# Patient Record
Sex: FEMALE | Race: WHITE | NOT HISPANIC OR LATINO | Employment: OTHER | URBAN - METROPOLITAN AREA
[De-identification: names, ages, dates, MRNs, and addresses within clinical notes are randomized per-mention and may not be internally consistent; named-entity substitution may affect disease eponyms.]

---

## 2017-01-16 ENCOUNTER — GENERIC CONVERSION - ENCOUNTER (OUTPATIENT)
Dept: OTHER | Facility: OTHER | Age: 77
End: 2017-01-16

## 2017-01-18 ENCOUNTER — GENERIC CONVERSION - ENCOUNTER (OUTPATIENT)
Dept: OTHER | Facility: OTHER | Age: 77
End: 2017-01-18

## 2017-02-27 ENCOUNTER — APPOINTMENT (OUTPATIENT)
Dept: LAB | Facility: CLINIC | Age: 77
End: 2017-02-27
Payer: MEDICARE

## 2017-02-27 ENCOUNTER — TRANSCRIBE ORDERS (OUTPATIENT)
Dept: LAB | Facility: CLINIC | Age: 77
End: 2017-02-27

## 2017-02-27 DIAGNOSIS — Z79.899 ENCOUNTER FOR LONG-TERM (CURRENT) USE OF OTHER MEDICATIONS: Primary | ICD-10-CM

## 2017-02-27 DIAGNOSIS — Z79.899 ENCOUNTER FOR LONG-TERM (CURRENT) USE OF OTHER MEDICATIONS: ICD-10-CM

## 2017-02-27 LAB
25(OH)D3 SERPL-MCNC: 22.3 NG/ML (ref 30–100)
ALT SERPL W P-5'-P-CCNC: 21 U/L (ref 12–78)
ANION GAP SERPL CALCULATED.3IONS-SCNC: 6 MMOL/L (ref 4–13)
BUN SERPL-MCNC: 17 MG/DL (ref 5–25)
CALCIUM SERPL-MCNC: 9.1 MG/DL (ref 8.3–10.1)
CHLORIDE SERPL-SCNC: 106 MMOL/L (ref 100–108)
CHOLEST SERPL-MCNC: 199 MG/DL (ref 50–200)
CO2 SERPL-SCNC: 30 MMOL/L (ref 21–32)
CREAT SERPL-MCNC: 0.78 MG/DL (ref 0.6–1.3)
GFR SERPL CREATININE-BSD FRML MDRD: >60 ML/MIN/1.73SQ M
GLUCOSE SERPL-MCNC: 95 MG/DL (ref 65–140)
HCT VFR BLD AUTO: 39.6 % (ref 34.8–46.1)
HDLC SERPL-MCNC: 83 MG/DL (ref 40–60)
HGB BLD-MCNC: 13.2 G/DL (ref 11.5–15.4)
LDLC SERPL CALC-MCNC: 99 MG/DL (ref 0–100)
POTASSIUM SERPL-SCNC: 3.8 MMOL/L (ref 3.5–5.3)
SODIUM SERPL-SCNC: 142 MMOL/L (ref 136–145)
TRIGL SERPL-MCNC: 83 MG/DL

## 2017-02-27 PROCEDURE — 85014 HEMATOCRIT: CPT

## 2017-02-27 PROCEDURE — 80061 LIPID PANEL: CPT

## 2017-02-27 PROCEDURE — 36415 COLL VENOUS BLD VENIPUNCTURE: CPT

## 2017-02-27 PROCEDURE — 84460 ALANINE AMINO (ALT) (SGPT): CPT

## 2017-02-27 PROCEDURE — 82306 VITAMIN D 25 HYDROXY: CPT

## 2017-02-27 PROCEDURE — 80048 BASIC METABOLIC PNL TOTAL CA: CPT

## 2017-02-27 PROCEDURE — 85018 HEMOGLOBIN: CPT

## 2017-04-25 ENCOUNTER — ALLSCRIPTS OFFICE VISIT (OUTPATIENT)
Dept: OTHER | Facility: OTHER | Age: 77
End: 2017-04-25

## 2017-05-03 ENCOUNTER — ANESTHESIA EVENT (OUTPATIENT)
Dept: PERIOP | Facility: AMBULARY SURGERY CENTER | Age: 77
End: 2017-05-03
Payer: MEDICARE

## 2017-05-04 ENCOUNTER — HOSPITAL ENCOUNTER (OUTPATIENT)
Facility: AMBULARY SURGERY CENTER | Age: 77
Setting detail: OUTPATIENT SURGERY
Discharge: HOME | End: 2017-05-04
Attending: ORTHOPAEDIC SURGERY | Admitting: ORTHOPAEDIC SURGERY
Payer: MEDICARE

## 2017-05-04 ENCOUNTER — ANESTHESIA (OUTPATIENT)
Dept: PERIOP | Facility: AMBULARY SURGERY CENTER | Age: 77
End: 2017-05-04
Payer: MEDICARE

## 2017-05-04 VITALS
SYSTOLIC BLOOD PRESSURE: 130 MMHG | OXYGEN SATURATION: 98 % | RESPIRATION RATE: 20 BRPM | HEART RATE: 76 BPM | TEMPERATURE: 97 F | DIASTOLIC BLOOD PRESSURE: 68 MMHG

## 2017-05-04 PROCEDURE — C1713 ANCHOR/SCREW BN/BN,TIS/BN: HCPCS | Performed by: ORTHOPAEDIC SURGERY

## 2017-05-04 PROCEDURE — 93005 ELECTROCARDIOGRAM TRACING: CPT | Performed by: ANESTHESIOLOGY

## 2017-05-04 DEVICE — SYSTEM IMPLANT SPEEDBRIDGE W/4.75 BCMPS SWIVELOCK C: Type: IMPLANTABLE DEVICE | Site: SHOULDER | Status: FUNCTIONAL

## 2017-05-04 RX ORDER — ROPIVACAINE HYDROCHLORIDE 5 MG/ML
INJECTION, SOLUTION EPIDURAL; INFILTRATION; PERINEURAL AS NEEDED
Status: DISCONTINUED | OUTPATIENT
Start: 2017-05-04 | End: 2017-05-04 | Stop reason: SURG

## 2017-05-04 RX ORDER — CLINDAMYCIN PHOSPHATE 900 MG/50ML
900 INJECTION INTRAVENOUS ONCE
Status: COMPLETED | OUTPATIENT
Start: 2017-05-04 | End: 2017-05-04

## 2017-05-04 RX ORDER — PROPOFOL 10 MG/ML
INJECTION, EMULSION INTRAVENOUS AS NEEDED
Status: DISCONTINUED | OUTPATIENT
Start: 2017-05-04 | End: 2017-05-04 | Stop reason: SURG

## 2017-05-04 RX ORDER — OXYCODONE HYDROCHLORIDE AND ACETAMINOPHEN 5; 325 MG/1; MG/1
2 TABLET ORAL EVERY 4 HOURS PRN
Status: DISCONTINUED | OUTPATIENT
Start: 2017-05-04 | End: 2017-05-04 | Stop reason: HOSPADM

## 2017-05-04 RX ORDER — MIDAZOLAM HYDROCHLORIDE 1 MG/ML
INJECTION INTRAMUSCULAR; INTRAVENOUS AS NEEDED
Status: DISCONTINUED | OUTPATIENT
Start: 2017-05-04 | End: 2017-05-04 | Stop reason: SURG

## 2017-05-04 RX ORDER — PROPOFOL 10 MG/ML
INJECTION, EMULSION INTRAVENOUS CONTINUOUS PRN
Status: DISCONTINUED | OUTPATIENT
Start: 2017-05-04 | End: 2017-05-04 | Stop reason: SURG

## 2017-05-04 RX ORDER — SODIUM CHLORIDE, SODIUM LACTATE, POTASSIUM CHLORIDE, CALCIUM CHLORIDE 600; 310; 30; 20 MG/100ML; MG/100ML; MG/100ML; MG/100ML
75 INJECTION, SOLUTION INTRAVENOUS CONTINUOUS
Status: DISCONTINUED | OUTPATIENT
Start: 2017-05-04 | End: 2017-05-04 | Stop reason: HOSPADM

## 2017-05-04 RX ORDER — FENTANYL CITRATE 50 UG/ML
INJECTION, SOLUTION INTRAMUSCULAR; INTRAVENOUS AS NEEDED
Status: DISCONTINUED | OUTPATIENT
Start: 2017-05-04 | End: 2017-05-04 | Stop reason: SURG

## 2017-05-04 RX ADMIN — FENTANYL CITRATE 25 MCG: 50 INJECTION, SOLUTION INTRAMUSCULAR; INTRAVENOUS at 08:18

## 2017-05-04 RX ADMIN — MIDAZOLAM HYDROCHLORIDE 0.5 MG: 1 INJECTION, SOLUTION INTRAMUSCULAR; INTRAVENOUS at 09:05

## 2017-05-04 RX ADMIN — MIDAZOLAM HYDROCHLORIDE 0.5 MG: 1 INJECTION, SOLUTION INTRAMUSCULAR; INTRAVENOUS at 08:18

## 2017-05-04 RX ADMIN — FENTANYL CITRATE 25 MCG: 50 INJECTION, SOLUTION INTRAMUSCULAR; INTRAVENOUS at 09:05

## 2017-05-04 RX ADMIN — OXYCODONE HYDROCHLORIDE AND ACETAMINOPHEN 2 TABLET: 5; 325 TABLET ORAL at 10:51

## 2017-05-04 RX ADMIN — PROPOFOL 50 MG: 10 INJECTION, EMULSION INTRAVENOUS at 09:05

## 2017-05-04 RX ADMIN — ROPIVACAINE HYDROCHLORIDE 30 ML: 5 INJECTION, SOLUTION EPIDURAL; INFILTRATION; PERINEURAL at 08:24

## 2017-05-04 RX ADMIN — PROPOFOL 80 MCG/KG/MIN: 10 INJECTION, EMULSION INTRAVENOUS at 09:05

## 2017-05-04 RX ADMIN — SODIUM CHLORIDE, SODIUM LACTATE, POTASSIUM CHLORIDE, AND CALCIUM CHLORIDE: .6; .31; .03; .02 INJECTION, SOLUTION INTRAVENOUS at 08:00

## 2017-05-04 RX ADMIN — MIDAZOLAM HYDROCHLORIDE 1 MG: 1 INJECTION, SOLUTION INTRAMUSCULAR; INTRAVENOUS at 09:15

## 2017-05-04 RX ADMIN — CLINDAMYCIN PHOSPHATE 600 MG: 18 INJECTION, SOLUTION INTRAMUSCULAR; INTRAVENOUS at 09:02

## 2017-05-04 RX ADMIN — FENTANYL CITRATE 25 MCG: 50 INJECTION, SOLUTION INTRAMUSCULAR; INTRAVENOUS at 09:25

## 2017-05-04 RX ADMIN — FENTANYL CITRATE 25 MCG: 50 INJECTION, SOLUTION INTRAMUSCULAR; INTRAVENOUS at 10:10

## 2017-05-04 RX ADMIN — ROPIVACAINE HYDROCHLORIDE: 2 INJECTION, SOLUTION EPIDURAL; INFILTRATION at 10:37

## 2017-05-05 LAB
ATRIAL RATE: 61 BPM
P AXIS: 71 DEGREES
PR INTERVAL: 150 MS
QRS AXIS: 57 DEGREES
QRSD INTERVAL: 78 MS
QT INTERVAL: 440 MS
QTC INTERVAL: 442 MS
T WAVE AXIS: 43 DEGREES
VENTRICULAR RATE: 61 BPM

## 2017-05-12 ENCOUNTER — ALLSCRIPTS OFFICE VISIT (OUTPATIENT)
Dept: OTHER | Facility: OTHER | Age: 77
End: 2017-05-12

## 2017-06-16 ENCOUNTER — ALLSCRIPTS OFFICE VISIT (OUTPATIENT)
Dept: OTHER | Facility: OTHER | Age: 77
End: 2017-06-16

## 2017-06-16 DIAGNOSIS — M75.121 COMPLETE TEAR OF RIGHT ROTATOR CUFF: ICD-10-CM

## 2017-06-21 ENCOUNTER — GENERIC CONVERSION - ENCOUNTER (OUTPATIENT)
Dept: OTHER | Facility: OTHER | Age: 77
End: 2017-06-21

## 2017-07-28 ENCOUNTER — ALLSCRIPTS OFFICE VISIT (OUTPATIENT)
Dept: OTHER | Facility: OTHER | Age: 77
End: 2017-07-28

## 2017-07-31 ENCOUNTER — GENERIC CONVERSION - ENCOUNTER (OUTPATIENT)
Dept: OTHER | Facility: OTHER | Age: 77
End: 2017-07-31

## 2017-08-04 ENCOUNTER — GENERIC CONVERSION - ENCOUNTER (OUTPATIENT)
Dept: OTHER | Facility: OTHER | Age: 77
End: 2017-08-04

## 2017-09-11 ENCOUNTER — GENERIC CONVERSION - ENCOUNTER (OUTPATIENT)
Dept: OTHER | Facility: OTHER | Age: 77
End: 2017-09-11

## 2017-09-20 ENCOUNTER — TRANSCRIBE ORDERS (OUTPATIENT)
Dept: LAB | Facility: CLINIC | Age: 77
End: 2017-09-20

## 2017-09-20 ENCOUNTER — APPOINTMENT (OUTPATIENT)
Dept: LAB | Facility: CLINIC | Age: 77
End: 2017-09-20
Payer: MEDICARE

## 2017-09-20 DIAGNOSIS — I10 ESSENTIAL HYPERTENSION, BENIGN: Primary | ICD-10-CM

## 2017-09-20 DIAGNOSIS — D64.81 ANEMIA DUE TO CHEMOTHERAPY: ICD-10-CM

## 2017-09-20 DIAGNOSIS — D72.820 PERSISTENT LYMPHOCYTOSIS: ICD-10-CM

## 2017-09-20 DIAGNOSIS — E78.00 PURE HYPERCHOLESTEROLEMIA: ICD-10-CM

## 2017-09-20 DIAGNOSIS — Z79.899 ENCOUNTER FOR LONG-TERM (CURRENT) USE OF OTHER MEDICATIONS: ICD-10-CM

## 2017-09-20 DIAGNOSIS — T45.1X5A ANEMIA DUE TO CHEMOTHERAPY: ICD-10-CM

## 2017-09-20 LAB
ALBUMIN SERPL BCP-MCNC: 3.6 G/DL (ref 3.5–5)
ALP SERPL-CCNC: 81 U/L (ref 46–116)
ALT SERPL W P-5'-P-CCNC: 18 U/L (ref 12–78)
ANION GAP SERPL CALCULATED.3IONS-SCNC: 7 MMOL/L (ref 4–13)
AST SERPL W P-5'-P-CCNC: 13 U/L (ref 5–45)
BASOPHILS # BLD AUTO: 0.04 THOUSANDS/ΜL (ref 0–0.1)
BASOPHILS NFR BLD AUTO: 1 % (ref 0–1)
BILIRUB SERPL-MCNC: 1.17 MG/DL (ref 0.2–1)
BUN SERPL-MCNC: 16 MG/DL (ref 5–25)
CALCIUM SERPL-MCNC: 9.5 MG/DL (ref 8.3–10.1)
CHLORIDE SERPL-SCNC: 106 MMOL/L (ref 100–108)
CHOLEST SERPL-MCNC: 228 MG/DL (ref 50–200)
CO2 SERPL-SCNC: 28 MMOL/L (ref 21–32)
CREAT SERPL-MCNC: 0.82 MG/DL (ref 0.6–1.3)
EOSINOPHIL # BLD AUTO: 0.09 THOUSAND/ΜL (ref 0–0.61)
EOSINOPHIL NFR BLD AUTO: 2 % (ref 0–6)
ERYTHROCYTE [DISTWIDTH] IN BLOOD BY AUTOMATED COUNT: 13.7 % (ref 11.6–15.1)
GFR SERPL CREATININE-BSD FRML MDRD: 68 ML/MIN/1.73SQ M
GLUCOSE P FAST SERPL-MCNC: 94 MG/DL (ref 65–99)
HCT VFR BLD AUTO: 42.4 % (ref 34.8–46.1)
HDLC SERPL-MCNC: 81 MG/DL (ref 40–60)
HGB BLD-MCNC: 14.4 G/DL (ref 11.5–15.4)
LDLC SERPL CALC-MCNC: 133 MG/DL (ref 0–100)
LYMPHOCYTES # BLD AUTO: 2.26 THOUSANDS/ΜL (ref 0.6–4.47)
LYMPHOCYTES NFR BLD AUTO: 46 % (ref 14–44)
MCH RBC QN AUTO: 31.7 PG (ref 26.8–34.3)
MCHC RBC AUTO-ENTMCNC: 34 G/DL (ref 31.4–37.4)
MCV RBC AUTO: 93 FL (ref 82–98)
MONOCYTES # BLD AUTO: 0.46 THOUSAND/ΜL (ref 0.17–1.22)
MONOCYTES NFR BLD AUTO: 9 % (ref 4–12)
NEUTROPHILS # BLD AUTO: 2.08 THOUSANDS/ΜL (ref 1.85–7.62)
NEUTS SEG NFR BLD AUTO: 42 % (ref 43–75)
NRBC BLD AUTO-RTO: 0 /100 WBCS
PLATELET # BLD AUTO: 205 THOUSANDS/UL (ref 149–390)
PMV BLD AUTO: 11.8 FL (ref 8.9–12.7)
POTASSIUM SERPL-SCNC: 3.9 MMOL/L (ref 3.5–5.3)
PROT SERPL-MCNC: 7.2 G/DL (ref 6.4–8.2)
RBC # BLD AUTO: 4.54 MILLION/UL (ref 3.81–5.12)
SODIUM SERPL-SCNC: 141 MMOL/L (ref 136–145)
TRIGL SERPL-MCNC: 71 MG/DL
WBC # BLD AUTO: 4.93 THOUSAND/UL (ref 4.31–10.16)

## 2017-09-20 PROCEDURE — 80061 LIPID PANEL: CPT

## 2017-09-20 PROCEDURE — 80053 COMPREHEN METABOLIC PANEL: CPT

## 2017-09-20 PROCEDURE — 36415 COLL VENOUS BLD VENIPUNCTURE: CPT

## 2017-09-20 PROCEDURE — 85025 COMPLETE CBC W/AUTO DIFF WBC: CPT

## 2017-12-21 ENCOUNTER — TRANSCRIBE ORDERS (OUTPATIENT)
Dept: LAB | Facility: CLINIC | Age: 77
End: 2017-12-21

## 2017-12-22 ENCOUNTER — TRANSCRIBE ORDERS (OUTPATIENT)
Dept: LAB | Facility: CLINIC | Age: 77
End: 2017-12-22

## 2017-12-22 ENCOUNTER — APPOINTMENT (OUTPATIENT)
Dept: LAB | Facility: CLINIC | Age: 77
End: 2017-12-22
Payer: MEDICARE

## 2017-12-22 DIAGNOSIS — R94.5 NONSPECIFIC ABNORMAL RESULTS OF LIVER FUNCTION STUDY: ICD-10-CM

## 2017-12-22 DIAGNOSIS — I10 ESSENTIAL HYPERTENSION, MALIGNANT: Primary | ICD-10-CM

## 2017-12-22 DIAGNOSIS — E78.00 PURE HYPERCHOLESTEROLEMIA: ICD-10-CM

## 2017-12-22 LAB
ALBUMIN SERPL BCP-MCNC: 3.7 G/DL (ref 3.5–5)
ALP SERPL-CCNC: 79 U/L (ref 46–116)
ALT SERPL W P-5'-P-CCNC: 17 U/L (ref 12–78)
ANION GAP SERPL CALCULATED.3IONS-SCNC: 5 MMOL/L (ref 4–13)
AST SERPL W P-5'-P-CCNC: 13 U/L (ref 5–45)
BILIRUB DIRECT SERPL-MCNC: 0.27 MG/DL (ref 0–0.2)
BILIRUB SERPL-MCNC: 1.21 MG/DL (ref 0.2–1)
BUN SERPL-MCNC: 18 MG/DL (ref 5–25)
CALCIUM SERPL-MCNC: 9.4 MG/DL (ref 8.3–10.1)
CHLORIDE SERPL-SCNC: 107 MMOL/L (ref 100–108)
CHOLEST SERPL-MCNC: 196 MG/DL (ref 50–200)
CO2 SERPL-SCNC: 29 MMOL/L (ref 21–32)
CREAT SERPL-MCNC: 0.71 MG/DL (ref 0.6–1.3)
GFR SERPL CREATININE-BSD FRML MDRD: 81 ML/MIN/1.73SQ M
GLUCOSE P FAST SERPL-MCNC: 97 MG/DL (ref 65–99)
HDLC SERPL-MCNC: 87 MG/DL (ref 40–60)
LDLC SERPL CALC-MCNC: 97 MG/DL (ref 0–100)
POTASSIUM SERPL-SCNC: 3.9 MMOL/L (ref 3.5–5.3)
PROT SERPL-MCNC: 7.6 G/DL (ref 6.4–8.2)
SODIUM SERPL-SCNC: 141 MMOL/L (ref 136–145)
TRIGL SERPL-MCNC: 60 MG/DL

## 2017-12-22 PROCEDURE — 36415 COLL VENOUS BLD VENIPUNCTURE: CPT

## 2017-12-22 PROCEDURE — 80053 COMPREHEN METABOLIC PANEL: CPT

## 2017-12-22 PROCEDURE — 80061 LIPID PANEL: CPT

## 2017-12-22 PROCEDURE — 82248 BILIRUBIN DIRECT: CPT

## 2018-01-12 VITALS
HEART RATE: 62 BPM | HEIGHT: 60 IN | WEIGHT: 110.5 LBS | BODY MASS INDEX: 21.69 KG/M2 | DIASTOLIC BLOOD PRESSURE: 74 MMHG | SYSTOLIC BLOOD PRESSURE: 166 MMHG

## 2018-01-13 VITALS
HEART RATE: 66 BPM | HEIGHT: 60 IN | SYSTOLIC BLOOD PRESSURE: 150 MMHG | DIASTOLIC BLOOD PRESSURE: 77 MMHG | BODY MASS INDEX: 21.06 KG/M2 | WEIGHT: 107.25 LBS

## 2018-01-13 VITALS
HEART RATE: 70 BPM | BODY MASS INDEX: 21.6 KG/M2 | DIASTOLIC BLOOD PRESSURE: 82 MMHG | HEIGHT: 60 IN | WEIGHT: 110 LBS | SYSTOLIC BLOOD PRESSURE: 168 MMHG

## 2018-04-17 ENCOUNTER — TRANSCRIBE ORDERS (OUTPATIENT)
Dept: LAB | Facility: CLINIC | Age: 78
End: 2018-04-17

## 2018-04-17 ENCOUNTER — APPOINTMENT (OUTPATIENT)
Dept: LAB | Facility: CLINIC | Age: 78
End: 2018-04-17
Payer: MEDICARE

## 2018-04-17 DIAGNOSIS — I10 ESSENTIAL HYPERTENSION, MALIGNANT: Primary | ICD-10-CM

## 2018-04-17 DIAGNOSIS — D64.89 OTHER SPECIFIED ANEMIAS: ICD-10-CM

## 2018-04-17 DIAGNOSIS — E78.00 PURE HYPERCHOLESTEROLEMIA: ICD-10-CM

## 2018-04-17 LAB
ALT SERPL W P-5'-P-CCNC: 13 U/L (ref 12–78)
ANION GAP SERPL CALCULATED.3IONS-SCNC: 6 MMOL/L (ref 4–13)
BUN SERPL-MCNC: 16 MG/DL (ref 5–25)
CALCIUM SERPL-MCNC: 9.3 MG/DL (ref 8.3–10.1)
CHLORIDE SERPL-SCNC: 109 MMOL/L (ref 100–108)
CO2 SERPL-SCNC: 27 MMOL/L (ref 21–32)
CREAT SERPL-MCNC: 0.94 MG/DL (ref 0.6–1.3)
GFR SERPL CREATININE-BSD FRML MDRD: 58 ML/MIN/1.73SQ M
GLUCOSE SERPL-MCNC: 99 MG/DL (ref 65–140)
HCT VFR BLD AUTO: 42.3 % (ref 34.8–46.1)
HGB BLD-MCNC: 13.7 G/DL (ref 11.5–15.4)
POTASSIUM SERPL-SCNC: 4.2 MMOL/L (ref 3.5–5.3)
SODIUM SERPL-SCNC: 142 MMOL/L (ref 136–145)

## 2018-04-17 PROCEDURE — 85018 HEMOGLOBIN: CPT

## 2018-04-17 PROCEDURE — 80048 BASIC METABOLIC PNL TOTAL CA: CPT

## 2018-04-17 PROCEDURE — 85014 HEMATOCRIT: CPT

## 2018-04-17 PROCEDURE — 84460 ALANINE AMINO (ALT) (SGPT): CPT

## 2018-04-17 PROCEDURE — 36415 COLL VENOUS BLD VENIPUNCTURE: CPT

## 2018-04-25 ENCOUNTER — TRANSCRIBE ORDERS (OUTPATIENT)
Dept: ADMINISTRATIVE | Facility: HOSPITAL | Age: 78
End: 2018-04-25

## 2018-04-25 DIAGNOSIS — R09.81 NASAL CONGESTION: ICD-10-CM

## 2018-04-25 DIAGNOSIS — J30.89 SEASONAL ALLERGIC RHINITIS DUE TO OTHER ALLERGIC TRIGGER: Primary | ICD-10-CM

## 2018-04-30 ENCOUNTER — HOSPITAL ENCOUNTER (OUTPATIENT)
Dept: RADIOLOGY | Facility: HOSPITAL | Age: 78
Discharge: HOME | End: 2018-04-30
Attending: OTOLARYNGOLOGY
Payer: MEDICARE

## 2018-04-30 DIAGNOSIS — R09.81 NASAL CONGESTION: ICD-10-CM

## 2018-04-30 DIAGNOSIS — J30.89 SEASONAL ALLERGIC RHINITIS DUE TO OTHER ALLERGIC TRIGGER: ICD-10-CM

## 2018-04-30 PROCEDURE — 70486 CT MAXILLOFACIAL W/O DYE: CPT

## 2018-09-21 ENCOUNTER — TRANSCRIBE ORDERS (OUTPATIENT)
Dept: LAB | Facility: CLINIC | Age: 78
End: 2018-09-21

## 2018-09-21 ENCOUNTER — APPOINTMENT (OUTPATIENT)
Dept: LAB | Facility: CLINIC | Age: 78
End: 2018-09-21
Payer: MEDICARE

## 2018-09-21 DIAGNOSIS — I10 ESSENTIAL HYPERTENSION, MALIGNANT: Primary | ICD-10-CM

## 2018-09-21 DIAGNOSIS — E55.9 VITAMIN D DEFICIENCY: ICD-10-CM

## 2018-09-21 DIAGNOSIS — D63.8 CHRONIC DISEASE ANEMIA: ICD-10-CM

## 2018-09-21 DIAGNOSIS — E78.00 PURE HYPERCHOLESTEROLEMIA: ICD-10-CM

## 2018-09-21 LAB
25(OH)D3 SERPL-MCNC: 37.2 NG/ML (ref 30–100)
HCT VFR BLD AUTO: 40.4 % (ref 34.8–46.1)
HGB BLD-MCNC: 13.3 G/DL (ref 11.5–15.4)

## 2018-09-21 PROCEDURE — 80053 COMPREHEN METABOLIC PANEL: CPT

## 2018-09-21 PROCEDURE — 85018 HEMOGLOBIN: CPT

## 2018-09-21 PROCEDURE — 36415 COLL VENOUS BLD VENIPUNCTURE: CPT

## 2018-09-21 PROCEDURE — 80061 LIPID PANEL: CPT

## 2018-09-21 PROCEDURE — 82306 VITAMIN D 25 HYDROXY: CPT

## 2018-09-21 PROCEDURE — 85014 HEMATOCRIT: CPT

## 2018-09-22 LAB
ALBUMIN SERPL BCP-MCNC: 3.8 G/DL (ref 3.5–5)
ALP SERPL-CCNC: 67 U/L (ref 46–116)
ALT SERPL W P-5'-P-CCNC: 18 U/L (ref 12–78)
ANION GAP SERPL CALCULATED.3IONS-SCNC: 7 MMOL/L (ref 4–13)
AST SERPL W P-5'-P-CCNC: 13 U/L (ref 5–45)
BILIRUB SERPL-MCNC: 1.18 MG/DL (ref 0.2–1)
BUN SERPL-MCNC: 19 MG/DL (ref 5–25)
CALCIUM SERPL-MCNC: 9.7 MG/DL (ref 8.3–10.1)
CHLORIDE SERPL-SCNC: 109 MMOL/L (ref 100–108)
CHOLEST SERPL-MCNC: 201 MG/DL (ref 50–200)
CO2 SERPL-SCNC: 25 MMOL/L (ref 21–32)
CREAT SERPL-MCNC: 0.87 MG/DL (ref 0.6–1.3)
GFR SERPL CREATININE-BSD FRML MDRD: 63 ML/MIN/1.73SQ M
GLUCOSE P FAST SERPL-MCNC: 92 MG/DL (ref 65–99)
HDLC SERPL-MCNC: 75 MG/DL (ref 40–60)
LDLC SERPL CALC-MCNC: 113 MG/DL (ref 0–100)
NONHDLC SERPL-MCNC: 126 MG/DL
POTASSIUM SERPL-SCNC: 4.5 MMOL/L (ref 3.5–5.3)
PROT SERPL-MCNC: 7.2 G/DL (ref 6.4–8.2)
SODIUM SERPL-SCNC: 141 MMOL/L (ref 136–145)
TRIGL SERPL-MCNC: 63 MG/DL

## 2018-12-18 ENCOUNTER — APPOINTMENT (OUTPATIENT)
Dept: LAB | Facility: CLINIC | Age: 78
End: 2018-12-18
Payer: MEDICARE

## 2018-12-18 ENCOUNTER — TRANSCRIBE ORDERS (OUTPATIENT)
Dept: LAB | Facility: CLINIC | Age: 78
End: 2018-12-18

## 2018-12-18 DIAGNOSIS — I10 ESSENTIAL HYPERTENSION, MALIGNANT: Primary | ICD-10-CM

## 2018-12-18 LAB
ALBUMIN SERPL BCP-MCNC: 3.5 G/DL (ref 3.5–5)
ALP SERPL-CCNC: 68 U/L (ref 46–116)
ALT SERPL W P-5'-P-CCNC: 18 U/L (ref 12–78)
ANION GAP SERPL CALCULATED.3IONS-SCNC: 6 MMOL/L (ref 4–13)
AST SERPL W P-5'-P-CCNC: 12 U/L (ref 5–45)
BILIRUB SERPL-MCNC: 0.58 MG/DL (ref 0.2–1)
BUN SERPL-MCNC: 21 MG/DL (ref 5–25)
CALCIUM SERPL-MCNC: 9.6 MG/DL (ref 8.3–10.1)
CHLORIDE SERPL-SCNC: 107 MMOL/L (ref 100–108)
CO2 SERPL-SCNC: 25 MMOL/L (ref 21–32)
CREAT SERPL-MCNC: 0.87 MG/DL (ref 0.6–1.3)
GFR SERPL CREATININE-BSD FRML MDRD: 63 ML/MIN/1.73SQ M
GLUCOSE SERPL-MCNC: 102 MG/DL (ref 65–140)
POTASSIUM SERPL-SCNC: 4 MMOL/L (ref 3.5–5.3)
PROT SERPL-MCNC: 7 G/DL (ref 6.4–8.2)
SODIUM SERPL-SCNC: 138 MMOL/L (ref 136–145)

## 2018-12-18 PROCEDURE — 36415 COLL VENOUS BLD VENIPUNCTURE: CPT

## 2018-12-18 PROCEDURE — 80053 COMPREHEN METABOLIC PANEL: CPT

## 2019-03-12 ENCOUNTER — TRANSCRIBE ORDERS (OUTPATIENT)
Dept: ADMINISTRATIVE | Facility: HOSPITAL | Age: 79
End: 2019-03-12

## 2019-03-12 DIAGNOSIS — R06.02 SOB (SHORTNESS OF BREATH): Primary | ICD-10-CM

## 2019-03-12 DIAGNOSIS — I34.0 MITRAL VALVE INSUFFICIENCY, UNSPECIFIED ETIOLOGY: ICD-10-CM

## 2019-03-25 ENCOUNTER — HOSPITAL ENCOUNTER (OUTPATIENT)
Dept: NON INVASIVE DIAGNOSTICS | Facility: HOSPITAL | Age: 79
Discharge: HOME | End: 2019-03-25
Attending: INTERNAL MEDICINE
Payer: MEDICARE

## 2019-03-25 DIAGNOSIS — I34.0 MITRAL VALVE INSUFFICIENCY, UNSPECIFIED ETIOLOGY: ICD-10-CM

## 2019-03-25 DIAGNOSIS — R06.02 SOB (SHORTNESS OF BREATH): ICD-10-CM

## 2019-03-25 PROCEDURE — 93306 TTE W/DOPPLER COMPLETE: CPT

## 2019-03-26 PROCEDURE — 93306 TTE W/DOPPLER COMPLETE: CPT | Performed by: INTERNAL MEDICINE

## 2019-05-09 ENCOUNTER — TRANSCRIBE ORDERS (OUTPATIENT)
Dept: LAB | Facility: CLINIC | Age: 79
End: 2019-05-09

## 2019-05-09 ENCOUNTER — APPOINTMENT (OUTPATIENT)
Dept: LAB | Facility: CLINIC | Age: 79
End: 2019-05-09
Payer: MEDICARE

## 2019-05-09 DIAGNOSIS — E55.9 AVITAMINOSIS D: ICD-10-CM

## 2019-05-09 DIAGNOSIS — I10 ESSENTIAL HYPERTENSION, MALIGNANT: ICD-10-CM

## 2019-05-09 DIAGNOSIS — E78.00 PURE HYPERCHOLESTEROLEMIA: ICD-10-CM

## 2019-05-09 DIAGNOSIS — I10 ESSENTIAL HYPERTENSION, MALIGNANT: Primary | ICD-10-CM

## 2019-05-09 LAB
25(OH)D3 SERPL-MCNC: 39.2 NG/ML (ref 30–100)
ALBUMIN SERPL BCP-MCNC: 3.6 G/DL (ref 3.5–5)
ALP SERPL-CCNC: 63 U/L (ref 46–116)
ALT SERPL W P-5'-P-CCNC: 29 U/L (ref 12–78)
ANION GAP SERPL CALCULATED.3IONS-SCNC: 5 MMOL/L (ref 4–13)
AST SERPL W P-5'-P-CCNC: 15 U/L (ref 5–45)
BILIRUB SERPL-MCNC: 0.83 MG/DL (ref 0.2–1)
BUN SERPL-MCNC: 31 MG/DL (ref 5–25)
CALCIUM SERPL-MCNC: 9 MG/DL (ref 8.3–10.1)
CHLORIDE SERPL-SCNC: 105 MMOL/L (ref 100–108)
CHOLEST SERPL-MCNC: 219 MG/DL (ref 50–200)
CO2 SERPL-SCNC: 29 MMOL/L (ref 21–32)
CREAT SERPL-MCNC: 1.17 MG/DL (ref 0.6–1.3)
GFR SERPL CREATININE-BSD FRML MDRD: 44 ML/MIN/1.73SQ M
GLUCOSE P FAST SERPL-MCNC: 95 MG/DL (ref 65–99)
HCT VFR BLD AUTO: 40.7 % (ref 34.8–46.1)
HDLC SERPL-MCNC: 70 MG/DL (ref 40–60)
HGB BLD-MCNC: 13.2 G/DL (ref 11.5–15.4)
LDLC SERPL CALC-MCNC: 134 MG/DL (ref 0–100)
NONHDLC SERPL-MCNC: 149 MG/DL
POTASSIUM SERPL-SCNC: 4.3 MMOL/L (ref 3.5–5.3)
PROT SERPL-MCNC: 7.3 G/DL (ref 6.4–8.2)
SODIUM SERPL-SCNC: 139 MMOL/L (ref 136–145)
TRIGL SERPL-MCNC: 77 MG/DL

## 2019-05-09 PROCEDURE — 85018 HEMOGLOBIN: CPT

## 2019-05-09 PROCEDURE — 80053 COMPREHEN METABOLIC PANEL: CPT

## 2019-05-09 PROCEDURE — 82306 VITAMIN D 25 HYDROXY: CPT

## 2019-05-09 PROCEDURE — 85014 HEMATOCRIT: CPT

## 2019-05-09 PROCEDURE — 80061 LIPID PANEL: CPT

## 2019-05-09 PROCEDURE — 36415 COLL VENOUS BLD VENIPUNCTURE: CPT

## 2019-08-22 PROBLEM — Z87.898 HISTORY OF SYNCOPE: Status: ACTIVE | Noted: 2019-08-22

## 2019-08-22 PROBLEM — G43.909 MIGRAINE: Status: ACTIVE | Noted: 2019-08-22

## 2019-08-22 PROBLEM — R60.0 EDEMA OF EXTREMITIES: Status: ACTIVE | Noted: 2019-08-22

## 2019-08-22 PROBLEM — J30.9 RHINITIS, ALLERGIC: Status: ACTIVE | Noted: 2019-08-22

## 2019-08-22 PROBLEM — K21.9 GASTROESOPHAGEAL REFLUX DISEASE: Status: ACTIVE | Noted: 2019-08-22

## 2019-08-22 PROBLEM — I34.0 MITRAL REGURGITATION: Status: ACTIVE | Noted: 2019-08-22

## 2019-08-22 PROBLEM — G25.81 RESTLESS LEGS: Status: ACTIVE | Noted: 2019-08-22

## 2019-08-22 PROBLEM — Z78.9 ASPIRIN INTOLERANCE: Status: ACTIVE | Noted: 2019-08-22

## 2019-08-22 PROBLEM — B02.9 HERPES ZOSTER: Status: ACTIVE | Noted: 2019-08-22

## 2019-08-22 PROBLEM — D72.820 PERSISTENT LYMPHOCYTOSIS: Status: ACTIVE | Noted: 2019-08-22

## 2019-08-22 PROBLEM — R93.1 ABNORMAL ECHOCARDIOGRAM: Status: ACTIVE | Noted: 2019-08-22

## 2019-08-22 PROBLEM — E55.9 VITAMIN D DEFICIENCY: Status: ACTIVE | Noted: 2019-08-22

## 2019-08-22 PROBLEM — R73.02 GLUCOSE INTOLERANCE (IMPAIRED GLUCOSE TOLERANCE): Status: ACTIVE | Noted: 2019-08-22

## 2019-08-22 PROBLEM — E87.6 HYPOKALEMIA: Status: ACTIVE | Noted: 2019-08-22

## 2019-08-22 PROBLEM — E78.00 HYPERCHOLESTEREMIA: Status: ACTIVE | Noted: 2019-08-22

## 2019-08-22 PROBLEM — K58.9 IRRITABLE BOWEL SYNDROME: Status: ACTIVE | Noted: 2019-08-22

## 2019-08-22 PROBLEM — R17 ELEVATED BILIRUBIN: Status: ACTIVE | Noted: 2019-08-22

## 2019-08-22 PROBLEM — K57.90 DIVERTICULOSIS: Status: ACTIVE | Noted: 2019-08-22

## 2019-08-22 PROBLEM — I10 ESSENTIAL HYPERTENSION: Status: ACTIVE | Noted: 2019-08-22

## 2019-08-22 PROBLEM — I83.90 VARICOSE VEIN OF LEG: Status: ACTIVE | Noted: 2019-08-22

## 2019-11-20 PROBLEM — J20.8 ACUTE BRONCHITIS DUE TO OTHER SPECIFIED ORGANISMS: Status: ACTIVE | Noted: 2019-11-20

## 2020-01-13 ENCOUNTER — TELEPHONE (OUTPATIENT)
Dept: SURGERY | Facility: CLINIC | Age: 80
End: 2020-01-13

## 2020-01-16 ENCOUNTER — CONSULT (OUTPATIENT)
Dept: SURGERY | Facility: CLINIC | Age: 80
End: 2020-01-16
Payer: MEDICARE

## 2020-01-16 VITALS
DIASTOLIC BLOOD PRESSURE: 82 MMHG | TEMPERATURE: 98.3 F | WEIGHT: 122.5 LBS | HEIGHT: 61 IN | SYSTOLIC BLOOD PRESSURE: 134 MMHG | BODY MASS INDEX: 23.13 KG/M2 | HEART RATE: 69 BPM

## 2020-01-16 DIAGNOSIS — M20.42 HAMMER TOE OF LEFT FOOT: Primary | ICD-10-CM

## 2020-01-16 PROCEDURE — 99203 OFFICE O/P NEW LOW 30 MIN: CPT | Performed by: SPECIALIST

## 2020-01-16 NOTE — PROGRESS NOTES
History and Physical Examination - General Surgery   [facility]  Jessie Pal 78 y o  female MRN: [id number]  Unit/Bed#:  Encounter: [id number] PCP: Delon Barrios MD    History of Present Illness   Chief Complaint:    I have a hammertoe and I want to have the surgery done for it    HPI:  Jessie Simmons is a 78 y o  female who presents to office with hammertoe left 2nd  With the little ulcer which patient has developed since she has started wearing new shoes which are slightly tight    Patient is here for hammertoe surgery    Historical Information   The following portions of the patient's history were reviewed and updated as appropriate  Past Medical History:   Diagnosis Date    Allergic rhinitis     Arthritis     Baker's cyst of knee 10/21/2016    right- burst on its own    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    hx of-fell when ice skating  Noted brain bleed w/swelling    Contact lens/glasses fitting     contact only in left eye    GERD (gastroesophageal reflux disease)     History of shingles     bilateral eyes-x3 episodes    HL (hearing loss)     Hypertension     recent elevation put on norvasc-now controlled    Loss of smell     since brain injury 1992    Migraine     PONV (postoperative nausea and vomiting)     Raynaud's disease     both hands    Seasonal allergies     Shoulder problem 10/2016    rotator cuff issue-right    Tendonitis of shoulder     right     Past Surgical History:   Procedure Laterality Date    AXILLARY SURGERY Left     fatty cyst removed, benign    CATARACT EXTRACTION Bilateral     CATARACT EXTRACTION W/ INTRAOCULAR LENS IMPLANT Right 11/10/2016    Procedure: EXTRACTION EXTRACAPSULAR CATARACT PHACO INTRAOCULAR LENS (IOL);   Surgeon: Nestor Galvan MD;  Location: [facility] MAIN OR;  Service:    [address] UTERUS      MYRINGOTOMY W/ TUBES  2011    both ears-one tube out on the left    MYRINGOTOMY W/ TUBES  UT SHLDR ARTHROSCOP,SURG,W/ROTAT CUFF REPR Right 2017    Procedure: ARTHROSCOPY SHOULDER WITH ROTATOR CUFF REPAIR, BICEPS TENODESIS, AND SUBACROMIAL DECOMPRESSION;  Surgeon: Ike Whitehead MD;  Location: [place] [address] MAIN OR;  Service: Orthopedics    UT XCAPSL CTRC RMVL INSJ IO LENS PROSTH W/O ECP Left 10/20/2016    Procedure: EXTRACTION EXTRACAPSULAR CATARACT PHACO INTRAOCULAR LENS (IOL);   Surgeon: Alejo Ahn MD;  Location: [facility] MAIN OR;  Service: Ophthalmology    SKIN BIOPSY      mole on chest    SKIN BIOPSY      under breast, benign     Social History   Social History     Substance and Sexual Activity   Alcohol Use Yes    Comment: socially     Social History     Substance and Sexual Activity   Drug Use No     Social History     Tobacco Use   Smoking Status Former Smoker    Packs/day: 1 50    Years: 30 00    Pack years: 45 00    Last attempt to quit: Trerebel Byers Years since quittin 0   Smokeless Tobacco Never Used     Family History:   Family History   Problem Relation Age of Onset    Heart disease Mother         exp age 59 MI    Stroke Father     Macular degeneration Sister     Macular degeneration Brother     Diabetes Brother     Cancer Brother         kidney-nephrectomy    Kidney disease Brother         cancer     Meds/Allergies   Allergies   Allergen Reactions    Lactose GI Intolerance    Latex Itching     Elastic,adhesive tape    Dilantin [Phenytoin Sodium Extended] Rash    Other Rash     Adhesive tape, environmental    Penicillins Rash       Current Outpatient Medications:     acetaminophen (TYLENOL) 500 mg tablet, Take 1,000 mg by mouth every 6 (six) hours as needed for mild pain, Disp: , Rfl:     ALPHAGAN P 0 1 %, , Disp: , Rfl: 0    ascorbic acid (VITAMIN C) 500 mg tablet, Take 500 mg by mouth every morning, Disp: , Rfl:     Azelastine-Fluticasone (DYMISTA NA), into each nostril 2 (two) times a day  , Disp: , Rfl:     Biotin (BIOTIN 5000) 5 MG CAPS, Take by mouth every morning, Disp: , Rfl:     Calcium Carbonate-Vitamin D (CALTRATE 600+D PO), Take by mouth every morning, Disp: , Rfl:     Cyanocobalamin (VITAMIN B 12 PO), Take by mouth every morning, Disp: , Rfl:     furosemide (LASIX) 40 mg tablet, Take 1 tablet (40 mg total) by mouth every morning, Disp: 90 tablet, Rfl: 1    ipratropium (ATROVENT) 0 06 % nasal spray, instill 2 sprays into each nostril four times a day if needed, Disp: 15 mL, Rfl: 6    levocetirizine (XYZAL) 5 MG tablet, Take 1 tablet (5 mg total) by mouth every evening, Disp: 90 tablet, Rfl: 3    Lutein 40 MG CAPS, Take by mouth every morning, Disp: , Rfl:     Magnesium 250 MG TABS, Take by mouth every morning, Disp: , Rfl:     Misc Natural Products (TUMERSAID PO), Take 1 tablet by mouth daily, Disp: , Rfl:     olmesartan (BENICAR) 40 mg tablet, Take 1 tablet (40 mg total) by mouth daily, Disp: 90 tablet, Rfl: 1    omeprazole (PriLOSEC) 20 mg delayed release capsule, take 1 capsule by mouth once daily, Disp: 30 capsule, Rfl: 0    Potassium Chloride ER 20 MEQ TBCR, take 1 tablet by mouth once daily, Disp: 90 tablet, Rfl: 0    SUMAtriptan (IMITREX) 50 mg tablet, Take 50 mg by mouth once as needed for migraine, Disp: , Rfl:     vitamin A 7500 UNIT capsule, Take 7,500 Units by mouth every morning  , Disp: , Rfl:     vitamin E, tocopherol, 400 units capsule, Take 400 Units by mouth every morning Last dose 4/26/17, Disp: , Rfl:     zinc gluconate 50 mg tablet, Take 50 mg by mouth every morning, Disp: , Rfl:      REVIEW OF SYSTEMS  Constitutional:  Denies fever or chills   Eyes:  Denies change in visual acuity   HENT:  Denies nasal congestion or sore throat   Respiratory:  Denies cough or shortness of breath   Cardiovascular:  Denies chest pain or edema   GI:  Denies abdominal pain, nausea, vomiting, bloody stools or diarrhea   :  Denies dysuria, frequency, difficulty in micturition and nocturia  Musculoskeletal:  Denies back pain or joint pain Neurologic:  Denies headache, focal weakness or sensory changes   Endocrine:  Denies polyuria or polydipsia   Lymphatic:  Denies swollen glands   Psychiatric:  Denies depression or anxiety     Objective   Current Vitals:   /82 (BP Location: Right arm, Patient Position: Sitting, Cuff Size: Standard)   Pulse 69   Temp 98 3 °F (36 8 °C)   Ht 5' 1" (1 549 m)   Wt 55 6 kg (122 lb 8 oz)   BMI 23 15 kg/m²   Body mass index is 23 15 kg/m²  PHYSICAL EXAMS  General:  Patient is not in acute distress, laying in the bed comfortably, awake, alert responding to commands,   HEENT:  Both pupils normal-size atraumatic, normocephalic, nonicteric  Neck:  JVP not raised  Trachea central  Respiratory:  normal Breath sounds clear to auscultation,  Cardiovascular:  S1-S2 normal without any murmur   GI:  Abdomen soft nontender  Musculoskeletal:  No back pain  Integument:  No skin rashes or ulceration  Neurologic:  Patient is awake alert, responding to command, well-oriented to time and place and person moving all extremities ambulating well    2nd toe hammertoe  With a small ulcer over the dorsum of the toe 0 1 x 0 1cm    Visit Diagnosis:   Diagnoses and all orders for this visit:    Hammer toe of left foot       Plan of care was discussed with patient in detail    Pertinent labs reviewed  Pertinent images and available reads personally reviewed  No results found  Pertinent notes reviewed    Assessment/Plan   Assessment:  Hammertoe with the early pressure ulcer    Plan:  Local wound care with bacitracin ointment  Referred to podiatrist Dr Rich Pepe / Coordination of Care  Expained patient  in detailed about coordination of care  A description of the counseling / coordination of care:  I performed an interim history, pertinent images and labs, performed a physical examination to arrive at the plan delineated above with associated thought processes         Dr Marika Sellers MD [id number] [facility]    Office   Tel  [phone]  Fax   [phone]

## 2020-02-11 ENCOUNTER — OFFICE VISIT (OUTPATIENT)
Dept: PODIATRY | Facility: CLINIC | Age: 80
End: 2020-02-11
Payer: MEDICARE

## 2020-02-11 VITALS
RESPIRATION RATE: 16 BRPM | DIASTOLIC BLOOD PRESSURE: 83 MMHG | WEIGHT: 122 LBS | HEIGHT: 61 IN | HEART RATE: 85 BPM | BODY MASS INDEX: 23.03 KG/M2 | SYSTOLIC BLOOD PRESSURE: 131 MMHG

## 2020-02-11 DIAGNOSIS — M21.961 ACQUIRED DEFORMITY OF RIGHT FOOT: ICD-10-CM

## 2020-02-11 DIAGNOSIS — M20.41 HAMMER TOE OF RIGHT FOOT: ICD-10-CM

## 2020-02-11 DIAGNOSIS — M20.11 HALLUX VALGUS OF RIGHT FOOT: Primary | ICD-10-CM

## 2020-02-11 DIAGNOSIS — M79.671 RIGHT FOOT PAIN: ICD-10-CM

## 2020-02-11 PROCEDURE — 99203 OFFICE O/P NEW LOW 30 MIN: CPT | Performed by: PODIATRIST

## 2020-02-11 PROCEDURE — 73630 X-RAY EXAM OF FOOT: CPT | Performed by: PODIATRIST

## 2020-02-11 NOTE — PROGRESS NOTES
Assessment/Plan:  Pain upon ambulation  Pes planus  Hallux valgus deformity with secondary overlapping 2nd right hammertoe  Plan  Foot exam performed  X-rays taken  Patient educated on condition  Patient may need surgical intervention  This would include Naren bunionectomy with Perez osteotomy  In addition she would need 2nd toe pinning after hammertoe surgery  Today her corn on the dorsum of the 2nd right toe debrided  Gentian violet applied  She will bandage daily  Diagnoses and all orders for this visit:    Hallux valgus of right foot    Hammer toe of right foot    Right foot pain    Acquired deformity of right foot          Subjective:  Patient is seen on referral   Patient has pain in her foot  She has pain with ambulation and shoe wear  This has been ongoing for years  No history of trauma  She gets sores on the top of her 2nd right toe  She has never had an occult infection    Allergies   Allergen Reactions    Lactose GI Intolerance    Latex Itching     Elastic,adhesive tape    Dilantin [Phenytoin Sodium Extended] Rash    Other Rash     Adhesive tape, environmental    Penicillins Rash         Current Outpatient Medications:     acetaminophen (TYLENOL) 500 mg tablet, Take 1,000 mg by mouth every 6 (six) hours as needed for mild pain, Disp: , Rfl:     ALPHAGAN P 0 1 %, , Disp: , Rfl: 0    ascorbic acid (VITAMIN C) 500 mg tablet, Take 500 mg by mouth every morning, Disp: , Rfl:     Azelastine-Fluticasone (DYMISTA NA), into each nostril 2 (two) times a day  , Disp: , Rfl:     Biotin (BIOTIN 5000) 5 MG CAPS, Take by mouth every morning, Disp: , Rfl:     Calcium Carbonate-Vitamin D (CALTRATE 600+D PO), Take by mouth every morning, Disp: , Rfl:     Cyanocobalamin (VITAMIN B 12 PO), Take by mouth every morning, Disp: , Rfl:     furosemide (LASIX) 40 mg tablet, Take 1 tablet (40 mg total) by mouth every morning, Disp: 90 tablet, Rfl: 1    ipratropium (ATROVENT) 0 06 % nasal spray, instill 2 sprays into each nostril four times a day if needed, Disp: 15 mL, Rfl: 6    levocetirizine (XYZAL) 5 MG tablet, Take 1 tablet (5 mg total) by mouth every evening, Disp: 90 tablet, Rfl: 3    Lutein 40 MG CAPS, Take by mouth every morning, Disp: , Rfl:     Magnesium 250 MG TABS, Take by mouth every morning, Disp: , Rfl:     Misc Natural Products (TUMERSAID PO), Take 1 tablet by mouth daily, Disp: , Rfl:     olmesartan (BENICAR) 40 mg tablet, Take 1 tablet (40 mg total) by mouth daily, Disp: 90 tablet, Rfl: 1    omeprazole (PriLOSEC) 20 mg delayed release capsule, take 1 capsule by mouth once daily, Disp: 30 capsule, Rfl: 0    Potassium Chloride ER 20 MEQ TBCR, take 1 tablet by mouth once daily, Disp: 90 tablet, Rfl: 0    SUMAtriptan (IMITREX) 50 mg tablet, Take 50 mg by mouth once as needed for migraine, Disp: , Rfl:     vitamin A 7500 UNIT capsule, Take 7,500 Units by mouth every morning  , Disp: , Rfl:     vitamin E, tocopherol, 400 units capsule, Take 400 Units by mouth every morning Last dose 4/26/17, Disp: , Rfl:     zinc gluconate 50 mg tablet, Take 50 mg by mouth every morning, Disp: , Rfl:     Patient Active Problem List   Diagnosis    Baker cyst, right    Complete tear of right rotator cuff    Right rotator cuff tendinitis    Gastroesophageal reflux disease without esophagitis    Glucose intolerance (impaired glucose tolerance)    Restless legs    Hypercholesteremia    Mitral regurgitation    Migraine    Essential hypertension    Varicose vein of leg    Edema of extremities    Rhinitis, allergic    Aspirin intolerance    Persistent lymphocytosis    Hypokalemia    Irritable bowel syndrome    Abnormal echocardiogram    Vitamin D deficiency    Diverticulosis    Herpes zoster    History of syncope    Acute bronchitis due to other specified organisms    Hammer toe of left foot          Patient ID: Georgiana Snellen is a 78 y o  female      HPI    The following portions of the patient's history were reviewed and updated as appropriate:     family history includes Cancer in her brother; Diabetes in her brother; Heart disease in her mother; Kidney disease in her brother; Macular degeneration in her brother and sister; Stroke in her father  reports that she quit smoking about 25 years ago  She has a 45 00 pack-year smoking history  She has never used smokeless tobacco  She reports that she drinks alcohol  She reports that she does not use drugs  Vitals:    02/11/20 1519   BP: 131/83   Pulse: 85   Resp: 16       Review of Systems      Objective:  Patient's shoes and socks removed  Foot Exam    General  General Appearance: appears stated age and healthy   Orientation: alert and oriented to person, place, and time   Affect: appropriate   Gait: antalgic       Right Foot/Ankle     Inspection and Palpation  Ecchymosis: none  Tenderness: bony tenderness, great toe metatarsophalangeal joint and lesser metatarsophalangeal joints   Swelling: great toe metatarsophalangeal joint   Arch: pes planus  Hammertoes: second toe and fifth toe  Hallux valgus: yes  Hallux limitus: yes  Skin Exam: callus and dry skin;     Neurovascular  Dorsalis pedis: 3+  Posterior tibial: 3+  Saphenous nerve sensation: normal  Tibial nerve sensation: normal  Superficial peroneal nerve sensation: normal  Deep peroneal nerve sensation: normal  Sural nerve sensation: normal      Left Foot/Ankle      Inspection and Palpation  Ecchymosis: none  Tenderness: none   Arch: pes planus  Hammertoes: fifth toe  Hallux valgus: no  Hallux limitus: yes  Skin Exam: callus and dry skin;     Neurovascular  Dorsalis pedis: 3+  Posterior tibial: 3+  Saphenous nerve sensation: normal  Tibial nerve sensation: normal  Superficial peroneal nerve sensation: normal  Deep peroneal nerve sensation: normal  Sural nerve sensation: normal        Physical Exam   Constitutional: She is oriented to person, place, and time   She appears well-developed and well-nourished  Cardiovascular: Normal rate and regular rhythm  Pulses:       Dorsalis pedis pulses are 3+ on the right side, and 3+ on the left side  Posterior tibial pulses are 3+ on the right side, and 3+ on the left side  Musculoskeletal:        Right foot: There is bony tenderness  Patient is pronated stance and gait  She has large hallux valgus deformity of the right foot  It is track bound  There is overlapping hammertoe of the 2nd right toe  It is rigid  X-ray demonstrates hammertoe and bunion formation  Mild osteopenia noted  Osteoarthritis noted as well   Feet:   Right Foot:   Skin Integrity: Positive for callus and dry skin  Left Foot:   Skin Integrity: Positive for callus and dry skin  Neurological: She is alert and oriented to person, place, and time  Skin: Skin is warm and dry  Capillary refill takes less than 2 seconds  Pre ulcer/corn over 2nd right PIPJ  Psychiatric: She has a normal mood and affect  Her behavior is normal  Judgment and thought content normal    Vitals reviewed

## 2020-05-19 PROBLEM — J20.8 ACUTE BRONCHITIS DUE TO OTHER SPECIFIED ORGANISMS: Status: RESOLVED | Noted: 2019-11-20 | Resolved: 2020-05-19

## 2020-05-19 PROBLEM — I34.0 MITRAL REGURGITATION: Status: RESOLVED | Noted: 2019-08-22 | Resolved: 2020-05-19

## 2020-05-19 PROBLEM — I73.00 RAYNAUD'S DISEASE: Status: ACTIVE | Noted: 2020-05-19

## 2020-08-19 ENCOUNTER — OFFICE VISIT (OUTPATIENT)
Dept: URGENT CARE | Facility: CLINIC | Age: 80
End: 2020-08-19
Payer: MEDICARE

## 2020-08-19 VITALS — OXYGEN SATURATION: 96 % | RESPIRATION RATE: 14 BRPM | HEART RATE: 88 BPM | TEMPERATURE: 98.6 F

## 2020-08-19 DIAGNOSIS — Z20.822 SUSPECTED COVID-19 VIRUS INFECTION: Primary | ICD-10-CM

## 2020-08-19 DIAGNOSIS — R52 BODY ACHES: ICD-10-CM

## 2020-08-19 PROCEDURE — 1160F RVW MEDS BY RX/DR IN RCRD: CPT | Performed by: FAMILY MEDICINE

## 2020-08-19 PROCEDURE — 99212 OFFICE O/P EST SF 10 MIN: CPT | Performed by: FAMILY MEDICINE

## 2020-08-19 PROCEDURE — 3079F DIAST BP 80-89 MM HG: CPT | Performed by: FAMILY MEDICINE

## 2020-08-19 PROCEDURE — 1036F TOBACCO NON-USER: CPT | Performed by: FAMILY MEDICINE

## 2020-08-19 PROCEDURE — U0003 INFECTIOUS AGENT DETECTION BY NUCLEIC ACID (DNA OR RNA); SEVERE ACUTE RESPIRATORY SYNDROME CORONAVIRUS 2 (SARS-COV-2) (CORONAVIRUS DISEASE [COVID-19]), AMPLIFIED PROBE TECHNIQUE, MAKING USE OF HIGH THROUGHPUT TECHNOLOGIES AS DESCRIBED BY CMS-2020-01-R: HCPCS | Performed by: FAMILY MEDICINE

## 2020-08-19 PROCEDURE — 3075F SYST BP GE 130 - 139MM HG: CPT | Performed by: FAMILY MEDICINE

## 2020-08-19 NOTE — PATIENT INSTRUCTIONS
Patient tested for COVID-19 in office today  She was informed that we will call her with test results and further instructions at that time  Until then she is to self isolate at home, rest, drink plenty of fluids, take Tylenol as needed  It was explained to the patient that because she traveled to [place] which is a [facility] state, she must remain at home self quarantine for a minimum of 14 days even if her symptoms resolve and COVID-19 test result is negative  Work excuse note provided  I explained that if her symptoms persist despite treatment, worsen, or any new symptoms present, such as but not limited to, persistent high fever, chest pain, or shortness of breath, she is to be seen in the ER  For any follow up care or clearance to return to work, patient is to follow up with her PCP  Patient verbalizes understanding and agrees w/ treatment plan

## 2020-08-19 NOTE — PROGRESS NOTES
[facility]        NAME: Shweta Livingston is a 78 y o  female  : 1940    MRN: [id number]  DATE: 2020  TIME: 3:52 PM    Assessment and Plan   Suspected Covid-19 Virus Infection [Z20 828]  1  Suspected Covid-19 Virus Infection  Novel Coronavirus (COVID-19), PCR [facility] - Collected at [facility]         Patient Instructions     Patient Instructions   Patient tested for COVID-19 in office today  She was informed that we will call her with test results and further instructions at that time  Until then she is to self isolate at home, rest, drink plenty of fluids, take Tylenol as needed  It was explained to the patient that because she traveled to [place] which is a restricted state, she must remain at home self quarantine for a minimum of 14 days even if her symptoms resolve and COVID-19 test result is negative  Work excuse note provided  I explained that if her symptoms persist despite treatment, worsen, or any new symptoms present, such as but not limited to, persistent high fever, chest pain, or shortness of breath, she is to be seen in the ER  For any follow up care or clearance to return to work, patient is to follow up with her PCP  Patient verbalizes understanding and agrees w/ treatment plan  Follow up with PCP in 3-5 days  Proceed to  ER if symptoms worsen  Chief Complaint     Chief Complaint   Patient presents with    COVID-19         History of Present Illness       79 yo female presents for COVID-19 testing  She recently traveled to [place] and returned 4 days ago  She is currently experiencing subjective feeling of fever, chills, headache, body aches, nasal congestion, rhinorrhea, and post-nasal drip x 2 days  No cough or sore throat  No chest pain, SOB, or wheezing  Non-smoker  No GI sx  No skin rashes  She has taken Tylenol for the symptoms  She denies any known exposure to anyone with confirmed COVID-19  She has never been tested for COVID-19 prior to today  Review of Systems   Review of Systems   Constitutional:        As noted in HPI   HENT:        As noted in HPI   Eyes: Negative  Respiratory: Negative  Cardiovascular: Negative  Gastrointestinal: Negative  Genitourinary: Negative  Musculoskeletal: Negative  Skin: Negative  Hematological: Negative            Current Medications       Current Outpatient Medications:     acetaminophen (TYLENOL) 500 mg tablet, Take 1,000 mg by mouth every 6 (six) hours as needed for mild pain, Disp: , Rfl:     ALPHAGAN P 0 1 %, , Disp: , Rfl: 0    ascorbic acid (VITAMIN C) 500 mg tablet, Take 500 mg by mouth every morning, Disp: , Rfl:     Azelastine-Fluticasone (DYMISTA NA), into each nostril 2 (two) times a day  , Disp: , Rfl:     Biotin (BIOTIN 5000) 5 MG CAPS, Take by mouth every morning, Disp: , Rfl:     Calcium Carbonate-Vitamin D (CALTRATE 600+D PO), Take by mouth every morning, Disp: , Rfl:     Cyanocobalamin (VITAMIN B 12 PO), Take by mouth every morning, Disp: , Rfl:     furosemide (LASIX) 40 mg tablet, Take 1 tablet (40 mg total) by mouth every morning, Disp: 90 tablet, Rfl: 1    ipratropium (ATROVENT) 0 06 % nasal spray, instill 2 sprays into each nostril four times a day if needed, Disp: 15 mL, Rfl: 6    levocetirizine (XYZAL) 5 MG tablet, Take 1 tablet (5 mg total) by mouth every evening, Disp: 90 tablet, Rfl: 3    Lutein 40 MG CAPS, Take by mouth every morning, Disp: , Rfl:     Magnesium 250 MG TABS, Take by mouth every morning, Disp: , Rfl:     Misc Natural Products (TUMERSAID PO), Take 1 tablet by mouth daily, Disp: , Rfl:     olmesartan (BENICAR) 40 mg tablet, Take 1 tablet (40 mg total) by mouth daily, Disp: 90 tablet, Rfl: 1    omeprazole (PriLOSEC) 20 mg delayed release capsule, take 1 capsule by mouth once daily, Disp: 30 capsule, Rfl: 3    omeprazole (PriLOSEC) 20 mg delayed release capsule, Take 1 capsule (20 mg total) by mouth daily as needed (HEARTBURN), Disp: 90 capsule, Rfl: 1    Potassium Chloride ER 20 MEQ TBCR, Take 1 tablet (20 mEq total) by mouth daily, Disp: 90 tablet, Rfl: 1    rOPINIRole (REQUIP) 0 5 mg tablet, Take 1 tablet (0 5 mg total) by mouth daily at bedtime, Disp: 90 tablet, Rfl: 1    SUMAtriptan (IMITREX) 50 mg tablet, Take 50 mg by mouth once as needed for migraine, Disp: , Rfl:     vitamin A 7500 UNIT capsule, Take 7,500 Units by mouth every morning  , Disp: , Rfl:     vitamin E, tocopherol, 400 units capsule, Take 400 Units by mouth every morning Last dose 4/26/17, Disp: , Rfl:     zinc gluconate 50 mg tablet, Take 50 mg by mouth every morning, Disp: , Rfl:     Current Allergies     Allergies as of 08/19/2020 - Reviewed 08/19/2020   Allergen Reaction Noted    Lactose GI Intolerance 04/27/2017    Latex Itching 04/27/2017    Dilantin [phenytoin sodium extended] Rash 10/17/2016    Other Rash 10/17/2016    Penicillins Rash 10/17/2016            The following portions of the patient's history were reviewed and updated as appropriate: allergies, current medications, past family history, past medical history, past social history, past surgical history and problem list      Past Medical History:   Diagnosis Date    Allergic rhinitis     Arthritis     Baker's cyst of knee 10/21/2016    right- burst on its own    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    hx of-fell when ice skating   Noted brain bleed w/swelling    Contact lens/glasses fitting     contact only in left eye    GERD (gastroesophageal reflux disease)     History of shingles     bilateral eyes-x3 episodes    HL (hearing loss)     Hypertension     recent elevation put on norvasc-now controlled    Loss of smell     since brain injury 1992    Migraine     PONV (postoperative nausea and vomiting)     Raynaud's disease     both hands    Seasonal allergies     Shoulder problem 10/2016    rotator cuff issue-right    Tendonitis of shoulder     right       Past Surgical History:   Procedure Laterality Date    AXILLARY SURGERY Left     fatty cyst removed, benign    CATARACT EXTRACTION Bilateral     CATARACT EXTRACTION W/ INTRAOCULAR LENS IMPLANT Right 11/10/2016    Procedure: EXTRACTION EXTRACAPSULAR CATARACT PHACO INTRAOCULAR LENS (IOL); Surgeon: Anaya Quezada MD;  Location: [facility] MAIN OR;  Service:    [place] [address] OF UTERUS      MYRINGOTOMY W/ TUBES  2011    both ears-one tube out on the left    MYRINGOTOMY W/ TUBES      FL SHLDR ARTHROSCOP,SURG,W/ROTAT CUFF REPR Right 5/4/2017    Procedure: ARTHROSCOPY SHOULDER WITH ROTATOR CUFF REPAIR, BICEPS TENODESIS, AND SUBACROMIAL DECOMPRESSION;  Surgeon: Cristiane Avilez MD;  Location: [place] [id number] MAIN OR;  Service: Orthopedics    FL XCAPSL CTRC RMVL INSJ IO LENS PROSTH W/O ECP Left 10/20/2016    Procedure: EXTRACTION EXTRACAPSULAR CATARACT PHACO INTRAOCULAR LENS (IOL); Surgeon: Anaya Quezada MD;  Location: [facility] MAIN OR;  Service: Ophthalmology    SKIN BIOPSY      mole on chest    SKIN BIOPSY      under breast, benign       Family History   Problem Relation Age of Onset    Heart disease Mother         exp age 59 MI    Stroke Father     Macular degeneration Sister     Macular degeneration Brother     Diabetes Brother     Cancer Brother         kidney-nephrectomy    Kidney disease Brother         cancer         Medications have been verified  Objective   Pulse 88   Temp 98 6 °F (37 °C) (Tympanic)   Resp 14   SpO2 96%        Physical Exam     Physical Exam  Vitals signs and nursing note reviewed  Constitutional:       General: She is awake  She is not in acute distress  Appearance: Normal appearance  She is well-developed, well-groomed and normal weight  She is not ill-appearing, toxic-appearing or diaphoretic  HENT:      Head: Normocephalic and atraumatic  Nose: Nose normal       Mouth/Throat:      Lips: Pink        Mouth: Mucous membranes are moist    Cardiovascular: Rate and Rhythm: Normal rate  Pulses: Normal pulses  Pulmonary:      Effort: Pulmonary effort is normal  No tachypnea, accessory muscle usage or respiratory distress  Breath sounds: Normal breath sounds and air entry  Skin:     General: Skin is warm and dry  Coloration: Skin is not pale  Neurological:      Mental Status: She is alert and oriented to person, place, and time  Mental status is at baseline  Psychiatric:         Attention and Perception: Attention and perception normal          Mood and Affect: Mood and affect normal          Speech: Speech normal          Behavior: Behavior normal  Behavior is cooperative  Thought Content:  Thought content normal          Cognition and Memory: Cognition and memory normal          Judgment: Judgment normal

## 2020-08-20 LAB — SARS-COV-2 RNA SPEC QL NAA+PROBE: NOT DETECTED

## 2020-08-28 PROBLEM — Z20.822 SUSPECTED COVID-19 VIRUS INFECTION: Status: RESOLVED | Noted: 2020-08-19 | Resolved: 2020-08-28

## 2020-09-11 ENCOUNTER — APPOINTMENT (OUTPATIENT)
Dept: LAB | Facility: CLINIC | Age: 80
End: 2020-09-11
Payer: MEDICARE

## 2020-09-11 DIAGNOSIS — I10 ESSENTIAL HYPERTENSION: ICD-10-CM

## 2020-09-11 DIAGNOSIS — D72.820 LYMPHOCYTOSIS: ICD-10-CM

## 2020-09-11 DIAGNOSIS — J30.89 NON-SEASONAL ALLERGIC RHINITIS DUE TO OTHER ALLERGIC TRIGGER: ICD-10-CM

## 2020-09-11 DIAGNOSIS — K21.9 GASTROESOPHAGEAL REFLUX DISEASE WITHOUT ESOPHAGITIS: ICD-10-CM

## 2020-09-11 DIAGNOSIS — R73.02 GLUCOSE INTOLERANCE (IMPAIRED GLUCOSE TOLERANCE): ICD-10-CM

## 2020-09-11 DIAGNOSIS — E78.00 HYPERCHOLESTEREMIA: ICD-10-CM

## 2020-09-11 DIAGNOSIS — E55.9 VITAMIN D DEFICIENCY: ICD-10-CM

## 2020-09-11 LAB
25(OH)D3 SERPL-MCNC: 39.7 NG/ML (ref 30–100)
ALBUMIN SERPL BCP-MCNC: 3.8 G/DL (ref 3.5–5)
ALP SERPL-CCNC: 57 U/L (ref 46–116)
ALT SERPL W P-5'-P-CCNC: 19 U/L (ref 12–78)
ANION GAP SERPL CALCULATED.3IONS-SCNC: 6 MMOL/L (ref 4–13)
AST SERPL W P-5'-P-CCNC: 10 U/L (ref 5–45)
BASOPHILS # BLD AUTO: 0.04 THOUSANDS/ΜL (ref 0–0.1)
BASOPHILS NFR BLD AUTO: 1 % (ref 0–1)
BILIRUB SERPL-MCNC: 0.76 MG/DL (ref 0.2–1)
BUN SERPL-MCNC: 33 MG/DL (ref 5–25)
CALCIUM SERPL-MCNC: 9.6 MG/DL (ref 8.3–10.1)
CHLORIDE SERPL-SCNC: 107 MMOL/L (ref 100–108)
CHOLEST SERPL-MCNC: 224 MG/DL (ref 50–200)
CO2 SERPL-SCNC: 25 MMOL/L (ref 21–32)
CREAT SERPL-MCNC: 1.07 MG/DL (ref 0.6–1.3)
EOSINOPHIL # BLD AUTO: 0.15 THOUSAND/ΜL (ref 0–0.61)
EOSINOPHIL NFR BLD AUTO: 2 % (ref 0–6)
ERYTHROCYTE [DISTWIDTH] IN BLOOD BY AUTOMATED COUNT: 13.4 % (ref 11.6–15.1)
GFR SERPL CREATININE-BSD FRML MDRD: 48 ML/MIN/1.73SQ M
GLUCOSE P FAST SERPL-MCNC: 90 MG/DL (ref 65–99)
HCT VFR BLD AUTO: 36.4 % (ref 34.8–46.1)
HDLC SERPL-MCNC: 65 MG/DL
HGB BLD-MCNC: 12.2 G/DL (ref 11.5–15.4)
IMM GRANULOCYTES # BLD AUTO: 0.02 THOUSAND/UL (ref 0–0.2)
IMM GRANULOCYTES NFR BLD AUTO: 0 % (ref 0–2)
LDLC SERPL CALC-MCNC: 138 MG/DL (ref 0–100)
LYMPHOCYTES # BLD AUTO: 2.77 THOUSANDS/ΜL (ref 0.6–4.47)
LYMPHOCYTES NFR BLD AUTO: 45 % (ref 14–44)
MCH RBC QN AUTO: 33.2 PG (ref 26.8–34.3)
MCHC RBC AUTO-ENTMCNC: 33.5 G/DL (ref 31.4–37.4)
MCV RBC AUTO: 99 FL (ref 82–98)
MONOCYTES # BLD AUTO: 0.61 THOUSAND/ΜL (ref 0.17–1.22)
MONOCYTES NFR BLD AUTO: 10 % (ref 4–12)
NEUTROPHILS # BLD AUTO: 2.63 THOUSANDS/ΜL (ref 1.85–7.62)
NEUTS SEG NFR BLD AUTO: 42 % (ref 43–75)
NONHDLC SERPL-MCNC: 159 MG/DL
NRBC BLD AUTO-RTO: 0 /100 WBCS
PLATELET # BLD AUTO: 159 THOUSANDS/UL (ref 149–390)
PMV BLD AUTO: 11.8 FL (ref 8.9–12.7)
POTASSIUM SERPL-SCNC: 3.9 MMOL/L (ref 3.5–5.3)
PROT SERPL-MCNC: 7.1 G/DL (ref 6.4–8.2)
RBC # BLD AUTO: 3.68 MILLION/UL (ref 3.81–5.12)
SODIUM SERPL-SCNC: 138 MMOL/L (ref 136–145)
TRIGL SERPL-MCNC: 103 MG/DL
WBC # BLD AUTO: 6.22 THOUSAND/UL (ref 4.31–10.16)

## 2020-09-11 PROCEDURE — 36415 COLL VENOUS BLD VENIPUNCTURE: CPT

## 2020-09-11 PROCEDURE — 82306 VITAMIN D 25 HYDROXY: CPT

## 2020-09-11 PROCEDURE — 85025 COMPLETE CBC W/AUTO DIFF WBC: CPT

## 2020-09-11 PROCEDURE — 80061 LIPID PANEL: CPT

## 2020-09-11 PROCEDURE — 80053 COMPREHEN METABOLIC PANEL: CPT

## 2021-01-21 ENCOUNTER — IMMUNIZATIONS (OUTPATIENT)
Dept: FAMILY MEDICINE CLINIC | Facility: HOSPITAL | Age: 81
End: 2021-01-21

## 2021-01-21 DIAGNOSIS — Z23 ENCOUNTER FOR IMMUNIZATION: Primary | ICD-10-CM

## 2021-01-21 PROCEDURE — 91300 SARS-COV-2 / COVID-19 MRNA VACCINE (PFIZER-BIONTECH) 30 MCG: CPT

## 2021-01-21 PROCEDURE — 0001A SARS-COV-2 / COVID-19 MRNA VACCINE (PFIZER-BIONTECH) 30 MCG: CPT

## 2021-02-11 ENCOUNTER — IMMUNIZATIONS (OUTPATIENT)
Dept: FAMILY MEDICINE CLINIC | Facility: HOSPITAL | Age: 81
End: 2021-02-11

## 2021-02-11 DIAGNOSIS — Z23 ENCOUNTER FOR IMMUNIZATION: Primary | ICD-10-CM

## 2021-02-11 PROCEDURE — 91300 SARS-COV-2 / COVID-19 MRNA VACCINE (PFIZER-BIONTECH) 30 MCG: CPT

## 2021-02-11 PROCEDURE — 0002A SARS-COV-2 / COVID-19 MRNA VACCINE (PFIZER-BIONTECH) 30 MCG: CPT

## 2021-02-26 ENCOUNTER — LAB (OUTPATIENT)
Dept: LAB | Facility: CLINIC | Age: 81
End: 2021-02-26
Payer: MEDICARE

## 2021-02-26 DIAGNOSIS — R60.0 EDEMA OF EXTREMITIES: ICD-10-CM

## 2021-02-26 DIAGNOSIS — I10 ESSENTIAL HYPERTENSION: ICD-10-CM

## 2021-02-26 DIAGNOSIS — E55.9 VITAMIN D DEFICIENCY: ICD-10-CM

## 2021-02-26 DIAGNOSIS — E78.00 HYPERCHOLESTEREMIA: ICD-10-CM

## 2021-02-26 DIAGNOSIS — K21.9 GASTROESOPHAGEAL REFLUX DISEASE WITHOUT ESOPHAGITIS: ICD-10-CM

## 2021-02-26 LAB
25(OH)D3 SERPL-MCNC: 36.3 NG/ML (ref 30–100)
ALBUMIN SERPL BCP-MCNC: 3.8 G/DL (ref 3.5–5)
ALP SERPL-CCNC: 62 U/L (ref 46–116)
ALT SERPL W P-5'-P-CCNC: 19 U/L (ref 12–78)
ANION GAP SERPL CALCULATED.3IONS-SCNC: 3 MMOL/L (ref 4–13)
AST SERPL W P-5'-P-CCNC: 15 U/L (ref 5–45)
BASOPHILS # BLD AUTO: 0.04 THOUSANDS/ΜL (ref 0–0.1)
BASOPHILS NFR BLD AUTO: 1 % (ref 0–1)
BILIRUB SERPL-MCNC: 0.78 MG/DL (ref 0.2–1)
BUN SERPL-MCNC: 35 MG/DL (ref 5–25)
CALCIUM SERPL-MCNC: 10 MG/DL (ref 8.3–10.1)
CHLORIDE SERPL-SCNC: 108 MMOL/L (ref 100–108)
CHOLEST SERPL-MCNC: 235 MG/DL (ref 50–200)
CO2 SERPL-SCNC: 28 MMOL/L (ref 21–32)
CREAT SERPL-MCNC: 1.15 MG/DL (ref 0.6–1.3)
EOSINOPHIL # BLD AUTO: 0.12 THOUSAND/ΜL (ref 0–0.61)
EOSINOPHIL NFR BLD AUTO: 2 % (ref 0–6)
ERYTHROCYTE [DISTWIDTH] IN BLOOD BY AUTOMATED COUNT: 12.7 % (ref 11.6–15.1)
GFR SERPL CREATININE-BSD FRML MDRD: 44 ML/MIN/1.73SQ M
GLUCOSE P FAST SERPL-MCNC: 109 MG/DL (ref 65–99)
HCT VFR BLD AUTO: 36.5 % (ref 34.8–46.1)
HDLC SERPL-MCNC: 71 MG/DL
HGB BLD-MCNC: 11.8 G/DL (ref 11.5–15.4)
IMM GRANULOCYTES # BLD AUTO: 0.01 THOUSAND/UL (ref 0–0.2)
IMM GRANULOCYTES NFR BLD AUTO: 0 % (ref 0–2)
LDLC SERPL CALC-MCNC: 141 MG/DL (ref 0–100)
LYMPHOCYTES # BLD AUTO: 2.82 THOUSANDS/ΜL (ref 0.6–4.47)
LYMPHOCYTES NFR BLD AUTO: 41 % (ref 14–44)
MCH RBC QN AUTO: 32.2 PG (ref 26.8–34.3)
MCHC RBC AUTO-ENTMCNC: 32.3 G/DL (ref 31.4–37.4)
MCV RBC AUTO: 100 FL (ref 82–98)
MONOCYTES # BLD AUTO: 0.7 THOUSAND/ΜL (ref 0.17–1.22)
MONOCYTES NFR BLD AUTO: 10 % (ref 4–12)
NEUTROPHILS # BLD AUTO: 3.15 THOUSANDS/ΜL (ref 1.85–7.62)
NEUTS SEG NFR BLD AUTO: 46 % (ref 43–75)
NONHDLC SERPL-MCNC: 164 MG/DL
NRBC BLD AUTO-RTO: 0 /100 WBCS
PLATELET # BLD AUTO: 178 THOUSANDS/UL (ref 149–390)
PMV BLD AUTO: 11.3 FL (ref 8.9–12.7)
POTASSIUM SERPL-SCNC: 4.2 MMOL/L (ref 3.5–5.3)
PROT SERPL-MCNC: 7.3 G/DL (ref 6.4–8.2)
RBC # BLD AUTO: 3.66 MILLION/UL (ref 3.81–5.12)
SODIUM SERPL-SCNC: 139 MMOL/L (ref 136–145)
TRIGL SERPL-MCNC: 117 MG/DL
WBC # BLD AUTO: 6.84 THOUSAND/UL (ref 4.31–10.16)

## 2021-02-26 PROCEDURE — 80053 COMPREHEN METABOLIC PANEL: CPT

## 2021-02-26 PROCEDURE — 36415 COLL VENOUS BLD VENIPUNCTURE: CPT

## 2021-02-26 PROCEDURE — 85025 COMPLETE CBC W/AUTO DIFF WBC: CPT

## 2021-02-26 PROCEDURE — 80061 LIPID PANEL: CPT

## 2021-02-26 PROCEDURE — 82306 VITAMIN D 25 HYDROXY: CPT

## 2021-03-02 ENCOUNTER — HOSPITAL ENCOUNTER (OUTPATIENT)
Dept: RADIOLOGY | Facility: HOSPITAL | Age: 81
Discharge: HOME | End: 2021-03-02
Attending: INTERNAL MEDICINE
Payer: MEDICARE

## 2021-03-02 ENCOUNTER — TELEPHONE (OUTPATIENT)
Dept: GASTROENTEROLOGY | Facility: CLINIC | Age: 81
End: 2021-03-02

## 2021-03-02 ENCOUNTER — TRANSCRIBE ORDERS (OUTPATIENT)
Dept: ADMINISTRATIVE | Facility: HOSPITAL | Age: 81
End: 2021-03-02

## 2021-03-02 ENCOUNTER — OFFICE VISIT (OUTPATIENT)
Dept: INTERNAL MEDICINE CLINIC | Facility: CLINIC | Age: 81
End: 2021-03-02
Payer: MEDICARE

## 2021-03-02 VITALS
BODY MASS INDEX: 22.84 KG/M2 | HEIGHT: 61 IN | WEIGHT: 121 LBS | DIASTOLIC BLOOD PRESSURE: 76 MMHG | SYSTOLIC BLOOD PRESSURE: 140 MMHG | TEMPERATURE: 97.1 F

## 2021-03-02 DIAGNOSIS — K21.9 GASTROESOPHAGEAL REFLUX DISEASE WITHOUT ESOPHAGITIS: ICD-10-CM

## 2021-03-02 DIAGNOSIS — R73.02 GLUCOSE INTOLERANCE (IMPAIRED GLUCOSE TOLERANCE): ICD-10-CM

## 2021-03-02 DIAGNOSIS — N18.32 STAGE 3B CHRONIC KIDNEY DISEASE (HCC): ICD-10-CM

## 2021-03-02 DIAGNOSIS — I73.00 RAYNAUD'S DISEASE WITHOUT GANGRENE: ICD-10-CM

## 2021-03-02 DIAGNOSIS — M54.2 NECK PAIN: Primary | ICD-10-CM

## 2021-03-02 DIAGNOSIS — J30.89 NON-SEASONAL ALLERGIC RHINITIS DUE TO OTHER ALLERGIC TRIGGER: ICD-10-CM

## 2021-03-02 DIAGNOSIS — I10 ESSENTIAL HYPERTENSION: ICD-10-CM

## 2021-03-02 DIAGNOSIS — K58.0 IRRITABLE BOWEL SYNDROME WITH DIARRHEA: ICD-10-CM

## 2021-03-02 DIAGNOSIS — I34.0 NONRHEUMATIC MITRAL VALVE REGURGITATION: ICD-10-CM

## 2021-03-02 DIAGNOSIS — E87.6 HYPOKALEMIA: ICD-10-CM

## 2021-03-02 DIAGNOSIS — R60.0 EDEMA OF EXTREMITIES: ICD-10-CM

## 2021-03-02 DIAGNOSIS — E78.00 HYPERCHOLESTEREMIA: ICD-10-CM

## 2021-03-02 DIAGNOSIS — G25.81 RESTLESS LEGS: ICD-10-CM

## 2021-03-02 DIAGNOSIS — E55.9 VITAMIN D DEFICIENCY: ICD-10-CM

## 2021-03-02 DIAGNOSIS — M54.2 NECK PAIN: ICD-10-CM

## 2021-03-02 PROCEDURE — 72050 X-RAY EXAM NECK SPINE 4/5VWS: CPT

## 2021-03-02 PROCEDURE — 99215 OFFICE O/P EST HI 40 MIN: CPT | Performed by: INTERNAL MEDICINE

## 2021-03-02 RX ORDER — PREDNISONE 20 MG/1
TABLET ORAL
Qty: 10 TABLET | Refills: 0 | Status: SHIPPED | OUTPATIENT
Start: 2021-03-02 | End: 2021-03-16 | Stop reason: ALTCHOICE

## 2021-03-02 RX ORDER — METHOCARBAMOL 500 MG/1
500 TABLET, FILM COATED ORAL 2 TIMES DAILY
Qty: 30 TABLET | Refills: 0 | Status: SHIPPED | OUTPATIENT
Start: 2021-03-02 | End: 2021-03-16 | Stop reason: DRUGHIGH

## 2021-03-02 NOTE — ASSESSMENT & PLAN NOTE
A modest elevation of LDL cholesterol  Diet is continued    I recommend to think about statin present will think about it

## 2021-03-02 NOTE — PROGRESS NOTES
[facility] Office Visit Note  21     Eula Oneil 78 y o  female MRN: [id number]  : 1940    Assessment:     Irritable bowel syndrome  IBS flare  Continue same regimen  Gastroesophageal reflux disease without esophagitis  More symptomatic acid reflux symptoms  Restarted on her own MR isabel 20 mg daily will continue  Will defer to the gastroenterologist   His hemoglobin was 11 8  Diet is reviewed and advised    Suggestion for Gastroesophageal reflux Disease/Peptic Ulcer/hyperacidity/Boyce's esophagus  1  Avoid Orange Juice/citrus Juice/Grapefruit juice      2  Avoid Caffeine  including cola, coffee, tea       3  No Tomato products of any kind including tomato sauce, ketch up , pizza sauce, marinara saucet etc       4  No mint, herbs, garlic, onions      5  No spicy food      6  No Alcohol of any type     7  No Smoking    8  Minimize/avoid stress      Glucose intolerance (impaired glucose tolerance)  Monitor blood sugar  Next prednisone short course    Rhinitis, allergic  Remains on home oral size as well as local nitro spray by ENT physicians chronic symptoms    Raynaud's disease  Renal remains symptomatic  This winter was better  Again given name of the dermatologist and telephone number moved for additional input continue on a heart June blocker and a wedding this    Mitral regurgitation  Mom will ask cardiologist to see in the future  Compensated    Essential hypertension  Hypertension( High Blood Pressure ):    Continue Home BP check daily and bring log, if you are not checking consider checking daily    Take your Blood Pressure medicine as advised    Do not take your Blood pressure medicine if systolic Blood Pressure (top number) is less than 100 or heart rate less than 60  Notify you physician  Vitamin D deficiency  Vitamin-D level is normal   Continue adamantly over-the-counter supplement  Hypokalemia  Potassium stable    Will continue Lasix and potassium and monitor the glucose and BMP noted  GFR 44  Hypercholesteremia  A modest elevation of LDL cholesterol  Diet is continued  I recommend to think about statin present will think about it     Edema of extremities  Edema of leg is now fairly stable  Continue Lasix as well as potassium pill    Stage 3b chronic kidney disease  Lab Results   Component Value Date    EGFR 44 02/26/2021    EGFR 48 09/11/2020    EGFR 44 05/09/2019    CREATININE 1 15 02/26/2021    CREATININE 1 07 09/11/2020    CREATININE 1 17 05/09/2019   GFR is baseline  Creat is baseline  Recommend periodic blood test for monitoring of BUN and Creat  Avoid Non Steroidal Antiinflammatory such as ibuprofen, aleve etc   Potassium, HCO3 and calcium are wnl and will require periodic blood test   Bone and Mineral disease monitoring as appropriate  All patient with GFR less than 30( CKD 4 or 5 or 6) advised to follow with nephrologist     Baseline CKD    Restless legs  Increased fluid intake  That helps better than Requip    Neck pain  New problem neck pain with radiculopathy for last 1 month  Will treat with prednisone off for 1 week as prescribed  With Robaxin 500 mg twice a day for 10-15 days  He gets are) accepted will follow back in 2 weeks  Medical and physical therapy in the future  Diagnoses and all orders for this visit:    Neck pain  -     predniSONE 20 mg tablet; Two daily for three days then one daily for next four days  -     methocarbamol (ROBAXIN) 500 mg tablet; Take 1 tablet (500 mg total) by mouth 2 (two) times a day  -     XR spine cervical complete 4 or 5 vw non injury; Future    Irritable bowel syndrome with diarrhea    Raynaud's disease without gangrene  -     Ambulatory referral to Rheumatology;  Future    Non-seasonal allergic rhinitis due to other allergic trigger    Nonrheumatic mitral valve regurgitation    Vitamin D deficiency    Hypokalemia    Hypercholesteremia    Edema of extremities    Stage 3b chronic kidney disease    Restless legs    Gastroesophageal reflux disease without esophagitis  -     Ambulatory referral to Gastroenterology; Future    Glucose intolerance (impaired glucose tolerance)    Essential hypertension          Discussion Summary and Plan: Today's care plan and medications were reviewed with patient in detail and all their questions answered to their satisfaction  In summary present presents with a new problem of acute left neck pain for 1 month with radiculopathy to the left mid arm with some tingling numbness while it  Will treat with prednisone for a week  Robaxin and intubated paracervical spine to be done  Medical and physical therapy  Multiple Aks other medical problems of breath and fatigue  Allergic rhinitis chronic  Raynaud's apparently asymptomatic the patient went to see rheumatologist name again given  Worsening of the GERD symptoms back on omeprazole I have some difficulty swallowing and it reverted in symptoms  Referred to the gastroenterologist   I have mitral regurgitant compensated  Hyperlipidemia explained the need of ongoing on statins even think about  Edema fat  Blood sugar will monitor  Blood number of chronic issues were addressed  The 100s and mammogram or colonoscopy due to her age  New problem of neck pain when addressed  Time spent is greater than 50 minutes  Chief Complaint   Patient presents with    Neck Pain     asssociated with arm pain for 1 month  Worse at night sitting while reading newspaper    Hypertension    Hyperlipidemia    Abnormal Lab    Allergic Rhinitis    Depression    Follow-up     Multiple other problems as outlined in HPI    GERD      Subjective:       chronic disease MANAGEMENT as well as a new complaint of fall left paracervical neck pain with radiculopathy to the left upper extremity  Patient complains of 1 month history of left lower paracervical neck area pain radicular down to the mid forearm    Does get some tingling numbness in the fingers at times  No loss of strength PPD had no effect of the shoulder movement  Denies any fall weight lifting injury  Patient did note a previous back surgery and neck surgery  Present the note of previous neck x-ray  Present has not seen back next visualized orthopedic before foot  However patient is a known case of Raynaud's phenomenon as well as advanced degenerative joint disease multiple joints  her restless legs are symptomatic at at times  Plain water helps more than liquid  She prefers to take that as needed  Raynaud's this time of the year is fairly good in summer , now symptomatic, rec to follow with rheuamtologsit    Allergic rhinitis :   She remains on Xyzal and has some inhaled most likely steroid by ENT physician  See is not taking damaged anymore  She is no longer on allergy desensitization    No further mammogram or colonoscopy given her age and choice    Today notes reflect control  LVH, moderate TR, 2+ MR, PA systolic pressure of 67-86 for surveillance  CKD level 3 who will continue to monitor see blood test and mid    Varicose veins flat  Lymphocytosis will monitor  Remote history of shingles not a problem  Diet diverticulosis fair  S the hyperlipidemia:  Her LDL is 141  CP has not been taking statin  I did advise  She will think about it and let me know next visit  Annual eye exam done in 2016, DEXA scan 2012 osteopenia, mammogram 2015 reviewed, 08/11/2014 upper GI endoscopy and colonoscopy revealed distal esophageal erosion small polyp from the stomach and colon revealed sigmoid diverticulosis  Hypertension:  Symptom-free  Home blood pressure well controlled  Tolerating current antihypertensive without side effect  Peripheral edema is better the remains on Lasix    Mitral regurgitation:  Shortness of breath is better  Patient had echocardiogram previously    IBS:  Symptomatic at times  For symptomatic treatment      DJD of knees reasonable, some knee pain as well as the finger pain back secondary to arthritis though takes Tylenol p r n     Will also await the rheumatologists input    Gastroesophageal reflux symptoms for further paragraphs migraine not a major issue    Total bilirubin will continue to monitor  Anxiety and depression disorder  Borderline blood sugar low will continue to monitor  Hyperlipidemia:  Continue diet      GERD:  Her acid reflux is Darwin Morelos is taking antacid medicines tobacco regularly  Dietary would advise, CC started back on omeprazole 20 mg daily back again  Her heartburn is better  Patient is getting food as he did not regurgitate a son but not as well as CS has difficulty swallowing at times  No black bowel movement no nausea vomiting hematemesis melena  See endoscopic done many years ago  Sodium                    Value: 139(mmol/L)        Dt: 02/26/2021  Potassium                 Value: 4 2(mmol/L)        Dt: 02/26/2021  Chloride                  Value: 108(mmol/L)        Dt: 02/26/2021  CO2                       Value: 28(mmol/L)         Dt: 02/26/2021  ANION GAP                 Value: 3(mmol/L)*         Dt: 02/26/2021  BUN                       Value: 35(mg/dL)*         Dt: 02/26/2021  Creatinine                Value: 1 15(mg/dL)        Dt: 02/26/2021  Glucose, Fasting          Value: 109(mg/dL)*        Dt: 02/26/2021  Calcium                   Value: 10  0(mg/dL)        Dt: 02/26/2021  AST                       Value: 15(U/L)            Dt: 02/26/2021  ALT                       Value: 19(U/L)            Dt: 02/26/2021  Alkaline Phosphatase      Value: 62(U/L)            Dt: 02/26/2021  Total Protein             Value: 7 3(g/dL)          Dt: 02/26/2021  Albumin                   Value: 3 8(g/dL)          Dt: 02/26/2021  Total Bilirubin           Value: 0 78(mg/dL)        Dt: 02/26/2021  eGFR                      Value: 44(ml/min/1 73sq m) Dt: 02/26/2021  WBC                       Value: 6 84(Thousand/uL)  Dt: 02/26/2021  RBC                       Value: 3 66(Million/uL)*  Dt: 02/26/2021  Hemoglobin                Value: 11 8(g/dL)         Dt: 02/26/2021  Hematocrit                Value: 36 5(%)            Dt: 02/26/2021  MCV                       Value: 100(fL)*           Dt: 02/26/2021  MCH                       Value: 32 2(pg)           Dt: 02/26/2021  MCHC                      Value: 32 3(g/dL)         Dt: 02/26/2021  RDW                       Value: 12 7(%)            Dt: 02/26/2021  MPV                       Value: 11 3(fL)           Dt: 02/26/2021  Platelets                 Value: 178(Thousands/uL)  Dt: 02/26/2021  nRBC                      Value: 0(/100 WBCs)       Dt: 02/26/2021  Neutrophils Relative      Value: 46(%)              Dt: 02/26/2021  Immat GRANS %             Value: 0(%)               Dt: 02/26/2021  Lymphocytes Relative      Value: 41(%)              Dt: 02/26/2021  Monocytes Relative        Value: 10(%)              Dt: 02/26/2021  Eosinophils Relative      Value: 2(%)               Dt: 02/26/2021  Basophils Relative        Value: 1(%)               Dt: 02/26/2021  Neutrophils Absolute      Value: 3 15(Thousands/µL) Dt: 02/26/2021  Immature Grans Absolute   Value: 0 01(Thousand/uL)  Dt: 02/26/2021  Lymphocytes Absolute      Value: 2 82(Thousands/µL) Dt: 02/26/2021  Monocytes Absolute        Value: 0 70(Thousand/µL)  Dt: 02/26/2021  Eosinophils Absolute      Value: 0 12(Thousand/µL)  Dt: 02/26/2021  Basophils Absolute        Value: 0 04(Thousands/µL) Dt: 02/26/2021  Cholesterol               Value: 235(mg/dL)*        Dt: 02/26/2021  Triglycerides             Value: 117(mg/dL)         Dt: 02/26/2021  HDL, Direct               Value: 71(mg/dL)          Dt: 02/26/2021  LDL Calculated            Value: 141(mg/dL)*        Dt: 02/26/2021  Non-HDL-Chol (CHOL-HDL)   Value: 164(mg/dl)         Dt: 02/26/2021  Vit D, 25-Hydroxy         Value: 36  3(ng/mL)        Dt: 02/26/2021  ------------ - 2 weeks          The following portions of the patient's history were reviewed and updated as appropriate: allergies, current medications, past family history, past medical history, past social history, past surgical history and problem list     Review of Systems   All other systems reviewed and are negative  Historical Information   Patient Active Problem List   Diagnosis    Baker cyst, right    Gastroesophageal reflux disease without esophagitis    Glucose intolerance (impaired glucose tolerance)    Restless legs    Hypercholesteremia    Mitral regurgitation    Migraine    Essential hypertension    Varicose vein of leg    Edema of extremities    Rhinitis, allergic    Aspirin intolerance    Persistent lymphocytosis    Hypokalemia    Irritable bowel syndrome    Abnormal echocardiogram    Vitamin D deficiency    Diverticulosis    Herpes zoster    History of syncope    Hammer toe of left foot    Raynaud's disease    Stage 3b chronic kidney disease    Neck pain     Past Medical History:   Diagnosis Date    Allergic rhinitis     Arthritis     Baker's cyst of knee 10/21/2016    right- burst on its own    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    hx of-fell when ice skating   Noted brain bleed w/swelling    Contact lens/glasses fitting     contact only in left eye    GERD (gastroesophageal reflux disease)     History of shingles     bilateral eyes-x3 episodes    HL (hearing loss)     Hypertension     recent elevation put on norvasc-now controlled    Loss of smell     since brain injury 1992    Migraine     PONV (postoperative nausea and vomiting)     Raynaud's disease     both hands    Seasonal allergies     Shoulder problem 10/2016    rotator cuff issue-right    Tendonitis of shoulder     right     Past Surgical History:   Procedure Laterality Date    AXILLARY SURGERY Left     fatty cyst removed, benign    CATARACT EXTRACTION Bilateral     CATARACT EXTRACTION W/ INTRAOCULAR LENS IMPLANT Right 11/10/2016    Procedure: EXTRACTION EXTRACAPSULAR CATARACT PHACO INTRAOCULAR LENS (IOL); Surgeon: Hiram Peabody, MD;  Location: [facility] MAIN OR;  Service:    [facility] 32 OF UTERUS      MYRINGOTOMY W/ TUBES  2011    both ears-one tube out on the left    MYRINGOTOMY W/ TUBES      LA SHLDR ARTHROSCOP,SURG,W/ROTAT CUFF REPR Right 2017    Procedure: ARTHROSCOPY SHOULDER WITH ROTATOR CUFF REPAIR, BICEPS TENODESIS, AND SUBACROMIAL DECOMPRESSION;  Surgeon: Rachelle Henry MD;  Location: [facility] MAIN OR;  Service: Orthopedics    LA XCAPSL CTRC RMVL INSJ IO LENS PROSTH W/O ECP Left 10/20/2016    Procedure: EXTRACTION EXTRACAPSULAR CATARACT PHACO INTRAOCULAR LENS (IOL);   Surgeon: Hiram Peabody, MD;  Location: [facility] MAIN OR;  Service: Ophthalmology    SKIN BIOPSY      mole on chest    SKIN BIOPSY      under breast, benign     Social History     Substance and Sexual Activity   Alcohol Use Yes    Comment: socially     Social History     Substance and Sexual Activity   Drug Use No     Social History     Tobacco Use   Smoking Status Former Smoker    Packs/day: 1 50    Years: 30 00    Pack years: 45 00    Quit date: 18    Years since quittin 1   Smokeless Tobacco Never Used     Family History   Problem Relation Age of Onset    Heart disease Mother         exp age 59 MI    Stroke Father     Macular degeneration Sister     Macular degeneration Brother     Diabetes Brother     Cancer Brother         kidney-nephrectomy    Kidney disease Brother         cancer     Health Maintenance Due   Topic    DTaP,Tdap,and Td Vaccines (1 - Tdap)    Pneumococcal Vaccine: 65+ Years (1 of 1 - PPSV23)    Medicare Annual Wellness Visit (AWV)     Depression Screening PHQ       Meds/Allergies       Current Outpatient Medications:     acetaminophen (TYLENOL) 500 mg tablet, Take 1,000 mg by mouth every 6 (six) hours as needed for mild pain, Disp: , Rfl:     ALPHAGAN P 0 1 %, , Disp: , Rfl: 0    ascorbic acid (VITAMIN C) 500 mg tablet, Take 500 mg by mouth every morning, Disp: , Rfl:     azelastine (ASTELIN) 0 1 % nasal spray, 1 spray into each nostril 2 (two) times a day, Disp: 1 Bottle, Rfl: 11    Biotin (BIOTIN 5000) 5 MG CAPS, Take by mouth every morning, Disp: , Rfl:     Calcium Carbonate-Vitamin D (CALTRATE 600+D PO), Take by mouth every morning, Disp: , Rfl:     Cyanocobalamin (VITAMIN B 12 PO), Take by mouth every morning, Disp: , Rfl:     furosemide (LASIX) 40 mg tablet, Take 1 tablet (40 mg total) by mouth every morning, Disp: 90 tablet, Rfl: 1    ipratropium (ATROVENT) 0 06 % nasal spray, 2 sprays into each nostril 4 (four) times a day, Disp: 15 mL, Rfl: 11    levocetirizine (XYZAL) 5 MG tablet, take 1 tablet by mouth every evening, Disp: 90 tablet, Rfl: 3    Lutein 40 MG CAPS, Take by mouth every morning, Disp: , Rfl:     Magnesium 250 MG TABS, Take by mouth every morning, Disp: , Rfl:     Misc Natural Products (TUMERSAID PO), Take 1 tablet by mouth daily, Disp: , Rfl:     olmesartan (BENICAR) 40 mg tablet, Take 1 tablet (40 mg total) by mouth daily, Disp: 90 tablet, Rfl: 1    omeprazole (PriLOSEC) 20 mg delayed release capsule, Take 1 capsule (20 mg total) by mouth daily as needed (HEARTBURN), Disp: 90 capsule, Rfl: 1    Potassium Chloride ER 20 MEQ TBCR, Take 1 tablet (20 mEq total) by mouth daily, Disp: 90 tablet, Rfl: 1    rOPINIRole (REQUIP) 0 5 mg tablet, Take 1 tablet (0 5 mg total) by mouth daily at bedtime, Disp: 90 tablet, Rfl: 1    SUMAtriptan (IMITREX) 50 mg tablet, Take 1 tablet (50 mg total) by mouth once as needed for migraine, Disp: 9 tablet, Rfl: 1    vitamin A 7500 UNIT capsule, Take 7,500 Units by mouth every morning  , Disp: , Rfl:     vitamin E, tocopherol, 400 units capsule, Take 400 Units by mouth every morning Last dose 4/26/17, Disp: , Rfl:     zinc gluconate 50 mg tablet, Take 50 mg by mouth every morning, Disp: , Rfl:     methocarbamol (ROBAXIN) 500 mg tablet, Take 1 tablet (500 mg total) by mouth 2 (two) times a day, Disp: 30 tablet, Rfl: 0    predniSONE 20 mg tablet, Two daily for three days then one daily for next four days, Disp: 10 tablet, Rfl: 0      Objective:    Vitals:   /76   Temp (!) 97 1 °F (36 2 °C)   Ht 5' 1" (1 549 m)   Wt 54 9 kg (121 lb)   BMI 22 86 kg/m²   Body mass index is 22 86 kg/m²  Vitals:    03/02/21 0937   Weight: 54 9 kg (121 lb)       Physical Exam  Vitals signs and nursing note reviewed  Constitutional:       Appearance: She is well-developed  She is not ill-appearing or diaphoretic  HENT:      Head: Normocephalic and atraumatic  Nose: Rhinorrhea present  Mouth/Throat:      Pharynx: Uvula midline  No oropharyngeal exudate or posterior oropharyngeal erythema  Eyes:      General:         Right eye: No discharge  Left eye: No discharge  Conjunctiva/sclera: Conjunctivae normal    Neck:      Thyroid: No thyromegaly  Vascular: No JVD  Cardiovascular:      Rate and Rhythm: Regular rhythm  Heart sounds: Murmur present  Pulmonary:      Effort: No respiratory distress  Breath sounds: Normal breath sounds  No wheezing or rales  Abdominal:      General: Bowel sounds are normal  There is no distension  Palpations: There is no mass  Tenderness: There is no abdominal tenderness  There is no rebound  Musculoskeletal:      Left shoulder: Normal       Right elbow: Normal      Left elbow: Normal       Cervical back: She exhibits decreased range of motion, tenderness, pain and spasm  She exhibits no edema  Right upper arm: Normal       Left upper arm: Normal       Right lower leg: No edema  Left lower leg: No edema  Comments: Left upper extremity proximal and distal strength normal reflexes are normal biceps triceps    Sensitive to touch is normal     Multiple joint exam isn't consistent with mild-to-moderate DJD without active synovitis of both hands PPI PIP and MCP as well as both knees   Lymphadenopathy:      Cervical: No cervical adenopathy  Skin:     General: Skin is warm  Findings: No rash  Psychiatric:         Mood and Affect: Mood normal          Behavior: Behavior normal          Thought Content: Thought content normal          Judgment: Judgment normal          Lab Review   Lab on 02/26/2021   Component Date Value Ref Range Status    Sodium 02/26/2021 139  136 - 145 mmol/L Final    Potassium 02/26/2021 4 2  3 5 - 5 3 mmol/L Final    Chloride 02/26/2021 108  100 - 108 mmol/L Final    CO2 02/26/2021 28  21 - 32 mmol/L Final    ANION GAP 02/26/2021 3* 4 - 13 mmol/L Final    BUN 02/26/2021 35* 5 - 25 mg/dL Final    Creatinine 02/26/2021 1 15  0 60 - 1 30 mg/dL Final    Standardized to IDMS reference method    Glucose, Fasting 02/26/2021 109* 65 - 99 mg/dL Final    Specimen collection should occur prior to Sulfasalazine administration due to the potential for falsely depressed results  Specimen collection should occur prior to Sulfapyridine administration due to the potential for falsely elevated results   Calcium 02/26/2021 10 0  8 3 - 10 1 mg/dL Final    AST 02/26/2021 15  5 - 45 U/L Final    Specimen collection should occur prior to Sulfasalazine administration due to the potential for falsely depressed results   ALT 02/26/2021 19  12 - 78 U/L Final    Specimen collection should occur prior to Sulfasalazine and/or Sulfapyridine administration due to the potential for falsely depressed results   Alkaline Phosphatase 02/26/2021 62  46 - 116 U/L Final    Total Protein 02/26/2021 7 3  6 4 - 8 2 g/dL Final    Albumin 02/26/2021 3 8  3 5 - 5 0 g/dL Final    Total Bilirubin 02/26/2021 0 78  0 20 - 1 00 mg/dL Final    Use of this assay is not recommended for patients undergoing treatment with eltrombopag due to the potential for falsely elevated results      eGFR 02/26/2021 44  ml/min/1 73sq m Final    WBC 02/26/2021 6 84  4 31 - 10 16 Thousand/uL Final    RBC 02/26/2021 3 66* 3 81 - 5 12 Million/uL Final    Hemoglobin 02/26/2021 11 8  11 5 - 15 4 g/dL Final    Hematocrit 02/26/2021 36 5  34 8 - 46 1 % Final    MCV 02/26/2021 100* 82 - 98 fL Final    MCH 02/26/2021 32 2  26 8 - 34 3 pg Final    MCHC 02/26/2021 32 3  31 4 - 37 4 g/dL Final    RDW 02/26/2021 12 7  11 6 - 15 1 % Final    MPV 02/26/2021 11 3  8 9 - 12 7 fL Final    Platelets 40/06/8132 178  149 - 390 Thousands/uL Final    nRBC 02/26/2021 0  /100 WBCs Final    Neutrophils Relative 02/26/2021 46  43 - 75 % Final    Immat GRANS % 02/26/2021 0  0 - 2 % Final    Lymphocytes Relative 02/26/2021 41  14 - 44 % Final    Monocytes Relative 02/26/2021 10  4 - 12 % Final    Eosinophils Relative 02/26/2021 2  0 - 6 % Final    Basophils Relative 02/26/2021 1  0 - 1 % Final    Neutrophils Absolute 02/26/2021 3 15  1 85 - 7 62 Thousands/µL Final    Immature Grans Absolute 02/26/2021 0 01  0 00 - 0 20 Thousand/uL Final    Lymphocytes Absolute 02/26/2021 2 82  0 60 - 4 47 Thousands/µL Final    Monocytes Absolute 02/26/2021 0 70  0 17 - 1 22 Thousand/µL Final    Eosinophils Absolute 02/26/2021 0 12  0 00 - 0 61 Thousand/µL Final    Basophils Absolute 02/26/2021 0 04  0 00 - 0 10 Thousands/µL Final    Cholesterol 02/26/2021 235* 50 - 200 mg/dL Final    Cholesterol:       Desirable         <200 mg/dl       Borderline         200-239 mg/dl       High              >239           Triglycerides 02/26/2021 117  <=150 mg/dL Final    Triglyceride:     Normal          <150 mg/dl     Borderline High 150-199 mg/dl     High            200-499 mg/dl        Very High       >499 mg/dl    Specimen collection should occur prior to N-Acetylcysteine or Metamizole administration due to the potential for falsely depressed results      HDL, Direct 02/26/2021 71  >=40 mg/dL Final    HDL Cholesterol:       Low     <41 mg/dL  Specimen collection should occur prior to Metamizole administration due to the potential for falsley depressed results   LDL Calculated 02/26/2021 141* 0 - 100 mg/dL Final    LDL Cholesterol:     Optimal           <100 mg/dl     Near Optimal      100-129 mg/dl     Above Optimal       Borderline High 130-159 mg/dl       High            160-189 mg/dl       Very High       >189 mg/dl         This screening LDL is a calculated result  It does not have the accuracy of the Direct Measured LDL in the monitoring of patients with hyperlipidemia and/or statin therapy  Direct Measure LDL (ETM398) must be ordered separately in these patients   Non-HDL-Chol (CHOL-HDL) 02/26/2021 164  mg/dl Final    Vit D, 25-Hydroxy 02/26/2021 36 3  30 0 - 100 0 ng/mL Final         Patient Instructions   Follow with Consultants as per their and our suggestion    Follow up in 2  week(s) or as needed earlier    Follow all instructions as advised and discussed  Take your medications as prescribed  Call the office immediately if you experience any side effects  Ask questions if you do not understand  Keep your scheduled appointment as advised or come sooner if necessary or in doubt  Best time to call for non-urgent matter or questions on weekdays is between 9am and 12 noon  See physician for any new symptoms or worsening of current symptoms  Urgent or emergent situations call 911 and report to nearest emergency room  I spent  45 minutes taking care of this patient including clinical care, conseling, collaboration, chart, lab and consultaion review  A prednisone as directed 2 tablets daily for 3 days then 1 a day for next 4 days  Muscle relaxant Robaxin 500 mg twice a day for 10-15 days      No weightlifting plan put    Over the neck x-ray to convene is prepared    Call gastroenterologist since it appeared appointment for your reflux symptoms and possible evaluation for possible endoscopy continue omeprazole 20 mg daily  Please see rheumatologist Dr Thanh Crespo  name and telephone number given :  40 on appointment         Dr Tony Gandhi MD  [facility]       "This note has been constructed using a voice recognition system  Therefore there may be syntax, spelling, and/or grammatical errors   Please call if you have any questions  "

## 2021-03-02 NOTE — ASSESSMENT & PLAN NOTE
Lab Results   Component Value Date    EGFR 44 02/26/2021    EGFR 48 09/11/2020    EGFR 44 05/09/2019    CREATININE 1 15 02/26/2021    CREATININE 1 07 09/11/2020    CREATININE 1 17 05/09/2019   GFR is baseline  Creat is baseline  Recommend periodic blood test for monitoring of BUN and Creat  Avoid Non Steroidal Antiinflammatory such as ibuprofen, aleve etc   Potassium, HCO3 and calcium are wnl and will require periodic blood test   Bone and Mineral disease monitoring as appropriate    All patient with GFR less than 30( CKD 4 or 5 or 6) advised to follow with nephrologist     Baseline CKD

## 2021-03-02 NOTE — TELEPHONE ENCOUNTER
Received order from Dr Shobha Whitaker for pt to be scheduled for an appt for GERD  Pt is actually overdue for colon for hx of polyps since 2014  I lmom for pt to please call back to schedule an appt with Dr Florina Lepe   Will call pt again in one week if do not hear back from her

## 2021-03-02 NOTE — ASSESSMENT & PLAN NOTE
New problem neck pain with radiculopathy for last 1 month  Will treat with prednisone off for 1 week as prescribed  With Robaxin 500 mg twice a day for 10-15 days  He gets are) accepted will follow back in 2 weeks  Medical and physical therapy in the future

## 2021-03-02 NOTE — PATIENT INSTRUCTIONS
Follow with Consultants as per their and our suggestion    Follow up in 2  week(s) or as needed earlier    Follow all instructions as advised and discussed  Take your medications as prescribed  Call the office immediately if you experience any side effects  Ask questions if you do not understand  Keep your scheduled appointment as advised or come sooner if necessary or in doubt  Best time to call for non-urgent matter or questions on weekdays is between 9am and 12 noon  See physician for any new symptoms or worsening of current symptoms  Urgent or emergent situations call 911 and report to nearest emergency room  I spent  50 minutes taking care of this patient including clinical care, conseling, collaboration, chart, lab and consultaion review  A prednisone as directed 2 tablets daily for 3 days then 1 a day for next 4 days  Muscle relaxant Robaxin 500 mg twice a day for 10-15 days  No weightlifting plan put    Over the neck x-ray to convene is prepared    Call gastroenterologist since it appeared appointment for your reflux symptoms and possible evaluation for possible endoscopy continue omeprazole 20 mg daily      Please see rheumatologist Dr Lennard Romberg  name and telephone number given :  40 on appointment

## 2021-03-02 NOTE — ASSESSMENT & PLAN NOTE
Renal remains symptomatic  This winter was better    Again given name of the dermatologist and telephone number moved for additional input continue on a heart June blocker and a wedding this

## 2021-03-02 NOTE — ASSESSMENT & PLAN NOTE
More symptomatic acid reflux symptoms  Restarted on her own MR present 20 mg daily will continue  Will defer to the gastroenterologist   His hemoglobin was 11 8  Diet is reviewed and advised    Suggestion for Gastroesophageal reflux Disease/Peptic Ulcer/hyperacidity/Boyce's esophagus  1  Avoid Orange Juice/citrus Juice/Grapefruit juice      2  Avoid Caffeine  including cola, coffee, tea       3  No Tomato products of any kind including tomato sauce, ketch up , pizza sauce, marinara saucet etc       4  No mint, herbs, garlic, onions      5  No spicy food      6  No Alcohol of any type     7  No Smoking    8   Minimize/avoid stress

## 2021-03-09 NOTE — TELEPHONE ENCOUNTER
I lmom for pt to please call back to schedule an appt with Dr Letty De Jesus per Dr Glenis Torres  Will call pt again in two weeks if do not hear back from her

## 2021-03-11 ENCOUNTER — OFFICE VISIT (OUTPATIENT)
Dept: INTERNAL MEDICINE CLINIC | Facility: CLINIC | Age: 81
End: 2021-03-11
Payer: MEDICARE

## 2021-03-11 VITALS — WEIGHT: 120 LBS | HEIGHT: 61 IN | BODY MASS INDEX: 22.66 KG/M2

## 2021-03-11 DIAGNOSIS — Z00.00 MEDICARE ANNUAL WELLNESS VISIT, SUBSEQUENT: Primary | ICD-10-CM

## 2021-03-11 DIAGNOSIS — I10 ESSENTIAL HYPERTENSION: ICD-10-CM

## 2021-03-11 PROBLEM — I48.92 ATRIAL FLUTTER (HCC): Status: ACTIVE | Noted: 2021-03-11

## 2021-03-11 PROCEDURE — 1123F ACP DISCUSS/DSCN MKR DOCD: CPT | Performed by: INTERNAL MEDICINE

## 2021-03-11 PROCEDURE — G0439 PPPS, SUBSEQ VISIT: HCPCS | Performed by: INTERNAL MEDICINE

## 2021-03-11 NOTE — PATIENT INSTRUCTIONS
Medicare Preventive Visit Patient Instructions  Thank you for completing your Welcome to Medicare Visit or Medicare Annual Wellness Visit today  Your next wellness visit will be due in one year (3/12/2022)  The screening/preventive services that you may require over the next 5-10 years are detailed below  Some tests may not apply to you based off risk factors and/or age  Screening tests ordered at today's visit but not completed yet may show as past due  Also, please note that scanned in results may not display below  Preventive Screenings:  Service Recommendations Previous Testing/Comments   Colorectal Cancer Screening  * Colonoscopy    * Fecal Occult Blood Test (FOBT)/Fecal Immunochemical Test (FIT)  * Fecal DNA/Cologuard Test  * Flexible Sigmoidoscopy Age: 54-65 years old   Colonoscopy: every 10 years (may be performed more frequently if at higher risk)  OR  FOBT/FIT: every 1 year  OR  Cologuard: every 3 years  OR  Sigmoidoscopy: every 5 years  Screening may be recommended earlier than age 48 if at higher risk for colorectal cancer  Also, an individualized decision between you and your healthcare provider will decide whether screening between the ages of 74-80 would be appropriate  Colonoscopy: Not on file  FOBT/FIT: Not on file  Cologuard: Not on file  Sigmoidoscopy: Not on file          Breast Cancer Screening Age: 36 years old  Frequency: every 1-2 years  Not required if history of left and right mastectomy Mammogram: Not on file        Cervical Cancer Screening Between the ages of 21-29, pap smear recommended once every 3 years  Between the ages of 33-67, can perform pap smear with HPV co-testing every 5 years     Recommendations may differ for women with a history of total hysterectomy, cervical cancer, or abnormal pap smears in past  Pap Smear: Not on file        Hepatitis C Screening Once for adults born between [facility]  More frequently in patients at high risk for Hepatitis C Hep C Antibody: Not on file        Diabetes Screening 1-2 times per year if you're at risk for diabetes or have pre-diabetes Fasting glucose: 109 mg/dL   A1C: No results in last 5 years        Cholesterol Screening Once every 5 years if you don't have a lipid disorder  May order more often based on risk factors  Lipid panel: 02/26/2021          Other Preventive Screenings Covered by Medicare:  1  Abdominal Aortic Aneurysm (AAA) Screening: covered once if your at risk  You're considered to be at risk if you have a family history of AAA  2  Lung Cancer Screening: covers low dose CT scan once per year if you meet all of the following conditions: (1) Age 50-69; (2) No signs or symptoms of lung cancer; (3) Current smoker or have quit smoking within the last 15 years; (4) You have a tobacco smoking history of at least 30 pack years (packs per day multiplied by number of years you smoked); (5) You get a written order from a healthcare provider  3  Glaucoma Screening: covered annually if you're considered high risk: (1) You have diabetes OR (2) Family history of glaucoma OR (3)  aged 48 and older OR (3)  American aged 72 and older  3  Osteoporosis Screening: covered every 2 years if you meet one of the following conditions: (1) You're estrogen deficient and at risk for osteoporosis based off medical history and other findings; (2) Have a vertebral abnormality; (3) On glucocorticoid therapy for more than 3 months; (4) Have primary hyperparathyroidism; (5) On osteoporosis medications and need to assess response to drug therapy  · Last bone density test (DXA Scan): Not on file  5  HIV Screening: covered annually if you're between the age of 12-76  Also covered annually if you are younger than 13 and older than 72 with risk factors for HIV infection  For pregnant patients, it is covered up to 3 times per pregnancy      Immunizations:  Immunization Recommendations   Influenza Vaccine Annual influenza vaccination during flu season is recommended for all persons aged >= 6 months who do not have contraindications   Pneumococcal Vaccine (Prevnar and Pneumovax)  * Prevnar = PCV13  * Pneumovax = PPSV23   Adults 25-60 years old: 1-3 doses may be recommended based on certain risk factors  Adults 72 years old: Prevnar (PCV13) vaccine recommended followed by Pneumovax (PPSV23) vaccine  If already received PPSV23 since turning 65, then PCV13 recommended at least one year after PPSV23 dose  Hepatitis B Vaccine 3 dose series if at intermediate or high risk (ex: diabetes, end stage renal disease, liver disease)   Tetanus (Td) Vaccine - COST NOT COVERED BY MEDICARE PART B Following completion of primary series, a booster dose should be given every 10 years to maintain immunity against tetanus  Td may also be given as tetanus wound prophylaxis  Tdap Vaccine - COST NOT COVERED BY MEDICARE PART B Recommended at least once for all adults  For pregnant patients, recommended with each pregnancy  Shingles Vaccine (Shingrix) - COST NOT COVERED BY MEDICARE PART B  2 shot series recommended in those aged 48 and above     Health Maintenance Due:  There are no preventive care reminders to display for this patient  Immunizations Due:      Topic Date Due    DTaP,Tdap,and Td Vaccines (1 - Tdap) 07/24/1961    Pneumococcal Vaccine: 65+ Years (1 of 1 - PPSV23) 07/24/2003     Advance Directives   What are advance directives? Advance directives are legal documents that state your wishes and plans for medical care  These plans are made ahead of time in case you lose your ability to make decisions for yourself  Advance directives can apply to any medical decision, such as the treatments you want, and if you want to donate organs  What are the types of advance directives? There are many types of advance directives, and each state has rules about how to use them  You may choose a combination of any of the following:  · Living will:   This is a written record of the treatment you want  You can also choose which treatments you do not want, which to limit, and which to stop at a certain time  This includes surgery, medicine, IV fluid, and tube feedings  · Durable power of  for healthcare [facility]): This is a written record that states who you want to make healthcare choices for you when you are unable to make them for yourself  This person, called a proxy, is usually a family member or a friend  You may choose more than 1 proxy  · Do not resuscitate (DNR) order:  A DNR order is used in case your heart stops beating or you stop breathing  It is a request not to have certain forms of treatment, such as CPR  A DNR order may be included in other types of advance directives  · Medical directive: This covers the care that you want if you are in a coma, near death, or unable to make decisions for yourself  You can list the treatments you want for each condition  Treatment may include pain medicine, surgery, blood transfusions, dialysis, IV or tube feedings, and a ventilator (breathing machine)  · Values history: This document has questions about your views, beliefs, and how you feel and think about life  This information can help others choose the care that you would choose  Why are advance directives important? An advance directive helps you control your care  Although spoken wishes may be used, it is better to have your wishes written down  Spoken wishes can be misunderstood, or not followed  Treatments may be given even if you do not want them  An advance directive may make it easier for your family to make difficult choices about your care  © Copyright [employer] 2018 Information is for End User's use only and may not be sold, redistributed or otherwise used for commercial purposes   All illustrations and images included in CareNotes® are the copyrighted property of [employer]  or Michael Lorenzo      Follow up as scheduled

## 2021-03-11 NOTE — PROGRESS NOTES
Virtual AWV Consent    Reason for visit is AWE    Encounter provider Adnia Birmingham MD    Provider located at [address] [address]      Recent Visits  No visits were found meeting these conditions  Showing recent visits within past 7 days and meeting all other requirements     Today's Visits  Date Type Provider Dept   03/11/21 Office Visit Adina Birmingham MD [facility] Internal Med   Showing today's visits and meeting all other requirements     Future Appointments  No visits were found meeting these conditions  Showing future appointments within next 150 days and meeting all other requirements        After connecting through [employer], the patient was identified by name and date of birth  Germain Nose was informed that this is a telemedicine visit and that the visit is being conducted through telephone  My office door was closed  No one else was in the room  She acknowledged consent and understanding of privacy and security of the video platform  The patient has agreed to participate and understands they can discontinue the visit at any time    Patient is aware this is a billable service  Assessment and Plan:     Problem List Items Addressed This Visit        Cardiovascular and Mediastinum    Essential hypertension       Other    Medicare annual wellness visit, subsequent - Primary     See order                  Preventive health issues were discussed with patient, and age appropriate screening tests were ordered as noted in patient's After Visit Summary  Personalized health advice and appropriate referrals for health education or preventive services given if needed, as noted in patient's After Visit Summary       History of Present Illness:     Patient presents for Medicare Annual Wellness visit    Patient Care Team:  Adina Birmingham MD as PCP - General (Internal Medicine)     Problem List: Patient Active Problem List   Diagnosis    Baker cyst, right    Gastroesophageal reflux disease without esophagitis    Glucose intolerance (impaired glucose tolerance)    Restless legs    Hypercholesteremia    Mitral regurgitation    Migraine    Essential hypertension    Varicose vein of leg    Edema of extremities    Rhinitis, allergic    Aspirin intolerance    Persistent lymphocytosis    Hypokalemia    Irritable bowel syndrome    Abnormal echocardiogram    Vitamin D deficiency    Diverticulosis    Herpes zoster    History of syncope    Hammer toe of left foot    Raynaud's disease    Stage 3b chronic kidney disease    Neck pain    Medicare annual wellness visit, subsequent      Past Medical and Surgical History:     Past Medical History:   Diagnosis Date    Allergic rhinitis     Arthritis     Baker's cyst of knee 10/21/2016    right- burst on its own    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    hx of-fell when ice skating  Noted brain bleed w/swelling    Contact lens/glasses fitting     contact only in left eye    GERD (gastroesophageal reflux disease)     History of shingles     bilateral eyes-x3 episodes    HL (hearing loss)     Hypertension     recent elevation put on norvasc-now controlled    Loss of smell     since brain injury 1992    Migraine     PONV (postoperative nausea and vomiting)     Raynaud's disease     both hands    Seasonal allergies     Shoulder problem 10/2016    rotator cuff issue-right    Tendonitis of shoulder     right     Past Surgical History:   Procedure Laterality Date    AXILLARY SURGERY Left     fatty cyst removed, benign    CATARACT EXTRACTION Bilateral     CATARACT EXTRACTION W/ INTRAOCULAR LENS IMPLANT Right 11/10/2016    Procedure: EXTRACTION EXTRACAPSULAR CATARACT PHACO INTRAOCULAR LENS (IOL);   Surgeon: Odilon Gibson MD;  Location: [facility] OR;  Service:    Naveen Lacey  UTERUS      MYRINGOTOMY W/ TUBES  2011    both ears-one tube out on the left    MYRINGOTOMY W/ TUBES      NV SHLDR ARTHROSCOP,SURG,W/ROTAT CUFF REPR Right 2017    Procedure: ARTHROSCOPY SHOULDER WITH ROTATOR CUFF REPAIR, BICEPS TENODESIS, AND SUBACROMIAL DECOMPRESSION;  Surgeon: Nico Bradford MD;  Location: [place] [address] MAIN OR;  Service: Orthopedics    NV XCAPSL CTRC RMVL INSJ IO LENS PROSTH W/O ECP Left 10/20/2016    Procedure: EXTRACTION EXTRACAPSULAR CATARACT PHACO INTRAOCULAR LENS (IOL);   Surgeon: Johan Nicole MD;  Location: [facility] MAIN OR;  Service: Ophthalmology    SKIN BIOPSY      mole on chest    SKIN BIOPSY      under breast, benign      Family History:     Family History   Problem Relation Age of Onset    Heart disease Mother         exp age 59 MI    Stroke Father     Macular degeneration Sister     Macular degeneration Brother     Diabetes Brother     Cancer Brother         kidney-nephrectomy    Kidney disease Brother         cancer      Social History:        Social History     Socioeconomic History    Marital status: /Civil Union     Spouse name: None    Number of children: None    Years of education: None    Highest education level: None   Occupational History    None   Social Needs    Financial resource strain: None    Food insecurity     Worry: None     Inability: None    Transportation needs     Medical: None     Non-medical: None   Tobacco Use    Smoking status: Former Smoker     Packs/day: 1 50     Years: 30 00     Pack years: 45 00     Quit date:      Years since quittin 2    Smokeless tobacco: Never Used   Substance and Sexual Activity    Alcohol use: Yes     Comment: socially    Drug use: No    Sexual activity: None   Lifestyle    Physical activity     Days per week: None     Minutes per session: None    Stress: None   Relationships    Social connections     Talks on phone: None     Gets together: None     Attends Latter day service: None     Active member of club or organization: None     Attends meetings of clubs or organizations: None     Relationship status: None    Intimate partner violence     Fear of current or ex partner: None     Emotionally abused: None     Physically abused: None     Forced sexual activity: None   Other Topics Concern    None   Social History Narrative    None      Medications and Allergies:     Current Outpatient Medications   Medication Sig Dispense Refill    acetaminophen (TYLENOL) 500 mg tablet Take 1,000 mg by mouth every 6 (six) hours as needed for mild pain      ALPHAGAN P 0 1 %   0    ascorbic acid (VITAMIN C) 500 mg tablet Take 500 mg by mouth every morning      azelastine (ASTELIN) 0 1 % nasal spray 1 spray into each nostril 2 (two) times a day 1 Bottle 11    Biotin (BIOTIN 5000) 5 MG CAPS Take by mouth every morning      Calcium Carbonate-Vitamin D (CALTRATE 600+D PO) Take by mouth every morning      Cyanocobalamin (VITAMIN B 12 PO) Take by mouth every morning      furosemide (LASIX) 40 mg tablet Take 1 tablet (40 mg total) by mouth every morning 90 tablet 1    ipratropium (ATROVENT) 0 06 % nasal spray 2 sprays into each nostril 4 (four) times a day 15 mL 11    levocetirizine (XYZAL) 5 MG tablet take 1 tablet by mouth every evening 90 tablet 3    Lutein 40 MG CAPS Take by mouth every morning      Magnesium 250 MG TABS Take by mouth every morning      methocarbamol (ROBAXIN) 500 mg tablet Take 1 tablet (500 mg total) by mouth 2 (two) times a day 30 tablet 0    Misc Natural Products (TUMERSAID PO) Take 1 tablet by mouth daily      olmesartan (BENICAR) 40 mg tablet Take 1 tablet (40 mg total) by mouth daily 90 tablet 1    omeprazole (PriLOSEC) 20 mg delayed release capsule Take 1 capsule (20 mg total) by mouth daily as needed (HEARTBURN) 90 capsule 1    Potassium Chloride ER 20 MEQ TBCR Take 1 tablet (20 mEq total) by mouth daily 90 tablet 1    predniSONE 20 mg tablet Two daily for three days then one daily for next four days 10 tablet 0    rOPINIRole (REQUIP) 0 5 mg tablet Take 1 tablet (0 5 mg total) by mouth daily at bedtime 90 tablet 1    SUMAtriptan (IMITREX) 50 mg tablet Take 1 tablet (50 mg total) by mouth once as needed for migraine 9 tablet 1    vitamin A 7500 UNIT capsule Take 7,500 Units by mouth every morning        vitamin E, tocopherol, 400 units capsule Take 400 Units by mouth every morning Last dose 4/26/17      zinc gluconate 50 mg tablet Take 50 mg by mouth every morning       No current facility-administered medications for this visit  Allergies   Allergen Reactions    Lactose GI Intolerance    Latex Itching     Elastic,adhesive tape    Dilantin [Phenytoin Sodium Extended] Rash    Other Rash     Adhesive tape, environmental    Penicillins Rash      Immunizations:     Immunization History   Administered Date(s) Administered    Influenza, high dose seasonal 0 7 mL 12/01/2020    Influenza, injectable, quadrivalent, preservative free 0 5 mL 10/31/2019    SARS-CoV-2 / COVID-19 mRNA IM (Pfizer-BioNTech) 01/21/2021, 02/11/2021      Health Maintenance: There are no preventive care reminders to display for this patient  Topic Date Due    DTaP,Tdap,and Td Vaccines (1 - Tdap) 07/24/1961    Pneumococcal Vaccine: 65+ Years (1 of 1 - PPSV23) 07/24/2003      Medicare Health Risk Assessment:     Ht 5' 1" (1 549 m)   Wt 54 4 kg (120 lb)   BMI 22 67 kg/m²      Sharona Love is here for her Subsequent Wellness visit  Last Medicare Wellness visit information reviewed, patient interviewed and updates made to the record today  Health Risk Assessment:   Patient rates overall health as very good  Patient feels that their physical health rating is same  Patient is satisfied with their life  Eyesight was rated as slightly worse  Hearing was rated as same  Patient feels that their emotional and mental health rating is same  Patients states they are never, rarely angry   Patient states they are sometimes unusually tired/fatigued  Pain experienced in the last 7 days has been none  Patient states that she has experienced no weight loss or gain in last 6 months  Gets tired tired at the end of the day  Hearing is ok  Anger not a problem  Weight stays stable  Depression Screening:   PHQ-2 Score: 0      Fall Risk Screening: In the past year, patient has experienced: no history of falling in past year      Urinary Incontinence Screening:   Patient has not leaked urine accidently in the last six months  Home Safety:  Patient does not have trouble with stairs inside or outside of their home  Patient has working smoke alarms and has working carbon monoxide detector  Home safety hazards include: none  No home hazrads  No issues with stairs  Runs stairs for cardio  Pt feels safe in her home  Nutrition:   Current diet is Regular  Balanced  Eats fruits and veggies and proteins  Medications:   Patient is currently taking over-the-counter supplements  OTC medications include: see medication list  Patient is able to manage medications  Has no issues with meds  Does not take opiods  Activities of Daily Living (ADLs)/Instrumental Activities of Daily Living (IADLs):   Walk and transfer into and out of bed and chair?: Yes  Dress and groom yourself?: Yes    Bathe or shower yourself?: Yes    Do your laundry/housekeeping?: Yes  Manage your money, pay your bills and track your expenses?: Yes  Make your own meals?: Yes    Do your own shopping?: Yes    ADL comments: She still works full time  Drives and is independent  Previous Hospitalizations:   Any hospitalizations or ED visits within the last 12 months?: No      Hospitalization Comments: Chronic Conditions have been stable      Advance Care Planning:   Living will: No    Durable POA for healthcare: No    Advanced directive: No    Advanced directive counseling given: Yes    Five wishes given: No    Patient declined ACP directive: Yes    End of Life Decisions reviewed with patient: Yes    Provider agrees with end of life decisions: Yes      Comments: Will send pt a POLST  She will review and bring to next visit  Cognitive Screening:   Provider or family/friend/caregiver concerned regarding cognition?: No    PREVENTIVE SCREENINGS      Cardiovascular Screening:    General: Screening Not Indicated and History Lipid Disorder      Diabetes Screening:     General: Screening Current      Colorectal Cancer Screening:     General: Patient Declines and Risks and Benefits Discussed      Breast Cancer Screening:     General: Risks and Benefits Discussed and Patient Declines      Cervical Cancer Screening:    General: Screening Not Indicated      Osteoporosis Screening:    General: Risks and Benefits Discussed and Patient Declines      Abdominal Aortic Aneurysm (AAA) Screening:        General: Screening Not Indicated and Risks and Benefits Discussed      Lung Cancer Screening:     General: Screening Not Indicated and Risks and Benefits Discussed      Hepatitis C Screening:    General: Risks and Benefits Discussed and Patient Declines    Hep C Screening Accepted: No       Preventive Screening Comments: UTD with flu and Covid  Rec Prevnar  Sees eye doctor regularly  No longer wants CRC or mammogram     Screening, Brief Intervention, and Referral to Treatment (SBIRT)    Screening  Typical number of drinks in a day: 1  Typical number of drinks in a week: 1  Interpretation: Low risk drinking behavior  Single Item Drug Screening:  How often have you used an illegal drug (including marijuana) or a prescription medication for non-medical reasons in the past year? never    Single Item Drug Screen Score: 0  Interpretation: Negative screen for possible drug use disorder    Brief Intervention  Alcohol & drug use screenings were reviewed  No concerns regarding substance use disorder identified       Other Counseling Topics:   Skin self-exam        Zaire Cota MD

## 2021-03-16 ENCOUNTER — OFFICE VISIT (OUTPATIENT)
Dept: INTERNAL MEDICINE CLINIC | Facility: CLINIC | Age: 81
End: 2021-03-16
Payer: MEDICARE

## 2021-03-16 VITALS
OXYGEN SATURATION: 100 % | DIASTOLIC BLOOD PRESSURE: 80 MMHG | WEIGHT: 120 LBS | TEMPERATURE: 97.4 F | HEART RATE: 74 BPM | HEIGHT: 61 IN | BODY MASS INDEX: 22.66 KG/M2 | SYSTOLIC BLOOD PRESSURE: 134 MMHG

## 2021-03-16 DIAGNOSIS — I73.00 RAYNAUD'S DISEASE WITHOUT GANGRENE: ICD-10-CM

## 2021-03-16 DIAGNOSIS — N18.32 STAGE 3B CHRONIC KIDNEY DISEASE (HCC): ICD-10-CM

## 2021-03-16 DIAGNOSIS — M54.2 NECK PAIN: ICD-10-CM

## 2021-03-16 DIAGNOSIS — K21.9 GASTROESOPHAGEAL REFLUX DISEASE WITHOUT ESOPHAGITIS: ICD-10-CM

## 2021-03-16 DIAGNOSIS — E55.9 VITAMIN D DEFICIENCY: ICD-10-CM

## 2021-03-16 DIAGNOSIS — M54.12 CERVICAL RADICULOPATHY: Primary | ICD-10-CM

## 2021-03-16 PROCEDURE — 99214 OFFICE O/P EST MOD 30 MIN: CPT | Performed by: INTERNAL MEDICINE

## 2021-03-16 RX ORDER — METHOCARBAMOL 500 MG/1
500 TABLET, FILM COATED ORAL 3 TIMES DAILY
Qty: 45 TABLET | Refills: 0 | Status: SHIPPED | OUTPATIENT
Start: 2021-03-16 | End: 2021-07-19

## 2021-03-16 RX ORDER — OXYCODONE HYDROCHLORIDE AND ACETAMINOPHEN 5; 325 MG/1; MG/1
TABLET ORAL
Qty: 12 TABLET | Refills: 0 | Status: SHIPPED | OUTPATIENT
Start: 2021-03-16 | End: 2021-07-19

## 2021-03-16 NOTE — PATIENT INSTRUCTIONS
1  MRI of the cervical spine  2  Go for physical therapy  3  See orthopedic doctor of     4  Continue Tylenol 500 mg every 6 hours as needed for mild pain, half Percocet for moderate pain or 1 full tablet every 6 hour for severe pain  5  Continue Robaxin 500 mg twice a day or 3 times a day  No weightlifting pulling pushing  Follow with your rheumatologist as per plan    Follow with Consultants as per their and our suggestion    Follow up in 2  week(s) or as needed earlier    Follow all instructions as advised and discussed  Take your medications as prescribed  Call the office immediately if you experience any side effects  Ask questions if you do not understand  Keep your scheduled appointment as advised or come sooner if necessary or in doubt  Best time to call for non-urgent matter or questions on weekdays is between 9am and 12 noon  See physician for any new symptoms or worsening of current symptoms  Urgent or emergent situations call 911 and report to nearest emergency room  I spent  30 minutes taking care of this patient including clinical care, conseling, collaboration, chart, lab and consultaion review      Patient is to get labs 1 week(s) prior to next visit if advised

## 2021-03-16 NOTE — ASSESSMENT & PLAN NOTE
Patient will be seeing rheumatologist in near future for evaluation of significant Raynaud's phenomenon with suboptimal symptom control

## 2021-03-16 NOTE — ASSESSMENT & PLAN NOTE
Continue omeprazole daily  Recommend to follow-up with gastroenterologist regarding acid reflux re-evaluation

## 2021-03-16 NOTE — PROGRESS NOTES
Shane Acosta Office Visit Note  21     Kenya Gordon 78 y o  female MRN: [id number]  : 1940    Assessment:     No problem-specific Assessment & Plan notes found for this encounter  There are no diagnoses linked to this encounter  Discussion Summary and Plan: Today's care plan and medications were reviewed with patient in detail and all their questions answered to their satisfaction  In summary patient with ongoing neck pain on the left side with left upper extremity radiculopathy with associated tingling numbness more moderate to severe pain ongoing symptoms not better with prednisone and Robaxin  Known case of multiple joint degenerative joint disease rim Raynaud's symptoms  Home    Will get MRI of cervical spine, refer for physical therapy, refer to the orthopedic doctor, a increase Robaxin 500 mg 3 times a day due to spasm identified on the cervical spine x-ray, probably has a combination of degenerative joint disease per pinching of the nerve home of   Given tingling and numbness MRI is indicated also patient did not get better with the current treatment plan  Patient's pain is severe home will add Percocet for the severe pain contract were signed  Side effect of the Percocet reviewed recommend stool softener when she takes Percocet  Tylenol for mild pain half Percocet for moderate and 1 for severe  Increase Robaxin 500 3 times a day refer for physical therapy  Also to be seen by rheumatologist   Evaluate back in 2-3 weeks  Chief Complaint   Patient presents with    Neck Pain     Was better  No pain coming back with movement      Subjective:          Patient complains of 1 month history of left lower paracervical neck area pain radicular down to the mid forearm  Does get some tingling numbness in the fingers at times  No loss of strength  had no effect of the shoulder movement  Denies any fall weight lifting injury    Patient did note report  a previous back surgery and neck surgery  Patient was treated with prednisone for a week as well as Robaxin which she is finishing up  X-ray report shows his stroke is straightening of cervical lordosis related to spasm as well as 1 mm anterolisthesis of C3 on C4 and 2 mm on the retrolisthesis on C6 on C7  There are endplate degenerative changes throughout the cervical spine most prominent at C4-C5 and to c6-7  Patient continues to have radiculopathy  Pain is not better  Patient can not now  1st from the floor however has difficulty raising it at shoulder level  Patient continues to have tingling and numbness in the left upper extremity  Pain is 8/10 at times  At times it is worse at times it is better  Patient is taking Tylenol  Due to acid reflux we cannot give anti-inflammatory  Tolerated prednisone without side effect  Remains on Robaxin  See sees a known case of advanced osteoarthritis of multiple joint       However patient is a known case of Raynaud's phenomenon as well as advanced degenerative joint disease multiple joints  Patient has appointment with rheumatologist in near future  Patient had seen new rheumatologist in 2013 at 1 time  her restless legs are symptomatic at at times  Plain water helps more than liquid  She prefers to take that as needed  Raynaud's this time of the year is fairly good in summer , now symptomatic,     Allergic rhinitis :   She remains on Xyzal and has some inhaled most likely steroid by ENT physician  See is not taking damaged anymore  She is no longer on allergy desensitization    No further mammogram or colonoscopy given her age and choice      LVH, moderate TR, 2+ MR, PA systolic pressure of 78-58 for surveillance  CKD level 3 who will continue to monitor see blood test and mid    Varicose veins flat  Lymphocytosis will monitor  Remote history of shingles not a problem  Diet diverticulosis fair  S the hyperlipidemia:  Her LDL is 141   CP has not been taking statin  I did advise  She will think about it    Annual eye exam done in 2016, DEXA scan 2012 osteopenia, mammogram 2015 reviewed, 08/11/2014 upper GI endoscopy and colonoscopy revealed distal esophageal erosion small polyp from the stomach and colon revealed sigmoid diverticulosis  Hypertension:  Symptom-free  Home blood pressure well controlled  Tolerating current antihypertensive without side effect  Peripheral edema is better the remains on Lasix    Mitral regurgitation:  Shortness of breath is better  Patient had echocardiogram previously    IBS:  Symptomatic at times  For symptomatic treatment  DJD of knees reasonable, some knee pain as well as the finger pain back secondary to arthritis though takes Tylenol p r n     Will also await the rheumatologists input    Gastroesophageal reflux symptoms free as long as patient is taking omeprazole  Patient has a appointment with gastroenterologist and will be going for upper GI endoscopy when patient gets time see does not have time at this time  migraine not a major issue    Total bilirubin will continue to monitor  Anxiety and depression disorder  Borderline blood sugar low will continue to monitor  Hyperlipidemia:  Continue diet      GERD:  Her acid reflux is [address] is taking antacid medicines tobacco regularly  Dietary would advise, CC started back on omeprazole 20 mg daily back again  Her heartburn is better  Patient is getting food as he did not regurgitate a son but not as well as CS has difficulty swallowing at times  No black bowel movement no nausea vomiting hematemesis melena  See endoscopic done many years ago        Sodium                    Value: 139(mmol/L)        Dt: 02/26/2021  Potassium                 Value: 4 2(mmol/L)        Dt: 02/26/2021  Chloride                  Value: 108(mmol/L)        Dt: 02/26/2021  CO2                       Value: 28(mmol/L)         Dt: 02/26/2021  ANION GAP Value: 3(mmol/L)*         Dt: 02/26/2021  BUN                       Value: 35(mg/dL)*         Dt: 02/26/2021  Creatinine                Value: 1 15(mg/dL)        Dt: 02/26/2021  Glucose, Fasting          Value: 109(mg/dL)*        Dt: 02/26/2021  Calcium                   Value: 10  0(mg/dL)        Dt: 02/26/2021  AST                       Value: 15(U/L)            Dt: 02/26/2021  ALT                       Value: 19(U/L)            Dt: 02/26/2021  Alkaline Phosphatase      Value: 62(U/L)            Dt: 02/26/2021  Total Protein             Value: 7 3(g/dL)          Dt: 02/26/2021  Albumin                   Value: 3 8(g/dL)          Dt: 02/26/2021  Total Bilirubin           Value: 0 78(mg/dL)        Dt: 02/26/2021  eGFR                      Value: 44(ml/min/1 73sq m) Dt: 02/26/2021  WBC                       Value: 6 84(Thousand/uL)  Dt: 02/26/2021  RBC                       Value: 3 66(Million/uL)*  Dt: 02/26/2021  Hemoglobin                Value: 11 8(g/dL)         Dt: 02/26/2021  Hematocrit                Value: 36 5(%)            Dt: 02/26/2021  MCV                       Value: 100(fL)*           Dt: 02/26/2021  MCH                       Value: 32 2(pg)           Dt: 02/26/2021  MCHC                      Value: 32 3(g/dL)         Dt: 02/26/2021  RDW                       Value: 12 7(%)            Dt: 02/26/2021  MPV                       Value: 11 3(fL)           Dt: 02/26/2021  Platelets                 Value: 178(Thousands/uL)  Dt: 02/26/2021  nRBC                      Value: 0(/100 WBCs)       Dt: 02/26/2021  Neutrophils Relative      Value: 46(%)              Dt: 02/26/2021  Immat GRANS %             Value: 0(%)               Dt: 02/26/2021  Lymphocytes Relative      Value: 41(%)              Dt: 02/26/2021  Monocytes Relative        Value: 10(%)              Dt: 02/26/2021  Eosinophils Relative      Value: 2(%)               Dt: 02/26/2021  Basophils Relative        Value: 1(%)               Dt: 02/26/2021  Neutrophils Absolute      Value: 3 15(Thousands/µL) Dt: 02/26/2021  Immature Grans Absolute   Value: 0 01(Thousand/uL)  Dt: 02/26/2021  Lymphocytes Absolute      Value: 2 82(Thousands/µL) Dt: 02/26/2021  Monocytes Absolute        Value: 0 70(Thousand/µL)  Dt: 02/26/2021  Eosinophils Absolute      Value: 0 12(Thousand/µL)  Dt: 02/26/2021  Basophils Absolute        Value: 0 04(Thousands/µL) Dt: 02/26/2021  Cholesterol               Value: 235(mg/dL)*        Dt: 02/26/2021  Triglycerides             Value: 117(mg/dL)         Dt: 02/26/2021  HDL, Direct               Value: 71(mg/dL)          Dt: 02/26/2021  LDL Calculated            Value: 141(mg/dL)*        Dt: 02/26/2021  Non-HDL-Chol (CHOL-HDL)   Value: 164(mg/dl)         Dt: 02/26/2021  Vit D, 25-Hydroxy         Value: 36  3(ng/mL)        Dt: 02/26/2021  ------------ - 2 weeks          The following portions of the patient's history were reviewed and updated as appropriate: allergies, current medications, past family history, past medical history, past social history, past surgical history and problem list     Review of Systems      Historical Information   Patient Active Problem List   Diagnosis    Baker cyst, right    Gastroesophageal reflux disease without esophagitis    Glucose intolerance (impaired glucose tolerance)    Restless legs    Hypercholesteremia    Mitral regurgitation    Migraine    Essential hypertension    Varicose vein of leg    Edema of extremities    Rhinitis, allergic    Aspirin intolerance    Persistent lymphocytosis    Hypokalemia    Irritable bowel syndrome    Abnormal echocardiogram    Vitamin D deficiency    Diverticulosis    Herpes zoster    History of syncope    Hammer toe of left foot    Raynaud's disease    Stage 3b chronic kidney disease    Neck pain    Medicare annual wellness visit, subsequent     Past Medical History:   Diagnosis Date    Allergic rhinitis     Arthritis     Baker's cyst of knee 10/21/2016    right- burst on its own    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    hx of-fell when ice skating  Noted brain bleed w/swelling    Contact lens/glasses fitting     contact only in left eye    GERD (gastroesophageal reflux disease)     History of shingles     bilateral eyes-x3 episodes    HL (hearing loss)     Hypertension     recent elevation put on norvasc-now controlled    Loss of smell     since brain injury 1992    Migraine     PONV (postoperative nausea and vomiting)     Raynaud's disease     both hands    Seasonal allergies     Shoulder problem 10/2016    rotator cuff issue-right    Tendonitis of shoulder     right     Past Surgical History:   Procedure Laterality Date    AXILLARY SURGERY Left     fatty cyst removed, benign    CATARACT EXTRACTION Bilateral     CATARACT EXTRACTION W/ INTRAOCULAR LENS IMPLANT Right 11/10/2016    Procedure: EXTRACTION EXTRACAPSULAR CATARACT PHACO INTRAOCULAR LENS (IOL); Surgeon: Rashaad Milton MD;  Location: [facility] MAIN OR;  [facility]:    [facility]      MYRINGOTOMY W/ TUBES  2011    both ears-one tube out on the left    MYRINGOTOMY W/ TUBES      [place] SHLDR ARTHROSCOP,SURG,W/ROTAT CUFF REPR Right 5/4/2017    Procedure: ARTHROSCOPY SHOULDER WITH ROTATOR CUFF REPAIR, BICEPS TENODESIS, AND SUBACROMIAL DECOMPRESSION;  Surgeon: Sonya Brock MD;  Location: [facility] MAIN OR;  Service: Orthopedics    [place] XCAPSL CTRC RMVL INSJ IO LENS PROSTH W/O ECP Left 10/20/2016    Procedure: EXTRACTION EXTRACAPSULAR CATARACT PHACO INTRAOCULAR LENS (IOL);   Surgeon: Rashaad Milton MD;  Location: [facility] MAIN OR;  Service: Ophthalmology    SKIN BIOPSY      mole on chest    SKIN BIOPSY      under breast, benign     Social History     Substance and Sexual Activity   Alcohol Use Yes    Comment: socially     Social History     Substance and Sexual Activity   Drug Use No     Social History     Tobacco Use   Smoking Status Former Smoker    Packs/day: 1 50    Years: 30 00    Pack years: 37 1    Quit date: [place] Years since quittin 2   Smokeless Tobacco Never Used     Family History   Problem Relation Age of Onset    Heart disease Mother         exp age 59 MI    Stroke Father     Macular degeneration Sister     Macular degeneration Brother     Diabetes Brother     Cancer Brother         kidney-nephrectomy    Kidney disease Brother         cancer     Health Maintenance Due   Topic    DTaP,Tdap,and Td Vaccines (1 - Tdap)    Pneumococcal Vaccine: 65+ Years (1 of 1 - PPSV23)      Meds/Allergies       Current Outpatient Medications:     acetaminophen (TYLENOL) 500 mg tablet, Take 1,000 mg by mouth every 6 (six) hours as needed for mild pain, Disp: , Rfl:     ALPHAGAN P 0 1 %, , Disp: , Rfl: 0    ascorbic acid (VITAMIN C) 500 mg tablet, Take 500 mg by mouth every morning, Disp: , Rfl:     azelastine (ASTELIN) 0 1 % nasal spray, 1 spray into each nostril 2 (two) times a day, Disp: 1 Bottle, Rfl: 11    Biotin (BIOTIN 5000) 5 MG CAPS, Take by mouth every morning, Disp: , Rfl:     Calcium Carbonate-Vitamin D (CALTRATE 600+D PO), Take by mouth every morning, Disp: , Rfl:     Cyanocobalamin (VITAMIN B 12 PO), Take by mouth every morning, Disp: , Rfl:     furosemide (LASIX) 40 mg tablet, Take 1 tablet (40 mg total) by mouth every morning, Disp: 90 tablet, Rfl: 1    ipratropium (ATROVENT) 0 06 % nasal spray, 2 sprays into each nostril 4 (four) times a day, Disp: 15 mL, Rfl: 11    levocetirizine (XYZAL) 5 MG tablet, take 1 tablet by mouth every evening, Disp: 90 tablet, Rfl: 3    Lutein 40 MG CAPS, Take by mouth every morning, Disp: , Rfl:     Magnesium 250 MG TABS, Take by mouth every morning, Disp: , Rfl:     methocarbamol (ROBAXIN) 500 mg tablet, Take 1 tablet (500 mg total) by mouth 2 (two) times a day, Disp: 30 tablet, Rfl: 0    Misc Natural Products (TUMERSAID PO), Take 1 tablet by mouth daily, Disp: , Rfl:    olmesartan (BENICAR) 40 mg tablet, Take 1 tablet (40 mg total) by mouth daily, Disp: 90 tablet, Rfl: 1    omeprazole (PriLOSEC) 20 mg delayed release capsule, Take 1 capsule (20 mg total) by mouth daily as needed (HEARTBURN), Disp: 90 capsule, Rfl: 1    Potassium Chloride ER 20 MEQ TBCR, Take 1 tablet (20 mEq total) by mouth daily, Disp: 90 tablet, Rfl: 1    predniSONE 20 mg tablet, Two daily for three days then one daily for next four days, Disp: 10 tablet, Rfl: 0    rOPINIRole (REQUIP) 0 5 mg tablet, Take 1 tablet (0 5 mg total) by mouth daily at bedtime, Disp: 90 tablet, Rfl: 1    SUMAtriptan (IMITREX) 50 mg tablet, Take 1 tablet (50 mg total) by mouth once as needed for migraine, Disp: 9 tablet, Rfl: 1    vitamin A 7500 UNIT capsule, Take 7,500 Units by mouth every morning  , Disp: , Rfl:     vitamin E, tocopherol, 400 units capsule, Take 400 Units by mouth every morning Last dose 4/26/17, Disp: , Rfl:     zinc gluconate 50 mg tablet, Take 50 mg by mouth every morning, Disp: , Rfl:       Objective:    Vitals:   Pulse 74   Temp (!) 97 4 °F (36 3 °C)   Ht 5' 1" (1 549 m)   Wt 54 4 kg (120 lb)   SpO2 100%   BMI 22 67 kg/m²   Body mass index is 22 67 kg/m²  Vitals:    03/16/21 0925   Weight: 54 4 kg (120 lb)       Physical Exam    Lab Review   Lab on 02/26/2021   Component Date Value Ref Range Status    Sodium 02/26/2021 139  136 - 145 mmol/L Final    Potassium 02/26/2021 4 2  3 5 - 5 3 mmol/L Final    Chloride 02/26/2021 108  100 - 108 mmol/L Final    CO2 02/26/2021 28  21 - 32 mmol/L Final    ANION GAP 02/26/2021 3* 4 - 13 mmol/L Final    BUN 02/26/2021 35* 5 - 25 mg/dL Final    Creatinine 02/26/2021 1 15  0 60 - 1 30 mg/dL Final    Standardized to IDMS reference method    Glucose, Fasting 02/26/2021 109* 65 - 99 mg/dL Final    Specimen collection should occur prior to Sulfasalazine administration due to the potential for falsely depressed results   Specimen collection should occur prior to Sulfapyridine administration due to the potential for falsely elevated results   Calcium 02/26/2021 10 0  8 3 - 10 1 mg/dL Final    AST 02/26/2021 15  5 - 45 U/L Final    Specimen collection should occur prior to Sulfasalazine administration due to the potential for falsely depressed results   ALT 02/26/2021 19  12 - 78 U/L Final    Specimen collection should occur prior to Sulfasalazine and/or Sulfapyridine administration due to the potential for falsely depressed results   Alkaline Phosphatase 02/26/2021 62  46 - 116 U/L Final    Total Protein 02/26/2021 7 3  6 4 - 8 2 g/dL Final    Albumin 02/26/2021 3 8  3 5 - 5 0 g/dL Final    Total Bilirubin 02/26/2021 0 78  0 20 - 1 00 mg/dL Final    Use of this assay is not recommended for patients undergoing treatment with eltrombopag due to the potential for falsely elevated results      eGFR 02/26/2021 44  ml/min/1 73sq m Final    WBC 02/26/2021 6 84  4 31 - 10 16 Thousand/uL Final    RBC 02/26/2021 3 66* 3 81 - 5 12 Million/uL Final    Hemoglobin 02/26/2021 11 8  11 5 - 15 4 g/dL Final    Hematocrit 02/26/2021 36 5  34 8 - 46 1 % Final    MCV 02/26/2021 100* 82 - 98 fL Final    MCH 02/26/2021 32 2  26 8 - 34 3 pg Final    MCHC 02/26/2021 32 3  31 4 - 37 4 g/dL Final    RDW 02/26/2021 12 7  11 6 - 15 1 % Final    MPV 02/26/2021 11 3  8 9 - 12 7 fL Final    Platelets 64/97/2416 178  149 - 390 Thousands/uL Final    nRBC 02/26/2021 0  /100 WBCs Final    Neutrophils Relative 02/26/2021 46  43 - 75 % Final    Immat GRANS % 02/26/2021 0  0 - 2 % Final    Lymphocytes Relative 02/26/2021 41  14 - 44 % Final    Monocytes Relative 02/26/2021 10  4 - 12 % Final    Eosinophils Relative 02/26/2021 2  0 - 6 % Final    Basophils Relative 02/26/2021 1  0 - 1 % Final    Neutrophils Absolute 02/26/2021 3 15  1 85 - 7 62 Thousands/µL Final    Immature Grans Absolute 02/26/2021 0 01  0 00 - 0 20 Thousand/uL Final    Lymphocytes Absolute 02/26/2021 2 82 0 60 - 4 47 Thousands/µL Final    Monocytes Absolute 02/26/2021 0 70  0 17 - 1 22 Thousand/µL Final    Eosinophils Absolute 02/26/2021 0 12  0 00 - 0 61 Thousand/µL Final    Basophils Absolute 02/26/2021 0 04  0 00 - 0 10 Thousands/µL Final    Cholesterol 02/26/2021 235* 50 - 200 mg/dL Final    Cholesterol:       Desirable         <200 mg/dl       Borderline         200-239 mg/dl       High              >239           Triglycerides 02/26/2021 117  <=150 mg/dL Final    Triglyceride:     Normal          <150 mg/dl     Borderline High 150-199 mg/dl     High            200-499 mg/dl        Very High       >499 mg/dl    Specimen collection should occur prior to N-Acetylcysteine or Metamizole administration due to the potential for falsely depressed results   HDL, Direct 02/26/2021 71  >=40 mg/dL Final    HDL Cholesterol:       Low     <41 mg/dL  Specimen collection should occur prior to Metamizole administration due to the potential for falsley depressed results   LDL Calculated 02/26/2021 141* 0 - 100 mg/dL Final    LDL Cholesterol:     Optimal           <100 mg/dl     Near Optimal      100-129 mg/dl     Above Optimal       Borderline High 130-159 mg/dl       High            160-189 mg/dl       Very High       >189 mg/dl         This screening LDL is a calculated result  It does not have the accuracy of the Direct Measured LDL in the monitoring of patients with hyperlipidemia and/or statin therapy  Direct Measure LDL (CHN753) must be ordered separately in these patients   Non-HDL-Chol (CHOL-HDL) 02/26/2021 164  mg/dl Final    Vit D, 25-Hydroxy 02/26/2021 36 3  30 0 - 100 0 ng/mL Final         There are no Patient Instructions on file for this visit  Dr Latesha Sevilla MD  [facility]       "This note has been constructed using a voice recognition system  Therefore there may be syntax, spelling, and/or grammatical errors   Please call if you have any questions  "

## 2021-03-16 NOTE — ASSESSMENT & PLAN NOTE
Recommend to avoid reviewed  Plan is to proceed with MRI of cervical spine  Refer to the orthopedic doctor  Of how continue Robaxin 500 mg but increase to 3 times a day  Continue Tylenol of 500 mg 3 times a day home  The refer for physical therapy    Recommend Percocet 5 mg half tablet every 6 hour if severe pain

## 2021-03-23 NOTE — TELEPHONE ENCOUNTER
Called and spoke to pt whom informed has other issues at this time and will call us back to schedule once taken care of

## 2021-03-24 ENCOUNTER — EVALUATION (OUTPATIENT)
Dept: PHYSICAL THERAPY | Facility: CLINIC | Age: 81
End: 2021-03-24
Payer: MEDICARE

## 2021-03-24 DIAGNOSIS — M54.12 CERVICAL RADICULOPATHY: Primary | ICD-10-CM

## 2021-03-24 PROCEDURE — 97162 PT EVAL MOD COMPLEX 30 MIN: CPT | Performed by: PHYSICAL THERAPIST

## 2021-03-24 NOTE — PROGRESS NOTES
PT Evaluation     Today's date: 3/24/2021  Patient name: Rena Fischer  : 1940  MRN: [id number]  Referring provider: Klever Miles MD  Dx:   Encounter Diagnosis     ICD-10-CM    1  Cervical radiculopathy  M54 12 Ambulatory referral to Physical Therapy                  Assessment  Assessment details: Rena Fischer is a 78 y o  female who presents to physical therapy with pain, decreased LE strength, decreased cervical range of motion , impaired function, decreased activity tolerance, fair posture and poor body mechanics  Patient's clinical presentation is consistent with their referring diagnosis of Cervical radiculopathy  (primary encounter diagnosis)  The pt presents with functional limitations of ADLs, recreational activities, work-related activities, performing household chores and reaching  Pt would benefit from physical therapy services to address these limitations and maximize function  Pt was instructed and educated on home exercise program today and demonstrates understanding  Impairments: abnormal muscle firing, abnormal muscle tone, abnormal or restricted ROM, activity intolerance, impaired physical strength, lacks appropriate home exercise program, pain with function, scapular dyskinesis, poor posture  and poor body mechanics  Understanding of Dx/Px/POC: good   Prognosis: good    Goals  Short term goals:  (3 weeks)  1  Patient will have pain level 2/10 or less in cervical spine and left arm with ADL's   2  Patient will report a 50% improvement in symptoms with ADL's  3  Patient will have normal Cervical ROM  4  Patient will demonstrate correct sitting posture      Long term goals:  (6 weeks)  1  Patient will demonstrate a reduction in tenderness and tension in hypertonic musculature  2  Patient will report 85% improvement improvement in symptoms with ADL's  3  patient will demonstrate functional reaching without compensatory patterns   4   Patient will be independent in a comprehensive home exercise program       Plan  Patient would benefit from: PT eval and skilled physical therapy  Planned modality interventions: thermotherapy: hydrocollator packs  Planned therapy interventions: joint mobilization, manual therapy, massage, neuromuscular re-education, patient education, postural training, strengthening, stretching, therapeutic activities, ADL training, functional ROM exercises, home exercise program and abdominal trunk stabilization  Frequency: 2x week  Duration in visits: 12  Duration in weeks: 6  Treatment plan discussed with: patient        Subjective Evaluation    History of Present Illness  Mechanism of injury: She reports over a month of having left C-S and left arm pain  She followed up with Dr Trini Kessler  She was prescribed muscle relaxers and she had an x-ray  She was then referred to PT  Pain  Current pain ratin  At best pain ratin  At worst pain rating: 10  Location: Left C-S, Left scapula, axilla and posterior arm to elbow  Quality: dull ache, throbbing and sharp  Relieving factors: medications    Social Support    Employment status: working (Book keeper)  Hand dominance: right  Exercise history: Yard work    Patient Goals  Patient goals for therapy: return to sport/leisure activities and decreased pain          Objective     Postural Observations  Seated posture: fair    Additional Postural Observation Details  Increase T-S hyphosis    Palpation   Left   Hypertonic in the cervical paraspinals, levator scapulae, pectoralis minor, rhomboids, suboccipitals and upper trapezius  Tenderness of the cervical paraspinals, rhomboids, suboccipitals and upper trapezius       Neurological Testing     Sensation   Cervical/Thoracic   Left   Intact: light touch    Right   Intact: light touch    Active Range of Motion   Cervical/Thoracic Spine       Cervical    Flexion:  Restriction level: minimal  Extension:  Restriction level: moderate  Left lateral flexion:  Restriction level: minimal  Right lateral flexion:  with pain Restriction level moderate  Left rotation:  with pain Restriction level: moderate  Right rotation:  Restriction level: minimal    Joint Play     Hypomobile: C4, C5, C6, T3, T4, T5 and T6     Pain: C4, C5, C6, T3, T4, T5 and T6     Strength/Myotome Testing   Cervical Spine     Left   Interossei strength (t1): 5    Right   Interossei strength (t1): 5    Left Shoulder     Planes of Motion   Flexion: 4+     Right Shoulder     Planes of Motion   Flexion: 5     Left Elbow   Flexion: 4+  Extension: 4    Right Elbow   Flexion: 5  Extension: 5    Left Wrist/Hand   Wrist extension: 5  Wrist flexion: 5    Right Wrist/Hand   Wrist extension: 5  Wrist flexion: 5             Precautions:  Hypertension, LATEX ALLERGY    Specialty Daily Treatment Diary     Manuals 3/24/21       Visit # 1       STM UT, paraspinals, levator, biceps        C-S PROM        C-S distraction        T-S P to A        Warm-up        NuStep        Neuro Re-Ed        Posture correct        UT stretch        Chin tucks        C-S  AROM                Ther Ex        TB row        TB ext        Shrugs        Cat/Camel        Quad T-S rotate                                Ther Activity                                Modalities        MH

## 2021-03-29 ENCOUNTER — OFFICE VISIT (OUTPATIENT)
Dept: PHYSICAL THERAPY | Facility: CLINIC | Age: 81
End: 2021-03-29
Payer: MEDICARE

## 2021-03-29 DIAGNOSIS — M54.12 CERVICAL RADICULOPATHY: Primary | ICD-10-CM

## 2021-03-29 PROCEDURE — 97110 THERAPEUTIC EXERCISES: CPT | Performed by: PHYSICAL THERAPIST

## 2021-03-29 PROCEDURE — 97140 MANUAL THERAPY 1/> REGIONS: CPT | Performed by: PHYSICAL THERAPIST

## 2021-03-29 NOTE — PROGRESS NOTES
Daily Note     Today's date: 3/29/2021  Patient name: Elza Valadez  : 1940  MRN: [id number]  Referring provider: Mick Montes MD  Dx:   Encounter Diagnosis     ICD-10-CM    1  Cervical radiculopathy  M54 12                   Subjective: Pain is 4/10 right now      Objective: See treatment diary below      Assessment: Tolerated treatment well  Patient would benefit from continued PT  Patient felt distraction relieved her symptoms but thera band extension may have caused them to worsen  Plan: Continue per plan of care        Precautions:  Hypertension, LATEX ALLERGY    Specialty Daily Treatment Diary     Manuals 3/24/21 3/29/21      Visit # 1 2      STM UT, paraspinals, levator, biceps  7'      C-S PROM  5'      C-S distraction  2'      T-S P to A  2'      Warm-up        NuStep  5'      Neuro Re-Ed        Posture correct        UT stretch  20      Chin tucks  20   3"      C-S  AROM  10              Ther Ex        TB row  20    RTB      TB ext  20   YTB      Shrugs        Cat/Camel  10      Quad T-S rotate  10                              Ther Activity                                Modalities          5'

## 2021-03-30 ENCOUNTER — APPOINTMENT (OUTPATIENT)
Dept: LAB | Facility: CLINIC | Age: 81
End: 2021-03-30
Payer: MEDICARE

## 2021-03-30 ENCOUNTER — OFFICE VISIT (OUTPATIENT)
Dept: INTERNAL MEDICINE CLINIC | Facility: CLINIC | Age: 81
End: 2021-03-30
Payer: MEDICARE

## 2021-03-30 ENCOUNTER — TRANSCRIBE ORDERS (OUTPATIENT)
Dept: LAB | Facility: CLINIC | Age: 81
End: 2021-03-30
Payer: MEDICARE

## 2021-03-30 VITALS
BODY MASS INDEX: 22.84 KG/M2 | DIASTOLIC BLOOD PRESSURE: 76 MMHG | WEIGHT: 121 LBS | SYSTOLIC BLOOD PRESSURE: 126 MMHG | HEART RATE: 90 BPM | OXYGEN SATURATION: 90 % | TEMPERATURE: 96.3 F | HEIGHT: 61 IN

## 2021-03-30 DIAGNOSIS — I73.00 RAYNAUD'S DISEASE WITHOUT GANGRENE: ICD-10-CM

## 2021-03-30 DIAGNOSIS — M79.602 LEFT ARM PAIN: ICD-10-CM

## 2021-03-30 DIAGNOSIS — I73.00 SECONDARY RAYNAUD'S PHENOMENON: Primary | ICD-10-CM

## 2021-03-30 DIAGNOSIS — H11.143 CONJUNCTIVAL XEROSIS OF BOTH EYES: ICD-10-CM

## 2021-03-30 DIAGNOSIS — I10 ESSENTIAL HYPERTENSION: Primary | ICD-10-CM

## 2021-03-30 DIAGNOSIS — M54.12 CERVICAL RADICULOPATHY: ICD-10-CM

## 2021-03-30 LAB
CRP SERPL QL: <3 MG/L
ERYTHROCYTE [SEDIMENTATION RATE] IN BLOOD: 6 MM/HOUR (ref 0–29)

## 2021-03-30 PROCEDURE — 36415 COLL VENOUS BLD VENIPUNCTURE: CPT

## 2021-03-30 PROCEDURE — 86038 ANTINUCLEAR ANTIBODIES: CPT

## 2021-03-30 PROCEDURE — 86140 C-REACTIVE PROTEIN: CPT

## 2021-03-30 PROCEDURE — 84165 PROTEIN E-PHORESIS SERUM: CPT

## 2021-03-30 PROCEDURE — 85652 RBC SED RATE AUTOMATED: CPT

## 2021-03-30 PROCEDURE — 84165 PROTEIN E-PHORESIS SERUM: CPT | Performed by: PATHOLOGY

## 2021-03-30 PROCEDURE — 99214 OFFICE O/P EST MOD 30 MIN: CPT | Performed by: INTERNAL MEDICINE

## 2021-03-30 PROCEDURE — 86235 NUCLEAR ANTIGEN ANTIBODY: CPT

## 2021-03-30 PROCEDURE — 86225 DNA ANTIBODY NATIVE: CPT

## 2021-03-30 NOTE — PATIENT INSTRUCTIONS
Follow with Consultants as per their and our suggestion    Follow up in one month or as needed earlier    Follow all instructions as advised and discussed  Take your medications as prescribed  Call the office immediately if you experience any side effects  Ask questions if you do not understand  Keep your scheduled appointment as advised or come sooner if necessary or in doubt  Best time to call for non-urgent matter or questions on weekdays is between 9am and 12 noon  See physician for any new symptoms or worsening of current symptoms  Urgent or emergent situations call 911 and report to nearest emergency room  I spent  30 -40 minutes taking care of this patient including clinical care, conseling, collaboration, chart, lab and consultaion review as appropriate    Patient is to get labs 1 week(s) prior to next visit if advised    Go for MRI on April 5th as planned  Continue physical therapy for left neck pain and radiculopathy  You have a new problem of possible partial left deltoid tear  See orthopedic doctor Dr Alfonza Lesch  Also keep her appointment with Dr Kristin Ceja regarding your neck pain and radiculopathy  Of consider taking SSRI/antidepressant for your Raynaud's as recommended by rheumatologist     We will hold of SSRI per your request in your blood test for Sjogren syndrome are back  Continue physical therapy for you neck  Continue Robaxin as needed  We will continue Benicar for your blood pressure  You could not tolerate amlodipine in the past we will not be able to use beta blocker or other calcium channel blocker  Urology on the diuretic  Blood pressure is well controlled

## 2021-03-30 NOTE — ASSESSMENT & PLAN NOTE
Of cervical radiculopathy is improving  Will continue physical therapy  Will continue Robaxin a as needed  Patient has appointment with Dr Leslie Acosta after MRI going for MRI of cervical spine on April 5th

## 2021-03-30 NOTE — PROGRESS NOTES
[facility] Office Visit Note  21     Rena Fischer 78 y o  female MRN: [id number]  : 1940    Assessment:     Essential hypertension  Will continue Benicar at this time  Patient could not tolerate amlodipine in the past   Now beta-blocker will exacerbate her Raynaud's will avoid it  She is already on diuretic  The    Cervical radiculopathy  Of cervical radiculopathy is improving  Will continue physical therapy  Will continue Robaxin a as needed  Patient has appointment with Dr Yesenia Steven after MRI going for MRI of cervical spine on   Raynaud's disease  Seen by rheumatologist     Lelia Ly for additional blood test for the Sjogren syndrome  The options of SSRI were given to the patient  Patient wants to wait till blood test is back  We did discuss about the pros and cons  We reassured that it is reasonable to try given her significant Raynaud's  Probably will not be able to change blood pressure medications given her inability to tolerate beta blocker in the past, she is already on diuretic, as well as would beta-blocker will exacerbate her Raynaud of  Home Ace will not have any added benefit at this time  Left arm pain  New problem of the left arm on the mid arm area probably may have a partial deltoid tear  Home sees functioning reasonably well  Recommend to see orthopedic doctor for their opinion       Diagnoses and all orders for this visit:    Essential hypertension    Raynaud's disease without gangrene    Cervical radiculopathy    Left arm pain  -     Ambulatory referral to Orthopedic Surgery; Future          Discussion Summary and Plan: Today's care plan and medications were reviewed with patient in detail and all their questions answered to their satisfaction  Chief Complaint   Patient presents with    Follow-up     raynaud's    Shoulder Pain    Hypertension      Subjective:  Is here for follow up following issues      1  Raynaud's:  Patient was seen by rheumatologist going for for the blood test including so current  Patient is recommended to consider SSRI however patient wanted to talk to me we talked about antidepressant patient does not want to pursue that at this time  All see would prefer to await blood tests and make further decision  We talked about pros and cons of using it  Please not that she could not tolerate the amlodipine due to edema of the legs in the past   Also the patient mentioned about possibly Benicar causing was a constrictions  She could not tolerate calcium channel blocker I would be really reluctant to try other calcium channel blocker such as verapamil or Cardizem  Sees LAD on diuretic  We will continue Benicar at this time  2  Patient continued to have a left paracervical pain radiculopathy to the left upper extremity to left arm  Patient had x-ray which showed  X-ray report shows his stroke is straightening of cervical lordosis related to spasm as well as 1 mm anterolisthesis of C3 on C4 and 2 mm on the retrolisthesis on C6 on C7  There are endplate degenerative changes throughout the cervical spine most prominent at C4-C5 and to c6-7  Patient does not have much tingling and numbness  Pain is improved recently pain is 2 to 3/10 today  Patient tolerated prednisone and patient did not need to take opioid  Patient is taking Robaxin twice a day with modest relief  Patient is going for physical therapy for her iron neck pain and radiculopathy and went twice so far    Patient does have appointment with the back specialist Dr Jero Estes after MRI for MRI of cervical spine on April 5th  3  New problem:  Patient drove the computer on the floor and tried to grab it from preventing from falling down about 10 days ago and developed lump in deltoid area with prominent prominent muscle on the left arm  This is more prominent when she does the flexion of the elbow    Patient does not have any pain in that area at this time         Allergic rhinitis :   She remains on Xyzal and has some inhaled most likely steroid by ENT physician  See is not taking damaged anymore  She is no longer on allergy desensitization    No further mammogram or colonoscopy given her age and choice      LVH, moderate TR, 2+ MR, PA systolic pressure of 48-28 for surveillance  CKD level 3 who will continue to monitor see blood test and mid    Varicose veins flat  Lymphocytosis will monitor  Remote history of shingles not a problem  Diet diverticulosis fair  S the hyperlipidemia:  Her LDL is 141  CP has not been taking statin  I did advise  She will think about it    Annual eye exam done in 2016, DEXA scan 2012 osteopenia, mammogram 2015 reviewed, 08/11/2014 upper GI endoscopy and colonoscopy revealed distal esophageal erosion small polyp from the stomach and colon revealed sigmoid diverticulosis  Hypertension:  Symptom-free  Home blood pressure well controlled  Tolerating current antihypertensive without side effect  Peripheral edema is better the remains on Lasix    Mitral regurgitation:  Shortness of breath is better  Patient had echocardiogram previously    IBS:  Symptomatic at times  For symptomatic treatment  DJD of knees reasonable, some knee pain as well as the finger pain back secondary to arthritis though takes Tylenol p r n     Will also await the rheumatologists input    Gastroesophageal reflux symptoms free as long as patient is taking omeprazole  Patient has a appointment with gastroenterologist and will be going for upper GI endoscopy when patient gets time see does not have time at this time  migraine not a major issue    Total bilirubin will continue to monitor  Anxiety and depression disorder  Borderline blood sugar low will continue to monitor  Hyperlipidemia:  Continue diet      GERD:  Her acid reflux is [place] Willy is taking antacid medicines tobacco regularly    Dietary would advise, CC started back on omeprazole 20 mg daily back again  Her heartburn is better  Patient is getting food as he did not regurgitate a son but not as well as CS has difficulty swallowing at times  No black bowel movement no nausea vomiting hematemesis melena  See endoscopic done many years ago  Sodium                    Value: 139(mmol/L)        Dt: 02/26/2021  Potassium                 Value: 4 2(mmol/L)        Dt: 02/26/2021  Chloride                  Value: 108(mmol/L)        Dt: 02/26/2021  CO2                       Value: 28(mmol/L)         Dt: 02/26/2021  ANION GAP                 Value: 3(mmol/L)*         Dt: 02/26/2021  BUN                       Value: 35(mg/dL)*         Dt: 02/26/2021  Creatinine                Value: 1 15(mg/dL)        Dt: 02/26/2021  Glucose, Fasting          Value: 109(mg/dL)*        Dt: 02/26/2021  Calcium                   Value: 10  0(mg/dL)        Dt: 02/26/2021  AST                       Value: 15(U/L)            Dt: 02/26/2021  ALT                       Value: 19(U/L)            Dt: 02/26/2021  Alkaline Phosphatase      Value: 62(U/L)            Dt: 02/26/2021  Total Protein             Value: 7 3(g/dL)          Dt: 02/26/2021  Albumin                   Value: 3 8(g/dL)          Dt: 02/26/2021  Total Bilirubin           Value: 0 78(mg/dL)        Dt: 02/26/2021  eGFR                      Value: 44(ml/min/1 73sq m) Dt: 02/26/2021  WBC                       Value: 6 84(Thousand/uL)  Dt: 02/26/2021  RBC                       Value: 3 66(Million/uL)*  Dt: 02/26/2021  Hemoglobin                Value: 11 8(g/dL)         Dt: 02/26/2021  Hematocrit                Value: 36 5(%)            Dt: 02/26/2021  MCV                       Value: 100(fL)*           Dt: 02/26/2021  MCH                       Value: 32 2(pg)           Dt: 02/26/2021  MCHC                      Value: 32 3(g/dL)         Dt: 02/26/2021  RDW                       Value: 12 7(%)            Dt: 02/26/2021  MPV Value: 11 3(fL)           Dt: 02/26/2021  Platelets                 Value: 178(Thousands/uL)  Dt: 02/26/2021  nRBC                      Value: 0(/100 WBCs)       Dt: 02/26/2021  Neutrophils Relative      Value: 46(%)              Dt: 02/26/2021  Immat GRANS %             Value: 0(%)               Dt: 02/26/2021  Lymphocytes Relative      Value: 41(%)              Dt: 02/26/2021  Monocytes Relative        Value: 10(%)              Dt: 02/26/2021  Eosinophils Relative      Value: 2(%)               Dt: 02/26/2021  Basophils Relative        Value: 1(%)               Dt: 02/26/2021  Neutrophils Absolute      Value: 3 15(Thousands/µL) Dt: 02/26/2021  Immature Grans Absolute   Value: 0 01(Thousand/uL)  Dt: 02/26/2021  Lymphocytes Absolute      Value: 2 82(Thousands/µL) Dt: 02/26/2021  Monocytes Absolute        Value: 0 70(Thousand/µL)  Dt: 02/26/2021  Eosinophils Absolute      Value: 0 12(Thousand/µL)  Dt: 02/26/2021  Basophils Absolute        Value: 0 04(Thousands/µL) Dt: 02/26/2021  Cholesterol               Value: 235(mg/dL)*        Dt: 02/26/2021  Triglycerides             Value: 117(mg/dL)         Dt: 02/26/2021  HDL, Direct               Value: 71(mg/dL)          Dt: 02/26/2021  LDL Calculated            Value: 141(mg/dL)*        Dt: 02/26/2021  Non-HDL-Chol (CHOL-HDL)   Value: 164(mg/dl)         Dt: 02/26/2021  Vit D, 25-Hydroxy         Value: 36  3(ng/mL)        Dt: 02/26/2021  ------------ - 2 weeks          The following portions of the patient's history were reviewed and updated as appropriate: allergies, current medications, past family history, past medical history, past social history, past surgical history and problem list     Review of Systems   All other systems reviewed and are negative          Historical Information   Patient Active Problem List   Diagnosis    Baker cyst, right    Gastroesophageal reflux disease without esophagitis    Glucose intolerance (impaired glucose tolerance)    Restless legs  Hypercholesteremia    Mitral regurgitation    Migraine    Essential hypertension    Varicose vein of leg    Edema of extremities    Rhinitis, allergic    Aspirin intolerance    Persistent lymphocytosis    Hypokalemia    Irritable bowel syndrome    Abnormal echocardiogram    Vitamin D deficiency    Diverticulosis    Herpes zoster    History of syncope    Hammer toe of left foot    Raynaud's disease    Stage 3b chronic kidney disease    Neck pain    Medicare annual wellness visit, subsequent    Cervical radiculopathy    Left arm pain     Past Medical History:   Diagnosis Date    Allergic rhinitis     Arthritis     Baker's cyst of knee 10/21/2016    right- burst on its own    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    hx of-fell when ice skating  Noted brain bleed w/swelling    Contact lens/glasses fitting     contact only in left eye    GERD (gastroesophageal reflux disease)     History of shingles     bilateral eyes-x3 episodes    HL (hearing loss)     Hypertension     recent elevation put on norvasc-now controlled    Loss of smell     since brain injury 1992    Migraine     PONV (postoperative nausea and vomiting)     Raynaud's disease     both hands    Seasonal allergies     Shoulder problem 10/2016    rotator cuff issue-right    Tendonitis of shoulder     right     Past Surgical History:   Procedure Laterality Date    AXILLARY SURGERY Left     fatty cyst removed, benign    CATARACT EXTRACTION Bilateral     CATARACT EXTRACTION W/ INTRAOCULAR LENS IMPLANT Right 11/10/2016    Procedure: EXTRACTION EXTRACAPSULAR CATARACT PHACO INTRAOCULAR LENS (IOL);   Surgeon: Lynn Evangelista MD;  Location: [facility] MAIN OR;  Service:    [place] [address] OF UTERUS      MYRINGOTOMY W/ TUBES  2011    both ears-one tube out on the left    MYRINGOTOMY W/ TUBES      IL SHLDR ARTHROSCOP,SURG,W/ROTAT CUFF REPR Right 5/4/2017    Procedure: ARTHROSCOPY SHOULDER WITH ROTATOR CUFF REPAIR, BICEPS TENODESIS, AND SUBACROMIAL DECOMPRESSION;  Surgeon: Yen Castro MD;  Location: [facility] MAIN OR;  Service: Orthopedics    WY XCAPSL CTRC RMVL INSJ IO LENS PROSTH W/O ECP Left 10/20/2016    Procedure: EXTRACTION EXTRACAPSULAR CATARACT PHACO INTRAOCULAR LENS (IOL);   Surgeon: Julissa Subramanian MD;  Location: [facility] MAIN OR;  Service: Ophthalmology    SKIN BIOPSY      mole on chest    SKIN BIOPSY      under breast, benign     Social History     Substance and Sexual Activity   Alcohol Use Yes    Comment: socially     Social History     Substance and Sexual Activity   Drug Use No     Social History     Tobacco Use   Smoking Status Former Smoker    Packs/day: 1 50    Years: 30 00    Pack years: 45 00    Quit date: 18    Years since quittin 2   Smokeless Tobacco Never Used     Family History   Problem Relation Age of Onset    Heart disease Mother         exp age 59 MI    Stroke Father     Macular degeneration Sister     Macular degeneration Brother     Diabetes Brother     Cancer Brother         kidney-nephrectomy    Kidney disease Brother         cancer     Health Maintenance Due   Topic    DTaP,Tdap,and Td Vaccines (1 - Tdap)    Pneumococcal Vaccine: 65+ Years (1 of 1 - PPSV23)      Meds/Allergies       Current Outpatient Medications:     acetaminophen (TYLENOL) 500 mg tablet, Take 1,000 mg by mouth every 6 (six) hours as needed for mild pain, Disp: , Rfl:     ALPHAGAN P 0 1 %, , Disp: , Rfl: 0    ascorbic acid (VITAMIN C) 500 mg tablet, Take 500 mg by mouth every morning, Disp: , Rfl:     azelastine (ASTELIN) 0 1 % nasal spray, 1 spray into each nostril 2 (two) times a day, Disp: 1 Bottle, Rfl: 11    Biotin (BIOTIN 5000) 5 MG CAPS, Take by mouth every morning, Disp: , Rfl:     Calcium Carbonate-Vitamin D (CALTRATE 600+D PO), Take by mouth every morning, Disp: , Rfl:     Cyanocobalamin (VITAMIN B 12 PO), Take by mouth every morning, Disp: , Rfl:     furosemide (LASIX) 40 mg tablet, Take 1 tablet (40 mg total) by mouth every morning, Disp: 90 tablet, Rfl: 1    ipratropium (ATROVENT) 0 06 % nasal spray, 2 sprays into each nostril 4 (four) times a day, Disp: 15 mL, Rfl: 11    levocetirizine (XYZAL) 5 MG tablet, take 1 tablet by mouth every evening, Disp: 90 tablet, Rfl: 3    Lutein 40 MG CAPS, Take by mouth every morning, Disp: , Rfl:     Magnesium 250 MG TABS, Take by mouth every morning, Disp: , Rfl:     methocarbamol (ROBAXIN) 500 mg tablet, Take 1 tablet (500 mg total) by mouth 3 (three) times a day, Disp: 45 tablet, Rfl: 0    Misc Natural Products (TUMERSAID PO), Take 1 tablet by mouth daily, Disp: , Rfl:     olmesartan (BENICAR) 40 mg tablet, Take 1 tablet (40 mg total) by mouth daily, Disp: 90 tablet, Rfl: 1    omeprazole (PriLOSEC) 20 mg delayed release capsule, Take 1 capsule (20 mg total) by mouth daily as needed (HEARTBURN), Disp: 90 capsule, Rfl: 1    oxyCODONE-acetaminophen (PERCOCET) 5-325 mg per tablet, Half tablet every 6 hours as needed for moderate pain and 1 tablet every 6 hour for severe pain as needed, Disp: 12 tablet, Rfl: 0    Potassium Chloride ER 20 MEQ TBCR, Take 1 tablet (20 mEq total) by mouth daily, Disp: 90 tablet, Rfl: 1    rOPINIRole (REQUIP) 0 5 mg tablet, Take 1 tablet (0 5 mg total) by mouth daily at bedtime, Disp: 90 tablet, Rfl: 1    SUMAtriptan (IMITREX) 50 mg tablet, Take 1 tablet (50 mg total) by mouth once as needed for migraine, Disp: 9 tablet, Rfl: 1    vitamin A 7500 UNIT capsule, Take 7,500 Units by mouth every morning  , Disp: , Rfl:     vitamin E, tocopherol, 400 units capsule, Take 400 Units by mouth every morning Last dose 4/26/17, Disp: , Rfl:     zinc gluconate 50 mg tablet, Take 50 mg by mouth every morning, Disp: , Rfl:       Objective:    Vitals:   /76   Pulse 90   Temp (!) 96 3 °F (35 7 °C)   Ht 5' 1" (1 549 m)   Wt 54 9 kg (121 lb)   SpO2 90% Comment: Emily  BMI 22 86 kg/m²   Body mass index is 22 86 kg/m²  Vitals:    03/30/21 0917   Weight: 54 9 kg (121 lb)       Physical Exam  Constitutional:       Appearance: She is well-developed  HENT:      Head: Normocephalic and atraumatic  Eyes:      Conjunctiva/sclera: Conjunctivae normal    Neck:      Thyroid: No thyromegaly  Vascular: No carotid bruit or JVD  Cardiovascular:      Rate and Rhythm: Regular rhythm  Heart sounds: Normal heart sounds  No murmur  Pulmonary:      Effort: No respiratory distress  Breath sounds: Normal breath sounds  No wheezing or rales  Musculoskeletal:      Right shoulder: She exhibits decreased range of motion  Left shoulder: She exhibits normal range of motion  Cervical back: She exhibits decreased range of motion  She exhibits no tenderness, no bony tenderness, no swelling, no edema, no deformity and no laceration  Right lower leg: No edema  Left lower leg: No edema  Comments: Cervical spine range of motion is better  No neurovascular deficit of left upper extremity  Left elbow and left shoulder range of motion is normal     Patient does have a prominent deltoid muscle on the left arm partial particularly when she flexes  No other neurovascular deficit of upper extremity       Lymphadenopathy:      Cervical: No cervical adenopathy  Lab Review   Lab on 02/26/2021   Component Date Value Ref Range Status    Sodium 02/26/2021 139  136 - 145 mmol/L Final    Potassium 02/26/2021 4 2  3 5 - 5 3 mmol/L Final    Chloride 02/26/2021 108  100 - 108 mmol/L Final    CO2 02/26/2021 28  21 - 32 mmol/L Final    ANION GAP 02/26/2021 3* 4 - 13 mmol/L Final    BUN 02/26/2021 35* 5 - 25 mg/dL Final    Creatinine 02/26/2021 1 15  0 60 - 1 30 mg/dL Final    Standardized to IDMS reference method    Glucose, Fasting 02/26/2021 109* 65 - 99 mg/dL Final    Specimen collection should occur prior to Sulfasalazine administration due to the potential for falsely depressed results  Specimen collection should occur prior to Sulfapyridine administration due to the potential for falsely elevated results   Calcium 02/26/2021 10 0  8 3 - 10 1 mg/dL Final    AST 02/26/2021 15  5 - 45 U/L Final    Specimen collection should occur prior to Sulfasalazine administration due to the potential for falsely depressed results   ALT 02/26/2021 19  12 - 78 U/L Final    Specimen collection should occur prior to Sulfasalazine and/or Sulfapyridine administration due to the potential for falsely depressed results   Alkaline Phosphatase 02/26/2021 62  46 - 116 U/L Final    Total Protein 02/26/2021 7 3  6 4 - 8 2 g/dL Final    Albumin 02/26/2021 3 8  3 5 - 5 0 g/dL Final    Total Bilirubin 02/26/2021 0 78  0 20 - 1 00 mg/dL Final    Use of this assay is not recommended for patients undergoing treatment with eltrombopag due to the potential for falsely elevated results      eGFR 02/26/2021 44  ml/min/1 73sq m Final    WBC 02/26/2021 6 84  4 31 - 10 16 Thousand/uL Final    RBC 02/26/2021 3 66* 3 81 - 5 12 Million/uL Final    Hemoglobin 02/26/2021 11 8  11 5 - 15 4 g/dL Final    Hematocrit 02/26/2021 36 5  34 8 - 46 1 % Final    MCV 02/26/2021 100* 82 - 98 fL Final    MCH 02/26/2021 32 2  26 8 - 34 3 pg Final    MCHC 02/26/2021 32 3  31 4 - 37 4 g/dL Final    RDW 02/26/2021 12 7  11 6 - 15 1 % Final    MPV 02/26/2021 11 3  8 9 - 12 7 fL Final    Platelets 07/80/5906 178  149 - 390 Thousands/uL Final    nRBC 02/26/2021 0  /100 WBCs Final    Neutrophils Relative 02/26/2021 46  43 - 75 % Final    Immat GRANS % 02/26/2021 0  0 - 2 % Final    Lymphocytes Relative 02/26/2021 41  14 - 44 % Final    Monocytes Relative 02/26/2021 10  4 - 12 % Final    Eosinophils Relative 02/26/2021 2  0 - 6 % Final    Basophils Relative 02/26/2021 1  0 - 1 % Final    Neutrophils Absolute 02/26/2021 3 15  1 85 - 7 62 Thousands/µL Final    Immature Grans Absolute 02/26/2021 0 01  0 00 - 0 20 Thousand/uL Final    Lymphocytes Absolute 02/26/2021 2 82  0 60 - 4 47 Thousands/µL Final    Monocytes Absolute 02/26/2021 0 70  0 17 - 1 22 Thousand/µL Final    Eosinophils Absolute 02/26/2021 0 12  0 00 - 0 61 Thousand/µL Final    Basophils Absolute 02/26/2021 0 04  0 00 - 0 10 Thousands/µL Final    Cholesterol 02/26/2021 235* 50 - 200 mg/dL Final    Cholesterol:       Desirable         <200 mg/dl       Borderline         200-239 mg/dl       High              >239           Triglycerides 02/26/2021 117  <=150 mg/dL Final    Triglyceride:     Normal          <150 mg/dl     Borderline High 150-199 mg/dl     High            200-499 mg/dl        Very High       >499 mg/dl    Specimen collection should occur prior to N-Acetylcysteine or Metamizole administration due to the potential for falsely depressed results   HDL, Direct 02/26/2021 71  >=40 mg/dL Final    HDL Cholesterol:       Low     <41 mg/dL  Specimen collection should occur prior to Metamizole administration due to the potential for falsley depressed results   LDL Calculated 02/26/2021 141* 0 - 100 mg/dL Final    LDL Cholesterol:     Optimal           <100 mg/dl     Near Optimal      100-129 mg/dl     Above Optimal       Borderline High 130-159 mg/dl       High            160-189 mg/dl       Very High       >189 mg/dl         This screening LDL is a calculated result  It does not have the accuracy of the Direct Measured LDL in the monitoring of patients with hyperlipidemia and/or statin therapy  Direct Measure LDL (JQZ888) must be ordered separately in these patients   Non-HDL-Chol (CHOL-HDL) 02/26/2021 164  mg/dl Final    Vit D, 25-Hydroxy 02/26/2021 36 3  30 0 - 100 0 ng/mL Final         Patient Instructions   Follow with Consultants as per their and our suggestion    Follow up in one month or as needed earlier    Follow all instructions as advised and discussed  Take your medications as prescribed      Call the office immediately if you experience any side effects  Ask questions if you do not understand  Keep your scheduled appointment as advised or come sooner if necessary or in doubt  Best time to call for non-urgent matter or questions on weekdays is between 9am and 12 noon  See physician for any new symptoms or worsening of current symptoms  Urgent or emergent situations call 911 and report to nearest emergency room  I spent  30 -40 minutes taking care of this patient including clinical care, conseling, collaboration, chart, lab and consultaion review as appropriate    Patient is to get labs 1 week(s) prior to next visit if advised    Go for MRI on April 5th as planned  Continue physical therapy for left neck pain and radiculopathy  You have a new problem of possible partial left deltoid tear  See orthopedic doctor Dr Laura Javed  Also keep her appointment with Dr Shukri Parisi regarding your neck pain and radiculopathy  Of consider taking SSRI/antidepressant for your Raynaud's as recommended by rheumatologist     We will hold of SSRI per your request in your blood test for Sjogren syndrome are back  Continue physical therapy for you neck  Continue Robaxin as needed  We will continue Benicar for your blood pressure  You could not tolerate amlodipine in the past we will not be able to use beta blocker or other calcium channel blocker  Urology on the diuretic  Blood pressure is well controlled  Dr Elly Claros MD  [facility] [place]       "This note has been constructed using a voice recognition system  Therefore there may be syntax, spelling, and/or grammatical errors   Please call if you have any questions  "

## 2021-03-30 NOTE — ASSESSMENT & PLAN NOTE
New problem of the left arm on the mid arm area probably may have a partial deltoid tear  Home sees functioning reasonably well    Recommend to see orthopedic doctor for their opinion

## 2021-03-30 NOTE — ASSESSMENT & PLAN NOTE
Seen by rheumatologist     Carey Davis for additional blood test for the Sjogren syndrome  The options of SSRI were given to the patient  Patient wants to wait till blood test is back  We did discuss about the pros and cons  We reassured that it is reasonable to try given her significant Raynaud's  Probably will not be able to change blood pressure medications given her inability to tolerate beta blocker in the past, she is already on diuretic, as well as would beta-blocker will exacerbate her Raynaud of  Home Ace will not have any added benefit at this time

## 2021-03-30 NOTE — ASSESSMENT & PLAN NOTE
Will continue Benicar at this time  Patient could not tolerate amlodipine in the past   Now beta-blocker will exacerbate her Raynaud's will avoid it  She is already on diuretic    The

## 2021-03-31 LAB
ALBUMIN SERPL ELPH-MCNC: 3.97 G/DL (ref 3.5–5)
ALBUMIN SERPL ELPH-MCNC: 60.1 % (ref 52–65)
ALPHA1 GLOB SERPL ELPH-MCNC: 0.26 G/DL (ref 0.1–0.4)
ALPHA1 GLOB SERPL ELPH-MCNC: 3.9 % (ref 2.5–5)
ALPHA2 GLOB SERPL ELPH-MCNC: 0.73 G/DL (ref 0.4–1.2)
ALPHA2 GLOB SERPL ELPH-MCNC: 11 % (ref 7–13)
BETA GLOB ABNORMAL SERPL ELPH-MCNC: 0.42 G/DL (ref 0.4–0.8)
BETA1 GLOB SERPL ELPH-MCNC: 6.3 % (ref 5–13)
BETA2 GLOB SERPL ELPH-MCNC: 5.9 % (ref 2–8)
BETA2+GAMMA GLOB SERPL ELPH-MCNC: 0.39 G/DL (ref 0.2–0.5)
CENTROMERE B AB SER-ACNC: <0.2 AI (ref 0–0.9)
DSDNA AB SER-ACNC: 1 IU/ML (ref 0–9)
ENA RNP AB SER-ACNC: <0.2 AI (ref 0–0.9)
ENA SCL70 AB SER-ACNC: <0.2 AI (ref 0–0.9)
ENA SM AB SER-ACNC: <0.2 AI (ref 0–0.9)
ENA SS-A AB SER-ACNC: <0.2 AI (ref 0–0.9)
ENA SS-B AB SER-ACNC: <0.2 AI (ref 0–0.9)
GAMMA GLOB ABNORMAL SERPL ELPH-MCNC: 0.84 G/DL (ref 0.5–1.6)
GAMMA GLOB SERPL ELPH-MCNC: 12.8 % (ref 12–22)
IGG/ALB SER: 1.51 {RATIO} (ref 1.1–1.8)
PROT PATTERN SERPL ELPH-IMP: NORMAL
PROT SERPL-MCNC: 6.6 G/DL (ref 6.4–8.2)
RYE IGE QN: NEGATIVE

## 2021-04-05 ENCOUNTER — OFFICE VISIT (OUTPATIENT)
Dept: PHYSICAL THERAPY | Facility: CLINIC | Age: 81
End: 2021-04-05
Payer: MEDICARE

## 2021-04-05 ENCOUNTER — HOSPITAL ENCOUNTER (OUTPATIENT)
Dept: RADIOLOGY | Facility: HOSPITAL | Age: 81
Discharge: HOME | End: 2021-04-05
Attending: INTERNAL MEDICINE
Payer: MEDICARE

## 2021-04-05 DIAGNOSIS — M54.12 CERVICAL RADICULOPATHY: ICD-10-CM

## 2021-04-05 DIAGNOSIS — M54.12 CERVICAL RADICULOPATHY: Primary | ICD-10-CM

## 2021-04-05 PROCEDURE — 97110 THERAPEUTIC EXERCISES: CPT | Performed by: PHYSICAL THERAPIST

## 2021-04-05 PROCEDURE — 72141 MRI NECK SPINE W/O DYE: CPT

## 2021-04-05 PROCEDURE — G1004 CDSM NDSC: HCPCS

## 2021-04-05 PROCEDURE — 97140 MANUAL THERAPY 1/> REGIONS: CPT | Performed by: PHYSICAL THERAPIST

## 2021-04-05 NOTE — PROGRESS NOTES
Daily Note     Today's date: 2021  Patient name: Abelardo Black  : 1940  MRN: [id number]  Referring provider: Vic Perez MD  Dx:   Encounter Diagnosis     ICD-10-CM    1  Cervical radiculopathy  M54 12                   Subjective:  She was a little sore following last session but overall feels better  Objective: See treatment diary below      Assessment: Tolerated treatment well  Patient would benefit from continued PT  Left upper trap and levator restrictions remain  She also has poor mobility through upper T-S segments  Plan: Continue per plan of care        Precautions:  Hypertension, LATEX ALLERGY    Specialty Daily Treatment Diary     Manuals 3/24/21 3/29/21 4/5/21     Visit # 1 2 3     STM UT, paraspinals, levator, biceps  7' 5''     C-S PROM  5' 5'     C-S distraction  2' 2'     T-S P to A  2' 2'     Warm-up        NuStep  5' 5'     Neuro Re-Ed        Posture correct        UT stretch  20 20     Chin tucks  20   3" 20   3"     C-S  AROM  10 10             Ther Ex        TB row  20    RTB 20   GTB     TB ext  20   YTB 20   RTB     Shrugs   20     Cat/Camel  10 15     Quad T-S rotate  10 12                             Ther Activity                                Modalities          5' 5'

## 2021-04-07 ENCOUNTER — OFFICE VISIT (OUTPATIENT)
Dept: PHYSICAL THERAPY | Facility: CLINIC | Age: 81
End: 2021-04-07
Payer: MEDICARE

## 2021-04-07 DIAGNOSIS — M54.12 CERVICAL RADICULOPATHY: Primary | ICD-10-CM

## 2021-04-07 PROCEDURE — 97140 MANUAL THERAPY 1/> REGIONS: CPT | Performed by: PHYSICAL THERAPIST

## 2021-04-07 PROCEDURE — 97110 THERAPEUTIC EXERCISES: CPT | Performed by: PHYSICAL THERAPIST

## 2021-04-07 NOTE — PROGRESS NOTES
Daily Note     Today's date: 2021  Patient name: Eliseo Love  : 1940  MRN: [id number]  Referring provider: Tanisha Coffman MD  Dx:   Encounter Diagnosis     ICD-10-CM    1  Cervical radiculopathy  M54 12                   Subjective:  She has 3/10 pain today midline C-S      Objective: See treatment diary below      Assessment: Tolerated treatment well  Patient would benefit from continued PT  She has improved C-S ROM for side bend and rotation  She needs postural cues during PT session  Plan: Continue per plan of care        Precautions:  Hypertension, LATEX ALLERGY    Specialty Daily Treatment Diary     Manuals 3/24/21 3/29/21 4/5/21 4/7/21    Visit # 1 2 3 4    STM UT, paraspinals, levator, biceps  7' 5'' 5'    C-S PROM  5' 5' 4'    C-S distraction  2' 2' 2'    T-S P to A  2' 2' 1'    Warm-up        NuStep  5' 5' 5'    Neuro Re-Ed        Posture correct        UT stretch  20 20 20    Chin tucks  20   3" 20   3" 20   3"    C-S  AROM  10 10 10            Ther Ex        TB row  20    RTB 20   GTB 20   GTB    TB ext  20   YTB 20   RTB 20    RTB    Shrugs   20 20    Cat/Camel  10 15 15    Quad T-S rotate  10 12 12    Pulleys    20                    Ther Activity                                Modalities        MH  5' 5' 5'

## 2021-04-08 ENCOUNTER — OFFICE VISIT (OUTPATIENT)
Dept: OBGYN CLINIC | Facility: CLINIC | Age: 81
End: 2021-04-08
Payer: MEDICARE

## 2021-04-08 VITALS
WEIGHT: 116 LBS | SYSTOLIC BLOOD PRESSURE: 123 MMHG | BODY MASS INDEX: 21.9 KG/M2 | HEART RATE: 81 BPM | HEIGHT: 61 IN | DIASTOLIC BLOOD PRESSURE: 73 MMHG

## 2021-04-08 DIAGNOSIS — S46.212A RUPTURE OF LEFT PROXIMAL BICEPS TENDON, INITIAL ENCOUNTER: ICD-10-CM

## 2021-04-08 DIAGNOSIS — M47.812 OSTEOARTHRITIS OF FACET JOINT OF CERVICAL SPINE: ICD-10-CM

## 2021-04-08 PROCEDURE — 99203 OFFICE O/P NEW LOW 30 MIN: CPT | Performed by: ORTHOPAEDIC SURGERY

## 2021-04-08 NOTE — LETTER
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  Ambulatory referral to Orthopedic Surgery    Ambulatory referral to Pain Management   2  Rupture of left proximal biceps tendon, initial encounter  Ambulatory referral to [facility] Lantigua 51 has significant osteoarthritis in her cervical spine and I do think most of her pain is coming from her facet joints  She does not have obvious radiculopathy but she may be experiencing some foraminal stenosis discomfort  I would like for her to see Dr Judie Cowden at pain management  In consider a facet joint or epidural injection based on his interpretation of the MRI  She also seems to have suffered a proximal biceps tendon rupture which can ultimately relieve a lot of her anterior shoulder pain  She will be left with a pop by deformity however this should not affect her strength of her shoulder going forward  She will follow up with me as needed but she can continue treatment  of her neck with Dr Judie Cowden at next available appointment  Subjective:   Andres Fernandez is a 78 y o  female who presents  To the office for evaluation for left sided neck and shoulder pain  She was referred by her primary care physician Dr Trish Rodríguez  She has been having ongoing neck pain and left arm pain for several months  She denies any injury or trauma    She initially had x-rays of her neck showing severe arthritis and then was sent for an MRI of her neck  Results have not been completed as the MRI was just a few days ago  She has also started physical therapy and has  Attended 3 sessions with some improvement  She also was feeling left arm discomfort in the anterior aspect and she suffered significant pain lifting a computer last week which caused a pop in bruising in the anterior aspect of the left shoulder  This pain and swelling has resolved and she has noticed a deformity I her biceps consistent with a pop by deformity  She states that this has relieved a lot of the pain in her shoulder she no longer has pain radiating down her arm  She denies any numbness and tingling in her arm  Review of Systems   Constitutional: Negative for chills, fever and unexpected weight change  HENT: Negative for hearing loss, nosebleeds and sore throat  Eyes: Negative for pain, redness and visual disturbance  Respiratory: Negative for cough, shortness of breath and wheezing  Cardiovascular: Negative for chest pain, palpitations and leg swelling  Gastrointestinal: Negative for abdominal pain, nausea and vomiting  Endocrine: Negative for polydipsia and polyuria  Genitourinary: Negative for dysuria and hematuria  Musculoskeletal:        See HPI   Skin: Negative for rash and wound  Neurological: Negative for dizziness, numbness and headaches  Psychiatric/Behavioral: Negative for decreased concentration and suicidal ideas  The patient is not nervous/anxious  Past Medical History:   Diagnosis Date    Allergic rhinitis     Arthritis     Baker's cyst of knee 10/21/2016    right- burst on its own    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[facility] [address] ) 1992    hx of-fell when ice skating   Noted brain bleed w/swelling    Contact lens/glasses fitting     contact only in left eye    GERD (gastroesophageal reflux disease)     History of shingles     bilateral eyes-x3 episodes    HL (hearing loss)     Hypertension     recent elevation put on norvasc-now controlled    Loss of smell     since brain injury     Migraine     PONV (postoperative nausea and vomiting)     Raynaud's disease     both hands    Seasonal allergies     Shoulder problem 10/2016    rotator cuff issue-right    Tendonitis of shoulder     right       Past Surgical History:   Procedure Laterality Date    AXILLARY SURGERY Left     fatty cyst removed, benign    CATARACT EXTRACTION Bilateral     CATARACT EXTRACTION W/ INTRAOCULAR LENS IMPLANT Right 11/10/2016    Procedure: EXTRACTION EXTRACAPSULAR CATARACT PHACO INTRAOCULAR LENS (IOL); Surgeon: Tiffany Felix MD;  Location: [facility] MAIN OR;  Service:    [place]  OF UTERUS      MYRINGOTOMY W/ TUBES      both ears-one tube out on the left    MYRINGOTOMY W/ TUBES      ND SHLDR ARTHROSCOP,SURG,W/ROTAT CUFF REPR Right 2017    Procedure: ARTHROSCOPY SHOULDER WITH ROTATOR CUFF REPAIR, BICEPS TENODESIS, AND SUBACROMIAL DECOMPRESSION;  Surgeon: Charissa Del Valle MD;  Location: [facility] MAIN OR;  Service: Orthopedics    ND XCAPSL CTRC RMVL INSJ IO LENS PROSTH W/O ECP Left 10/20/2016    Procedure: EXTRACTION EXTRACAPSULAR CATARACT PHACO INTRAOCULAR LENS (IOL);   Surgeon: Tiffany Felix MD;  Location: [facility] MAIN OR;  Service: Ophthalmology    SKIN BIOPSY      mole on chest    SKIN BIOPSY      under breast, benign       Family History   Problem Relation Age of Onset    Heart disease Mother         exp age 59 MI    Stroke Father     Macular degeneration Sister     Macular degeneration Brother     Diabetes Brother     Cancer Brother         kidney-nephrectomy    Kidney disease Brother         cancer       Social History     Occupational History    Not on file   Tobacco Use    Smoking status: Former Smoker     Packs/day: 1 50     Years: 30 00     Pack years: 45 00     Quit date:      Years since quittin 2    Smokeless tobacco: Never Used   Substance and Sexual Activity    Alcohol use: Yes     Comment: socially    Drug use: No    Sexual activity: Not on file         Current Outpatient Medications:     acetaminophen (TYLENOL) 500 mg tablet, Take 1,000 mg by mouth every 6 (six) hours as needed for mild pain, Disp: , Rfl:     ALPHAGAN P 0 1 %, , Disp: , Rfl: 0    ascorbic acid (VITAMIN C) 500 mg tablet, Take 500 mg by mouth every morning, Disp: , Rfl:     azelastine (ASTELIN) 0 1 % nasal spray, 1 spray into each nostril 2 (two) times a day, Disp: 1 Bottle, Rfl: 11    Biotin (BIOTIN 5000) 5 MG CAPS, Take by mouth every morning, Disp: , Rfl:     Calcium Carbonate-Vitamin D (CALTRATE 600+D PO), Take by mouth every morning, Disp: , Rfl:     Cyanocobalamin (VITAMIN B 12 PO), Take by mouth every morning, Disp: , Rfl:     furosemide (LASIX) 40 mg tablet, Take 1 tablet (40 mg total) by mouth every morning, Disp: 90 tablet, Rfl: 1    ipratropium (ATROVENT) 0 06 % nasal spray, 2 sprays into each nostril 4 (four) times a day, Disp: 15 mL, Rfl: 11    levocetirizine (XYZAL) 5 MG tablet, take 1 tablet by mouth every evening, Disp: 90 tablet, Rfl: 3    Lutein 40 MG CAPS, Take by mouth every morning, Disp: , Rfl:     Magnesium 250 MG TABS, Take by mouth every morning, Disp: , Rfl:     methocarbamol (ROBAXIN) 500 mg tablet, Take 1 tablet (500 mg total) by mouth 3 (three) times a day, Disp: 45 tablet, Rfl: 0    Misc Natural Products (TUMERSAID PO), Take 1 tablet by mouth daily, Disp: , Rfl:     olmesartan (BENICAR) 40 mg tablet, Take 1 tablet (40 mg total) by mouth daily, Disp: 90 tablet, Rfl: 1    omeprazole (PriLOSEC) 20 mg delayed release capsule, Take 1 capsule (20 mg total) by mouth daily as needed (HEARTBURN), Disp: 90 capsule, Rfl: 1    Potassium Chloride ER 20 MEQ TBCR, Take 1 tablet (20 mEq total) by mouth daily, Disp: 90 tablet, Rfl: 1    rOPINIRole (REQUIP) 0 5 mg tablet, Take 1 tablet (0 5 mg total) by mouth daily at bedtime, Disp: 90 tablet, Rfl: 1   SUMAtriptan (IMITREX) 50 mg tablet, Take 1 tablet (50 mg total) by mouth once as needed for migraine, Disp: 9 tablet, Rfl: 1    vitamin A 7500 UNIT capsule, Take 7,500 Units by mouth every morning  , Disp: , Rfl:     vitamin E, tocopherol, 400 units capsule, Take 400 Units by mouth every morning Last dose 4/26/17, Disp: , Rfl:     zinc gluconate 50 mg tablet, Take 50 mg by mouth every morning, Disp: , Rfl:     oxyCODONE-acetaminophen (PERCOCET) 5-325 mg per tablet, Half tablet every 6 hours as needed for moderate pain and 1 tablet every 6 hour for severe pain as needed (Patient not taking: Reported on 4/8/2021), Disp: 12 tablet, Rfl: 0    Allergies   Allergen Reactions    Lactose - Food Allergy GI Intolerance    Latex - Food Allergy Itching     Elastic,adhesive tape    Dilantin [Phenytoin Sodium Extended] Rash    Other Rash     Adhesive tape, environmental    Penicillins Rash       Objective:  Vitals:    04/08/21 0908   BP: 123/73   Pulse: 81       Left Shoulder Exam     Tenderness   The patient is experiencing tenderness in the biceps tendon  Range of Motion   Active abduction: normal   Passive abduction: normal   Extension: normal   External rotation: normal   Forward flexion: normal     Muscle Strength   Abduction: 4/5   Internal rotation: 5/5   External rotation: 5/5   Supraspinatus: 5/5   Subscapularis: 5/5   Biceps: 4/5     Tests   Goldberg test: negative  Impingement: negative  Drop arm: negative    Other   Erythema: absent  Sensation: normal  Pulse: present             Physical Exam  Vitals signs reviewed  Constitutional:       Appearance: She is well-developed  HENT:      Head: Normocephalic and atraumatic  Eyes:      Conjunctiva/sclera: Conjunctivae normal       Pupils: Pupils are equal, round, and reactive to light  Neck:      Musculoskeletal: Neck supple  Decreased range of motion  No spinous process tenderness or muscular tenderness  Comments:   Positive facet joint loading  Negative Spurling's maneuver on the left side  Cardiovascular:      Rate and Rhythm: Normal rate  Pulmonary:      Effort: Pulmonary effort is normal  No respiratory distress  Skin:     General: Skin is warm and dry  Neurological:      Mental Status: She is alert and oriented to person, place, and time  Psychiatric:         Behavior: Behavior normal          I have personally reviewed pertinent films in PACS and my interpretation is as follows:   three-view x-rays of the cervical spine dated 4/7/2021 demonstrate significant degenerative changes throughout the entire cervical spine  MRI of the cervical spine demonstrates multiple levels of degenerative changes and disc bulging without clear spinal canal stenosis  Foraminal stenosis at many levels  Facet joint osteoarthritis at multiple levels

## 2021-04-08 NOTE — PROGRESS NOTES
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  Ambulatory referral to Orthopedic Surgery    Ambulatory referral to Pain Management   2  Rupture of left proximal biceps tendon, initial encounter  Ambulatory referral to [facility] Lantigua 51 has significant osteoarthritis in her cervical spine and I do think most of her pain is coming from her facet joints  She does not have obvious radiculopathy but she may be experiencing some foraminal stenosis discomfort  I would like for her to see Dr Juju Acosta at pain management  In consider a facet joint or epidural injection based on his interpretation of the MRI  She also seems to have suffered a proximal biceps tendon rupture which can ultimately relieve a lot of her anterior shoulder pain  She will be left with a pop by deformity however this should not affect her strength of her shoulder going forward  She will follow up with me as needed but she can continue treatment  of her neck with Dr Juju Acosta at next available appointment  Subjective:   Kvng Marcus is a 78 y o  female who presents  To the office for evaluation for left sided neck and shoulder pain  She was referred by her primary care physician Dr Emilia Galeas  She has been having ongoing neck pain and left arm pain for several months  She denies any injury or trauma  She initially had x-rays of her neck showing severe arthritis and then was sent for an MRI of her neck  Results have not been completed as the MRI was just a few days ago  She has also started physical therapy and has  Attended 3 sessions with some improvement  She also was feeling left arm discomfort in the anterior aspect and she suffered significant pain lifting a computer last week which caused a pop in bruising in the anterior aspect of the left shoulder  This pain and swelling has resolved and she has noticed a deformity I her biceps consistent with a pop by deformity    She states that this has relieved a lot of the pain in her shoulder she no longer has pain radiating down her arm  She denies any numbness and tingling in her arm  Review of Systems   Constitutional: Negative for chills, fever and unexpected weight change  HENT: Negative for hearing loss, nosebleeds and sore throat  Eyes: Negative for pain, redness and visual disturbance  Respiratory: Negative for cough, shortness of breath and wheezing  Cardiovascular: Negative for chest pain, palpitations and leg swelling  Gastrointestinal: Negative for abdominal pain, nausea and vomiting  Endocrine: Negative for polydipsia and polyuria  Genitourinary: Negative for dysuria and hematuria  Musculoskeletal:        See HPI   Skin: Negative for rash and wound  Neurological: Negative for dizziness, numbness and headaches  Psychiatric/Behavioral: Negative for decreased concentration and suicidal ideas  The patient is not nervous/anxious  Past Medical History:   Diagnosis Date    Allergic rhinitis     Arthritis     Baker's cyst of knee 10/21/2016    right- burst on its own    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[facility] [address] ) 1992    hx of-fell when ice skating   Noted brain bleed w/swelling    Contact lens/glasses fitting     contact only in left eye    GERD (gastroesophageal reflux disease)     History of shingles     bilateral eyes-x3 episodes    HL (hearing loss)     Hypertension     recent elevation put on norvasc-now controlled    Loss of smell     since brain injury 1992    Migraine     PONV (postoperative nausea and vomiting)     Raynaud's disease     both hands    Seasonal allergies     Shoulder problem 10/2016    rotator cuff issue-right    Tendonitis of shoulder     right       Past Surgical History:   Procedure Laterality Date    AXILLARY SURGERY Left     fatty cyst removed, benign    CATARACT EXTRACTION Bilateral     CATARACT EXTRACTION W/ INTRAOCULAR LENS IMPLANT Right 11/10/2016    Procedure: EXTRACTION EXTRACAPSULAR CATARACT PHACO INTRAOCULAR LENS (IOL); Surgeon: Mateusz Sierra MD;  Location: [facility] MAIN OR;  Service:    [address] [id number] OF UTERUS      MYRINGOTOMY W/ TUBES      both ears-one tube out on the left    MYRINGOTOMY W/ TUBES      MO SHLDR ARTHROSCOP,SURG,W/ROTAT CUFF REPR Right 2017    Procedure: ARTHROSCOPY SHOULDER WITH ROTATOR CUFF REPAIR, BICEPS TENODESIS, AND SUBACROMIAL DECOMPRESSION;  Surgeon: Maria Del Carmen Fall MD;  Location: [address] [address] MAIN OR;  Service: Orthopedics    MO XCAPSL CTRC RMVL INSJ IO LENS PROSTH W/O ECP Left 10/20/2016    Procedure: EXTRACTION EXTRACAPSULAR CATARACT PHACO INTRAOCULAR LENS (IOL);   Surgeon: Mateusz Sierra MD;  Location: [facility] MAIN OR;  Service: Ophthalmology    SKIN BIOPSY      mole on chest    SKIN BIOPSY      under breast, benign       Family History   Problem Relation Age of Onset    Heart disease Mother         exp age 59 MI    Stroke Father     Macular degeneration Sister     Macular degeneration Brother     Diabetes Brother     Cancer Brother         kidney-nephrectomy    Kidney disease Brother         cancer       Social History     Occupational History    Not on file   Tobacco Use    Smoking status: Former Smoker     Packs/day: 1 50     Years: 30 00     Pack years: 45 00     Quit date:      Years since quittin 2    Smokeless tobacco: Never Used   Substance and Sexual Activity    Alcohol use: Yes     Comment: socially    Drug use: No    Sexual activity: Not on file         Current Outpatient Medications:     acetaminophen (TYLENOL) 500 mg tablet, Take 1,000 mg by mouth every 6 (six) hours as needed for mild pain, Disp: , Rfl:     ALPHAGAN P 0 1 %, , Disp: , Rfl: 0    ascorbic acid (VITAMIN C) 500 mg tablet, Take 500 mg by mouth every morning, Disp: , Rfl:     azelastine (ASTELIN) 0 1 % nasal spray, 1 spray into each nostril 2 (two) times a day, Disp: 1 Bottle, Rfl: 11    Biotin (BIOTIN 5000) 5 MG CAPS, Take by mouth every morning, Disp: , Rfl:     Calcium Carbonate-Vitamin D (CALTRATE 600+D PO), Take by mouth every morning, Disp: , Rfl:     Cyanocobalamin (VITAMIN B 12 PO), Take by mouth every morning, Disp: , Rfl:     furosemide (LASIX) 40 mg tablet, Take 1 tablet (40 mg total) by mouth every morning, Disp: 90 tablet, Rfl: 1    ipratropium (ATROVENT) 0 06 % nasal spray, 2 sprays into each nostril 4 (four) times a day, Disp: 15 mL, Rfl: 11    levocetirizine (XYZAL) 5 MG tablet, take 1 tablet by mouth every evening, Disp: 90 tablet, Rfl: 3    Lutein 40 MG CAPS, Take by mouth every morning, Disp: , Rfl:     Magnesium 250 MG TABS, Take by mouth every morning, Disp: , Rfl:     methocarbamol (ROBAXIN) 500 mg tablet, Take 1 tablet (500 mg total) by mouth 3 (three) times a day, Disp: 45 tablet, Rfl: 0    Misc Natural Products (TUMERSAID PO), Take 1 tablet by mouth daily, Disp: , Rfl:     olmesartan (BENICAR) 40 mg tablet, Take 1 tablet (40 mg total) by mouth daily, Disp: 90 tablet, Rfl: 1    omeprazole (PriLOSEC) 20 mg delayed release capsule, Take 1 capsule (20 mg total) by mouth daily as needed (HEARTBURN), Disp: 90 capsule, Rfl: 1    Potassium Chloride ER 20 MEQ TBCR, Take 1 tablet (20 mEq total) by mouth daily, Disp: 90 tablet, Rfl: 1    rOPINIRole (REQUIP) 0 5 mg tablet, Take 1 tablet (0 5 mg total) by mouth daily at bedtime, Disp: 90 tablet, Rfl: 1    SUMAtriptan (IMITREX) 50 mg tablet, Take 1 tablet (50 mg total) by mouth once as needed for migraine, Disp: 9 tablet, Rfl: 1    vitamin A 7500 UNIT capsule, Take 7,500 Units by mouth every morning  , Disp: , Rfl:     vitamin E, tocopherol, 400 units capsule, Take 400 Units by mouth every morning Last dose 4/26/17, Disp: , Rfl:     zinc gluconate 50 mg tablet, Take 50 mg by mouth every morning, Disp: , Rfl:     oxyCODONE-acetaminophen (PERCOCET) 5-325 mg per tablet, Half tablet every 6 hours as needed for moderate pain and 1 tablet every 6 hour for severe pain as needed (Patient not taking: Reported on 4/8/2021), Disp: 12 tablet, Rfl: 0    Allergies   Allergen Reactions    Lactose - Food Allergy GI Intolerance    Latex - Food Allergy Itching     Elastic,adhesive tape    Dilantin [Phenytoin Sodium Extended] Rash    Other Rash     Adhesive tape, environmental    Penicillins Rash       Objective:  Vitals:    04/08/21 0908   BP: 123/73   Pulse: 81       Left Shoulder Exam     Tenderness   The patient is experiencing tenderness in the biceps tendon  Range of Motion   Active abduction: normal   Passive abduction: normal   Extension: normal   External rotation: normal   Forward flexion: normal     Muscle Strength   Abduction: 4/5   Internal rotation: 5/5   External rotation: 5/5   Supraspinatus: 5/5   Subscapularis: 5/5   Biceps: 4/5     Tests   Goldberg test: negative  Impingement: negative  Drop arm: negative    Other   Erythema: absent  Sensation: normal  Pulse: present             Physical Exam  Vitals signs reviewed  Constitutional:       Appearance: She is well-developed  HENT:      Head: Normocephalic and atraumatic  Eyes:      Conjunctiva/sclera: Conjunctivae normal       Pupils: Pupils are equal, round, and reactive to light  Neck:      Musculoskeletal: Neck supple  Decreased range of motion  No spinous process tenderness or muscular tenderness  Comments:   Positive facet joint loading  Negative Spurling's maneuver on the left side  Cardiovascular:      Rate and Rhythm: Normal rate  Pulmonary:      Effort: Pulmonary effort is normal  No respiratory distress  Skin:     General: Skin is warm and dry  Neurological:      Mental Status: She is alert and oriented to person, place, and time     Psychiatric:         Behavior: Behavior normal          I have personally reviewed pertinent films in PACS and my interpretation is as follows:   three-view x-rays of the cervical spine dated 4/7/2021 demonstrate significant degenerative changes throughout the entire cervical spine  MRI of the cervical spine demonstrates multiple levels of degenerative changes and disc bulging without clear spinal canal stenosis  Foraminal stenosis at many levels  Facet joint osteoarthritis at multiple levels

## 2021-04-12 ENCOUNTER — OFFICE VISIT (OUTPATIENT)
Dept: PHYSICAL THERAPY | Facility: CLINIC | Age: 81
End: 2021-04-12
Payer: MEDICARE

## 2021-04-12 DIAGNOSIS — M54.12 CERVICAL RADICULOPATHY: Primary | ICD-10-CM

## 2021-04-12 PROCEDURE — 97140 MANUAL THERAPY 1/> REGIONS: CPT

## 2021-04-12 PROCEDURE — 97112 NEUROMUSCULAR REEDUCATION: CPT

## 2021-04-12 PROCEDURE — 97110 THERAPEUTIC EXERCISES: CPT

## 2021-04-12 NOTE — PROGRESS NOTES
Daily Note     Today's date: 2021  Patient name: Marixa Cline  : 1940  MRN: [id number]  Referring provider: Gretel Ibarra MD  Dx:   Encounter Diagnosis     ICD-10-CM    1  Cervical radiculopathy  M54 12        Start Time: 845  Stop Time: 930  Total time in clinic (min): 45 minutes    Subjective:  She has 1/10 pain prior to session  She reports she was doing yardwork this weekend as well  Objective: See treatment diary below      Assessment: Tolerated treatment well  Patient would benefit from continued PT  Demo improvement with ROM overall, added DNF exercise today, demo good understanding  Plan: Continue per plan of care        Precautions:  Hypertension, LATEX ALLERGY    Specialty Daily Treatment Diary     Manuals 3/24/21 3/29/21 4/5/21 4/7/21 4/12/21   Visit # 1 2 3 4 5   STM UT, paraspinals, levator, biceps  7' 5'' 5' 4'   C-S PROM  5' 5' 4' 4'   C-S distraction  2' 2' 2' 2'   T-S P to A  2' 2' 1'    Warm-up        NuStep  5' 5' 5'    Neuro Re-Ed        Posture correct        UT stretch  20 20 20 20   Chin tucks  20   3" 20   3" 20   3" 20 3" seated/supine   C-S  AROM  10 10 10 10   DNF lift off     W/ chin tuck 15x   Ther Ex        TB row  20    RTB 20   GTB 20   GTB 20 RTB   TB ext  20   YTB 20   RTB 20    RTB 20 YTB   Shrugs   20 20 20   Cat/Camel  10 15 15 20   Quad T-S rotate  10 12 12 12   Pulleys    20 20                   Ther Activity                                Modalities          5' 5' 5' 5' post

## 2021-04-15 ENCOUNTER — OFFICE VISIT (OUTPATIENT)
Dept: PHYSICAL THERAPY | Facility: CLINIC | Age: 81
End: 2021-04-15
Payer: MEDICARE

## 2021-04-15 DIAGNOSIS — M54.12 CERVICAL RADICULOPATHY: Primary | ICD-10-CM

## 2021-04-15 PROCEDURE — 97110 THERAPEUTIC EXERCISES: CPT | Performed by: PHYSICAL THERAPIST

## 2021-04-15 PROCEDURE — 97140 MANUAL THERAPY 1/> REGIONS: CPT | Performed by: PHYSICAL THERAPIST

## 2021-04-15 NOTE — PROGRESS NOTES
Daily Note     Today's date: 4/15/2021  Patient name: Niharika Stewart  : 1940  MRN: [id number]  Referring provider: Byrd Lanes, MD  Dx:   Encounter Diagnosis     ICD-10-CM    1  Cervical radiculopathy  M54 12                   Subjective: She complains of stiffness in C-S and upper shoulders  Pain has improved  Objective: See treatment diary below      Assessment: Tolerated treatment well  Patient would benefit from continued PT  Restrictions persist left lower C-S paraspinals and side glide of lower segments      Plan: Continue per plan of care  Decrease to once per week and progress patient over next few sessions to independent HEP       Precautions:  Hypertension, LATEX ALLERGY    Specialty Daily Treatment Diary     Manuals 4/15/21       Visit # 6       STM UT, paraspinals, levator, biceps 4'       C-S PROM 4'       C-S distraction 2'       Warm-up        NuStep 5'       Neuro Re-Ed        UT stretch 20       Chin tucks 20       C-S  AROM        DNF lift off 20       Ther Ex        TB row 20   GTB       TB ext 20   GTB       Shrugs 20   3#       Cat/Camel        Quad T-S rotate 10       Pulleys 20                       Ther Activity                                Modalities         5'

## 2021-04-19 ENCOUNTER — OFFICE VISIT (OUTPATIENT)
Dept: INTERNAL MEDICINE CLINIC | Facility: CLINIC | Age: 81
End: 2021-04-19
Payer: MEDICARE

## 2021-04-19 VITALS
TEMPERATURE: 97.1 F | HEIGHT: 61 IN | HEART RATE: 70 BPM | OXYGEN SATURATION: 99 % | WEIGHT: 121 LBS | BODY MASS INDEX: 22.84 KG/M2

## 2021-04-19 DIAGNOSIS — K21.9 GASTROESOPHAGEAL REFLUX DISEASE WITHOUT ESOPHAGITIS: Primary | ICD-10-CM

## 2021-04-19 DIAGNOSIS — E55.9 VITAMIN D DEFICIENCY: ICD-10-CM

## 2021-04-19 DIAGNOSIS — I83.93 VARICOSE VEINS OF BOTH LOWER EXTREMITIES, UNSPECIFIED WHETHER COMPLICATED: ICD-10-CM

## 2021-04-19 DIAGNOSIS — S46.212S BICEPS RUPTURE, DISTAL, LEFT, SEQUELA: ICD-10-CM

## 2021-04-19 DIAGNOSIS — G25.81 RESTLESS LEGS: ICD-10-CM

## 2021-04-19 DIAGNOSIS — D72.820 PERSISTENT LYMPHOCYTOSIS: ICD-10-CM

## 2021-04-19 DIAGNOSIS — N18.32 STAGE 3B CHRONIC KIDNEY DISEASE (HCC): ICD-10-CM

## 2021-04-19 DIAGNOSIS — E78.00 HYPERCHOLESTEREMIA: ICD-10-CM

## 2021-04-19 DIAGNOSIS — I73.00 RAYNAUD'S DISEASE WITHOUT GANGRENE: ICD-10-CM

## 2021-04-19 DIAGNOSIS — M54.12 CERVICAL RADICULOPATHY: ICD-10-CM

## 2021-04-19 DIAGNOSIS — I34.0 NONRHEUMATIC MITRAL VALVE REGURGITATION: ICD-10-CM

## 2021-04-19 PROCEDURE — 99214 OFFICE O/P EST MOD 30 MIN: CPT | Performed by: INTERNAL MEDICINE

## 2021-04-19 NOTE — PROGRESS NOTES
[facility] Office Visit Note  21     John Gao 78 y o  female MRN: [id number]  : 1940    Assessment:     No problem-specific Assessment & Plan notes found for this encounter  There are no diagnoses linked to this encounter  Discussion Summary and Plan: Today's care plan and medications were reviewed with patient in detail and all their questions answered to their satisfaction  In summary her neck pain is much better  The pain was in left paracervical secondary to cervical multilevel spondylosis as well as foraminal stenosis as outlined in the full body of MRI report  Her rupture biceps is stable  Patient was seen by orthopedic doctor  Physical therapy helped a lot  Hypertension is well controlled  Raynaud disease fairly stable  Does not want to change Benicar  Could not tolerate amlodipine in the past   Allergic rhinitis reasonable the pain notes at this time is fair  Vitamin-D remains on supplement  CKD level 3 will monitor  Generally speaking she is doing much better  Will follow back in 3 months with no blood test continue same regimen medication list were reviewed  Chief Complaint   Patient presents with    Follow-up     no complaints      Subjective:  Patient is here for follow-up of left paracervical pain  Patient reports pain is much better  Patient was seen by orthopedic doctor  They are not were noted  According to them patient is having a pain secondary to degenerative joint disease as well as the facet joint disease  Patient reports significant improvement with the therapy  Patient was referred to the pain management however since she is getting better patient is not planning to see him  Paragraphs is considering acupuncture  Patient is also thought to have a biceps tear  No surgical treatment is recommended  Of patient's left arm pain is much better at this time  Cervical radiculopathy is much better    Patient is not taking muscle relaxant at this time  Or anti-inflammatory       Patient drove the computer on the floor and tried to grab it from preventing from falling down about 10 days ago and developed lump in deltoid area with prominent prominent muscle on the left arm  This is more prominent when she does the flexion of the elbow  Final diagnosis is that of a biceps tear        Allergic rhinitis :   She remains on Xyzal and has some inhaled most likely steroid by ENT physician  See is not taking damaged anymore  She is no longer on allergy desensitization    No further mammogram or colonoscopy given her age and choice      LVH, moderate TR, 2+ MR, PA systolic pressure of 32-38 for surveillance  CKD level 3 who will continue to monitor see blood test and mid    Varicose veins flat  Lymphocytosis will monitor  Remote history of shingles not a problem  Diet diverticulosis fair  S the hyperlipidemia:  Her LDL is 141  CP has not been taking statin  I did advise  She will think about it    Annual eye exam done in 2016, DEXA scan 2012 osteopenia, mammogram 2015 reviewed, 08/11/2014 upper GI endoscopy and colonoscopy revealed distal esophageal erosion small polyp from the stomach and colon revealed sigmoid diverticulosis  Hypertension:  Symptom-free  Home blood pressure well controlled  Tolerating current antihypertensive without side effect  Peripheral edema is better the remains on Lasix    Mitral regurgitation:  Shortness of breath is better  Patient had echocardiogram previously    IBS:  Symptomatic at times  For symptomatic treatment  DJD of knees reasonable, some knee pain as well as the finger pain back secondary to arthritis though takes Tylenol p r n     Will also await the rheumatologists input    Gastroesophageal reflux symptoms free as long as patient is taking omeprazole    Patient has a appointment with gastroenterologist and will be going for upper GI endoscopy when patient gets time see does not have time at this time  migraine not a major issue    Total bilirubin will continue to monitor  Anxiety and depression disorder  Borderline blood sugar low will continue to monitor  Hyperlipidemia:  Continue diet      GERD:  Patient is back on omeprazole and feeling much better  Procedure: Xr Spine Cervical Complete 4 Or 5 Vw Non Injury    Result Date: 3/6/2021  Narrative: CERVICAL SPINE INDICATION:   M54 2: Cervicalgia  COMPARISON:  None VIEWS:  XR SPINE CERVICAL COMPLETE 4 OR 5 VW NON INJURY FINDINGS: No fracture  Straightening of the cervical lordosis may be related to patient positioning or muscle spasm  There is 1 mm anterolisthesis of C3 on C4   2 mm retrolisthesis of C6 on C7  There are endplate degenerative changes throughout the cervical spine most pronounced at C4-5 through C6-7  The neuroforamina are patent  The prevertebral soft tissues are within normal limits  The lung apices are clear  Impression: No acute osseous abnormality  Degenerative changes as above  Workstation performed: [id number]     Procedure: Mri Cervical Spine Wo Contrast    Result Date: 4/8/2021  Narrative: MRI CERVICAL SPINE WITHOUT CONTRAST INDICATION: M54 12: Radiculopathy, cervical region  Left upper extremity paresthesias, tingling and numbness  COMPARISON:  3/2/2021 TECHNIQUE:  Sagittal T1, sagittal T2, sagittal inversion recovery, axial T2, axial  2D merge IMAGE QUALITY:  Diagnostic FINDINGS: ALIGNMENT  :  Mild reversal the cervical lordosis at the C4 segment is similar to the prior radiograph  Trace grade 1 anterolisthesis of C3 on C4 is stable  Trace grade 1 retrolisthesis of C6 on C7, also similar to the prior radiograph  MARROW SIGNAL:      Scattered degenerative endplate changes  No focally suspicious marrow lesions  No bone marrow edema or compression abnormality  CERVICAL AND VISUALIZED THORACIC CORD:  Normal signal within the visualized cord   PREVERTEBRAL AND PARASPINAL SOFT TISSUES: Normal  VISUALIZED POSTERIOR FOSSA:  The visualized posterior fossa demonstrates no abnormal signal  CERVICAL DISC SPACES: C2-C3:  Normal  C3-C4:  There is uncovering the intervertebral disc space  There is bilateral facet hypertrophy  There is a left neural foraminal disc protrusion  Moderate left neural foraminal narrowing  Central canal patent  Mild right neural foraminal narrowing  C4-C5:  There is a disc osteophyte complex  There is a superimposed right neural foraminal disc protrusion  Mild central canal and left neural foraminal narrowing  Moderate right neural foraminal narrowing  C5-C6:  There is a left paracentral disc herniation, protrusion type  Mild central canal and left neural foraminal narrowing  Right neural foramen is patent  [place] C6-C7:  There is a disc osteophyte complex with a superimposed left neural foraminal disc protrusion  Mild left neural foraminal narrowing  Central canal mildly narrowed  Right neural foramen patent  C7-T1:  Normal  UPPER THORACIC DISC SPACES:  Normal      Impression: Multilevel cervical spondylosis          Sodium                    Value: 139(mmol/L)        Dt: 02/26/2021  Potassium                 Value: 4 2(mmol/L)        Dt: 02/26/2021  Chloride                  Value: 108(mmol/L)        Dt: 02/26/2021  CO2                       Value: 28(mmol/L)         Dt: 02/26/2021  ANION GAP                 Value: 3(mmol/L)*         Dt: 02/26/2021  BUN                       Value: 35(mg/dL)*         Dt: 02/26/2021  Creatinine                Value: 1 15(mg/dL)        Dt: 02/26/2021  Glucose, Fasting          Value: 109(mg/dL)*        Dt: 02/26/2021  Calcium                   Value: 10  0(mg/dL)        Dt: 02/26/2021  AST                       Value: 15(U/L)            Dt: 02/26/2021  ALT                       Value: 19(U/L)            Dt: 02/26/2021  Alkaline Phosphatase      Value: 62(U/L)            Dt: 02/26/2021  Total Protein             Value: 7 3(g/dL)          Dt: 02/26/2021  Albumin                   Value: 3 8(g/dL)          Dt: 02/26/2021  Total Bilirubin           Value: 0 78(mg/dL)        Dt: 02/26/2021  eGFR                      Value: 44(ml/min/1 73sq m) Dt: 02/26/2021  WBC                       Value: 6 84(Thousand/uL)  Dt: 02/26/2021  RBC                       Value: 3 66(Million/uL)*  Dt: 02/26/2021  Hemoglobin                Value: 11 8(g/dL)         Dt: 02/26/2021  Hematocrit                Value: 36 5(%)            Dt: 02/26/2021  MCV                       Value: 100(fL)*           Dt: 02/26/2021  MCH                       Value: 32 2(pg)           Dt: 02/26/2021  MCHC                      Value: 32 3(g/dL)         Dt: 02/26/2021  RDW                       Value: 12 7(%)            Dt: 02/26/2021  MPV                       Value: 11 3(fL)           Dt: 02/26/2021  Platelets                 Value: 178(Thousands/uL)  Dt: 02/26/2021  nRBC                      Value: 0(/100 WBCs)       Dt: 02/26/2021  Neutrophils Relative      Value: 46(%)              Dt: 02/26/2021  Immat GRANS %             Value: 0(%)               Dt: 02/26/2021  Lymphocytes Relative      Value: 41(%)              Dt: 02/26/2021  Monocytes Relative        Value: 10(%)              Dt: 02/26/2021  Eosinophils Relative      Value: 2(%)               Dt: 02/26/2021  Basophils Relative        Value: 1(%)               Dt: 02/26/2021  Neutrophils Absolute      Value: 3 15(Thousands/µL) Dt: 02/26/2021  Immature Grans Absolute   Value: 0 01(Thousand/uL)  Dt: 02/26/2021  Lymphocytes Absolute      Value: 2 82(Thousands/µL) Dt: 02/26/2021  Monocytes Absolute        Value: 0 70(Thousand/µL)  Dt: 02/26/2021  Eosinophils Absolute      Value: 0 12(Thousand/µL)  Dt: 02/26/2021  Basophils Absolute        Value: 0 04(Thousands/µL) Dt: 02/26/2021  Cholesterol               Value: 235(mg/dL)*        Dt: 02/26/2021  Triglycerides             Value: 117(mg/dL)         Dt: 02/26/2021  HDL, Direct               Value: 71(mg/dL)          Dt: 02/26/2021  LDL Calculated            Value: 141(mg/dL)*        Dt: 02/26/2021  Non-HDL-Chol (CHOL-HDL)   Value: 164(mg/dl)         Dt: 02/26/2021  Vit D, 25-Hydroxy         Value: 36  3(ng/mL)        Dt: 02/26/2021  ------------ - 2 weeks      Transcribe Orders on 03/30/2021  CRP                       Value: <3 0(mg/L)         Dt: 03/30/2021  Sed Rate                  Value:  6(mm/hour)         Dt: 03/30/2021  [place]                       Value: Negative           Dt: 03/30/2021  SS-A (RO) Ab              Value: <0 2(AI)           Dt: 03/30/2021  SS-B (LA) Ab              Value: <0 2(AI)           Dt: 03/30/2021  ds DNA Ab                 Value: 1(IU/mL)           Dt: 03/30/2021  Anti-Centromere B Antibo* Value: <0 2(AI)           Dt: 03/30/2021  Scleroderma SCL-70        Value: <0 2(AI)           Dt: 03/30/2021  REMINGTON Farfan (SM) Ab         Value: <0 2(AI)           Dt: 03/30/2021  REMINGTON RNP Ab                Value: <0 2(AI)           Dt: 03/30/2021  A/G Ratio                 Value: 1 51               Dt: 03/30/2021  Albumin Electrophoresis   Value: 60 1(%)            Dt: 03/30/2021  Albumin CONC              Value: 3 97(g/dl)         Dt: 03/30/2021  Alpha 1                   Value: 3 9(%)             Dt: 03/30/2021  ALPHA 1 CONC              Value: 0 26(g/dL)         Dt: 03/30/2021  Alpha 2                   Value: 11 0(%)            Dt: 03/30/2021  ALPHA 2 CONC              Value: 0 73(g/dL)         Dt: 03/30/2021  Beta-1                    Value: 6 3(%)             Dt: 03/30/2021  BETA 1 CONC               Value: 0 42(g/dL)         Dt: 03/30/2021  Beta-2                    Value: 5 9(%)             Dt: 03/30/2021  BETA 2 CONC               Value: 0 39(g/dL)         Dt: 03/30/2021  Gamma Globulin            Value: 12 8(%)            Dt: 03/30/2021  GAMMA CONC                Value: 0 84(g/dL)         Dt: 03/30/2021  Total Protein             Value: 6 6(g/dL)          Dt: 03/30/2021  SPEP Interpretation       Value: See Comment        Dt: 03/30/2021  ------------ - 2 weeks            The following portions of the patient's history were reviewed and updated as appropriate: allergies, current medications, past family history, past medical history, past social history, past surgical history and problem list     Review of Systems   All other systems reviewed and are negative  Historical Information   Patient Active Problem List   Diagnosis    Baker cyst, right    Gastroesophageal reflux disease without esophagitis    Glucose intolerance (impaired glucose tolerance)    Restless legs    Hypercholesteremia    Mitral regurgitation    Migraine    Essential hypertension    Varicose vein of leg    Edema of extremities    Rhinitis, allergic    Aspirin intolerance    Persistent lymphocytosis    Hypokalemia    Irritable bowel syndrome    Abnormal echocardiogram    Vitamin D deficiency    Diverticulosis    Herpes zoster    History of syncope    Hammer toe of left foot    Raynaud's disease    Stage 3b chronic kidney disease    Neck pain    Medicare annual wellness visit, subsequent    Cervical radiculopathy    Left arm pain     Past Medical History:   Diagnosis Date    Allergic rhinitis     Arthritis     Baker's cyst of knee 10/21/2016    right- burst on its own    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    hx of-fell when ice skating   Noted brain bleed w/swelling    Contact lens/glasses fitting     contact only in left eye    GERD (gastroesophageal reflux disease)     History of shingles     bilateral eyes-x3 episodes    HL (hearing loss)     Hypertension     recent elevation put on norvasc-now controlled    Loss of smell     since brain injury 1992    Migraine     PONV (postoperative nausea and vomiting)     Raynaud's disease     both hands    Seasonal allergies     Shoulder problem 10/2016    rotator cuff issue-right    Tendonitis of shoulder     right     Past Surgical History: Procedure Laterality Date    AXILLARY SURGERY Left     fatty cyst removed, benign    CATARACT EXTRACTION Bilateral     CATARACT EXTRACTION W/ INTRAOCULAR LENS IMPLANT Right 11/10/2016    Procedure: EXTRACTION EXTRACAPSULAR CATARACT PHACO INTRAOCULAR LENS (IOL); Surgeon: Naman Li MD;  [facility];  Service:    [place]  OF UTERUS      MYRINGOTOMY W/ TUBES      both ears-one tube out on the left    MYRINGOTOMY W/ TUBES      AK SHLDR ARTHROSCOP,SURG,W/ROTAT CUFF REPR Right 2017    Procedure: ARTHROSCOPY SHOULDER WITH ROTATOR CUFF REPAIR, BICEPS TENODESIS, AND SUBACROMIAL DECOMPRESSION;  Surgeon: Oswaldo Fleming MD;  Location[facility] MAIN OR;  Service: Orthopedics    AK XCAPSL CTRC RMVL INSJ IO LENS PROSTH W/O ECP Left 10/20/2016    Procedure: EXTRACTION EXTRACAPSULAR CATARACT PHACO INTRAOCULAR LENS (IOL);   Surgeon: Naman Li MD;  Location: [facility] MA[facility];  Service: Ophthalmology    SKIN BIOPSY      mole on chest    SKIN BIOPSY      under breast, benign     Social History     Substance and Sexual Activity   Alcohol Use Yes    Comment: socially     Social History     Substance and Sexual Activity   Drug Use No     Social History     Tobacco Use   Smoking Status Former Smoker    Packs/day: 1 50    Years: 30 00    Pack years: 45 00    Quit date: 18    Years since quittin 3   Smokeless Tobacco Never Used     Family History   Problem Relation Age of Onset    Heart disease Mother         exp age 59 MI    Stroke Father     Macular degeneration Sister     Macular degeneration Brother     Diabetes Brother     Cancer Brother         kidney-nephrectomy    Kidney disease Brother         cancer     Health Maintenance Due   Topic    DTaP,Tdap,and Td Vaccines (1 - Tdap)    Pneumococcal Vaccine: 65+ Years (1 of 1 - PPSV23)    PT PLAN OF CARE       Meds/Allergies       Current Outpatient Medications:     acetaminophen (TYLENOL) 500 mg tablet, Take 1,000 mg by mouth every 6 (six) hours as needed for mild pain, Disp: , Rfl:     ALPHAGAN P 0 1 %, , Disp: , Rfl: 0    ascorbic acid (VITAMIN C) 500 mg tablet, Take 500 mg by mouth every morning, Disp: , Rfl:     azelastine (ASTELIN) 0 1 % nasal spray, 1 spray into each nostril 2 (two) times a day, Disp: 1 Bottle, Rfl: 11    Biotin (BIOTIN 5000) 5 MG CAPS, Take by mouth every morning, Disp: , Rfl:     Calcium Carbonate-Vitamin D (CALTRATE 600+D PO), Take by mouth every morning, Disp: , Rfl:     Cyanocobalamin (VITAMIN B 12 PO), Take by mouth every morning, Disp: , Rfl:     furosemide (LASIX) 40 mg tablet, Take 1 tablet (40 mg total) by mouth every morning, Disp: 90 tablet, Rfl: 1    ipratropium (ATROVENT) 0 06 % nasal spray, 2 sprays into each nostril 4 (four) times a day, Disp: 15 mL, Rfl: 11    levocetirizine (XYZAL) 5 MG tablet, take 1 tablet by mouth every evening, Disp: 90 tablet, Rfl: 3    Lutein 40 MG CAPS, Take by mouth every morning, Disp: , Rfl:     Magnesium 250 MG TABS, Take by mouth every morning, Disp: , Rfl:     methocarbamol (ROBAXIN) 500 mg tablet, Take 1 tablet (500 mg total) by mouth 3 (three) times a day, Disp: 45 tablet, Rfl: 0    Misc Natural Products (TUMERSAID PO), Take 1 tablet by mouth daily, Disp: , Rfl:     olmesartan (BENICAR) 40 mg tablet, Take 1 tablet (40 mg total) by mouth daily, Disp: 90 tablet, Rfl: 1    omeprazole (PriLOSEC) 20 mg delayed release capsule, Take 1 capsule (20 mg total) by mouth daily as needed (HEARTBURN), Disp: 90 capsule, Rfl: 1    oxyCODONE-acetaminophen (PERCOCET) 5-325 mg per tablet, Half tablet every 6 hours as needed for moderate pain and 1 tablet every 6 hour for severe pain as needed, Disp: 12 tablet, Rfl: 0    Potassium Chloride ER 20 MEQ TBCR, Take 1 tablet (20 mEq total) by mouth daily, Disp: 90 tablet, Rfl: 1    rOPINIRole (REQUIP) 0 5 mg tablet, Take 1 tablet (0 5 mg total) by mouth daily at bedtime, Disp: 90 tablet, Rfl: 1    SUMAtriptan (IMITREX) 50 mg tablet, Take 1 tablet (50 mg total) by mouth once as needed for migraine, Disp: 9 tablet, Rfl: 1    vitamin A 7500 UNIT capsule, Take 7,500 Units by mouth every morning  , Disp: , Rfl:     vitamin E, tocopherol, 400 units capsule, Take 400 Units by mouth every morning Last dose 4/26/17, Disp: , Rfl:     zinc gluconate 50 mg tablet, Take 50 mg by mouth every morning, Disp: , Rfl:       Objective:    Vitals:   Pulse 70   Temp (!) 97 1 °F (36 2 °C)   Ht 5' 1" (1 549 m)   Wt 54 9 kg (121 lb)   SpO2 99%   BMI 22 86 kg/m²   Body mass index is 22 86 kg/m²  Vitals:    04/19/21 0943   Weight: 54 9 kg (121 lb)       Physical Exam  Constitutional:       Appearance: She is well-developed  HENT:      Head: Normocephalic and atraumatic  Eyes:      Conjunctiva/sclera: Conjunctivae normal    Neck:      Thyroid: No thyroid mass or thyromegaly  Vascular: No carotid bruit or JVD  Cardiovascular:      Rate and Rhythm: Regular rhythm  Heart sounds: Normal heart sounds  No murmur  Pulmonary:      Effort: No respiratory distress  Breath sounds: Normal breath sounds  No wheezing or rales  Abdominal:      General: Bowel sounds are normal  There is no distension  Palpations: There is no mass  Tenderness: There is no rebound  Musculoskeletal:      Right shoulder: She exhibits normal range of motion, no tenderness, no bony tenderness, no swelling, no effusion, no deformity and no laceration  Left shoulder: She exhibits normal range of motion  Cervical back: She exhibits decreased range of motion  She exhibits no tenderness, no bony tenderness, no swelling, no edema, no deformity and no laceration  Right lower leg: No edema  Left lower leg: No edema  Comments: Cervical spine range of motion is better  No neurovascular deficit of left upper extremity    Left elbow and left shoulder range of motion is normal     Patient does have a prominent biceps due to partial tear  No other neurovascular deficit of upper extremity       Lymphadenopathy:      Cervical: No cervical adenopathy  Skin:     General: Skin is warm  Findings: No rash     Neurological:      Coordination: Coordination normal    Psychiatric:         Behavior: Behavior normal          Judgment: Judgment normal          Lab Review   Transcribe Orders on 03/30/2021   Component Date Value Ref Range Status    CRP 03/30/2021 <3 0  <3 0 mg/L Final    Sed Rate 03/30/2021 6  0 - 29 mm/hour Final    [place] 03/30/2021 Negative  Negative Final    SS-A (RO) Ab 03/30/2021 <0 2  0 0 - 0 9 AI Final    SS-B (LA) Ab 03/30/2021 <0 2  0 0 - 0 9 AI Final    ds DNA Ab 03/30/2021 1  0 - 9 IU/mL Final                                       Negative      <5                                     Equivocal  5 - 9                                     Positive      >9    Anti-Centromere B Antibodies 03/30/2021 <0 2  0 0 - 0 9 AI Final    Scleroderma SCL-70 03/30/2021 <0 2  0 0 - 0 9 AI Final    REMINGTON Farfan (SM) Ab 03/30/2021 <0 2  0 0 - 0 9 AI Final    REMINGTON RNP Ab 03/30/2021 <0 2  0 0 - 0 9 AI Final    A/G Ratio 03/30/2021 1 51  1 10 - 1 80 Final    Albumin Electrophoresis 03/30/2021 60 1  52 0 - 65 0 % Final    Albumin CONC 03/30/2021 3 97  3 50 - 5 00 g/dl Final    Alpha 1 03/30/2021 3 9  2 5 - 5 0 % Final    ALPHA 1 CONC 03/30/2021 0 26  0 10 - 0 40 g/dL Final    Alpha 2 03/30/2021 11 0  7 0 - 13 0 % Final    ALPHA 2 CONC 03/30/2021 0 73  0 40 - 1 20 g/dL Final    Beta-1 03/30/2021 6 3  5 0 - 13 0 % Final    BETA 1 CONC 03/30/2021 0 42  0 40 - 0 80 g/dL Final    Beta-2 03/30/2021 5 9  2 0 - 8 0 % Final    BETA 2 CONC 03/30/2021 0 39  0 20 - 0 50 g/dL Final    Gamma Globulin 03/30/2021 12 8  12 0 - 22 0 % Final    GAMMA CONC 03/30/2021 0 84  0 50 - 1 60 g/dL Final    Total Protein 03/30/2021 6 6  6 4 - 8 2 g/dL Final    SPEP Interpretation 03/30/2021 See Comment   Final    No monoclonal bands noted  Reviewed by:  Stefan Swan MD Electronic Signature   Lab on 02/26/2021   Component Date Value Ref Range Status    Sodium 02/26/2021 139  136 - 145 mmol/L Final    Potassium 02/26/2021 4 2  3 5 - 5 3 mmol/L Final    Chloride 02/26/2021 108  100 - 108 mmol/L Final    CO2 02/26/2021 28  21 - 32 mmol/L Final    ANION GAP 02/26/2021 3* 4 - 13 mmol/L Final    BUN 02/26/2021 35* 5 - 25 mg/dL Final    Creatinine 02/26/2021 1 15  0 60 - 1 30 mg/dL Final    Standardized to IDMS reference method    Glucose, Fasting 02/26/2021 109* 65 - 99 mg/dL Final    Specimen collection should occur prior to Sulfasalazine administration due to the potential for falsely depressed results  Specimen collection should occur prior to Sulfapyridine administration due to the potential for falsely elevated results   Calcium 02/26/2021 10 0  8 3 - 10 1 mg/dL Final    AST 02/26/2021 15  5 - 45 U/L Final    Specimen collection should occur prior to Sulfasalazine administration due to the potential for falsely depressed results   ALT 02/26/2021 19  12 - 78 U/L Final    Specimen collection should occur prior to Sulfasalazine and/or Sulfapyridine administration due to the potential for falsely depressed results   Alkaline Phosphatase 02/26/2021 62  46 - 116 U/L Final    Total Protein 02/26/2021 7 3  6 4 - 8 2 g/dL Final    Albumin 02/26/2021 3 8  3 5 - 5 0 g/dL Final    Total Bilirubin 02/26/2021 0 78  0 20 - 1 00 mg/dL Final    Use of this assay is not recommended for patients undergoing treatment with eltrombopag due to the potential for falsely elevated results      eGFR 02/26/2021 44  ml/min/1 73sq m Final    WBC 02/26/2021 6 84  4 31 - 10 16 Thousand/uL Final    RBC 02/26/2021 3 66* 3 81 - 5 12 Million/uL Final    Hemoglobin 02/26/2021 11 8  11 5 - 15 4 g/dL Final    Hematocrit 02/26/2021 36 5  34 8 - 46 1 % Final    MCV 02/26/2021 100* 82 - 98 fL Final    MCH 02/26/2021 32 2  26 8 - 34 3 pg Final    MCHC 02/26/2021 32 3  31 4 - 37 4 g/dL Final    RDW 02/26/2021 12 7  11 6 - 15 1 % Final    MPV 02/26/2021 11 3  8 9 - 12 7 fL Final    Platelets 59/65/4836 178  149 - 390 Thousands/uL Final    nRBC 02/26/2021 0  /100 WBCs Final    Neutrophils Relative 02/26/2021 46  43 - 75 % Final    Immat GRANS % 02/26/2021 0  0 - 2 % Final    Lymphocytes Relative 02/26/2021 41  14 - 44 % Final    Monocytes Relative 02/26/2021 10  4 - 12 % Final    Eosinophils Relative 02/26/2021 2  0 - 6 % Final    Basophils Relative 02/26/2021 1  0 - 1 % Final    Neutrophils Absolute 02/26/2021 3 15  1 85 - 7 62 Thousands/µL Final    Immature Grans Absolute 02/26/2021 0 01  0 00 - 0 20 Thousand/uL Final    Lymphocytes Absolute 02/26/2021 2 82  0 60 - 4 47 Thousands/µL Final    Monocytes Absolute 02/26/2021 0 70  0 17 - 1 22 Thousand/µL Final    Eosinophils Absolute 02/26/2021 0 12  0 00 - 0 61 Thousand/µL Final    Basophils Absolute 02/26/2021 0 04  0 00 - 0 10 Thousands/µL Final    Cholesterol 02/26/2021 235* 50 - 200 mg/dL Final    Cholesterol:       Desirable         <200 mg/dl       Borderline         200-239 mg/dl       High              >239           Triglycerides 02/26/2021 117  <=150 mg/dL Final    Triglyceride:     Normal          <150 mg/dl     Borderline High 150-199 mg/dl     High            200-499 mg/dl        Very High       >499 mg/dl    Specimen collection should occur prior to N-Acetylcysteine or Metamizole administration due to the potential for falsely depressed results   HDL, Direct 02/26/2021 71  >=40 mg/dL Final    HDL Cholesterol:       Low     <41 mg/dL  Specimen collection should occur prior to Metamizole administration due to the potential for falsley depressed results      LDL Calculated 02/26/2021 141* 0 - 100 mg/dL Final    LDL Cholesterol:     Optimal           <100 mg/dl     Near Optimal      100-129 mg/dl     Above Optimal       Borderline High 130-159 mg/dl       High            270-068 mg/dl       Very High       >189 mg/dl         This screening LDL is a calculated result  It does not have the accuracy of the Direct Measured LDL in the monitoring of patients with hyperlipidemia and/or statin therapy  Direct Measure LDL (MXT776) must be ordered separately in these patients   Non-HDL-Chol (CHOL-HDL) 02/26/2021 164  mg/dl Final    Vit D, 25-Hydroxy 02/26/2021 36 3  30 0 - 100 0 ng/mL Final         There are no Patient Instructions on file for this visit  Dr Eileen Keating MD  [facility]       "This note has been constructed using a voice recognition system  Therefore there may be syntax, spelling, and/or grammatical errors   Please call if you have any questions  "

## 2021-04-19 NOTE — PATIENT INSTRUCTIONS
Follow with Consultants as per their and our suggestion    Follow up in 12 week(s) or as needed earlier    Follow all instructions as advised and discussed  Take your medications as prescribed  Call the office immediately if you experience any side effects  Ask questions if you do not understand  Keep your scheduled appointment as advised or come sooner if necessary or in doubt  Best time to call for non-urgent matter or questions on weekdays is between 9am and 12 noon  See physician for any new symptoms or worsening of current symptoms  Urgent or emergent situations call 911 and report to nearest emergency room      I spent  30 -40 minutes taking care of this patient including clinical care, conseling, collaboration, chart, lab and consultaion review as appropriate    Patient is to get labs 1 week(s) prior to next visit if advised

## 2021-04-21 ENCOUNTER — OFFICE VISIT (OUTPATIENT)
Dept: PHYSICAL THERAPY | Facility: CLINIC | Age: 81
End: 2021-04-21
Payer: MEDICARE

## 2021-04-21 DIAGNOSIS — M54.12 CERVICAL RADICULOPATHY: Primary | ICD-10-CM

## 2021-04-21 PROCEDURE — 97140 MANUAL THERAPY 1/> REGIONS: CPT | Performed by: PHYSICAL THERAPIST

## 2021-04-21 PROCEDURE — 97110 THERAPEUTIC EXERCISES: CPT | Performed by: PHYSICAL THERAPIST

## 2021-04-21 NOTE — PROGRESS NOTES
Daily Note     Today's date: 2021  Patient name: Thiago Walters  : 1940  MRN: [id number]  Referring provider: Mayte Wilkerson MD  Dx:   Encounter Diagnosis     ICD-10-CM    1  Cervical radiculopathy  M54 12                   Subjective: She reports tightness in C-S today      Objective: See treatment diary below      Assessment: Tolerated treatment well  Patient would benefit from continued PT  She has mild side glide restrictions to the left compared to the right  Plan: Continue per plan of care  Discuss discharge following next session        Precautions:  Hypertension, LATEX ALLERGY    Specialty Daily Treatment Diary     Manuals 4/15/21 4/21/21      Visit # 6 7      STM UT, paraspinals, levator, biceps 4' 4'      C-S PROM 4' 4'      C-S distraction 2' 2'      Warm-up        NuStep 5' 6'      Neuro Re-Ed        UT stretch 20 20      Chin tucks 20 20      C-S  AROM        DNF lift off 20 20      Ther Ex        TB row 20   GTB 20   GTB      TB ext 20   GTB 20   GTB      Shrugs 20   3# 20   3#      Cat/Camel        Quad T-S rotate 10 10      Pulleys 20 20                      Ther Activity                                Modalities         5' 5'

## 2021-04-22 ENCOUNTER — APPOINTMENT (OUTPATIENT)
Dept: PHYSICAL THERAPY | Facility: CLINIC | Age: 81
End: 2021-04-22
Payer: MEDICARE

## 2021-04-26 ENCOUNTER — OFFICE VISIT (OUTPATIENT)
Dept: PHYSICAL THERAPY | Facility: CLINIC | Age: 81
End: 2021-04-26
Payer: MEDICARE

## 2021-04-26 DIAGNOSIS — M54.12 CERVICAL RADICULOPATHY: Primary | ICD-10-CM

## 2021-04-26 PROCEDURE — 97110 THERAPEUTIC EXERCISES: CPT | Performed by: PHYSICAL THERAPIST

## 2021-04-26 PROCEDURE — 97140 MANUAL THERAPY 1/> REGIONS: CPT | Performed by: PHYSICAL THERAPIST

## 2021-04-26 NOTE — PROGRESS NOTES
Daily Note     Today's date: 2021  Patient name: Jeff Hathaway  : 1940  MRN: [id number]  Referring provider: Selvin Schwartz MD  Dx:   Encounter Diagnosis     ICD-10-CM    1  Cervical radiculopathy  M54 12                   Subjective: She reports feeling normal overall  Much better  Objective: See treatment diary below      Assessment: Ken Kolb has achieved all goals  Her C-S ROM is normal and she has no pain with activities just mild tightness at times  Plan: Discharge from PT at this time       Precautions:  Hypertension, LATEX ALLERGY    Specialty Daily Treatment Diary     Manuals 4/15/21 4/21/21 4/26/21     Visit # 6 7 8     STM UT, paraspinals, levator, biceps 4' 4' 4'     C-S PROM 4' 4' 4'     C-S distraction 2' 2' 2'     Warm-up        NuStep 5' 6' --     Neuro Re-Ed        UT stretch 20 20 20     Chin tucks 20 20 20     C-S  AROM        DNF lift off 20 20 20     Ther Ex        TB row 20   GTB 20   GTB 20    GTB     TB ext 20   GTB 20   GTB 20    GTB     Shrugs 20   3# 20   3# 20   3#     Cat/Camel        Quad T-S rotate 10 10 10     Pulleys 20 20 20                     Ther Activity                                Modalities         5' 5' 5'

## 2021-05-14 NOTE — PROGRESS NOTES
PT Discharge    Patient has done well with PT  Patient has achieved all goals at this time and will continue to perform HEP independently  D/C at this time

## 2021-07-19 ENCOUNTER — OFFICE VISIT (OUTPATIENT)
Dept: INTERNAL MEDICINE CLINIC | Facility: CLINIC | Age: 81
End: 2021-07-19
Payer: MEDICARE

## 2021-07-19 VITALS
DIASTOLIC BLOOD PRESSURE: 60 MMHG | BODY MASS INDEX: 22.84 KG/M2 | OXYGEN SATURATION: 97 % | HEART RATE: 67 BPM | WEIGHT: 121 LBS | SYSTOLIC BLOOD PRESSURE: 126 MMHG | HEIGHT: 61 IN

## 2021-07-19 DIAGNOSIS — K21.9 GASTROESOPHAGEAL REFLUX DISEASE WITHOUT ESOPHAGITIS: ICD-10-CM

## 2021-07-19 DIAGNOSIS — R73.02 GLUCOSE INTOLERANCE (IMPAIRED GLUCOSE TOLERANCE): ICD-10-CM

## 2021-07-19 DIAGNOSIS — N18.32 STAGE 3B CHRONIC KIDNEY DISEASE (HCC): ICD-10-CM

## 2021-07-19 DIAGNOSIS — D72.820 PERSISTENT LYMPHOCYTOSIS: ICD-10-CM

## 2021-07-19 DIAGNOSIS — G25.81 RESTLESS LEGS: ICD-10-CM

## 2021-07-19 DIAGNOSIS — I10 ESSENTIAL HYPERTENSION: ICD-10-CM

## 2021-07-19 DIAGNOSIS — J30.1 SEASONAL ALLERGIC RHINITIS DUE TO POLLEN: ICD-10-CM

## 2021-07-19 DIAGNOSIS — K58.0 IRRITABLE BOWEL SYNDROME WITH DIARRHEA: Primary | ICD-10-CM

## 2021-07-19 DIAGNOSIS — E87.6 HYPOKALEMIA: ICD-10-CM

## 2021-07-19 DIAGNOSIS — E78.00 HYPERCHOLESTEREMIA: ICD-10-CM

## 2021-07-19 DIAGNOSIS — K57.90 DIVERTICULOSIS: ICD-10-CM

## 2021-07-19 DIAGNOSIS — R60.0 EDEMA OF EXTREMITIES: ICD-10-CM

## 2021-07-19 DIAGNOSIS — I83.93 VARICOSE VEINS OF BOTH LOWER EXTREMITIES, UNSPECIFIED WHETHER COMPLICATED: ICD-10-CM

## 2021-07-19 DIAGNOSIS — G43.809 OTHER MIGRAINE WITHOUT STATUS MIGRAINOSUS, NOT INTRACTABLE: ICD-10-CM

## 2021-07-19 PROBLEM — M15.9 PRIMARY OSTEOARTHRITIS INVOLVING MULTIPLE JOINTS: Status: ACTIVE | Noted: 2021-07-19

## 2021-07-19 PROBLEM — M15.0 PRIMARY OSTEOARTHRITIS INVOLVING MULTIPLE JOINTS: Status: ACTIVE | Noted: 2021-07-19

## 2021-07-19 PROCEDURE — 99214 OFFICE O/P EST MOD 30 MIN: CPT | Performed by: INTERNAL MEDICINE

## 2021-07-19 RX ORDER — LEVOCETIRIZINE DIHYDROCHLORIDE 5 MG/1
5 TABLET, FILM COATED ORAL EVERY EVENING
Qty: 90 TABLET | Refills: 1 | Status: SHIPPED | OUTPATIENT
Start: 2021-07-19 | End: 2022-03-28

## 2021-07-19 RX ORDER — OLMESARTAN MEDOXOMIL 40 MG/1
40 TABLET ORAL DAILY
Qty: 90 TABLET | Refills: 1 | Status: SHIPPED | OUTPATIENT
Start: 2021-07-19 | End: 2022-01-24 | Stop reason: SDUPTHER

## 2021-07-19 NOTE — ASSESSMENT & PLAN NOTE
Right knee more symptomatic at this time  Recommend Tylenol  Recommend lidocaine application local   Recommend to stay active

## 2021-07-19 NOTE — ASSESSMENT & PLAN NOTE
More symptomatic recently in the morning and after lunch    Recommend high-fiber diet and Imodium as needed

## 2021-07-19 NOTE — ASSESSMENT & PLAN NOTE
Home blood pressure in the range of 120 and 130  Tolerating blood pressure medicine without side effect    Continue same regimen

## 2021-07-19 NOTE — ASSESSMENT & PLAN NOTE
Lab Results   Component Value Date    EGFR 44 02/26/2021    EGFR 48 09/11/2020    EGFR 44 05/09/2019    CREATININE 1 15 02/26/2021    CREATININE 1 07 09/11/2020    CREATININE 1 17 05/09/2019   Fair at this time continue to monitor and trend avoid nephrotoxic drugs electrolytes normal CO2 normal calcium normal hemoglobin normal

## 2021-07-19 NOTE — ASSESSMENT & PLAN NOTE
A chronic allergies it problem but fair control with the symptom with Atrovent and Astelin will continue same    SAME MEDICINE

## 2021-07-19 NOTE — PROGRESS NOTES
[facility] Office Visit Note  21     Sedrick Carmichael 78 y o  female MRN: [id number]  : 1940    Assessment:     Irritable bowel syndrome  More symptomatic recently in the morning and after lunch  Recommend high-fiber diet and Imodium as needed    Diverticulosis  Diverticulosis symptom-free    Gastroesophageal reflux disease without esophagitis  Unable to come off omeprazole but significant relief of the symptoms with omeprazole  Also continue to watch diet    Glucose intolerance (impaired glucose tolerance)  Watching diet fairly good    Migraine  Well controlled migraine  With medications occasional breakthrough but not a major issue    Essential hypertension  Home blood pressure in the range of 120 and 130  Tolerating blood pressure medicine without side effect  Continue same regimen    Rhinitis, allergic  A chronic allergies it problem but fair control with the symptom with Atrovent and Astelin will continue same    SAME MEDICINE    Stage 3b chronic kidney disease ([facility] [address] )  Lab Results   Component Value Date    EGFR 44 2021    EGFR 48 2020    EGFR 44 2019    CREATININE 1 15 2021    CREATININE 1 07 2020    CREATININE 1 17 2019   Fair at this time continue to monitor and trend avoid nephrotoxic drugs electrolytes normal CO2 normal calcium normal hemoglobin normal    Edema of extremities  Fair at this time    Hypercholesteremia  Continue low-cholesterol diet    Persistent lymphocytosis  Periodic CBC    Hypokalemia  Watch potassium    Vitamin D deficiency  Continue vitamin-D supplement and periodic lab monitoring    Primary osteoarthritis involving multiple joints  Right knee more symptomatic at this time  Recommend Tylenol  Recommend lidocaine application local   Recommend to stay active         Diagnoses and all orders for this visit:    Irritable bowel syndrome with diarrhea    Seasonal allergic rhinitis due to pollen  -     levocetirizine (XYZAL) 5 MG tablet; Take 1 tablet (5 mg total) by mouth every evening    Essential hypertension  -     olmesartan (BENICAR) 40 mg tablet; Take 1 tablet (40 mg total) by mouth daily    Diverticulosis    Gastroesophageal reflux disease without esophagitis    Glucose intolerance (impaired glucose tolerance)    Other migraine without status migrainosus, not intractable    Varicose veins of both lower extremities, unspecified whether complicated    Stage 3b chronic kidney disease (HCC)    Restless legs    Hypercholesteremia    Edema of extremities    Persistent lymphocytosis    Hypokalemia          Discussion Summary and Plan: Today's care plan and medications were reviewed with patient in detail and all their questions answered to their satisfaction  In summary  IBS is more symptomatic recommend to add Metamucil continue Imodium could increase hydration, diverticulosis fair, GERD is symptom control as long as he takes omeprazole  Glucose intolerance the recommend to continue to watch diet cholesterol  again advised to consider L2 statin see will think about it  Migraine well control, varicose vein fair, peripheral edema reasonable,  Hypertension well control continue same regimen,  A chronic allergic rhinitis symptomatic but were fair control with Xyzal and nasal spray also sees ENT physician some dry mouth from the [address] sees balancing recommend to chew hard candy, next like persistent lymphocytosis will monitor the CBC next visit the hypokalemia potassium next visit  CKD level will monitor renal functions electrolytes calcium next visit  Restless leg well-controlled          Will follow back in 3 months patient advised    Chief Complaint   Patient presents with    Hypertension    Hyperlipidemia    CKD III    Allergic Rhinitis    Follow-up     Restless leg, DJD multiple joint particularly right knee, mitral regurgitation, IBS, GERD, diverticulosis,    Arthritis      Subjective:  Patient is here for chronic disease management  DJD multiple joint neck pain is better  Radiculopathy is better  The right knee is more symptomatic  Does have a multiple joint DJD a m  Happy with Tylenol  Recommend to apply lidocaine  Mild clinical DJD is evident  Functional capacity is reasonable  IBS more symptomatic this days with more diarrhea usually after breakfast and lunch  No recommend to take more fiber  Up-to-date with the GI workup  The no nausea vomiting fever chills weight loss hematemesis melena hematochezia  Imodium helps  Allergic rhinitis :   She remains on Xyzal and has some inhaled most likely steroid by ENT physician  Gets some dry mouth from the Xyzal   Allergic allergic rhinitis is chronic symptomatic  No further mammogram or colonoscopy given her age and choice      LVH, moderate TR, 2+ MR, PA systolic pressure of 76-65 for surveillance  CKD level 3 who will continue to monitor see blood test and mid    Varicose veins flat  Lymphocytosis will monitor  No fatigue no lymphadenopathy anywhere    Remote history of shingles not a problem  Diet diverticulosis fair  Edema is fair at this time     hyperlipidemia:  Her LDL is 141  not been taking statin  I did advise  She will think about it    Annual eye exam done in 2016, DEXA scan 2012 osteopenia, mammogram 2015 reviewed, 08/11/2014 upper GI endoscopy and colonoscopy revealed distal esophageal erosion small polyp from the stomach and colon revealed sigmoid diverticulosis  Hypertension:  Symptom-free  Home blood pressure well controlled  Tolerating current antihypertensive without side effect  Peripheral edema is better the remains on Lasix    Mitral regurgitation:  Shortness of breath is better  Patient had echocardiogram previously    IBS:  Symptomatic at times  For symptomatic treatment  DJD of knees reasonable, some knee pain as well as the finger pain back secondary to arthritis though takes Tylenol p r n     Will also await the rheumatologists input    Gastroesophageal reflux symptoms free as long as patient is taking omeprazole  migraine not a major issue    Total bilirubin will continue to monitor  Anxiety and depression disorder  Borderline blood sugar low will continue to monitor  Hyperlipidemia:  Continue diet      GERD:  Patient is back on omeprazole and feeling much better  Procedure: Xr Spine Cervical Complete 4 Or 5 Vw Non Injury    Result Date: 3/6/2021  Narrative: CERVICAL SPINE INDICATION:       Impression: No acute osseous abnormality  Degenerative changes as above  Workstation performed: [id number]     Procedure: Mri Cervical Spine Wo Contrast    Result Date: 4/8/2021  Narrative: MRI CERVICAL SPINE WITHOUT CONTRAST INDICATION:   :  Mild reversal the cervical lordosis at the C4 segment is similar to the prior radiograph  Trace grade 1 anterolisthesis of C3 on C4 is stable  Trace grade 1 retrolisthesis of C6 on C7, also similar to the prior radiograph  MARROW SIGNAL:      Scattered degenerative endplate changes  No focally suspicious marrow lesions  No bone marrow edema or compression abnormality  CERVICAL AND VISUALIZED THORACIC CORD:     Impression: Multilevel cervical spondylosis     02/26/2021:  Creatinine 1 15, blood sugar 109, calcium 10, , rest of the CMP lipid profile unremarkable  GFR 44, hemoglobin 11 8, lymphocytosis noted  Vitamin-D 36    CRP                       Value: <3 0(mg/L)         Dt: 03/30/2021  Sed Rate                  Value:  6(mm/hour)         Dt: 03/30/2021  [place]                       Value: Negative           Dt: 03/30/2021  SS-A (RO) Ab              Value: <0 2(AI)           Dt: 03/30/2021  SS-B (LA) Ab              Value: <0 2(AI)           Dt: 03/30/2021  ds DNA Ab                 Value: 1(IU/mL)           Dt: 03/30/2021  Anti-Centromere B Antibo* Value: <0 2(AI)           Dt: 03/30/2021  Scleroderma SCL-70        Value: <0 2(AI)           Dt: 03/30/2021  REMINGTON Farfan (SM) Ab         Value: <0 2(AI)           Dt: 03/30/2021  REMINGTON RNP Ab                Value: <0 2(AI)           Dt: 03/30/2021  A/G Ratio                 Value: 1 51               Dt: 03/30/2021  Albumin Electrophoresis   Value: 60 1(%)            Dt: 03/30/2021  Albumin CONC              Value: 3 97(g/dl)         Dt: 03/30/2021  Alpha 1                   Value: 3 9(%)             Dt: 03/30/2021  ALPHA 1 CONC              Value: 0 26(g/dL)         Dt: 03/30/2021  Alpha 2                   Value: 11 0(%)            Dt: 03/30/2021  ALPHA 2 CONC              Value: 0 73(g/dL)         Dt: 03/30/2021  Beta-1                    Value: 6 3(%)             Dt: 03/30/2021  BETA 1 CONC               Value: 0 42(g/dL)         Dt: 03/30/2021  Beta-2                    Value: 5 9(%)             Dt: 03/30/2021  BETA 2 CONC               Value: 0 39(g/dL)         Dt: 03/30/2021  Gamma Globulin            Value: 12 8(%)            Dt: 03/30/2021  GAMMA CONC                Value: 0 84(g/dL)         Dt: 03/30/2021  Total Protein             Value: 6 6(g/dL)          Dt: 03/30/2021  SPEP Interpretation       Value: See Comment        Dt: 03/30/2021  ------------ - 2 weeks            The following portions of the patient's history were reviewed and updated as appropriate: allergies, current medications, past family history, past medical history, past social history, past surgical history and problem list     Review of Systems   All other systems reviewed and are negative          Historical Information   Patient Active Problem List   Diagnosis    Baker cyst, right    Gastroesophageal reflux disease without esophagitis    Glucose intolerance (impaired glucose tolerance)    Restless legs    Hypercholesteremia    Mitral regurgitation    Migraine    Essential hypertension    Varicose vein of leg    Edema of extremities    Rhinitis, allergic    Aspirin intolerance    Persistent lymphocytosis    Hypokalemia  Irritable bowel syndrome    Abnormal echocardiogram    Vitamin D deficiency    Diverticulosis    Herpes zoster    History of syncope    Hammer toe of left foot    Raynaud's disease    Stage 3b chronic kidney disease ([facility] [address] )    Neck pain    Medicare annual wellness visit, subsequent    Cervical radiculopathy    Left arm pain    Biceps rupture, distal, left, sequela    Primary osteoarthritis involving multiple joints     Past Medical History:   Diagnosis Date    Arthritis     Baker's cyst of knee 10/21/2016    right- burst on its own    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[facility] [address] ) 1992    hx of-fell when ice skating  Noted brain bleed w/swelling    Contact lens/glasses fitting     contact only in left eye    History of shingles     bilateral eyes-x3 episodes    HL (hearing loss)     Loss of smell     since brain injury 1992    Migraine     Raynaud's disease     both hands    Shoulder problem 10/2016    rotator cuff issue-right     Past Surgical History:   Procedure Laterality Date    AXILLARY SURGERY Left     fatty cyst removed, benign    CATARACT EXTRACTION Bilateral     CATARACT EXTRACTION W/ INTRAOCULAR LENS IMPLANT Right 11/10/2016    Procedure: EXTRACTION EXTRACAPSULAR CATARACT PHACO INTRAOCULAR LENS (IOL); Surgeon: Valentin Pina MD;  Location: [facility] MAIN OR;  Service:    [place] [id number] OF UTERUS      MYRINGOTOMY W/ TUBES  2011    both ears-one tube out on the left    MYRINGOTOMY W/ TUBES      AK SHLDR ARTHROSCOP,SURG,W/ROTAT CUFF REPR Right 5/4/2017    Procedure: ARTHROSCOPY SHOULDER WITH ROTATOR CUFF REPAIR, BICEPS TENODESIS, AND SUBACROMIAL DECOMPRESSION;  Surgeon: Vibha Acosta MD;  Location: [place] [address] MAIN OR;  Service: Orthopedics    AK XCAPSL CTRC RMVL INSJ IO LENS PROSTH W/O ECP Left 10/20/2016    Procedure: EXTRACTION EXTRACAPSULAR CATARACT PHACO INTRAOCULAR LENS (IOL);   Surgeon: Valentin Pina MD;  Location: [facility] MAIN OR;  Service: Ophthalmology  SKIN BIOPSY      mole on chest    SKIN BIOPSY      under breast, benign     Social History     Substance and Sexual Activity   Alcohol Use Yes    Comment: socially     Social History     Substance and Sexual Activity   Drug Use No     Social History     Tobacco Use   Smoking Status Former Smoker    Packs/day: 1 50    Years: 30 00    Pack years: 45 00    Quit date: 18    Years since quittin 5   Smokeless Tobacco Never Used     Family History   Problem Relation Age of Onset    Heart disease Mother         exp age 59 MI    Stroke Father     Macular degeneration Sister     Macular degeneration Brother     Diabetes Brother     Cancer Brother         kidney-nephrectomy    Kidney disease Brother         cancer     Health Maintenance Due   Topic    DTaP,Tdap,and Td Vaccines (1 - Tdap)    Pneumococcal Vaccine: 65+ Years (1 of 1 - PPSV23)    Influenza Vaccine (1)      Meds/Allergies       Current Outpatient Medications:     acetaminophen (TYLENOL) 500 mg tablet, Take 1,000 mg by mouth every 6 (six) hours as needed for mild pain, Disp: , Rfl:     ALPHAGAN P 0 1 %, , Disp: , Rfl: 0    ascorbic acid (VITAMIN C) 500 mg tablet, Take 500 mg by mouth every morning, Disp: , Rfl:     azelastine (ASTELIN) 0 1 % nasal spray, 1 spray into each nostril 2 (two) times a day, Disp: 1 Bottle, Rfl: 11    Biotin (BIOTIN 5000) 5 MG CAPS, Take by mouth every morning, Disp: , Rfl:     Calcium Carbonate-Vitamin D (CALTRATE 600+D PO), Take by mouth every morning, Disp: , Rfl:     Cyanocobalamin (VITAMIN B 12 PO), Take by mouth every morning, Disp: , Rfl:     furosemide (LASIX) 40 mg tablet, take 1 tablet by mouth every morning, Disp: 90 tablet, Rfl: 1    ipratropium (ATROVENT) 0 06 % nasal spray, 2 sprays into each nostril 4 (four) times a day, Disp: 15 mL, Rfl: 11    levocetirizine (XYZAL) 5 MG tablet, Take 1 tablet (5 mg total) by mouth every evening, Disp: 90 tablet, Rfl: 1    Lutein 40 MG CAPS, Take by mouth every morning, Disp: , Rfl:     Magnesium 250 MG TABS, Take by mouth every morning, Disp: , Rfl:     Misc Natural Products (TUMERSAID PO), Take 1 tablet by mouth daily, Disp: , Rfl:     olmesartan (BENICAR) 40 mg tablet, Take 1 tablet (40 mg total) by mouth daily, Disp: 90 tablet, Rfl: 1    omeprazole (PriLOSEC) 20 mg delayed release capsule, take 1 tablet by mouth daily if needed for HEARTBURN, Disp: 90 capsule, Rfl: 1    Potassium Chloride ER 20 MEQ TBCR, take 1 tablet by mouth once daily, Disp: 90 tablet, Rfl: 1    rOPINIRole (REQUIP) 0 5 mg tablet, Take 1 tablet (0 5 mg total) by mouth daily at bedtime, Disp: 90 tablet, Rfl: 1    SUMAtriptan (IMITREX) 50 mg tablet, Take 1 tablet (50 mg total) by mouth once as needed for migraine, Disp: 9 tablet, Rfl: 1    vitamin A 7500 UNIT capsule, Take 7,500 Units by mouth every morning  , Disp: , Rfl:     vitamin E, tocopherol, 400 units capsule, Take 400 Units by mouth every morning Last dose 4/26/17, Disp: , Rfl:     zinc gluconate 50 mg tablet, Take 50 mg by mouth every morning, Disp: , Rfl:       Objective:    Vitals:   /60   Pulse 67   Ht 5' 1" (1 549 m)   Wt 54 9 kg (121 lb)   SpO2 97%   BMI 22 86 kg/m²   Body mass index is 22 86 kg/m²  Vitals:    07/19/21 0857   Weight: 54 9 kg (121 lb)       Physical Exam  Vitals and nursing note reviewed  Constitutional:       Appearance: She is well-developed  HENT:      Head: Normocephalic and atraumatic  Nose: Rhinorrhea present  Mouth/Throat:      Pharynx: Uvula midline  No oropharyngeal exudate or posterior oropharyngeal erythema  Eyes:      Conjunctiva/sclera: Conjunctivae normal    Neck:      Thyroid: No thyromegaly  Vascular: No JVD  Cardiovascular:      Rate and Rhythm: Regular rhythm  Heart sounds: Normal heart sounds  Pulmonary:      Effort: No respiratory distress  Breath sounds: Normal breath sounds  No wheezing or rales     Abdominal:      General: Bowel sounds are normal  There is no distension  Palpations: There is no mass  Tenderness: There is no abdominal tenderness  There is no rebound  Musculoskeletal:      Right knee: Deformity present  No bony tenderness  Decreased range of motion  No tenderness  Right lower leg: No edema  Left lower leg: No edema  Lymphadenopathy:      Cervical: No cervical adenopathy  Skin:     General: Skin is warm  Coloration: Skin is not jaundiced or pale  Findings: No rash  Neurological:      General: No focal deficit present  Mental Status: She is oriented to person, place, and time  Psychiatric:         Mood and Affect: Mood normal          Behavior: Behavior normal          Thought Content: Thought content normal          Judgment: Judgment normal          Lab Review   No visits with results within 2 Month(s) from this visit     Latest known visit with results is:   Transcribe Orders on 03/30/2021   Component Date Value Ref Range Status    CRP 03/30/2021 <3 0  <3 0 mg/L Final    Sed Rate 03/30/2021 6  0 - 29 mm/hour Final    GE[place] 03/30/2021 Negative  Negative Final    SS-A (RO) Ab 03/30/2021 <0 2  0 0 - 0 9 AI Final    SS-B (LA) Ab 03/30/2021 <0 2  0 0 - 0 9 AI Final    ds DNA Ab 03/30/2021 1  0 - 9 IU/mL Final                                       Negative      <5                                     Equivocal  5 - 9                                     Positive      >9    Anti-Centromere B Antibodies 03/30/2021 <0 2  0 0 - 0 9 AI Final    Scleroderma SCL-70 03/30/2021 <0 2  0 0 - 0 9 AI Final    REMINGTON Farfan (SM) Ab 03/30/2021 <0 2  0 0 - 0 9 AI Final    REMINGTON RNP Ab 03/30/2021 <0 2  0 0 - 0 9 AI Final    A/G Ratio 03/30/2021 1 51  1 10 - 1 80 Final    Albumin Electrophoresis 03/30/2021 60 1  52 0 - 65 0 % Final    Albumin CONC 03/30/2021 3 97  3 50 - 5 00 g/dl Final    Alpha 1 03/30/2021 3 9  2 5 - 5 0 % Final    ALPHA 1 CONC 03/30/2021 0 26  0 10 - 0 40 g/dL Final    Alpha 2 03/30/2021 11 0  7 0 - 13 0 % Final    ALPHA 2 CONC 03/30/2021 0 73  0 40 - 1 20 g/dL Final    Beta-1 03/30/2021 6 3  5 0 - 13 0 % Final    BETA 1 CONC 03/30/2021 0 42  0 40 - 0 80 g/dL Final    Beta-2 03/30/2021 5 9  2 0 - 8 0 % Final    BETA 2 CONC 03/30/2021 0 39  0 20 - 0 50 g/dL Final    Gamma Globulin 03/30/2021 12 8  12 0 - 22 0 % Final    GAMMA CONC 03/30/2021 0 84  0 50 - 1 60 g/dL Final    Total Protein 03/30/2021 6 6  6 4 - 8 2 g/dL Final    SPEP Interpretation 03/30/2021 See Comment   Final    No monoclonal bands noted  Reviewed by:  Diamond Ríos MD Electronic Signature         Patient Instructions   Follow with Consultants as per their and our suggestion    Follow up in 12 week(s) or as needed earlier    Follow all instructions as advised and discussed  Take your medications as prescribed  Call the office immediately if you experience any side effects  Ask questions if you do not understand  Keep your scheduled appointment as advised or come sooner if necessary or in doubt  Best time to call for non-urgent matter or questions on weekdays is between 9am and 12 noon  See physician for any new symptoms or worsening of current symptoms  Urgent or emergent situations call 911 and report to nearest emergency room  I spent  30 -40 minutes taking care of this patient including clinical care, conseling, collaboration, chart, lab and consultaion review as appropriate    Patient is to get labs 1 week(s) prior to next visit if advised               Dr Simeon Howell MD  [facility]       "This note has been constructed using a voice recognition system  Therefore there may be syntax, spelling, and/or grammatical errors   Please call if you have any questions  "

## 2021-07-19 NOTE — ASSESSMENT & PLAN NOTE
Unable to come off omeprazole but significant relief of the symptoms with omeprazole    Also continue to watch diet

## 2021-10-12 ENCOUNTER — APPOINTMENT (OUTPATIENT)
Dept: LAB | Facility: CLINIC | Age: 81
End: 2021-10-12
Payer: MEDICARE

## 2021-10-12 DIAGNOSIS — D72.820 PERSISTENT LYMPHOCYTOSIS: ICD-10-CM

## 2021-10-12 DIAGNOSIS — K58.0 IRRITABLE BOWEL SYNDROME WITH DIARRHEA: ICD-10-CM

## 2021-10-12 DIAGNOSIS — E87.6 HYPOKALEMIA: ICD-10-CM

## 2021-10-12 DIAGNOSIS — E78.00 HYPERCHOLESTEREMIA: ICD-10-CM

## 2021-10-12 DIAGNOSIS — I10 ESSENTIAL HYPERTENSION: ICD-10-CM

## 2021-10-12 LAB
ALBUMIN SERPL BCP-MCNC: 3.4 G/DL (ref 3.5–5)
ALP SERPL-CCNC: 65 U/L (ref 46–116)
ALT SERPL W P-5'-P-CCNC: 17 U/L (ref 12–78)
ANION GAP SERPL CALCULATED.3IONS-SCNC: 2 MMOL/L (ref 4–13)
AST SERPL W P-5'-P-CCNC: 13 U/L (ref 5–45)
BASOPHILS # BLD AUTO: 0.03 THOUSANDS/ΜL (ref 0–0.1)
BASOPHILS NFR BLD AUTO: 1 % (ref 0–1)
BILIRUB SERPL-MCNC: 1.16 MG/DL (ref 0.2–1)
BUN SERPL-MCNC: 22 MG/DL (ref 5–25)
CALCIUM ALBUM COR SERPL-MCNC: 10.3 MG/DL (ref 8.3–10.1)
CALCIUM SERPL-MCNC: 9.8 MG/DL (ref 8.3–10.1)
CHLORIDE SERPL-SCNC: 108 MMOL/L (ref 100–108)
CHOLEST SERPL-MCNC: 216 MG/DL (ref 50–200)
CO2 SERPL-SCNC: 27 MMOL/L (ref 21–32)
CREAT SERPL-MCNC: 1.06 MG/DL (ref 0.6–1.3)
EOSINOPHIL # BLD AUTO: 0.14 THOUSAND/ΜL (ref 0–0.61)
EOSINOPHIL NFR BLD AUTO: 2 % (ref 0–6)
ERYTHROCYTE [DISTWIDTH] IN BLOOD BY AUTOMATED COUNT: 13.2 % (ref 11.6–15.1)
GFR SERPL CREATININE-BSD FRML MDRD: 49 ML/MIN/1.73SQ M
GLUCOSE P FAST SERPL-MCNC: 109 MG/DL (ref 65–99)
HCT VFR BLD AUTO: 37.4 % (ref 34.8–46.1)
HDLC SERPL-MCNC: 55 MG/DL
HGB BLD-MCNC: 12.2 G/DL (ref 11.5–15.4)
IMM GRANULOCYTES # BLD AUTO: 0.01 THOUSAND/UL (ref 0–0.2)
IMM GRANULOCYTES NFR BLD AUTO: 0 % (ref 0–2)
LDLC SERPL CALC-MCNC: 120 MG/DL (ref 0–100)
LYMPHOCYTES # BLD AUTO: 2.84 THOUSANDS/ΜL (ref 0.6–4.47)
LYMPHOCYTES NFR BLD AUTO: 44 % (ref 14–44)
MCH RBC QN AUTO: 32.2 PG (ref 26.8–34.3)
MCHC RBC AUTO-ENTMCNC: 32.6 G/DL (ref 31.4–37.4)
MCV RBC AUTO: 99 FL (ref 82–98)
MONOCYTES # BLD AUTO: 0.61 THOUSAND/ΜL (ref 0.17–1.22)
MONOCYTES NFR BLD AUTO: 10 % (ref 4–12)
NEUTROPHILS # BLD AUTO: 2.75 THOUSANDS/ΜL (ref 1.85–7.62)
NEUTS SEG NFR BLD AUTO: 43 % (ref 43–75)
NONHDLC SERPL-MCNC: 161 MG/DL
NRBC BLD AUTO-RTO: 0 /100 WBCS
PLATELET # BLD AUTO: 195 THOUSANDS/UL (ref 149–390)
PMV BLD AUTO: 11.6 FL (ref 8.9–12.7)
POTASSIUM SERPL-SCNC: 3.8 MMOL/L (ref 3.5–5.3)
PROT SERPL-MCNC: 7.1 G/DL (ref 6.4–8.2)
RBC # BLD AUTO: 3.79 MILLION/UL (ref 3.81–5.12)
SODIUM SERPL-SCNC: 137 MMOL/L (ref 136–145)
TRIGL SERPL-MCNC: 203 MG/DL
WBC # BLD AUTO: 6.38 THOUSAND/UL (ref 4.31–10.16)

## 2021-10-12 PROCEDURE — 80053 COMPREHEN METABOLIC PANEL: CPT

## 2021-10-12 PROCEDURE — 85025 COMPLETE CBC W/AUTO DIFF WBC: CPT

## 2021-10-12 PROCEDURE — 36415 COLL VENOUS BLD VENIPUNCTURE: CPT

## 2021-10-12 PROCEDURE — 80061 LIPID PANEL: CPT

## 2021-10-17 PROBLEM — B02.9 HERPES ZOSTER: Status: RESOLVED | Noted: 2019-08-22 | Resolved: 2021-10-17

## 2021-10-18 ENCOUNTER — OFFICE VISIT (OUTPATIENT)
Dept: INTERNAL MEDICINE CLINIC | Facility: CLINIC | Age: 81
End: 2021-10-18
Payer: MEDICARE

## 2021-10-18 VITALS
HEIGHT: 61 IN | BODY MASS INDEX: 23.03 KG/M2 | DIASTOLIC BLOOD PRESSURE: 68 MMHG | WEIGHT: 122 LBS | HEART RATE: 70 BPM | SYSTOLIC BLOOD PRESSURE: 128 MMHG

## 2021-10-18 DIAGNOSIS — E83.52 HYPERCALCEMIA: ICD-10-CM

## 2021-10-18 DIAGNOSIS — M15.9 PRIMARY OSTEOARTHRITIS INVOLVING MULTIPLE JOINTS: ICD-10-CM

## 2021-10-18 DIAGNOSIS — G25.81 RESTLESS LEGS: ICD-10-CM

## 2021-10-18 DIAGNOSIS — E78.00 HYPERCHOLESTEREMIA: ICD-10-CM

## 2021-10-18 DIAGNOSIS — I10 ESSENTIAL HYPERTENSION: ICD-10-CM

## 2021-10-18 DIAGNOSIS — N18.32 STAGE 3B CHRONIC KIDNEY DISEASE (HCC): Primary | ICD-10-CM

## 2021-10-18 DIAGNOSIS — I83.93 VARICOSE VEINS OF BOTH LOWER EXTREMITIES, UNSPECIFIED WHETHER COMPLICATED: ICD-10-CM

## 2021-10-18 DIAGNOSIS — I34.0 NONRHEUMATIC MITRAL VALVE REGURGITATION: ICD-10-CM

## 2021-10-18 DIAGNOSIS — R73.02 GLUCOSE INTOLERANCE (IMPAIRED GLUCOSE TOLERANCE): ICD-10-CM

## 2021-10-18 DIAGNOSIS — G43.809 OTHER MIGRAINE WITHOUT STATUS MIGRAINOSUS, NOT INTRACTABLE: ICD-10-CM

## 2021-10-18 DIAGNOSIS — K58.0 IRRITABLE BOWEL SYNDROME WITH DIARRHEA: ICD-10-CM

## 2021-10-18 DIAGNOSIS — S46.212A RUPTURE OF LEFT DISTAL BICEPS TENDON, INITIAL ENCOUNTER: ICD-10-CM

## 2021-10-18 DIAGNOSIS — Z23 NEED FOR VACCINATION: ICD-10-CM

## 2021-10-18 DIAGNOSIS — E55.9 VITAMIN D DEFICIENCY: ICD-10-CM

## 2021-10-18 DIAGNOSIS — I73.00 RAYNAUD'S DISEASE WITHOUT GANGRENE: ICD-10-CM

## 2021-10-18 DIAGNOSIS — J30.89 NON-SEASONAL ALLERGIC RHINITIS DUE TO OTHER ALLERGIC TRIGGER: ICD-10-CM

## 2021-10-18 PROCEDURE — 99214 OFFICE O/P EST MOD 30 MIN: CPT | Performed by: INTERNAL MEDICINE

## 2021-10-18 PROCEDURE — G0008 ADMIN INFLUENZA VIRUS VAC: HCPCS | Performed by: INTERNAL MEDICINE

## 2021-10-18 PROCEDURE — 90662 IIV NO PRSV INCREASED AG IM: CPT | Performed by: INTERNAL MEDICINE

## 2021-11-17 ENCOUNTER — APPOINTMENT (OUTPATIENT)
Dept: RADIOLOGY | Facility: CLINIC | Age: 81
End: 2021-11-17
Payer: MEDICARE

## 2021-11-17 ENCOUNTER — CONSULT (OUTPATIENT)
Dept: OBGYN CLINIC | Facility: CLINIC | Age: 81
End: 2021-11-17
Payer: MEDICARE

## 2021-11-17 VITALS
HEART RATE: 71 BPM | BODY MASS INDEX: 23.03 KG/M2 | HEIGHT: 61 IN | WEIGHT: 122 LBS | TEMPERATURE: 97.5 F | DIASTOLIC BLOOD PRESSURE: 73 MMHG | SYSTOLIC BLOOD PRESSURE: 159 MMHG

## 2021-11-17 DIAGNOSIS — M25.561 RIGHT KNEE PAIN, UNSPECIFIED CHRONICITY: ICD-10-CM

## 2021-11-17 DIAGNOSIS — M79.672 PAIN IN LEFT FOOT: ICD-10-CM

## 2021-11-17 DIAGNOSIS — S46.212A RUPTURE OF LEFT BICEPS TENDON, INITIAL ENCOUNTER: ICD-10-CM

## 2021-11-17 DIAGNOSIS — Z01.89 ENCOUNTER FOR LOWER EXTREMITY COMPARISON IMAGING STUDY: ICD-10-CM

## 2021-11-17 DIAGNOSIS — M75.102 TEAR OF LEFT ROTATOR CUFF, UNSPECIFIED TEAR EXTENT, UNSPECIFIED WHETHER TRAUMATIC: Primary | ICD-10-CM

## 2021-11-17 DIAGNOSIS — M19.072 ARTHRITIS OF LEFT FOOT: ICD-10-CM

## 2021-11-17 DIAGNOSIS — M79.671 PAIN IN RIGHT FOOT: ICD-10-CM

## 2021-11-17 DIAGNOSIS — M17.11 ARTHRITIS OF RIGHT KNEE: ICD-10-CM

## 2021-11-17 PROCEDURE — 73630 X-RAY EXAM OF FOOT: CPT

## 2021-11-17 PROCEDURE — 73560 X-RAY EXAM OF KNEE 1 OR 2: CPT

## 2021-11-17 PROCEDURE — 99214 OFFICE O/P EST MOD 30 MIN: CPT | Performed by: ORTHOPAEDIC SURGERY

## 2021-11-17 PROCEDURE — 73562 X-RAY EXAM OF KNEE 3: CPT

## 2021-12-10 ENCOUNTER — IMMUNIZATIONS (OUTPATIENT)
Dept: FAMILY MEDICINE CLINIC | Facility: HOSPITAL | Age: 81
End: 2021-12-10

## 2021-12-10 DIAGNOSIS — Z23 ENCOUNTER FOR IMMUNIZATION: Primary | ICD-10-CM

## 2021-12-10 PROCEDURE — 0064A COVID-19 MODERNA VACC 0.25 ML BOOSTER: CPT

## 2021-12-10 PROCEDURE — 91306 COVID-19 MODERNA VACC 0.25 ML BOOSTER: CPT

## 2022-01-21 ENCOUNTER — LAB (OUTPATIENT)
Dept: LAB | Facility: CLINIC | Age: 82
End: 2022-01-21
Payer: MEDICARE

## 2022-01-21 DIAGNOSIS — R05.9 COUGH: Primary | ICD-10-CM

## 2022-01-21 DIAGNOSIS — Z20.822 EXPOSURE TO COVID-19 VIRUS: ICD-10-CM

## 2022-01-21 DIAGNOSIS — I10 ESSENTIAL HYPERTENSION: ICD-10-CM

## 2022-01-21 DIAGNOSIS — E55.9 VITAMIN D DEFICIENCY: ICD-10-CM

## 2022-01-21 DIAGNOSIS — R09.81 NASAL CONGESTION: ICD-10-CM

## 2022-01-21 DIAGNOSIS — E83.52 HYPERCALCEMIA: ICD-10-CM

## 2022-01-21 DIAGNOSIS — N18.32 STAGE 3B CHRONIC KIDNEY DISEASE (HCC): ICD-10-CM

## 2022-01-21 DIAGNOSIS — R09.82 PND (POST-NASAL DRIP): ICD-10-CM

## 2022-01-21 DIAGNOSIS — U07.1 COVID-19: ICD-10-CM

## 2022-01-21 LAB
25(OH)D3 SERPL-MCNC: 30.8 NG/ML (ref 30–100)
ALBUMIN SERPL BCP-MCNC: 3.7 G/DL (ref 3.5–5)
ALP SERPL-CCNC: 67 U/L (ref 46–116)
ALT SERPL W P-5'-P-CCNC: 28 U/L (ref 12–78)
ANION GAP SERPL CALCULATED.3IONS-SCNC: 3 MMOL/L (ref 4–13)
AST SERPL W P-5'-P-CCNC: 17 U/L (ref 5–45)
BILIRUB SERPL-MCNC: 1.38 MG/DL (ref 0.2–1)
BUN SERPL-MCNC: 25 MG/DL (ref 5–25)
CALCIUM SERPL-MCNC: 9.9 MG/DL (ref 8.3–10.1)
CHLORIDE SERPL-SCNC: 108 MMOL/L (ref 100–108)
CO2 SERPL-SCNC: 26 MMOL/L (ref 21–32)
CREAT SERPL-MCNC: 1.08 MG/DL (ref 0.6–1.3)
GFR SERPL CREATININE-BSD FRML MDRD: 47 ML/MIN/1.73SQ M
GLUCOSE P FAST SERPL-MCNC: 98 MG/DL (ref 65–99)
POTASSIUM SERPL-SCNC: 4.6 MMOL/L (ref 3.5–5.3)
PROT SERPL-MCNC: 7.5 G/DL (ref 6.4–8.2)
PTH-INTACT SERPL-MCNC: 91.6 PG/ML (ref 18.4–80.1)
SODIUM SERPL-SCNC: 137 MMOL/L (ref 136–145)

## 2022-01-21 PROCEDURE — 36415 COLL VENOUS BLD VENIPUNCTURE: CPT

## 2022-01-21 PROCEDURE — 83970 ASSAY OF PARATHORMONE: CPT

## 2022-01-21 PROCEDURE — 82306 VITAMIN D 25 HYDROXY: CPT

## 2022-01-21 PROCEDURE — 80053 COMPREHEN METABOLIC PANEL: CPT

## 2022-01-24 ENCOUNTER — TELEMEDICINE (OUTPATIENT)
Dept: INTERNAL MEDICINE CLINIC | Facility: CLINIC | Age: 82
End: 2022-01-24
Payer: MEDICARE

## 2022-01-24 VITALS
SYSTOLIC BLOOD PRESSURE: 120 MMHG | WEIGHT: 122 LBS | TEMPERATURE: 97.7 F | HEIGHT: 61 IN | BODY MASS INDEX: 23.03 KG/M2 | DIASTOLIC BLOOD PRESSURE: 78 MMHG

## 2022-01-24 DIAGNOSIS — E87.6 HYPOKALEMIA: ICD-10-CM

## 2022-01-24 DIAGNOSIS — I34.0 NONRHEUMATIC MITRAL VALVE REGURGITATION: ICD-10-CM

## 2022-01-24 DIAGNOSIS — E87.6 HYPOPOTASSEMIA: ICD-10-CM

## 2022-01-24 DIAGNOSIS — I73.00 RAYNAUD'S DISEASE WITHOUT GANGRENE: ICD-10-CM

## 2022-01-24 DIAGNOSIS — E55.9 VITAMIN D DEFICIENCY: ICD-10-CM

## 2022-01-24 DIAGNOSIS — K58.0 IRRITABLE BOWEL SYNDROME WITH DIARRHEA: ICD-10-CM

## 2022-01-24 DIAGNOSIS — K21.9 GASTROESOPHAGEAL REFLUX DISEASE WITHOUT ESOPHAGITIS: ICD-10-CM

## 2022-01-24 DIAGNOSIS — R73.02 GLUCOSE INTOLERANCE (IMPAIRED GLUCOSE TOLERANCE): ICD-10-CM

## 2022-01-24 DIAGNOSIS — R60.0 EDEMA OF EXTREMITIES: ICD-10-CM

## 2022-01-24 DIAGNOSIS — I10 ESSENTIAL HYPERTENSION: ICD-10-CM

## 2022-01-24 DIAGNOSIS — N18.32 STAGE 3B CHRONIC KIDNEY DISEASE (HCC): ICD-10-CM

## 2022-01-24 DIAGNOSIS — E78.00 HYPERCHOLESTEREMIA: ICD-10-CM

## 2022-01-24 DIAGNOSIS — J30.89 NON-SEASONAL ALLERGIC RHINITIS DUE TO OTHER ALLERGIC TRIGGER: Primary | ICD-10-CM

## 2022-01-24 PROCEDURE — 99442 PR PHYS/QHP TELEPHONE EVALUATION 11-20 MIN: CPT | Performed by: INTERNAL MEDICINE

## 2022-01-24 RX ORDER — FUROSEMIDE 40 MG/1
40 TABLET ORAL EVERY MORNING
Qty: 90 TABLET | Refills: 1 | Status: SHIPPED | OUTPATIENT
Start: 2022-01-24 | End: 2022-07-12 | Stop reason: SDUPTHER

## 2022-01-24 RX ORDER — POTASSIUM CHLORIDE 1500 MG/1
1 TABLET, FILM COATED, EXTENDED RELEASE ORAL DAILY
Qty: 90 TABLET | Refills: 1 | Status: SHIPPED | OUTPATIENT
Start: 2022-01-24 | End: 2022-07-12 | Stop reason: SDUPTHER

## 2022-01-24 RX ORDER — OLMESARTAN MEDOXOMIL 40 MG/1
40 TABLET ORAL DAILY
Qty: 90 TABLET | Refills: 1 | Status: SHIPPED | OUTPATIENT
Start: 2022-01-24 | End: 2022-01-24

## 2022-01-24 NOTE — PROGRESS NOTES
[facility] Office Visit Note  22     John Gao 78 y o  female MRN: [id number]  : 1940    Assessment:     1  Non-seasonal allergic rhinitis due to other allergic trigger    2  Essential hypertension    3  Raynaud's disease without gangrene    4  Stage 3b chronic kidney disease ([address] )    5  Hypercholesteremia    6  Hypokalemia    7  Vitamin D deficiency    8  Nonrheumatic mitral valve regurgitation    9  Glucose intolerance (impaired glucose tolerance)    10  Gastroesophageal reflux disease without esophagitis    11  Irritable bowel syndrome with diarrhea          Discussion Summary and Plan: Today's care plan and medications were reviewed with patient in detail and all their questions answered to their satisfaction  Chief Complaint   Patient presents with    Hypertension    Hyperlipidemia    Allergic Rhinitis    Follow-up     raynaud's,     GERD     IBS, Diverticulosis      Subjective:  Patient is here for chronic disease management  DJD multiple joint:  Knee is more symptomatic  Patient is taking Tylenol along with CBD oral with relief of the symptoms  Also recently having increasing pain in the left biceps  See thinks that there is a ruptured tendon  Will refer for the orthopedic physician for further evaluation  IBS more symptomatic this days with more diarrhea usually after breakfast and lunch  On High  Fiber diet  Up-to-date with the GI workup  The no nausea vomiting fever chills weight loss hematemesis melena hematochezia  Imodium helps  Allergic rhinitis :   She remains on Xyzal and inhaled Atrovent nasal most likely steroid by ENT physician  Gets some dry mouth from the Xyzal   Allergic allergic rhinitis is chronic symptomatic  No further mammogram or colonoscopy given her age and choice    MR: SX free, previous echo 2019, LVH, moderate TR, 2+ MR, PA systolic pressure of 83-60 for surveillance      CKD level 3 : See BMP, gfr 50 RANGE, Creat 1 06 bun, lytes and calcium wnl    Varicose veins flat  Lymphocytosis will monitor  No fatigue no lymphadenopathy anywhere    diverticulosis fair  Edema is fair at this time     hyperlipidemia:  Previous LDL:141  not been taking statin  I did advise  She will think about it    Annual eye exam done in 2016, DEXA scan 2012 osteopenia, mammogram 2015 reviewed, 08/11/2014 upper GI endoscopy and colonoscopy revealed distal esophageal erosion small polyp from the stomach and colon revealed sigmoid diverticulosis  Raynaud's more symptomatic now that fall is here obviously her fingers are cold  Hypertension:  Symptom-free  Home blood pressure well controlled  Tolerating current antihypertensive without side effect     4-8-2021: MRI C Spine: Multilevel cervical spondylosis     02/26/2021:  Creatinine 1 15, blood sugar 109, calcium 10, , rest of the CMP lipid profile unremarkable  GFR 44, hemoglobin 11 8, lymphocytosis noted  Vitamin-D 36   3-: [place] , SSA, SSB, DS DNA, SCLE 70, CENROMERE AB, REMINGTON, RNP SPEP, ALL NEG, CRP AND SED RATE WNL  10-12-21: Chol 216   , CMP wnl except GFR 49, , CBC wnl, mcv 99              The following portions of the patient's history were reviewed and updated as appropriate: allergies, current medications, past family history, past medical history, past social history, past surgical history and problem list     Review of Systems   All other systems reviewed and are negative          Historical Information   Patient Active Problem List   Diagnosis    Baker cyst, right    Gastroesophageal reflux disease without esophagitis    Glucose intolerance (impaired glucose tolerance)    Restless legs    Hypercholesteremia    Mitral regurgitation    Migraine    Essential hypertension    Varicose vein of leg    Edema of extremities    Rhinitis, allergic    Aspirin intolerance    Persistent lymphocytosis    Hypokalemia    Irritable bowel syndrome    Abnormal echocardiogram    Vitamin D deficiency    Diverticulosis    History of syncope    Hammer toe of left foot    Raynaud's disease    Stage 3b chronic kidney disease ([facility] [address]    Neck pain    Medicare annual wellness visit, subsequent    Cervical radiculopathy    Left arm pain    Rupture of left distal biceps tendon    Primary osteoarthritis involving multiple joints    HL (hearing loss)     Past Medical History:   Diagnosis Date    Baker's cyst of knee 10/21/2016    right- burst on its own    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[facility] [address] 1992    hx of-fell when ice skating  Noted brain bleed w/swelling    Contact lens/glasses fitting     contact only in left eye    History of shingles     bilateral eyes-x3 episodes    HL (hearing loss)     Migraine     Raynaud's disease     both hands     Past Surgical History:   Procedure Laterality Date    AXILLARY SURGERY Left     fatty cyst removed, benign    CATARACT EXTRACTION Bilateral     CATARACT EXTRACTION W/ INTRAOCULAR LENS IMPLANT Right 11/10/2016    Procedure: EXTRACTION EXTRACAPSULAR CATARACT PHACO INTRAOCULAR LENS (IOL); Surgeon: Nelia Parmar MD;  Location: [facility] MAIN OR;  Service:    [address] UTERUS      MYRINGOTOMY W/ TUBES  2011    both ears-one tube out on the left    MYRINGOTOMY W/ TUBES      FL SHLDR ARTHROSCOP,SURG,W/ROTAT CUFF REPR Right 5/4/2017    Procedure: ARTHROSCOPY SHOULDER WITH ROTATOR CUFF REPAIR, BICEPS TENODESIS, AND SUBACROMIAL DECOMPRESSION;  Surgeon: Cee Jeff MD;  Location[facility] MAIN OR;  Service: Orthopedics    FL XCAPSL CTRC RMVL INSJ IO LENS PROSTH W/O ECP Left 10/20/2016    Procedure: EXTRACTION EXTRACAPSULAR CATARACT PHACO INTRAOCULAR LENS (IOL);   Surgeon: Nelia Parmar MD;  Location: [facility] MAIN OR;  Service: Ophthalmology    SKIN BIOPSY      mole on chest    SKIN BIOPSY      under breast, benign     Social History     Substance and Sexual Activity   Alcohol Use Yes Comment: socially     Social History     Substance and Sexual Activity   Drug Use No     Social History     Tobacco Use   Smoking Status Former Smoker    Packs/day: 1 50    Years: 30 00    Pack years: 45 00    Quit date: 18    Years since quittin 0   Smokeless Tobacco Never Used     Family History   Problem Relation Age of Onset    Heart disease Mother         exp age 59 MI    Stroke Father     Macular degeneration Sister     Macular degeneration Brother     Diabetes Brother     Cancer Brother         kidney-nephrectomy    Kidney disease Brother         cancer     Health Maintenance Due   Topic    DTaP,Tdap,and Td Vaccines (1 - Tdap)    Osteoporosis Screening     Pneumococcal Vaccine: 65+ Years (1 of 1 - PPSV23)    Medicare Annual Wellness Visit (AWV)     Fall Risk     Depression Screening       Meds/Allergies       Current Outpatient Medications:     acetaminophen (TYLENOL) 500 mg tablet, Take 1,000 mg by mouth every 6 (six) hours as needed for mild pain, Disp: , Rfl:     ALPHAGAN P 0 1 %, , Disp: , Rfl: 0    ascorbic acid (VITAMIN C) 500 mg tablet, Take 500 mg by mouth every morning, Disp: , Rfl:     Azelastine HCl 137 MCG/SPRAY SOLN, use 1 spray into each nostril twice a day, Disp: 30 mL, Rfl: 6    Biotin (BIOTIN 5000) 5 MG CAPS, Take by mouth every morning, Disp: , Rfl:     Calcium Carbonate-Vitamin D (CALTRATE 600+D PO), Take by mouth every morning, Disp: , Rfl:     Cyanocobalamin (VITAMIN B 12 PO), Take by mouth every morning, Disp: , Rfl:     furosemide (LASIX) 40 mg tablet, take 1 tablet by mouth every morning, Disp: 90 tablet, Rfl: 1    ipratropium (ATROVENT) 0 06 % nasal spray, instill 2 sprays into each nostril four times a day, Disp: 15 mL, Rfl: 11    levocetirizine (XYZAL) 5 MG tablet, Take 1 tablet (5 mg total) by mouth every evening, Disp: 90 tablet, Rfl: 1    Lutein 40 MG CAPS, Take by mouth every morning, Disp: , Rfl:     Magnesium 250 MG TABS, Take by mouth every morning, Disp: , Rfl:     Misc Natural Products (TUMERSAID PO), Take 1 tablet by mouth daily, Disp: , Rfl:     olmesartan (BENICAR) 40 mg tablet, Take 1 tablet (40 mg total) by mouth daily, Disp: 90 tablet, Rfl: 1    omeprazole (PriLOSEC) 20 mg delayed release capsule, take 1 tablet by mouth daily if needed for HEARTBURN, Disp: 90 capsule, Rfl: 1    Potassium Chloride ER 20 MEQ TBCR, take 1 tablet by mouth once daily, Disp: 90 tablet, Rfl: 1    rOPINIRole (REQUIP) 0 5 mg tablet, Take 1 tablet (0 5 mg total) by mouth daily at bedtime, Disp: 90 tablet, Rfl: 1    SUMAtriptan (IMITREX) 50 mg tablet, Take 1 tablet (50 mg total) by mouth once as needed for migraine, Disp: 9 tablet, Rfl: 1    vitamin A 7500 UNIT capsule, Take 7,500 Units by mouth every morning  , Disp: , Rfl:     vitamin E, tocopherol, 400 units capsule, Take 400 Units by mouth every morning Last dose 4/26/17, Disp: , Rfl:     zinc gluconate 50 mg tablet, Take 50 mg by mouth every morning, Disp: , Rfl:       Objective:    Vitals: There were no vitals taken for this visit  There is no height or weight on file to calculate BMI  There were no vitals filed for this visit  Physical Exam  Vitals and nursing note reviewed  Constitutional:       Appearance: She is well-developed  HENT:      Head: Normocephalic and atraumatic  Nose: Rhinorrhea present  Mouth/Throat:      Pharynx: Uvula midline  No oropharyngeal exudate or posterior oropharyngeal erythema  Eyes:      Conjunctiva/sclera: Conjunctivae normal    Neck:      Thyroid: No thyromegaly  Vascular: No JVD  Cardiovascular:      Rate and Rhythm: Regular rhythm  Heart sounds: Normal heart sounds  Pulmonary:      Effort: No respiratory distress  Breath sounds: Normal breath sounds  No wheezing or rales  Abdominal:      General: Bowel sounds are normal  There is no distension  Palpations: There is no mass  Tenderness:  There is no abdominal tenderness  There is no rebound  Musculoskeletal:      Right knee: Deformity present  No bony tenderness  Decreased range of motion  No tenderness  Right lower leg: No edema  Left lower leg: No edema  Lymphadenopathy:      Cervical: No cervical adenopathy  Skin:     General: Skin is warm  Coloration: Skin is not jaundiced or pale  Findings: No rash  Neurological:      General: No focal deficit present  Mental Status: She is oriented to person, place, and time  Psychiatric:         Mood and Affect: Mood normal          Behavior: Behavior normal          Thought Content: Thought content normal          Judgment: Judgment normal          Lab Review   Lab on 01/21/2022   Component Date Value Ref Range Status    Sodium 01/21/2022 137  136 - 145 mmol/L Final    Potassium 01/21/2022 4 6  3 5 - 5 3 mmol/L Final    Chloride 01/21/2022 108  100 - 108 mmol/L Final    CO2 01/21/2022 26  21 - 32 mmol/L Final    ANION GAP 01/21/2022 3* 4 - 13 mmol/L Final    BUN 01/21/2022 25  5 - 25 mg/dL Final    Creatinine 01/21/2022 1 08  0 60 - 1 30 mg/dL Final    Standardized to IDMS reference method    Glucose, Fasting 01/21/2022 98  65 - 99 mg/dL Final    Specimen collection should occur prior to Sulfasalazine administration due to the potential for falsely depressed results  Specimen collection should occur prior to Sulfapyridine administration due to the potential for falsely elevated results   Calcium 01/21/2022 9 9  8 3 - 10 1 mg/dL Final    AST 01/21/2022 17  5 - 45 U/L Final    Specimen collection should occur prior to Sulfasalazine administration due to the potential for falsely depressed results   ALT 01/21/2022 28  12 - 78 U/L Final    Specimen collection should occur prior to Sulfasalazine and/or Sulfapyridine administration due to the potential for falsely depressed results       Alkaline Phosphatase 01/21/2022 67  46 - 116 U/L Final    Total Protein 01/21/2022 7 5  6 4 - 8 2 g/dL Final    Albumin 01/21/2022 3 7  3 5 - 5 0 g/dL Final    Total Bilirubin 01/21/2022 1 38* 0 20 - 1 00 mg/dL Final    Use of this assay is not recommended for patients undergoing treatment with eltrombopag due to the potential for falsely elevated results   eGFR 01/21/2022 47  ml/min/1 73sq m Final    PTH 01/21/2022 91 6* 18 4 - 80 1 pg/mL Final    Vit D, 25-Hydroxy 01/21/2022 30 8  30 0 - 100 0 ng/mL Final         There are no Patient Instructions on file for this visit  Dr Dacia Hurd MD  [facility]       "This note has been constructed using a voice recognition system  Therefore there may be syntax, spelling, and/or grammatical errors  Please call if you have any questions  "  Virtual Brief Visit    Patient is located in the following state in which I hold an active license [place]      Assessment/Plan:    Problem List Items Addressed This Visit        Digestive    Gastroesophageal reflux disease without esophagitis    Irritable bowel syndrome       Endocrine    Glucose intolerance (impaired glucose tolerance)       Respiratory    Rhinitis, allergic - Primary       Cardiovascular and Mediastinum    Mitral regurgitation    Essential hypertension    Raynaud's disease       Genitourinary    Stage 3b chronic kidney disease (HCC)       Other    Hypercholesteremia    Edema of extremities    Hypokalemia    Vitamin D deficiency      Other Visit Diagnoses     Hypopotassemia              Recent Visits  No visits were found meeting these conditions  Showing recent visits within past 7 days and meeting all other requirements  Today's Visits  Date Type Provider Dept   01/24/22 Telemedicine Blanca Blunt MD [facility]   Showing today's visits and meeting all other requirements  Future Appointments  No visits were found meeting these conditions    Showing future appointments within next 150 days and meeting all other requirements         I spent 20 minutes directly with the patient during this visit          Virtual Brief Visit    Patient is located in the following state in which I hold an active license [place]      Assessment/Plan:    Problem List Items Addressed This Visit        Digestive    Gastroesophageal reflux disease without esophagitis    Irritable bowel syndrome       Endocrine    Glucose intolerance (impaired glucose tolerance)       Respiratory    Rhinitis, allergic - Primary       Cardiovascular and Mediastinum    Mitral regurgitation    Essential hypertension    Raynaud's disease       Genitourinary    Stage 3b chronic kidney disease (HCC)       Other    Hypercholesteremia    Edema of extremities    Hypokalemia    Vitamin D deficiency      Other Visit Diagnoses     Hypopotassemia              Recent Visits  No visits were found meeting these conditions  Showing recent visits within past 7 days and meeting all other requirements  Today's Visits  Date Type Provider Dept   01/24/22 Telemedicine Althea Moore MD [facility]   Showing today's visits and meeting all other requirements  Future Appointments  No visits were found meeting these conditions  Showing future appointments within next 150 days and meeting all other requirements     This was supposed to be office visit however it was converted to virtual visit due to patient's nasal symptoms as there is outbreak going on at this time  Chief Complain:dry throat, frontal headache and post nasal drip      First day of symptom: last week on Thursday 1-19-22    First day of contact to our office:1-21-22 we called pre visit screen    Pt did covid home test on Friday 1-21-22  Exposure History:  2 weeks ago entire family had COVID    Date of exposure:  2 weeks ago  Relationship:  Multiple family member  Confirmed or suspected:  Confirm  UnKnown:  Travel Hx no travel history    HPI:  The patient was in usual state of health till Thursday when she started with some dry throat some sore throat nasal congestions postnasal drip and secondary cough  Allergic is bad during the winter time usually          Associated Symptoms today  Cough:  Not during the day mostly at nighttime  Shortness of Breath:no  Chest tightness:no  Phlegm:no  Nasal congestion:Moderate nasal congestion,  Sore throat:    Smell Problem:for years due to head injury  Taste Problem:for years due to head injury    Abdominal Pain/Nausea/Vomiting/Diaarhea: no    Fever/Chills:no  Fatigue:no  Headache:frontal sinus  Bodyache:no    Treatment tried:atrovent nasal spray and astyeline and xyzal daily,    Risk Factors:allergic rhinitis, and PMH      Immunization Hx for COVID vaccine:    Pt was seen by Dr Mike Ordonez for knee and foot and was advised therapy, pt denies  Her shoulder pain was thought to be due to rotator cuff  Patient is here for chronic disease management  DJD multiple joint:  As above, takes tylenol and CBD cream with relief    IBS more symptomatic this days with more diarrhea usually after breakfast and lunch  On High  Fiber diet  Up-to-date with the GI workup  The no nausea vomiting fever chills weight loss hematemesis melena hematochezia  Imodium helps  Allergic rhinitis :   She remains on Xyzal and inhaled Atrovent nasal most likely steroid by ENT physician  Gets some dry mouth from the Xyzal   Allergic allergic rhinitis is chronic symptomatic  No further mammogram or colonoscopy given her age and choice    MR: SX free, previous echo 2019, LVH, moderate TR, 2+ MR, PA systolic pressure of 02-23 for surveillance  CKD level 3 : See BMP, gfr 50 RANGE, Creat 1 06 bun, lytes and calcium wnl    Varicose veins flat  Lymphocytosis will monitor  No fatigue no lymphadenopathy anywhere    diverticulosis fair  Edema is fair at this time     hyperlipidemia:  Previous LDL:141  not been taking statin  I did advise    She will think about it    Annual eye exam done in 2016, DEXA scan 2012 osteopenia, mammogram 2015 reviewed, 08/11/2014 upper GI endoscopy and colonoscopy revealed distal esophageal erosion small polyp from the stomach and colon revealed sigmoid diverticulosis  Raynaud's more symptomatic now that fall is here obviously her fingers are cold  Hypertension:  Symptom-free  Home blood pressure well controlled  Tolerating current antihypertensive without side effect  Home range 120-130  4-8-2021: MRI C Spine: Multilevel cervical spondylosis     02/26/2021:  Creatinine 1 15, blood sugar 109, calcium 10, , rest of the CMP lipid profile unremarkable  GFR 44, hemoglobin 11 8, lymphocytosis noted  Vitamin-D 36   3-: [place] , SSA, SSB, DS DNA, SCLE 70, CENROMERE AB, REMINGTON, RNP SPEP, ALL NEG, CRP AND SED RATE WNL  10-12-21: Chol 216   , CMP wnl except GFR 49, , CBC wnl, mcv 99      Lab on 01/21/2022   Component Date Value    Sodium 01/21/2022 137     Potassium 01/21/2022 4 6     Chloride 01/21/2022 108     CO2 01/21/2022 26     ANION GAP 01/21/2022 3*    BUN 01/21/2022 25     Creatinine 01/21/2022 1 08     Glucose, Fasting 01/21/2022 98     Calcium 01/21/2022 9 9     AST 01/21/2022 17     ALT 01/21/2022 28     Alkaline Phosphatase 01/21/2022 67     Total Protein 01/21/2022 7 5     Albumin 01/21/2022 3 7     Total Bilirubin 01/21/2022 1 38*    eGFR 01/21/2022 47     PTH 01/21/2022 91 6*    Vit D, 25-Hydroxy 01/21/2022 30 8     - 2 weeks    In summary her nasal symptoms appears to be related to flare-up of her allergy and not due to COVID  The patient will continue symptomatic treatment including Atrovent nasal spray, Astelin nasal spray, Xyzal, recommend to add saline nasal spray recommend to come back earlier if no better otherwise will see back in 1 month  Multiple other problem GERD fair, IBS somewhat flare-up continue supportive treatment with Imodium high-fiber diet  Hypertension home well-controlled  Mitral regurgitation stable    Slightly elevated bilirubin see does not want to pursue any further workup  Raynaud's fair  DJD a advanced for symptomatic treatment  CKD level 3 baseline  Restless leg fair  Hyperlipidemia acceptable  Edema fair    Of rec will see back in the office in 1 month          I spent 20 minutes directly with the patient during this visit

## 2022-01-24 NOTE — PATIENT INSTRUCTIONS
Follow with Consultants as per their and our suggestion    Follow up in one month or as needed earlier    Follow all instructions as advised and discussed  Take your medications as prescribed  Call the office immediately if you experience any side effects  Ask questions if you do not understand  Keep your scheduled appointment as advised or come sooner if necessary or in doubt  Best time to call for non-urgent matter or questions on weekdays is between 9am and 12 noon  See physician for any new symptoms or worsening of current symptoms  Urgent or emergent situations call 911 and report to nearest emergency room      I spent  30 -40 minutes taking care of this patient including clinical care, conseling, collaboration, chart, lab and consultaion review as appropriate    Patient is to get labs 1 week(s) prior to next visit if advised

## 2022-02-28 ENCOUNTER — HOSPITAL ENCOUNTER (OUTPATIENT)
Dept: RADIOLOGY | Facility: HOSPITAL | Age: 82
Discharge: HOME | End: 2022-02-28
Attending: INTERNAL MEDICINE
Payer: MEDICARE

## 2022-02-28 ENCOUNTER — APPOINTMENT (OUTPATIENT)
Dept: LAB | Facility: CLINIC | Age: 82
End: 2022-02-28
Payer: MEDICARE

## 2022-02-28 ENCOUNTER — OFFICE VISIT (OUTPATIENT)
Dept: INTERNAL MEDICINE CLINIC | Facility: CLINIC | Age: 82
End: 2022-02-28
Payer: MEDICARE

## 2022-02-28 VITALS
DIASTOLIC BLOOD PRESSURE: 77 MMHG | SYSTOLIC BLOOD PRESSURE: 122 MMHG | HEART RATE: 71 BPM | WEIGHT: 123 LBS | BODY MASS INDEX: 23.22 KG/M2 | HEIGHT: 61 IN

## 2022-02-28 DIAGNOSIS — G25.81 RESTLESS LEGS: ICD-10-CM

## 2022-02-28 DIAGNOSIS — E55.9 VITAMIN D DEFICIENCY: ICD-10-CM

## 2022-02-28 DIAGNOSIS — M15.9 PRIMARY OSTEOARTHRITIS INVOLVING MULTIPLE JOINTS: ICD-10-CM

## 2022-02-28 DIAGNOSIS — M54.16 LUMBAR RADICULOPATHY: ICD-10-CM

## 2022-02-28 DIAGNOSIS — E78.00 HYPERCHOLESTEREMIA: ICD-10-CM

## 2022-02-28 DIAGNOSIS — I73.00 RAYNAUD'S DISEASE WITHOUT GANGRENE: ICD-10-CM

## 2022-02-28 DIAGNOSIS — R79.89 ELEVATED PTHRP LEVEL: ICD-10-CM

## 2022-02-28 DIAGNOSIS — R79.89 ABNORMAL LFTS: ICD-10-CM

## 2022-02-28 DIAGNOSIS — I34.0 NONRHEUMATIC MITRAL VALVE REGURGITATION: ICD-10-CM

## 2022-02-28 DIAGNOSIS — K58.0 IRRITABLE BOWEL SYNDROME WITH DIARRHEA: ICD-10-CM

## 2022-02-28 DIAGNOSIS — M54.16 LUMBAR RADICULOPATHY: Primary | ICD-10-CM

## 2022-02-28 DIAGNOSIS — N18.32 STAGE 3B CHRONIC KIDNEY DISEASE (HCC): ICD-10-CM

## 2022-02-28 DIAGNOSIS — I10 ESSENTIAL HYPERTENSION: ICD-10-CM

## 2022-02-28 DIAGNOSIS — R73.02 GLUCOSE INTOLERANCE (IMPAIRED GLUCOSE TOLERANCE): ICD-10-CM

## 2022-02-28 DIAGNOSIS — D72.820 PERSISTENT LYMPHOCYTOSIS: ICD-10-CM

## 2022-02-28 DIAGNOSIS — J30.89 NON-SEASONAL ALLERGIC RHINITIS DUE TO OTHER ALLERGIC TRIGGER: ICD-10-CM

## 2022-02-28 PROBLEM — R17 ELEVATED BILIRUBIN: Status: ACTIVE | Noted: 2022-02-28

## 2022-02-28 LAB
ALBUMIN SERPL BCP-MCNC: 4.2 G/DL (ref 3.5–5)
ALP SERPL-CCNC: 71 U/L (ref 46–116)
ALT SERPL W P-5'-P-CCNC: 29 U/L (ref 12–78)
ANION GAP SERPL CALCULATED.3IONS-SCNC: 6 MMOL/L (ref 4–13)
AST SERPL W P-5'-P-CCNC: 21 U/L (ref 5–45)
BILIRUB DIRECT SERPL-MCNC: 0.17 MG/DL (ref 0–0.2)
BILIRUB SERPL-MCNC: 0.73 MG/DL (ref 0.2–1)
BUN SERPL-MCNC: 31 MG/DL (ref 5–25)
CALCIUM SERPL-MCNC: 10.2 MG/DL (ref 8.3–10.1)
CHLORIDE SERPL-SCNC: 106 MMOL/L (ref 100–108)
CO2 SERPL-SCNC: 27 MMOL/L (ref 21–32)
CREAT SERPL-MCNC: 1.19 MG/DL (ref 0.6–1.3)
ERYTHROCYTE [DISTWIDTH] IN BLOOD BY AUTOMATED COUNT: 12.7 % (ref 11.6–15.1)
ERYTHROCYTE [SEDIMENTATION RATE] IN BLOOD: 8 MM/HOUR (ref 0–29)
GFR SERPL CREATININE-BSD FRML MDRD: 42 ML/MIN/1.73SQ M
GLUCOSE SERPL-MCNC: 128 MG/DL (ref 65–140)
HCT VFR BLD AUTO: 40.5 % (ref 34.8–46.1)
HGB BLD-MCNC: 13 G/DL (ref 11.5–15.4)
MCH RBC QN AUTO: 31.5 PG (ref 26.8–34.3)
MCHC RBC AUTO-ENTMCNC: 32.1 G/DL (ref 31.4–37.4)
MCV RBC AUTO: 98 FL (ref 82–98)
PLATELET # BLD AUTO: 202 THOUSANDS/UL (ref 149–390)
PMV BLD AUTO: 11.4 FL (ref 8.9–12.7)
POTASSIUM SERPL-SCNC: 4.4 MMOL/L (ref 3.5–5.3)
PROT SERPL-MCNC: 8.2 G/DL (ref 6.4–8.2)
RBC # BLD AUTO: 4.13 MILLION/UL (ref 3.81–5.12)
SODIUM SERPL-SCNC: 139 MMOL/L (ref 136–145)
WBC # BLD AUTO: 5.84 THOUSAND/UL (ref 4.31–10.16)

## 2022-02-28 PROCEDURE — 72110 X-RAY EXAM L-2 SPINE 4/>VWS: CPT

## 2022-02-28 PROCEDURE — 85027 COMPLETE CBC AUTOMATED: CPT

## 2022-02-28 PROCEDURE — 36415 COLL VENOUS BLD VENIPUNCTURE: CPT

## 2022-02-28 PROCEDURE — 99214 OFFICE O/P EST MOD 30 MIN: CPT | Performed by: INTERNAL MEDICINE

## 2022-02-28 PROCEDURE — 85652 RBC SED RATE AUTOMATED: CPT

## 2022-02-28 PROCEDURE — 82248 BILIRUBIN DIRECT: CPT

## 2022-02-28 PROCEDURE — 80053 COMPREHEN METABOLIC PANEL: CPT

## 2022-02-28 RX ORDER — PREDNISONE 20 MG/1
TABLET ORAL
Qty: 10 TABLET | Refills: 0 | Status: SHIPPED | OUTPATIENT
Start: 2022-02-28 | End: 2022-04-05 | Stop reason: SDUPTHER

## 2022-02-28 NOTE — ASSESSMENT & PLAN NOTE
Do hypertension remains well controlled    Will continue same regimen with Benicar 40 mg daily, Lasix 40 mg daily, potassium daily,

## 2022-02-28 NOTE — ASSESSMENT & PLAN NOTE
Allergic rhinitis chronic fair remains symptomatic remains on Astelin nasal spray, Atrovent nasal spray, Xyzal, saline nasal spray,

## 2022-02-28 NOTE — PATIENT INSTRUCTIONS
Go for the lumbar spine x-ray at [address] [place] [address] you do not need appointment  Go for the blood test for it re-evaluation of your elevated bilirubin as well as for your back pain  He did not need to be fasting you can go for blood test today  If your blood test does reveal elevated bilirubin persistently then you will need to go for ultrasound I will already give you paper anyway  Prednisone 20 mg 2 tablet daily for 3 days then 1 a day for 4 more days for your radicular pain  Paragraphs do not lift any heavy weight  Follow with Consultants as per their and our suggestion    Follow up in 1-2  week(s) or as needed earlier    Follow all instructions as advised and discussed  Take your medications as prescribed  Call the office immediately if you experience any side effects  Ask questions if you do not understand  Keep your scheduled appointment as advised or come sooner if necessary or in doubt  Best time to call for non-urgent matter or questions on weekdays is between 9am and 12 noon  See physician for any new symptoms or worsening of current symptoms  Urgent or emergent situations call 911 and report to nearest emergency room  I spent  30 minutes taking care of this patient including clinical care, conseling, collaboration, chart, lab and consultaion review      Patient is to get labs and x-ray today

## 2022-02-28 NOTE — ASSESSMENT & PLAN NOTE
New lumbar radiculopathy  Will do CBC CMP lumbar spine x-ray sed rate  Will treat with prednisone 20 mg 2 tablet daily for 3 days then 1 a day for 4 more days

## 2022-02-28 NOTE — ASSESSMENT & PLAN NOTE
Lab Results   Component Value Date    CHOLESTEROL 216 (H) 10/12/2021    CHOLESTEROL 235 (H) 02/26/2021    CHOLESTEROL 224 (H) 09/11/2020     Lab Results   Component Value Date    HDL 55 10/12/2021    HDL 71 02/26/2021    HDL 65 09/11/2020     Lab Results   Component Value Date    TRIG 203 (H) 10/12/2021    TRIG 117 02/26/2021    TRIG 103 09/11/2020     Lab Results   Component Value Date    [place] 161 10/12/2021    [place] 164 02/26/2021    [place] 159 09/11/2020

## 2022-02-28 NOTE — ASSESSMENT & PLAN NOTE
26/04/5977:  Systolic function was normal  Ejection fraction was estimated in the range of 60 % to 65 % to be 65 %  There were no regional wall motion abnormalities  Wall thickness was at the upper limits of normal   There was mild concentric hypertrophy  Features were consistent with a pseudonormal left ventricular filling pattern, with concomitant abnormal relaxation and increased filling pressure (grade 2 diastolic dysfunction)      LEFT ATRIUM:  The atrium was mildly to moderately dilated      MITRAL VALVE:  There was mild regurgitation      AORTIC VALVE:  There was at max mild to moderate regurgitation      TRICUSPID VALVE:  There was mild to moderate regurgitation    Estimated peak PA pressure was 45 mmHg      PULMONIC VALVE:  There was mild regurgitation

## 2022-02-28 NOTE — ASSESSMENT & PLAN NOTE
In the remote past she was told to have it elevated bilirubin 1 time by    1 of the doctors 30 years ago  10/12/2021:  Bilirubin 1 16, with rest of the LFT normal  01/21/2022: Total bilirubin 1 38 with rest of the LFT normal    Likely of no major significance or congenital   Will do direct bilirubin as well as the repeat CMP    If it remains high will recommend to get ultrasound of upper abdomen done

## 2022-02-28 NOTE — ASSESSMENT & PLAN NOTE
Lab Results   Component Value Date    WBC 6 38 10/12/2021    HGB 12 2 10/12/2021    HCT 37 4 10/12/2021    MCV 99 (H) 10/12/2021     10/12/2021   diff reviewed

## 2022-02-28 NOTE — ASSESSMENT & PLAN NOTE
Seen by orthopedic for bilateral knee DJD, rotator cuff tear, as well as the biceps tear, a for conservative treatment per patient's choice orthopedics follow-up office visit not

## 2022-02-28 NOTE — ASSESSMENT & PLAN NOTE
Lab Results   Component Value Date    EGFR 47 01/21/2022    EGFR 49 10/12/2021    EGFR 44 02/26/2021    CREATININE 1 08 01/21/2022    CREATININE 1 06 10/12/2021    CREATININE 1 15 02/26/2021 01/21/2022:  Calcium 9 9, PTH 91 6, vitamin-D 30 8    Will monitor  Will avoid workup for the elevated PTH likely could be chronic kidney component contributing to that cannot rule out primary hyperparathyroidism  Given her age and normal calcium will monitor trend    GFR is baseline  Creat is baseline  Recommend periodic blood test for monitoring of BUN and Creat  Avoid Non Steroidal Antiinflammatory such as ibuprofen, aleve etc   Potassium, HCO3 and calcium are wnl and will require periodic blood test   Bone and Mineral disease monitoring as appropriate    All patient with GFR less than 30( CKD 4 or 5 or 6) advised to follow with nephrologist

## 2022-02-28 NOTE — PROGRESS NOTES
[facility] Office Visit Note  22     Delio Benavidez 78 y o  female MRN: [id number]  : 1940    Assessment:     1  Lumbar radiculopathy  Assessment & Plan:  New lumbar radiculopathy  Will do CBC CMP lumbar spine x-ray sed rate  Will treat with prednisone 20 mg 2 tablet daily for 3 days then 1 a day for 4 more days  Orders:  -     CBC; Future; Expected date: 2022  -     Comprehensive metabolic panel; Future; Expected date: 2022  -     Sedimentation rate, automated; Future; Expected date: 2022  -     XR spine lumbar minimum 4 views non injury; Future; Expected date: 2022  -     predniSONE 20 mg tablet; Two tablet daily for first 3 days then one tablet daily for next four days with food    2  Primary osteoarthritis involving multiple joints  Assessment & Plan:  Seen by orthopedic for bilateral knee DJD, rotator cuff tear, as well as the biceps tear, a for conservative treatment per patient's choice orthopedics follow-up office visit not      3  Raynaud's disease without gangrene  Assessment & Plan:  Raynaud's remains symptomatic  Worst lows  Could not tolerate Procardia or amlodipine    Orders:  -     CBC; Future; Expected date: 2022  -     Comprehensive metabolic panel; Future; Expected date: 2022    4  Essential hypertension  Assessment & Plan:  Do hypertension remains well controlled  Will continue same regimen with Benicar 40 mg daily, Lasix 40 mg daily, potassium daily,    Orders:  -     CBC; Future; Expected date: 2022  -     Comprehensive metabolic panel; Future; Expected date: 2022    5  Nonrheumatic mitral valve regurgitation  Assessment & Plan:  :  Systolic function was normal  Ejection fraction was estimated in the range of 60 % to 65 % to be 65 %  There were no regional wall motion abnormalities  Wall thickness was at the upper limits of normal   There was mild concentric hypertrophy    Features were consistent with a pseudonormal left ventricular filling pattern, with concomitant abnormal relaxation and increased filling pressure (grade 2 diastolic dysfunction)      LEFT ATRIUM:  The atrium was mildly to moderately dilated      MITRAL VALVE:  There was mild regurgitation      AORTIC VALVE:  There was at max mild to moderate regurgitation      TRICUSPID VALVE:  There was mild to moderate regurgitation  Estimated peak PA pressure was 45 mmHg      PULMONIC VALVE:  There was mild regurgitation        6  Non-seasonal allergic rhinitis due to other allergic trigger  Assessment & Plan:  Allergic rhinitis chronic fair remains symptomatic remains on Astelin nasal spray, Atrovent nasal spray, Xyzal, saline nasal spray,      7  Glucose intolerance (impaired glucose tolerance)  Assessment & Plan:  Blood sugar came down to 98 previously it was 109      8  Irritable bowel syndrome with diarrhea  Assessment & Plan:  IBS fair diarrhea reasonable continue high-fiber diet      9  Hypercholesteremia  Assessment & Plan:  Lab Results   Component Value Date    CHOLESTEROL 216 (H) 10/12/2021    CHOLESTEROL 235 (H) 02/26/2021    CHOLESTEROL 224 (H) 09/11/2020     Lab Results   Component Value Date    HDL 55 10/12/2021    HDL 71 02/26/2021    HDL 65 09/11/2020     Lab Results   Component Value Date    TRIG 203 (H) 10/12/2021    TRIG 117 02/26/2021    TRIG 103 09/11/2020     Lab Results   Component Value Date    [place] 161 10/12/2021    [place] 164 02/26/2021    [place] 159 09/11/2020           10  Restless legs  Assessment & Plan:  Restless fair  Continue Requip 0 5 mg daily      11  Stage 3b chronic kidney disease [facility])  Assessment & Plan:  Lab Results   Component Value Date    EGFR 47 01/21/2022    EGFR 49 10/12/2021    EGFR 44 02/26/2021    CREATININE 1 08 01/21/2022    CREATININE 1 06 10/12/2021    CREATININE 1 15 02/26/2021 01/21/2022:  Calcium 9 9, PTH 91 6, vitamin-D 30 8    Will monitor    Will avoid workup for the elevated PTH likely could be chronic kidney component contributing to that cannot rule out primary hyperparathyroidism  Given her age and normal calcium will monitor trend    GFR is baseline  Creat is baseline  Recommend periodic blood test for monitoring of BUN and Creat  Avoid Non Steroidal Antiinflammatory such as ibuprofen, aleve etc   Potassium, HCO3 and calcium are wnl and will require periodic blood test   Bone and Mineral disease monitoring as appropriate  All patient with GFR less than 30( CKD 4 or 5 or 6) advised to follow with nephrologist         12  Persistent lymphocytosis  Assessment & Plan:  Lab Results   Component Value Date    WBC 6 38 10/12/2021    HGB 12 2 10/12/2021    HCT 37 4 10/12/2021    MCV 99 (H) 10/12/2021     10/12/2021   diff reviewed          13  Elevated PTHrP level  Assessment & Plan:  01/21/2022:  Calcium 9 9, PTH 91 6      14  Vitamin D deficiency  Assessment & Plan:  01/21/2022:  Calcium 9 9, PTH 91 6, vitamin-D 30 8      15  Abnormal LFTs  Assessment & Plan:  In the remote past she was told to have it elevated bilirubin 1 time by    1 of the doctors 30 years ago  10/12/2021:  Bilirubin 1 16, with rest of the LFT normal  01/21/2022: Total bilirubin 1 38 with rest of the LFT normal    Likely of no major significance or congenital   Will do direct bilirubin as well as the repeat CMP  If it remains high will recommend to get ultrasound of upper abdomen done    Orders:  -     CBC; Future; Expected date: 02/28/2022  -     Comprehensive metabolic panel; Future; Expected date: 02/28/2022  -     Sedimentation rate, automated; Future; Expected date: 02/28/2022  -     US abdomen limited; Future; Expected date: 03/07/2022  -     Bilirubin, direct; Future; Expected date: 02/28/2022        Discussion Summary and Plan: Today's care plan and medications were reviewed with patient in detail and all their questions answered to their satisfaction      Chief Complaint   Patient presents with    Allergic Rhinitis  Hypertension    CKD III    Back Pain     New right lower extremity radiculopathy    Arthritis     DJD knee      Subjective:  Patient is here for chronic disease management  New complaint of radicular right lower extremity pain :  Pain started approximately month ago intermittent lasting for few minutes of  Denies any associated tingling numbness weakness of the leg  Denies any swelling of the right lower extremity  Denies any low back pain  Of denies any fall or injury or weightlifting  DJD of multiple joints:  She has unrelated bilateral knee pain from DJD also has a advanced osteoarthritis of the hands but pain is reasonable  Patient was seen by orthopedic doctor  Left rotator cuff tear  Left biceps tear  Patient was given option either observe were go for therapy or surgical considerations the notes were reviewed  Patient prefers to hold off anything right now of  IBS:  Fair reasonable for last couple of weeks remains on high-fiber diet  No major diarrhea nausea vomiting or belly pain  Allergic rhinitis :   She remains on Xyzal and inhaled Atrovent nasal most likely steroid by ENT physician  Gets some dry mouth from the Xyzal   Allergic allergic rhinitis is chronic symptomatic  MR: SX free, previous echo 2019, LVH, moderate TR, 2+ MR, PA systolic pressure of 20-41 for surveillance  CKD level 3 : See BMP, gfr 50 RANGE, Creat 1 06 bun, lytes and calcium wnl  Calcium came back normal however PTH is mildly elevated  Will monitor trend  Varicose veins flat  Lymphocytosis:  Resolved CBC normal   diverticulosis fair  Edema is fair at this time  hyperlipidemia:  Previous LDL:141  not been taking statin  Patient prefers no statin  Annual eye exam done in 2016, DEXA scan 2012 osteopenia, mammogram 2015 reviewed,   08/11/2014 upper GI endoscopy and colonoscopy revealed distal esophageal erosion small polyp from the stomach and colon revealed sigmoid diverticulosis    Raynaud's more symptomatic now that fall is here obviously her fingers are cold  Hypertension:  Symptom-free  Home blood pressure well controlled  Tolerating current antihypertensive without side effect  Lab data also reveals minimally elevated bilirubin 1 38  Will proceed with follow-up lab data with CMP as well as CBC as well as direct bilirubin if bilirubin remains high I will give order for the ultrasound of abdomen    4-8-2021: MRI C Spine: Multilevel cervical spondylosis     02/26/2021:  Creatinine 1 15, blood sugar 109, calcium 10, , rest of the CMP lipid profile unremarkable  GFR 44, hemoglobin 11 8, lymphocytosis noted  Vitamin-D 36   3-: [place] , SSA, SSB, DS DNA, SCLE 70, CENROMERE AB, [place], RNP SPEP, ALL NEG, CRP AND SED RATE WNL   10/12/2021:  CMP normal except albumin 3 4, cholesterol 216 HDL 55,  total bilirubin 1 16  CBC normal except MCV 99 differential reveals lymphocytes 44%  01/21/2022:  CMP normal except bilirubin went up to 1 38,              The following portions of the patient's history were reviewed and updated as appropriate: allergies, current medications, past family history, past medical history, past social history, past surgical history and problem list     Review of Systems   All other systems reviewed and are negative          Historical Information   Patient Active Problem List   Diagnosis    Baker cyst, right    Gastroesophageal reflux disease without esophagitis    Glucose intolerance (impaired glucose tolerance)    Restless legs    Hypercholesteremia    Mitral regurgitation    Migraine    Essential hypertension    Varicose vein of leg    Edema of extremities    Rhinitis, allergic    Aspirin intolerance    Persistent lymphocytosis    Hypokalemia    Irritable bowel syndrome    Abnormal echocardiogram    Vitamin D deficiency    Diverticulosis    History of syncope    Hammer toe of left foot    Raynaud's disease    Stage 3b chronic kidney disease (HCC)    Neck pain  Medicare annual wellness visit, subsequent    Cervical radiculopathy    Left arm pain    Rupture of left distal biceps tendon    Primary osteoarthritis involving multiple joints    HL (hearing loss)    Elevated PTHrP level    Lumbar radiculopathy    Abnormal LFTs     Past Medical History:   Diagnosis Date    Baker's cyst of knee 10/21/2016    right- burst on its own    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 MAIN OR;  Service:    [place] [id number] OF UTERUS      MYRINGOTOMY W/ TUBES  2011    both ears-one tube out on the left    MYRINGOTOMY W/ TUBES      MS SHLDR ARTHROSCOP,SURG,W/ROTAT CUFF REPR Right 5/4/2017    Procedure: ARTHROSCOPY SHOULDER WITH ROTATOR CUFF REPAIR, BICEPS TENODESIS, AND SUBACROMIAL DECOMPRESSION;  Surgeon: Anuradha Pa MD;  Location[facility] MAIN OR;  Service: Orthopedics    MS XCAPSL CTRC RMVL INSJ IO LENS PROSTH W/O ECP Left 10/20/2016    Procedure: EXTRACTION EXTRACAPSULAR CATARACT PHACO INTRAOCULAR LENS (IOL);   Surgeon: Jayla Edward MD;  Location: [facility] MAIN OR;  Service: Ophthalmology    SKIN BIOPSY      mole on chest    SKIN BIOPSY      under breast, benign     Social History     Substance and Sexual Activity   Alcohol Use Yes    Comment: socially     Social History     Substance and Sexual Activity   Drug Use No     Social History     Tobacco Use   Smoking Status Former Smoker    Packs/day: 1 50    Years: 30 00    Pack years: 45 00    Quit date: 18    Years since quittin 1   Smokeless Tobacco Never Used     Family History   Problem Relation Age of Onset    Heart disease Mother         exp age 59 MI    Stroke Father     Macular degeneration Sister     Macular degeneration Brother     Diabetes Brother     Cancer Brother         kidney-nephrectomy    Kidney disease Brother         cancer     Health Maintenance Due   Topic    DTaP,Tdap,and Td Vaccines (1 - Tdap)    Osteoporosis Screening     Pneumococcal Vaccine: 65+ Years (1 of 1 - PPSV23)    Medicare Annual Wellness Visit (AWV)       Meds/Allergies       Current Outpatient Medications:     acetaminophen (TYLENOL) 500 mg tablet, Take 1,000 mg by mouth every 6 (six) hours as needed for mild pain, Disp: , Rfl:     ALPHAGAN P 0 1 %, , Disp: , Rfl: 0    ascorbic acid (VITAMIN C) 500 mg tablet, Take 500 mg by mouth every morning, Disp: , Rfl:     Azelastine HCl 137 MCG/SPRAY SOLN, use 1 spray into each nostril twice a day, Disp: 30 mL, Rfl: 6    Biotin (BIOTIN 5000) 5 MG CAPS, Take by mouth every morning, Disp: , Rfl:     Calcium Carbonate-Vitamin D (CALTRATE 600+D PO), Take by mouth every morning, Disp: , Rfl:     Cyanocobalamin (VITAMIN B 12 PO), Take by mouth every morning, Disp: , Rfl:     furosemide (LASIX) 40 mg tablet, Take 1 tablet (40 mg total) by mouth every morning, Disp: 90 tablet, Rfl: 1    ipratropium (ATROVENT) 0 06 % nasal spray, instill 2 sprays into each nostril four times a day, Disp: 15 mL, Rfl: 11    levocetirizine (XYZAL) 5 MG tablet, Take 1 tablet (5 mg total) by mouth every evening, Disp: 90 tablet, Rfl: 1    Lutein 40 MG CAPS, Take by mouth every morning, Disp: , Rfl:     Magnesium 250 MG TABS, Take by mouth every morning, Disp: , Rfl:     Misc Natural Products (TUMERSAID PO), Take 1 tablet by mouth daily, Disp: , Rfl:     olmesartan (BENICAR) 40 mg tablet, take 1 tablet by mouth once daily, Disp: 90 tablet, Rfl: 1    omeprazole (PriLOSEC) 20 mg delayed release capsule, take 1 tablet by mouth daily if needed for HEARTBURN, Disp: 90 capsule, Rfl: 1    Potassium Chloride ER 20 MEQ TBCR, Take 1 tablet (20 mEq total) by mouth daily, Disp: 90 tablet, Rfl: 1    rOPINIRole (REQUIP) 0 5 mg tablet, Take 1 tablet (0 5 mg total) by mouth daily at bedtime, Disp: 90 tablet, Rfl: 1    SUMAtriptan (IMITREX) 50 mg tablet, Take 1 tablet (50 mg total) by mouth once as needed for migraine, Disp: 9 tablet, Rfl: 1    vitamin A 7500 UNIT capsule, Take 7,500 Units by mouth every morning  , Disp: , Rfl:     vitamin E, tocopherol, 400 units capsule, Take 400 Units by mouth every morning Last dose 4/26/17, Disp: , Rfl:     zinc gluconate 50 mg tablet, Take 50 mg by mouth every morning, Disp: , Rfl:     predniSONE 20 mg tablet, Two tablet daily for first 3 days then one tablet daily for next four days with food, Disp: 10 tablet, Rfl: 0      Objective:    Vitals:   /77   Pulse 71   Ht 5' 1" (1 549 m)   Wt 55 8 kg (123 lb)   BMI 23 24 kg/m²   Body mass index is 23 24 kg/m²  Vitals:    02/28/22 1040   Weight: 55 8 kg (123 lb)       Physical Exam  Vitals and nursing note reviewed  Constitutional:       Appearance: She is well-developed  HENT:      Head: Normocephalic and atraumatic  Nose: Rhinorrhea present  Mouth/Throat:      Pharynx: Uvula midline  No oropharyngeal exudate or posterior oropharyngeal erythema  Eyes:      Conjunctiva/sclera: Conjunctivae normal    Neck:      Thyroid: No thyromegaly  Vascular: No JVD  Cardiovascular:      Rate and Rhythm: Regular rhythm  Heart sounds: Normal heart sounds  Pulmonary:      Effort: No respiratory distress  Breath sounds: Normal breath sounds  No wheezing or rales  Abdominal:      General: Bowel sounds are normal  There is no distension  Palpations: There is no mass  Tenderness: There is no abdominal tenderness  There is no rebound     Musculoskeletal:      Lumbar back: No deformity, spasms, tenderness or bony tenderness  Negative right straight leg raise test and negative left straight leg raise test  No scoliosis  Right knee: Deformity present  No bony tenderness  Decreased range of motion  No tenderness  Right lower leg: No edema  Left lower leg: No edema  Lymphadenopathy:      Cervical: No cervical adenopathy  Skin:     General: Skin is warm  Coloration: Skin is not jaundiced or pale  Findings: No rash  Neurological:      General: No focal deficit present  Mental Status: She is oriented to person, place, and time  Psychiatric:         Mood and Affect: Mood normal          Behavior: Behavior normal          Thought Content: Thought content normal          Judgment: Judgment normal          Lab Review   Lab on 01/21/2022   Component Date Value Ref Range Status    Sodium 01/21/2022 137  136 - 145 mmol/L Final    Potassium 01/21/2022 4 6  3 5 - 5 3 mmol/L Final    Chloride 01/21/2022 108  100 - 108 mmol/L Final    CO2 01/21/2022 26  21 - 32 mmol/L Final    ANION GAP 01/21/2022 3* 4 - 13 mmol/L Final    BUN 01/21/2022 25  5 - 25 mg/dL Final    Creatinine 01/21/2022 1 08  0 60 - 1 30 mg/dL Final    Standardized to IDMS reference method    Glucose, Fasting 01/21/2022 98  65 - 99 mg/dL Final    Specimen collection should occur prior to Sulfasalazine administration due to the potential for falsely depressed results  Specimen collection should occur prior to Sulfapyridine administration due to the potential for falsely elevated results   Calcium 01/21/2022 9 9  8 3 - 10 1 mg/dL Final    AST 01/21/2022 17  5 - 45 U/L Final    Specimen collection should occur prior to Sulfasalazine administration due to the potential for falsely depressed results   ALT 01/21/2022 28  12 - 78 U/L Final    Specimen collection should occur prior to Sulfasalazine and/or Sulfapyridine administration due to the potential for falsely depressed results       Alkaline Phosphatase 01/21/2022 67 46 - 116 U/L Final    Total Protein 01/21/2022 7 5  6 4 - 8 2 g/dL Final    Albumin 01/21/2022 3 7  3 5 - 5 0 g/dL Final    Total Bilirubin 01/21/2022 1 38* 0 20 - 1 00 mg/dL Final    Use of this assay is not recommended for patients undergoing treatment with eltrombopag due to the potential for falsely elevated results   eGFR 01/21/2022 47  ml/min/1 73sq m Final    PTH 01/21/2022 91 6* 18 4 - 80 1 pg/mL Final    Vit D, 25-Hydroxy 01/21/2022 30 8  30 0 - 100 0 ng/mL Final         Patient Instructions   Go for the lumbar spine x-ray at [address] [place] [address] you do not need appointment  Go for the blood test for it re-evaluation of your elevated bilirubin as well as for your back pain  He did not need to be fasting you can go for blood test today  If your blood test does reveal elevated bilirubin persistently then you will need to go for ultrasound I will already give you paper anyway  Prednisone 20 mg 2 tablet daily for 3 days then 1 a day for 4 more days for your radicular pain  Paragraphs do not lift any heavy weight  Follow with Consultants as per their and our suggestion    Follow up in 1-2  week(s) or as needed earlier    Follow all instructions as advised and discussed  Take your medications as prescribed  Call the office immediately if you experience any side effects  Ask questions if you do not understand  Keep your scheduled appointment as advised or come sooner if necessary or in doubt  Best time to call for non-urgent matter or questions on weekdays is between 9am and 12 noon  See physician for any new symptoms or worsening of current symptoms  Urgent or emergent situations call 911 and report to nearest emergency room  I spent  30 minutes taking care of this patient including clinical care, conseling, collaboration, chart, lab and consultaion review      Patient is to get labs and x-ray today       Dr Speedy Orozco MD  [facility]       "This note has been constructed using a voice recognition system  Therefore there may be syntax, spelling, and/or grammatical errors   Please call if you have any questions  "

## 2022-03-08 ENCOUNTER — OFFICE VISIT (OUTPATIENT)
Dept: INTERNAL MEDICINE CLINIC | Facility: CLINIC | Age: 82
End: 2022-03-08
Payer: MEDICARE

## 2022-03-08 VITALS — BODY MASS INDEX: 23.41 KG/M2 | HEIGHT: 61 IN | WEIGHT: 124 LBS

## 2022-03-08 DIAGNOSIS — E83.52 HYPERCALCEMIA: ICD-10-CM

## 2022-03-08 DIAGNOSIS — E55.9 VITAMIN D DEFICIENCY: ICD-10-CM

## 2022-03-08 DIAGNOSIS — R79.89 ELEVATED PTHRP LEVEL: ICD-10-CM

## 2022-03-08 DIAGNOSIS — Z00.00 MEDICARE ANNUAL WELLNESS VISIT, SUBSEQUENT: ICD-10-CM

## 2022-03-08 DIAGNOSIS — M54.16 LUMBAR RADICULOPATHY: Primary | ICD-10-CM

## 2022-03-08 DIAGNOSIS — N18.32 STAGE 3B CHRONIC KIDNEY DISEASE (HCC): ICD-10-CM

## 2022-03-08 DIAGNOSIS — R79.89 ABNORMAL LFTS: ICD-10-CM

## 2022-03-08 PROCEDURE — 1123F ACP DISCUSS/DSCN MKR DOCD: CPT | Performed by: INTERNAL MEDICINE

## 2022-03-08 PROCEDURE — G0439 PPPS, SUBSEQ VISIT: HCPCS | Performed by: INTERNAL MEDICINE

## 2022-03-08 PROCEDURE — 99214 OFFICE O/P EST MOD 30 MIN: CPT | Performed by: INTERNAL MEDICINE

## 2022-03-08 RX ORDER — GABAPENTIN 100 MG/1
100 CAPSULE ORAL
Qty: 30 CAPSULE | Refills: 1 | Status: SHIPPED | OUTPATIENT
Start: 2022-03-08 | End: 2022-04-05

## 2022-03-08 NOTE — PROGRESS NOTES
Assessment and Plan:     Problem List Items Addressed This Visit        Other    Medicare annual wellness visit, subsequent - Primary          Depression Screening and Follow-up Plan: Patient was screened for depression during today's encounter  They screened negative with a PHQ-2 score of 0  Preventive health issues were discussed with patient, and age appropriate screening tests were ordered as noted in patient's After Visit Summary  Personalized health advice and appropriate referrals for health education or preventive services given if needed, as noted in patient's After Visit Summary  History of Present Illness:     Patient presents for Medicare Annual Wellness visit    Patient Care Team:  Trung Harris MD as PCP - General (Internal Medicine)     Problem List:     Patient Active Problem List   Diagnosis    Baker cyst, right    Gastroesophageal reflux disease without esophagitis    Glucose intolerance (impaired glucose tolerance)    Restless legs    Hypercholesteremia    Mitral regurgitation    Migraine    Essential hypertension    Varicose vein of leg    Edema of extremities    Rhinitis, allergic    Aspirin intolerance    Persistent lymphocytosis    Hypokalemia    Irritable bowel syndrome    Abnormal echocardiogram    Vitamin D deficiency    Diverticulosis    History of syncope    Hammer toe of left foot    Raynaud's disease    Stage 3b chronic kidney disease ([facility] [address] )    Neck pain    Medicare annual wellness visit, subsequent    Cervical radiculopathy    Left arm pain    Rupture of left distal biceps tendon    Primary osteoarthritis involving multiple joints    HL (hearing loss)    Elevated PTHrP level    Lumbar radiculopathy    Abnormal LFTs      Past Medical and Surgical History:     Past Medical History:   Diagnosis Date    Baker's cyst of knee 10/21/2016    right- burst on its own    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[facility] [address] ) 1992    hx of-fell when ice skating   Noted brain bleed w/swelling    HL (hearing loss)     Migraine     Raynaud's disease     both hands     Past Surgical History:   Procedure Laterality Date    AXILLARY SURGERY Left     fatty cyst removed, benign    CATARACT EXTRACTION Bilateral     CATARACT EXTRACTION W/ INTRAOCULAR LENS IMPLANT Right 11/10/2016    Procedure: EXTRACTION EXTRACAPSULAR CATARACT PHACO INTRAOCULAR LENS (IOL); Surgeon: Anabela Grimm MD;  Location: [facility] MAIN OR;  Service:    [address] UTERUS      MYRINGOTOMY W/ TUBES  2011    both ears-one tube out on the left    MYRINGOTOMY W/ TUBES      AR SHLDR ARTHROSCOP,SURG,W/ROTAT CUFF REPR Right 2017    Procedure: ARTHROSCOPY SHOULDER WITH ROTATOR CUFF REPAIR, BICEPS TENODESIS, AND SUBACROMIAL DECOMPRESSION;  Surgeon: Juan Perez MD;  Location: [place] [address] MAIN OR;  Service: Orthopedics    AR XCAPSL CTRC RMVL INSJ IO LENS PROSTH W/O ECP Left 10/20/2016    Procedure: EXTRACTION EXTRACAPSULAR CATARACT PHACO INTRAOCULAR LENS (IOL);   Surgeon: Anabela Grimm MD;  Location: [facility] MAIN OR;  Service: Ophthalmology    SKIN BIOPSY      mole on chest    SKIN BIOPSY      under breast, benign      Family History:     Family History   Problem Relation Age of Onset    Heart disease Mother         exp age 59 MI    Stroke Father     Macular degeneration Sister     Macular degeneration Brother     Diabetes Brother     Cancer Brother         kidney-nephrectomy    Kidney disease Brother         cancer      Social History:     Social History     Socioeconomic History    Marital status: /Civil Union     Spouse name: None    Number of children: None    Years of education: None    Highest education level: None   Occupational History    None   Tobacco Use    Smoking status: Former Smoker     Packs/day: 1 50     Years: 30 00     Pack years: 45 00     Quit date:      Years since quittin 2    Smokeless tobacco: Never Used   Substance and Sexual Activity    Alcohol use: Yes     Comment: socially    Drug use: No    Sexual activity: None   Other Topics Concern    None   Social History Narrative    None     Social Determinants of Health     Financial Resource Strain: Not on file   Food Insecurity: Not on file   Transportation Needs: Not on file   Physical Activity: Not on file   Stress: Not on file   Social Connections: Not on file   Intimate Partner Violence: Not on file   Housing Stability: Not on file      Medications and Allergies:     Current Outpatient Medications   Medication Sig Dispense Refill    acetaminophen (TYLENOL) 500 mg tablet Take 1,000 mg by mouth every 6 (six) hours as needed for mild pain      ALPHAGAN P 0 1 %   0    ascorbic acid (VITAMIN C) 500 mg tablet Take 500 mg by mouth every morning      Azelastine HCl 137 MCG/SPRAY SOLN use 1 spray into each nostril twice a day 30 mL 6    Biotin (BIOTIN 5000) 5 MG CAPS Take by mouth every morning      Calcium Carbonate-Vitamin D (CALTRATE 600+D PO) Take by mouth every morning      Cyanocobalamin (VITAMIN B 12 PO) Take by mouth every morning      furosemide (LASIX) 40 mg tablet Take 1 tablet (40 mg total) by mouth every morning 90 tablet 1    ipratropium (ATROVENT) 0 06 % nasal spray instill 2 sprays into each nostril four times a day 15 mL 11    levocetirizine (XYZAL) 5 MG tablet Take 1 tablet (5 mg total) by mouth every evening 90 tablet 1    Lutein 40 MG CAPS Take by mouth every morning      Magnesium 250 MG TABS Take by mouth every morning      Misc Natural Products (TUMERSAID PO) Take 1 tablet by mouth daily      olmesartan (BENICAR) 40 mg tablet take 1 tablet by mouth once daily 90 tablet 1    omeprazole (PriLOSEC) 20 mg delayed release capsule take 1 tablet by mouth daily if needed for HEARTBURN 90 capsule 1    Potassium Chloride ER 20 MEQ TBCR Take 1 tablet (20 mEq total) by mouth daily 90 tablet 1    predniSONE 20 mg tablet Two tablet daily for first 3 days then one tablet daily for next four days with food 10 tablet 0    rOPINIRole (REQUIP) 0 5 mg tablet Take 1 tablet (0 5 mg total) by mouth daily at bedtime 90 tablet 1    SUMAtriptan (IMITREX) 50 mg tablet Take 1 tablet (50 mg total) by mouth once as needed for migraine 9 tablet 1    vitamin A 7500 UNIT capsule Take 7,500 Units by mouth every morning        vitamin E, tocopherol, 400 units capsule Take 400 Units by mouth every morning Last dose 4/26/17      zinc gluconate 50 mg tablet Take 50 mg by mouth every morning       No current facility-administered medications for this visit  Allergies   Allergen Reactions    Lactose - Food Allergy GI Intolerance    Latex Itching     Elastic,adhesive tape    Dilantin [Phenytoin Sodium Extended] Rash    Other Rash     Adhesive tape, environmental    Penicillins Rash      Immunizations:     Immunization History   Administered Date(s) Administered    COVID-19 MODERNA VACC 0 25 ML IM BOOSTER 12/10/2021    COVID-19 PFIZER VACCINE 0 3 ML IM 01/21/2021, 02/11/2021    Influenza, high dose seasonal 0 7 mL 12/01/2020, 10/18/2021    Influenza, injectable, quadrivalent, preservative free 0 5 mL 10/31/2019      Health Maintenance: There are no preventive care reminders to display for this patient  Topic Date Due    DTaP,Tdap,and Td Vaccines (1 - Tdap) Never done    Pneumococcal Vaccine: 65+ Years (1 of 1 - PPSV23) Never done      Medicare Health Risk Assessment:     Ht 5' 1" (1 549 m)   Wt 56 2 kg (124 lb)   BMI 23 43 kg/m²      Baruch Cheadle is here for her Subsequent Wellness visit  Last Medicare Wellness visit information reviewed, patient interviewed and updates made to the record today  Health Risk Assessment:   Patient rates overall health as very good  Patient feels that their physical health rating is same  Patient is satisfied with their life  Eyesight was rated as slightly worse  Hearing was rated as same   Patient feels that their emotional and mental health rating is same  Patients states they are never, rarely angry  Patient states they are sometimes unusually tired/fatigued  Pain experienced in the last 7 days has been none  Patient states that she has experienced no weight loss or gain in last 6 months  Gets tired tired at the end of the day  Hearing is ok  Anger not a problem  Weight stays stable  Depression Screening:   PHQ-2 Score: 0      Fall Risk Screening: In the past year, patient has experienced: no history of falling in past year      Urinary Incontinence Screening:   Patient has not leaked urine accidently in the last six months  No issues  Home Safety:  Patient does not have trouble with stairs inside or outside of their home  Patient has working smoke alarms and has working carbon monoxide detector  Home safety hazards include: none  No home hazrads  No issues with stairs  Runs stairs for cardio  Pt feels safe in her home  Lives with   Nutrition:   Current diet is Regular  Balanced  Eats fruits and veggies and proteins  Medications:   Patient is currently taking over-the-counter supplements  OTC medications include: see medication list  Patient is able to manage medications  Has no issues with meds  Does not take opiods  Activities of Daily Living (ADLs)/Instrumental Activities of Daily Living (IADLs):   Walk and transfer into and out of bed and chair?: Yes  Dress and groom yourself?: Yes    Bathe or shower yourself?: Yes    Feed yourself? Yes  Do your laundry/housekeeping?: Yes  Manage your money, pay your bills and track your expenses?: Yes  Make your own meals?: Yes    Do your own shopping?: Yes    ADL comments: She still works full time  Drives and is independent  Previous Hospitalizations:   Any hospitalizations or ED visits within the last 12 months?: No      Hospitalization Comments: Chronic Conditions have been stable  Advance Care Planning:   Living will: No    Durable POA for healthcare:  No Advanced directive: No    Advanced directive counseling given: Yes    Five wishes given: No    Patient declined ACP directive: Yes    End of Life Decisions reviewed with patient: Yes    Provider agrees with end of life decisions: Yes      Comments: Will send pt a POLST  She will review and bring to next visit  Cognitive Screening:   Provider or family/friend/caregiver concerned regarding cognition?: No    PREVENTIVE SCREENINGS      Cardiovascular Screening:    General: Screening Not Indicated and History Lipid Disorder      Diabetes Screening:     General: Screening Current      Colorectal Cancer Screening:     General: Patient Declines and Risks and Benefits Discussed      Breast Cancer Screening:     General: Risks and Benefits Discussed and Patient Declines      Cervical Cancer Screening:    General: Screening Not Indicated      Osteoporosis Screening:    General: Risks and Benefits Discussed and Patient Declines      Abdominal Aortic Aneurysm (AAA) Screening:        General: Screening Not Indicated and Risks and Benefits Discussed      Lung Cancer Screening:     General: Screening Not Indicated      Hepatitis C Screening:    General: Risks and Benefits Discussed and Patient Declines    Hep C Screening Accepted: No       Preventive Screening Comments: UTD with flu and Covid  Rec Prevnar  Sees eye doctor regularly  No longer wants CRC or mammogram     Screening, Brief Intervention, and Referral to Treatment (SBIRT)    Screening  Typical number of drinks in a day: 1  Typical number of drinks in a week: 5  Interpretation: Low risk drinking behavior  AUDIT-C Screenin) How often did you have a drink containing alcohol in the past year? 2 to 3 times a week  2) How many drinks did you have on a typical day when you were drinking in the past year?  1 to 2  3) How often did you have 6 or more drinks on one occasion in the past year? never    AUDIT-C Score: 3  Interpretation: Score 3-12 (female): POSITIVE screen for alcohol misuse    AUDIT Screenin) How often during the last year have you found that you were not able to stop drinking once you had started? 0 - never  5) How often during the last year have you failed to do what was normally expected from you because of drinking? 0 - never  6) How often during the last year have you needed a first drink in the morning to get yourself going after a heavy drinking session? 0 - never  7) How often during the last year have you had a feeling of guilt or remorse after drinking? 0 - never  8) How often during the last year have you been unable to remember what happened the night before because you had been drinking? 0 - never  9) Have you or someone else been injured as a result of your drinking? 0 - no    Single Item Drug Screening:  How often have you used an illegal drug (including marijuana) or a prescription medication for non-medical reasons in the past year? never    Single Item Drug Screen Score: 0  Interpretation: Negative screen for possible drug use disorder    Brief Intervention  Healthy alcohol use/limits discussed       Other Counseling Topics:   Skin self-exam        Jv Singer MD

## 2022-03-08 NOTE — PATIENT INSTRUCTIONS
Medicare Preventive Visit Patient Instructions  Thank you for completing your Welcome to Medicare Visit or Medicare Annual Wellness Visit today  Your next wellness visit will be due in one year (3/9/2023)  The screening/preventive services that you may require over the next 5-10 years are detailed below  Some tests may not apply to you based off risk factors and/or age  Screening tests ordered at today's visit but not completed yet may show as past due  Also, please note that scanned in results may not display below  Preventive Screenings:  Service Recommendations Previous Testing/Comments   Colorectal Cancer Screening  * Colonoscopy    * Fecal Occult Blood Test (FOBT)/Fecal Immunochemical Test (FIT)  * Fecal DNA/Cologuard Test  * Flexible Sigmoidoscopy Age: 54-65 years old   Colonoscopy: every 10 years (may be performed more frequently if at higher risk)  OR  FOBT/FIT: every 1 year  OR  Cologuard: every 3 years  OR  Sigmoidoscopy: every 5 years  Screening may be recommended earlier than age 48 if at higher risk for colorectal cancer  Also, an individualized decision between you and your healthcare provider will decide whether screening between the ages of 74-80 would be appropriate  Colonoscopy: Not on file  FOBT/FIT: Not on file  Cologuard: Not on file  Sigmoidoscopy: Not on file          Breast Cancer Screening Age: 36 years old  Frequency: every 1-2 years  Not required if history of left and right mastectomy Mammogram: Not on file        Cervical Cancer Screening Between the ages of 21-29, pap smear recommended once every 3 years  Between the ages of 33-67, can perform pap smear with HPV co-testing every 5 years     Recommendations may differ for women with a history of total hysterectomy, cervical cancer, or abnormal pap smears in past  Pap Smear: Not on file        Hepatitis C Screening Once for adults born between [facility]  More frequently in patients at high risk for Hepatitis C Hep C Antibody: Not on file        Diabetes Screening 1-2 times per year if you're at risk for diabetes or have pre-diabetes Fasting glucose: 98 mg/dL   A1C: No results in last 5 years        Cholesterol Screening Once every 5 years if you don't have a lipid disorder  May order more often based on risk factors  Lipid panel: 10/12/2021          Other Preventive Screenings Covered by Medicare:  1  Abdominal Aortic Aneurysm (AAA) Screening: covered once if your at risk  You're considered to be at risk if you have a family history of AAA  2  Lung Cancer Screening: covers low dose CT scan once per year if you meet all of the following conditions: (1) Age 50-69; (2) No signs or symptoms of lung cancer; (3) Current smoker or have quit smoking within the last 15 years; (4) You have a tobacco smoking history of at least 30 pack years (packs per day multiplied by number of years you smoked); (5) You get a written order from a healthcare provider  3  Glaucoma Screening: covered annually if you're considered high risk: (1) You have diabetes OR (2) Family history of glaucoma OR (3)  aged 48 and older OR (3)  American aged 72 and older  3  Osteoporosis Screening: covered every 2 years if you meet one of the following conditions: (1) You're estrogen deficient and at risk for osteoporosis based off medical history and other findings; (2) Have a vertebral abnormality; (3) On glucocorticoid therapy for more than 3 months; (4) Have primary hyperparathyroidism; (5) On osteoporosis medications and need to assess response to drug therapy  · Last bone density test (DXA Scan): Not on file  5  HIV Screening: covered annually if you're between the age of 12-76  Also covered annually if you are younger than 13 and older than 72 with risk factors for HIV infection  For pregnant patients, it is covered up to 3 times per pregnancy      Immunizations:  Immunization Recommendations   Influenza Vaccine Annual influenza vaccination during flu season is recommended for all persons aged >= 6 months who do not have contraindications   Pneumococcal Vaccine (Prevnar and Pneumovax)  * Prevnar = PCV13  * Pneumovax = PPSV23   Adults 25-60 years old: 1-3 doses may be recommended based on certain risk factors  Adults 72 years old: Prevnar (PCV13) vaccine recommended followed by Pneumovax (PPSV23) vaccine  If already received PPSV23 since turning 65, then PCV13 recommended at least one year after PPSV23 dose  Hepatitis B Vaccine 3 dose series if at intermediate or high risk (ex: diabetes, end stage renal disease, liver disease)   Tetanus (Td) Vaccine - COST NOT COVERED BY MEDICARE PART B Following completion of primary series, a booster dose should be given every 10 years to maintain immunity against tetanus  Td may also be given as tetanus wound prophylaxis  Tdap Vaccine - COST NOT COVERED BY MEDICARE PART B Recommended at least once for all adults  For pregnant patients, recommended with each pregnancy  Shingles Vaccine (Shingrix) - COST NOT COVERED BY MEDICARE PART B  2 shot series recommended in those aged 48 and above     Health Maintenance Due:  There are no preventive care reminders to display for this patient  Immunizations Due:      Topic Date Due    DTaP,Tdap,and Td Vaccines (1 - Tdap) Never done    Pneumococcal Vaccine: 65+ Years (1 of 1 - PPSV23) Never done     Advance Directives   What are advance directives? Advance directives are legal documents that state your wishes and plans for medical care  These plans are made ahead of time in case you lose your ability to make decisions for yourself  Advance directives can apply to any medical decision, such as the treatments you want, and if you want to donate organs  What are the types of advance directives? There are many types of advance directives, and each state has rules about how to use them  You may choose a combination of any of the following:  · Living will:   This is a written record of the treatment you want  You can also choose which treatments you do not want, which to limit, and which to stop at a certain time  This includes surgery, medicine, IV fluid, and tube feedings  · Durable power of  for healthcare [facility]): This is a written record that states who you want to make healthcare choices for you when you are unable to make them for yourself  This person, called a proxy, is usually a family member or a friend  You may choose more than 1 proxy  · Do not resuscitate (DNR) order:  A DNR order is used in case your heart stops beating or you stop breathing  It is a request not to have certain forms of treatment, such as CPR  A DNR order may be included in other types of advance directives  · Medical directive: This covers the care that you want if you are in a coma, near death, or unable to make decisions for yourself  You can list the treatments you want for each condition  Treatment may include pain medicine, surgery, blood transfusions, dialysis, IV or tube feedings, and a ventilator (breathing machine)  · Values history: This document has questions about your views, beliefs, and how you feel and think about life  This information can help others choose the care that you would choose  Why are advance directives important? An advance directive helps you control your care  Although spoken wishes may be used, it is better to have your wishes written down  Spoken wishes can be misunderstood, or not followed  Treatments may be given even if you do not want them  An advance directive may make it easier for your family to make difficult choices about your care  © Copyright [employer] 2018 Information is for End User's use only and may not be sold, redistributed or otherwise used for commercial purposes   All illustrations and images included in CareNotes® are the copyrighted property of [employer]  or [address] [place] [address] calcium supplement  Consider seeing endocrinologist of if number goes up but we will hold of your consultation referral per your choice  Consider physical therapy for the low back and radiculopathy  Your x-ray consistent with degenerative joint disease level scoliosis degenerative disc disease facet arthropathy etc   Which explains no your radicular pain in the leg  We will add gabapentin 100 mg at bedtime  If your pain persist persist after we couple of weeks give me a call we will increase gabapentin and you should consider physical therapy at that point and may have to sees specialist     Follow with Consultants as per their and our suggestion    Follow up in one month or as needed earlier    Follow all instructions as advised and discussed  Take your medications as prescribed  Call the office immediately if you experience any side effects  Ask questions if you do not understand  Keep your scheduled appointment as advised or come sooner if necessary or in doubt  Best time to call for non-urgent matter or questions on weekdays is between 9am and 12 noon  See physician for any new symptoms or worsening of current symptoms  Urgent or emergent situations call 911 and report to nearest emergency room  I spent  30 -40 minutes taking care of this patient including clinical care, conseling, collaboration, chart, lab and consultaion review as appropriate    Additional time were spent in annual wellness    Patient is to get labs 1 week(s) prior to next visit if advised      May take Tylenol or Tylenol arthritis for the pain    Do not take more than 2 g total a day

## 2022-03-08 NOTE — ASSESSMENT & PLAN NOTE
Lab Results   Component Value Date    EGFR 42 02/28/2022    EGFR 47 01/21/2022    EGFR 49 10/12/2021    CREATININE 1 19 02/28/2022    CREATININE 1 08 01/21/2022    CREATININE 1 06 10/12/2021   Recommend increase fluid intake  Calcium is slightly high  PTH was slightly elevated    How the bilirubin came down normal   Will monitor

## 2022-03-08 NOTE — ASSESSMENT & PLAN NOTE
Lab Results   Component Value Date    PTH 91 6 (H) 01/21/2022    CALCIUM 10 2 (H) 02/28/2022   Patient does have a elevated PTH and calcium 10 2    Will refer her to endocrinologist       02/28/2022:  Calcium 10 2 BUN 31 creatinine 1 19 bilirubin normal 0 73 GFR 42  01/21/2022:  CMP normal except total bilirubin 1 38 GFR 47 calcium 9 9  10/12/2021:  GFR 49, albumin 3 4, total bilirubin 1 16, calcium 9 8    This is the 1st time calcium is slightly high see does take calcium supplement recommend to stop taking calcium supplement and we will recheck CMP back in a

## 2022-03-08 NOTE — ASSESSMENT & PLAN NOTE
02/28/2022:  Calcium 10 2 BUN 31 creatinine 1 19 bilirubin normal 0 73 GFR 42  01/21/2022:  CMP normal except total bilirubin 1 38 GFR 47 calcium 9 9  10/12/2021:  GFR 49, albumin 3 4, total bilirubin 1 16, calcium 9 8    Lab Results   Component Value Date    PTH 91 6 (H) 01/21/2022    CALCIUM 10 2 (H) 02/28/2022   We talked about the PTH elevation etiology could be from kidney disease or it could be primary hyperparathyroidism be should ideally get th see prefers to just monitor the trend at this time and does not want to see endocrinologist e opinion of endocrinologist if it is primary hyperparathyroidism see may need to have a parathyroid evaluation for possible surgical   See prefers not to see endocrinologist and just wants to be monitored for the trend for calcium and PTH

## 2022-03-08 NOTE — ASSESSMENT & PLAN NOTE
01/21/2022:  PTH 91 6, calcium was normal this time calcium is 10 2 will monitor vitamin-D level was 30 8

## 2022-03-08 NOTE — ASSESSMENT & PLAN NOTE
02/28/2022:  Calcium 10 2 BUN 31 creatinine 1 19 bilirubin normal 0 73 GFR 42  01/21/2022:  CMP normal except total bilirubin 1 38 GFR 47 calcium 9 9  10/12/2021:  GFR 49, albumin 3 4, total bilirubin 1 16, calcium 9 8    Bilirubin came down normal liver functions are normal will monitor

## 2022-03-08 NOTE — PROGRESS NOTES
[facility] Office Visit Note  22     Mike Marcos 78 y o  female MRN: [id number]  : 1940    Assessment:     1  Lumbar radiculopathy  -     gabapentin (Neurontin) 100 mg capsule; Take 1 capsule (100 mg total) by mouth daily at bedtime    2  Medicare annual wellness visit, subsequent    3  Hypercalcemia  Assessment & Plan:  Lab Results   Component Value Date    PTH 91 6 (H) 2022    CALCIUM 10 2 (H) 2022   Patient does have a elevated PTH and calcium 10 2  Will refer her to endocrinologist       2022:  Calcium 10 2 BUN 31 creatinine 1 19 bilirubin normal 0 73 GFR 42  2022:  CMP normal except total bilirubin 1 38 GFR 47 calcium 9 9  10/12/2021:  GFR 49, albumin 3 4, total bilirubin 1 16, calcium 9 8    This is the 1st time calcium is slightly high see does take calcium supplement recommend to stop taking calcium supplement and we will recheck CMP back in a      4  Abnormal LFTs  Assessment & Plan:  2022:  Calcium 10 2 BUN 31 creatinine 1 19 bilirubin normal 0 73 GFR 42  2022:  CMP normal except total bilirubin 1 38 GFR 47 calcium 9 9  10/12/2021:  GFR 49, albumin 3 4, total bilirubin 1 16, calcium 9 8    Bilirubin came down normal liver functions are normal will monitor      5   Elevated PTHrP level  Assessment & Plan:  2022:  Calcium 10 2 BUN 31 creatinine 1 19 bilirubin normal 0 73 GFR 42  2022:  CMP normal except total bilirubin 1 38 GFR 47 calcium 9 9  10/12/2021:  GFR 49, albumin 3 4, total bilirubin 1 16, calcium 9 8    Lab Results   Component Value Date    PTH 91 6 (H) 2022    CALCIUM 10 2 (H) 2022   We talked about the PTH elevation etiology could be from kidney disease or it could be primary hyperparathyroidism be should ideally get th see prefers to just monitor the trend at this time and does not want to see endocrinologist e opinion of endocrinologist if it is primary hyperparathyroidism see may need to have a parathyroid evaluation for possible surgical   See prefers not to see endocrinologist and just wants to be monitored for the trend for calcium and PTH        6  Vitamin D deficiency  Assessment & Plan:  01/21/2022:  PTH 91 6, calcium was normal this time calcium is 10 2 will monitor vitamin-D level was 30 8       7  Stage 3b chronic kidney disease [facility])  Assessment & Plan:  Lab Results   Component Value Date    EGFR 42 02/28/2022    EGFR 47 01/21/2022    EGFR 49 10/12/2021    CREATININE 1 19 02/28/2022    CREATININE 1 08 01/21/2022    CREATININE 1 06 10/12/2021   Recommend increase fluid intake  Calcium is slightly high  PTH was slightly elevated  How the bilirubin came down normal   Will monitor          Discussion Summary and Plan: Today's care plan and medications were reviewed with patient in detail and all their questions answered to their satisfaction  Chief Complaint   Patient presents with   [facility] Wellness Visit    Abnormal Lab     Abnormal PTH, elevated calcium, elevated bilirubin,    Arthritis    Back Pain     Right lower extremity radiculopathy      Subjective:  Patient is here for chronic disease management  Last visit patient presented with the right lower low radiculopathy often on going on for a month without any other neurovascular symptoms or GI  symptoms      Overall patient was feeling much better since he was taking steroid now she is off steroid  See had a another episode of radiculopathy yesterday even though generally speaking better  She is does get some often on the radicular feeling  Denies any weakness in the feet legs denies any tingling numbness in the legs or feet    Workup for the lumbar radiculopathy revealed 1   Normal CBC, 2  Normal CMP except calcium 10 2, bilirubin normal this time, direct bilirubin normal, sed rate normal,     DJD of multiple joints:  She has unrelated bilateral knee pain from DJD also has a advanced osteoarthritis of the hands but pain is reasonable  Patient was seen by orthopedic doctor  Left rotator cuff tear  Left biceps tear    Lumbar spine x-ray shows 1  Dextroscoliosis 2  Degenerative joint the joint disease 3  Mild loss of T11 and T12 though likely not an acute finding      Other chronic disease which has been stable as outlined in the note last time a as below  IBS:  Fair reasonable for last couple of weeks remains on high-fiber diet  No major diarrhea nausea vomiting or belly pain  Allergic rhinitis :   She remains on Xyzal and inhaled Atrovent nasal most likely steroid by ENT physician  Gets some dry mouth from the Xyzal   Allergic allergic rhinitis is chronic symptomatic  MR: SX free, previous echo 2019, LVH, moderate TR, 2+ MR, PA systolic pressure of 02-86 for surveillance  CKD level 3 : See BMP, gfr 50 RANGE, Creat 1 06 bun, lytes and calcium wnl  Calcium came back normal however PTH is mildly elevated  Will monitor trend  Varicose veins flat  Lymphocytosis:  Resolved CBC normal   diverticulosis fair  Edema is fair at this time  hyperlipidemia:  Previous LDL:141  not been taking statin  Patient prefers no statin  Annual eye exam done in 2016, DEXA scan 2012 osteopenia, mammogram 2015 reviewed,   08/11/2014 upper GI endoscopy and colonoscopy revealed distal esophageal erosion small polyp from the stomach and colon revealed sigmoid diverticulosis  Raynaud's more symptomatic now that fall is here obviously her fingers are cold  Hypertension:  Symptom-free  Home blood pressure well controlled  Tolerating current antihypertensive without side effect  Lab data also reveals minimally elevated bilirubin 1 38    Will proceed with follow-up lab data with CMP as well as CBC as well as direct bilirubin if bilirubin remains high I will give order for the ultrasound of abdomen    4-8-2021: MRI C Spine: Multilevel cervical spondylosis     02/26/2021:  Creatinine 1 15, blood sugar 109, calcium 10, , rest of the CMP lipid profile unremarkable  GFR 44, hemoglobin 11 8, lymphocytosis noted  Vitamin-D 36   3-: [place] , SSA, SSB, DS DNA, SCLE 70, CENROMERE AB, REMINGTON, RNP SPEP, ALL NEG, CRP AND SED RATE WNL   10/12/2021:  CMP normal except albumin 3 4, cholesterol 216 HDL 55,  total bilirubin 1 16  CBC normal except MCV 99 differential reveals lymphocytes 44%  01/21/2022:  CMP normal except bilirubin went up to 1 38,              The following portions of the patient's history were reviewed and updated as appropriate: allergies, current medications, past family history, past medical history, past social history, past surgical history and problem list     Review of Systems   All other systems reviewed and are negative  Historical Information   Patient Active Problem List   Diagnosis    Baker cyst, right    Gastroesophageal reflux disease without esophagitis    Glucose intolerance (impaired glucose tolerance)    Restless legs    Hypercholesteremia    Mitral regurgitation    Migraine    Essential hypertension    Varicose vein of leg    Edema of extremities    Rhinitis, allergic    Aspirin intolerance    Persistent lymphocytosis    Hypokalemia    Irritable bowel syndrome    Abnormal echocardiogram    Vitamin D deficiency    Diverticulosis    History of syncope    Hammer toe of left foot    Raynaud's disease    Stage 3b chronic kidney disease ([address] )    Neck pain    Medicare annual wellness visit, subsequent    Cervical radiculopathy    Left arm pain    Rupture of left distal biceps tendon    Primary osteoarthritis involving multiple joints    HL (hearing loss)    Elevated PTHrP level    Lumbar radiculopathy    Abnormal LFTs    Hypercalcemia     Past Medical History:   Diagnosis Date    Baker's cyst of knee 10/21/2016    right- burst on its own    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    hx of-fell when ice skating   Noted brain bleed w/swelling    HL (hearing loss)     Migraine     Raynaud's disease     both hands     Past Surgical History:   Procedure Laterality Date    AXILLARY SURGERY Left     fatty cyst removed, benign    CATARACT EXTRACTION Bilateral     CATARACT EXTRACTION W/ INTRAOCULAR LENS IMPLANT Right 11/10/2016    Procedure: EXTRACTION EXTRACAPSULAR CATARACT PHACO INTRAOCULAR LENS (IOL); Surgeon: Tej Echavarria MD;  Location: [facility] MAIN OR;  Service:    [place] [id number] OF UTERUS      MYRINGOTOMY W/ TUBES      both ears-one tube out on the left    MYRINGOTOMY W/ TUBES      HI SHLDR ARTHROSCOP,SURG,W/ROTAT CUFF REPR Right 2017    Procedure: ARTHROSCOPY SHOULDER WITH ROTATOR CUFF REPAIR, BICEPS TENODESIS, AND SUBACROMIAL DECOMPRESSION;  Surgeon: Chloe Hollis MD;  Location[facility] MAIN OR;  Service: Orthopedics    HI XCAPSL CTRC RMVL INSJ IO LENS PROSTH W/O ECP Left 10/20/2016    Procedure: EXTRACTION EXTRACAPSULAR CATARACT PHACO INTRAOCULAR LENS (IOL);   Surgeon: Tej Echavarria MD;  Location: [facility] MAIN OR;  Service: Ophthalmology    SKIN BIOPSY      mole on chest    SKIN BIOPSY      under breast, benign     Social History     Substance and Sexual Activity   Alcohol Use Yes    Comment: socially     Social History     Substance and Sexual Activity   Drug Use No     Social History     Tobacco Use   Smoking Status Former Smoker    Packs/day: 1 50    Years: 30 00    Pack years: 45 00    Quit date: 18    Years since quittin 2   Smokeless Tobacco Never Used     Family History   Problem Relation Age of Onset    Heart disease Mother         exp age 59 MI    Stroke Father     Macular degeneration Sister     Macular degeneration Brother     Diabetes Brother     Cancer Brother         kidney-nephrectomy    Kidney disease Brother         cancer     Health Maintenance Due   Topic    DTaP,Tdap,and Td Vaccines (1 - Tdap)    Osteoporosis Screening     Pneumococcal Vaccine: 65+ Years (1 of 1 - PPSV23)      Meds/Allergies       Current Outpatient Medications:     acetaminophen (TYLENOL) 500 mg tablet, Take 1,000 mg by mouth every 6 (six) hours as needed for mild pain, Disp: , Rfl:     ALPHAGAN P 0 1 %, , Disp: , Rfl: 0    ascorbic acid (VITAMIN C) 500 mg tablet, Take 500 mg by mouth every morning, Disp: , Rfl:     Azelastine HCl 137 MCG/SPRAY SOLN, use 1 spray into each nostril twice a day, Disp: 30 mL, Rfl: 6    Biotin (BIOTIN 5000) 5 MG CAPS, Take by mouth every morning, Disp: , Rfl:     Calcium Carbonate-Vitamin D (CALTRATE 600+D PO), Take by mouth every morning, Disp: , Rfl:     Cyanocobalamin (VITAMIN B 12 PO), Take by mouth every morning, Disp: , Rfl:     furosemide (LASIX) 40 mg tablet, Take 1 tablet (40 mg total) by mouth every morning, Disp: 90 tablet, Rfl: 1    ipratropium (ATROVENT) 0 06 % nasal spray, instill 2 sprays into each nostril four times a day, Disp: 15 mL, Rfl: 11    levocetirizine (XYZAL) 5 MG tablet, Take 1 tablet (5 mg total) by mouth every evening, Disp: 90 tablet, Rfl: 1    Lutein 40 MG CAPS, Take by mouth every morning, Disp: , Rfl:     Magnesium 250 MG TABS, Take by mouth every morning, Disp: , Rfl:     Misc Natural Products (TUMERSAID PO), Take 1 tablet by mouth daily, Disp: , Rfl:     olmesartan (BENICAR) 40 mg tablet, take 1 tablet by mouth once daily, Disp: 90 tablet, Rfl: 1    omeprazole (PriLOSEC) 20 mg delayed release capsule, take 1 tablet by mouth daily if needed for HEARTBURN, Disp: 90 capsule, Rfl: 1    Potassium Chloride ER 20 MEQ TBCR, Take 1 tablet (20 mEq total) by mouth daily, Disp: 90 tablet, Rfl: 1    predniSONE 20 mg tablet, Two tablet daily for first 3 days then one tablet daily for next four days with food, Disp: 10 tablet, Rfl: 0    rOPINIRole (REQUIP) 0 5 mg tablet, Take 1 tablet (0 5 mg total) by mouth daily at bedtime, Disp: 90 tablet, Rfl: 1    SUMAtriptan (IMITREX) 50 mg tablet, Take 1 tablet (50 mg total) by mouth once as needed for migraine, Disp: 9 tablet, Rfl: 1   vitamin A 7500 UNIT capsule, Take 7,500 Units by mouth every morning  , Disp: , Rfl:     vitamin E, tocopherol, 400 units capsule, Take 400 Units by mouth every morning Last dose 4/26/17, Disp: , Rfl:     zinc gluconate 50 mg tablet, Take 50 mg by mouth every morning, Disp: , Rfl:     gabapentin (Neurontin) 100 mg capsule, Take 1 capsule (100 mg total) by mouth daily at bedtime, Disp: 30 capsule, Rfl: 1      Objective:    Vitals:   Ht 5' 1" (1 549 m)   Wt 56 2 kg (124 lb)   BMI 23 43 kg/m²   Body mass index is 23 43 kg/m²  Vitals:    03/08/22 1023   Weight: 56 2 kg (124 lb)       Physical Exam  Vitals and nursing note reviewed  Constitutional:       General: She is not in acute distress  Appearance: She is well-developed  She is not ill-appearing, toxic-appearing or diaphoretic  HENT:      Head: Normocephalic and atraumatic  Right Ear: External ear normal       Left Ear: External ear normal       Nose: Rhinorrhea present  Mouth/Throat:      Pharynx: Uvula midline  No oropharyngeal exudate or posterior oropharyngeal erythema  Eyes:      Conjunctiva/sclera: Conjunctivae normal    Neck:      Thyroid: No thyromegaly  Vascular: No JVD  Cardiovascular:      Rate and Rhythm: Regular rhythm  Heart sounds: Normal heart sounds  Pulmonary:      Effort: No respiratory distress  Breath sounds: Normal breath sounds  Abdominal:      General: Bowel sounds are normal  There is no distension  Palpations: There is no mass  Tenderness: There is no abdominal tenderness  There is no rebound  Musculoskeletal:      Lumbar back: No deformity, spasms, tenderness or bony tenderness  Negative right straight leg raise test and negative left straight leg raise test  No scoliosis  Right knee: Deformity present  No bony tenderness  Decreased range of motion  No tenderness  Right lower leg: No edema  Left lower leg: No edema     Lymphadenopathy:      Cervical: No cervical adenopathy  Skin:     General: Skin is warm  Coloration: Skin is not jaundiced or pale  Findings: No rash  Neurological:      General: No focal deficit present  Mental Status: She is oriented to person, place, and time  Sensory: No sensory deficit  Psychiatric:         Mood and Affect: Mood normal          Behavior: Behavior normal          Thought Content: Thought content normal          Judgment: Judgment normal          Lab Review   Appointment on 02/28/2022   Component Date Value Ref Range Status    WBC 02/28/2022 5 84  4 31 - 10 16 Thousand/uL Final    RBC 02/28/2022 4 13  3 81 - 5 12 Million/uL Final    Hemoglobin 02/28/2022 13 0  11 5 - 15 4 g/dL Final    Hematocrit 02/28/2022 40 5  34 8 - 46 1 % Final    MCV 02/28/2022 98  82 - 98 fL Final    MCH 02/28/2022 31 5  26 8 - 34 3 pg Final    MCHC 02/28/2022 32 1  31 4 - 37 4 g/dL Final    RDW 02/28/2022 12 7  11 6 - 15 1 % Final    Platelets 37/29/2326 202  149 - 390 Thousands/uL Final    MPV 02/28/2022 11 4  8 9 - 12 7 fL Final    Sodium 02/28/2022 139  136 - 145 mmol/L Final    Potassium 02/28/2022 4 4  3 5 - 5 3 mmol/L Final    Chloride 02/28/2022 106  100 - 108 mmol/L Final    CO2 02/28/2022 27  21 - 32 mmol/L Final    ANION GAP 02/28/2022 6  4 - 13 mmol/L Final    BUN 02/28/2022 31* 5 - 25 mg/dL Final    Creatinine 02/28/2022 1 19  0 60 - 1 30 mg/dL Final    Standardized to IDMS reference method    Glucose 02/28/2022 128  65 - 140 mg/dL Final    If the patient is fasting, the ADA then defines impaired fasting glucose as > 100 mg/dL and diabetes as > or equal to 123 mg/dL  Specimen collection should occur prior to Sulfasalazine administration due to the potential for falsely depressed results  Specimen collection should occur prior to Sulfapyridine administration due to the potential for falsely elevated results      Calcium 02/28/2022 10 2* 8 3 - 10 1 mg/dL Final    AST 02/28/2022 21  5 - 45 U/L Final    Specimen collection should occur prior to Sulfasalazine administration due to the potential for falsely depressed results   ALT 02/28/2022 29  12 - 78 U/L Final    Specimen collection should occur prior to Sulfasalazine and/or Sulfapyridine administration due to the potential for falsely depressed results   Alkaline Phosphatase 02/28/2022 71  46 - 116 U/L Final    Total Protein 02/28/2022 8 2  6 4 - 8 2 g/dL Final    Albumin 02/28/2022 4 2  3 5 - 5 0 g/dL Final    Total Bilirubin 02/28/2022 0 73  0 20 - 1 00 mg/dL Final    Use of this assay is not recommended for patients undergoing treatment with eltrombopag due to the potential for falsely elevated results   eGFR 02/28/2022 42  ml/min/1 73sq m Final    Sed Rate 02/28/2022 8  0 - 29 mm/hour Final    Bilirubin, Direct 02/28/2022 0 17  0 00 - 0 20 mg/dL Final   Lab on 01/21/2022   Component Date Value Ref Range Status    Sodium 01/21/2022 137  136 - 145 mmol/L Final    Potassium 01/21/2022 4 6  3 5 - 5 3 mmol/L Final    Chloride 01/21/2022 108  100 - 108 mmol/L Final    CO2 01/21/2022 26  21 - 32 mmol/L Final    ANION GAP 01/21/2022 3* 4 - 13 mmol/L Final    BUN 01/21/2022 25  5 - 25 mg/dL Final    Creatinine 01/21/2022 1 08  0 60 - 1 30 mg/dL Final    Standardized to IDMS reference method    Glucose, Fasting 01/21/2022 98  65 - 99 mg/dL Final    Specimen collection should occur prior to Sulfasalazine administration due to the potential for falsely depressed results  Specimen collection should occur prior to Sulfapyridine administration due to the potential for falsely elevated results   Calcium 01/21/2022 9 9  8 3 - 10 1 mg/dL Final    AST 01/21/2022 17  5 - 45 U/L Final    Specimen collection should occur prior to Sulfasalazine administration due to the potential for falsely depressed results       ALT 01/21/2022 28  12 - 78 U/L Final    Specimen collection should occur prior to Sulfasalazine and/or Sulfapyridine administration due to the potential for falsely depressed results   Alkaline Phosphatase 01/21/2022 67  46 - 116 U/L Final    Total Protein 01/21/2022 7 5  6 4 - 8 2 g/dL Final    Albumin 01/21/2022 3 7  3 5 - 5 0 g/dL Final    Total Bilirubin 01/21/2022 1 38* 0 20 - 1 00 mg/dL Final    Use of this assay is not recommended for patients undergoing treatment with eltrombopag due to the potential for falsely elevated results   eGFR 01/21/2022 47  ml/min/1 73sq m Final    PTH 01/21/2022 91 6* 18 4 - 80 1 pg/mL Final    Vit D, 25-Hydroxy 01/21/2022 30 8  30 0 - 100 0 ng/mL Final         Patient Instructions       Medicare Preventive Visit Patient Instructions  Thank you for completing your Welcome to Medicare Visit or Medicare Annual Wellness Visit today  Your next wellness visit will be due in one year (3/9/2023)  The screening/preventive services that you may require over the next 5-10 years are detailed below  Some tests may not apply to you based off risk factors and/or age  Screening tests ordered at today's visit but not completed yet may show as past due  Also, please note that scanned in results may not display below  Preventive Screenings:  Service Recommendations Previous Testing/Comments   Colorectal Cancer Screening  * Colonoscopy    * Fecal Occult Blood Test (FOBT)/Fecal Immunochemical Test (FIT)  * Fecal DNA/Cologuard Test  * Flexible Sigmoidoscopy Age: 54-65 years old   Colonoscopy: every 10 years (may be performed more frequently if at higher risk)  OR  FOBT/FIT: every 1 year  OR  Cologuard: every 3 years  OR  Sigmoidoscopy: every 5 years  Screening may be recommended earlier than age 48 if at higher risk for colorectal cancer  Also, an individualized decision between you and your healthcare provider will decide whether screening between the ages of 74-80 would be appropriate   Colonoscopy: Not on file  FOBT/FIT: Not on file  Cologuard: Not on file  Sigmoidoscopy: Not on file          Breast Cancer Screening Age: 36+ years old  Frequency: every 1-2 years  Not required if history of left and right mastectomy Mammogram: Not on file        Cervical Cancer Screening Between the ages of 21-29, pap smear recommended once every 3 years  Between the ages of 33-67, can perform pap smear with HPV co-testing every 5 years  Recommendations may differ for women with a history of total hysterectomy, cervical cancer, or abnormal pap smears in past  Pap Smear: Not on file        Hepatitis C Screening Once for adults born between 1945 and 1965  More frequently in patients at high risk for Hepatitis C Hep C Antibody: Not on file        Diabetes Screening 1-2 times per year if you're at risk for diabetes or have pre-diabetes Fasting glucose: 98 mg/dL   A1C: No results in last 5 years        Cholesterol Screening Once every 5 years if you don't have a lipid disorder  May order more often based on risk factors  Lipid panel: 10/12/2021          Other Preventive Screenings Covered by Medicare:  1  Abdominal Aortic Aneurysm (AAA) Screening: covered once if your at risk  You're considered to be at risk if you have a family history of AAA  2  Lung Cancer Screening: covers low dose CT scan once per year if you meet all of the following conditions: (1) Age 50-69; (2) No signs or symptoms of lung cancer; (3) Current smoker or have quit smoking within the last 15 years; (4) You have a tobacco smoking history of at least 30 pack years (packs per day multiplied by number of years you smoked); (5) You get a written order from a healthcare provider  3  Glaucoma Screening: covered annually if you're considered high risk: (1) You have diabetes OR (2) Family history of glaucoma OR (3)  aged 48 and older OR (3)  American aged 72 and older  3   Osteoporosis Screening: covered every 2 years if you meet one of the following conditions: (1) You're estrogen deficient and at risk for osteoporosis based off medical history and other findings; (2) Have a vertebral abnormality; (3) On glucocorticoid therapy for more than 3 months; (4) Have primary hyperparathyroidism; (5) On osteoporosis medications and need to assess response to drug therapy  · Last bone density test (DXA Scan): Not on file  5  HIV Screening: covered annually if you're between the age of 12-76  Also covered annually if you are younger than 13 and older than 72 with risk factors for HIV infection  For pregnant patients, it is covered up to 3 times per pregnancy  Immunizations:  Immunization Recommendations   Influenza Vaccine Annual influenza vaccination during flu season is recommended for all persons aged >= 6 months who do not have contraindications   Pneumococcal Vaccine (Prevnar and Pneumovax)  * Prevnar = PCV13  * Pneumovax = PPSV23   Adults 25-60 years old: 1-3 doses may be recommended based on certain risk factors  Adults 72 years old: Prevnar (PCV13) vaccine recommended followed by Pneumovax (PPSV23) vaccine  If already received PPSV23 since turning 65, then PCV13 recommended at least one year after PPSV23 dose  Hepatitis B Vaccine 3 dose series if at intermediate or high risk (ex: diabetes, end stage renal disease, liver disease)   Tetanus (Td) Vaccine - COST NOT COVERED BY MEDICARE PART B Following completion of primary series, a booster dose should be given every 10 years to maintain immunity against tetanus  Td may also be given as tetanus wound prophylaxis  Tdap Vaccine - COST NOT COVERED BY MEDICARE PART B Recommended at least once for all adults  For pregnant patients, recommended with each pregnancy  Shingles Vaccine (Shingrix) - COST NOT COVERED BY MEDICARE PART B  2 shot series recommended in those aged 48 and above     Health Maintenance Due:  There are no preventive care reminders to display for this patient    Immunizations Due:      Topic Date Due    DTaP,Tdap,and Td Vaccines (1 - Tdap) Never done    Pneumococcal Vaccine: 65+ Years (1 of 1 - PPSV23) Never done     Advance Directives   What are advance directives? Advance directives are legal documents that state your wishes and plans for medical care  These plans are made ahead of time in case you lose your ability to make decisions for yourself  Advance directives can apply to any medical decision, such as the treatments you want, and if you want to donate organs  What are the types of advance directives? There are many types of advance directives, and each state has rules about how to use them  You may choose a combination of any of the following:  · Living will: This is a written record of the treatment you want  You can also choose which treatments you do not want, which to limit, and which to stop at a certain time  This includes surgery, medicine, IV fluid, and tube feedings  · Durable power of  for healthcare [facility]): This is a written record that states who you want to make healthcare choices for you when you are unable to make them for yourself  This person, called a proxy, is usually a family member or a friend  You may choose more than 1 proxy  · Do not resuscitate (DNR) order:  A DNR order is used in case your heart stops beating or you stop breathing  It is a request not to have certain forms of treatment, such as CPR  A DNR order may be included in other types of advance directives  · Medical directive: This covers the care that you want if you are in a coma, near death, or unable to make decisions for yourself  You can list the treatments you want for each condition  Treatment may include pain medicine, surgery, blood transfusions, dialysis, IV or tube feedings, and a ventilator (breathing machine)  · Values history: This document has questions about your views, beliefs, and how you feel and think about life  This information can help others choose the care that you would choose  Why are advance directives important? An advance directive helps you control your care  Although spoken wishes may be used, it is better to have your wishes written down  Spoken wishes can be misunderstood, or not followed  Treatments may be given even if you do not want them  An advance directive may make it easier for your family to make difficult choices about your care  © Copyright [place] 2018 Information is for End User's use only and may not be sold, redistributed or otherwise used for commercial purposes  All illustrations and images included in CareNotes® are the copyrighted property of [employer]  or [place] [employer]       Discontinue calcium supplement  Consider seeing endocrinologist of if number goes up but we will hold of your consultation referral per your choice  Consider physical therapy for the low back and radiculopathy  Your x-ray consistent with degenerative joint disease level scoliosis degenerative disc disease facet arthropathy etc   Which explains no your radicular pain in the leg  We will add gabapentin 100 mg at bedtime  If your pain persist persist after we couple of weeks give me a call we will increase gabapentin and you should consider physical therapy at that point and may have to sees specialist     Follow with Consultants as per their and our suggestion    Follow up in one month or as needed earlier    Follow all instructions as advised and discussed  Take your medications as prescribed  Call the office immediately if you experience any side effects  Ask questions if you do not understand  Keep your scheduled appointment as advised or come sooner if necessary or in doubt  Best time to call for non-urgent matter or questions on weekdays is between 9am and 12 noon  See physician for any new symptoms or worsening of current symptoms  Urgent or emergent situations call 911 and report to nearest emergency room      I spent  30 -40 minutes taking care of this patient including clinical care, conseling, collaboration, chart, lab and consultaion review as appropriate    Additional time were spent in annual wellness    Patient is to get labs 1 week(s) prior to next visit if advised      May take Tylenol or Tylenol arthritis for the pain  Do not take more than 2 g total a day       Dr Jung Winters MD  [facility]       "This note has been constructed using a voice recognition system  Therefore there may be syntax, spelling, and/or grammatical errors   Please call if you have any questions  "

## 2022-03-27 DIAGNOSIS — J30.1 SEASONAL ALLERGIC RHINITIS DUE TO POLLEN: ICD-10-CM

## 2022-03-28 RX ORDER — LEVOCETIRIZINE DIHYDROCHLORIDE 5 MG/1
TABLET, FILM COATED ORAL
Qty: 90 TABLET | Refills: 1 | Status: SHIPPED | OUTPATIENT
Start: 2022-03-28 | End: 2022-07-12 | Stop reason: SDUPTHER

## 2022-04-05 ENCOUNTER — OFFICE VISIT (OUTPATIENT)
Dept: INTERNAL MEDICINE CLINIC | Facility: CLINIC | Age: 82
End: 2022-04-05
Payer: MEDICARE

## 2022-04-05 VITALS — HEART RATE: 60 BPM | WEIGHT: 126 LBS | OXYGEN SATURATION: 100 % | HEIGHT: 61 IN | BODY MASS INDEX: 23.79 KG/M2

## 2022-04-05 DIAGNOSIS — G25.81 RESTLESS LEGS: ICD-10-CM

## 2022-04-05 DIAGNOSIS — N18.32 STAGE 3B CHRONIC KIDNEY DISEASE (HCC): ICD-10-CM

## 2022-04-05 DIAGNOSIS — D72.820 PERSISTENT LYMPHOCYTOSIS: ICD-10-CM

## 2022-04-05 DIAGNOSIS — K58.0 IRRITABLE BOWEL SYNDROME WITH DIARRHEA: Primary | ICD-10-CM

## 2022-04-05 DIAGNOSIS — R79.89 ELEVATED PTHRP LEVEL: ICD-10-CM

## 2022-04-05 DIAGNOSIS — E87.6 HYPOKALEMIA: ICD-10-CM

## 2022-04-05 DIAGNOSIS — I10 ESSENTIAL HYPERTENSION: ICD-10-CM

## 2022-04-05 DIAGNOSIS — E83.52 HYPERCALCEMIA: ICD-10-CM

## 2022-04-05 DIAGNOSIS — E78.00 HYPERCHOLESTEREMIA: ICD-10-CM

## 2022-04-05 DIAGNOSIS — R73.02 GLUCOSE INTOLERANCE (IMPAIRED GLUCOSE TOLERANCE): ICD-10-CM

## 2022-04-05 DIAGNOSIS — I73.00 RAYNAUD'S DISEASE WITHOUT GANGRENE: ICD-10-CM

## 2022-04-05 DIAGNOSIS — K21.9 GASTROESOPHAGEAL REFLUX DISEASE WITHOUT ESOPHAGITIS: ICD-10-CM

## 2022-04-05 DIAGNOSIS — I34.0 NONRHEUMATIC MITRAL VALVE REGURGITATION: ICD-10-CM

## 2022-04-05 DIAGNOSIS — M15.9 PRIMARY OSTEOARTHRITIS INVOLVING MULTIPLE JOINTS: ICD-10-CM

## 2022-04-05 DIAGNOSIS — J30.89 NON-SEASONAL ALLERGIC RHINITIS DUE TO OTHER ALLERGIC TRIGGER: ICD-10-CM

## 2022-04-05 DIAGNOSIS — R51.9 NONINTRACTABLE HEADACHE, UNSPECIFIED CHRONICITY PATTERN, UNSPECIFIED HEADACHE TYPE: ICD-10-CM

## 2022-04-05 DIAGNOSIS — R79.89 ABNORMAL LFTS: ICD-10-CM

## 2022-04-05 DIAGNOSIS — E55.9 VITAMIN D DEFICIENCY: ICD-10-CM

## 2022-04-05 DIAGNOSIS — M54.16 LUMBAR RADICULOPATHY: ICD-10-CM

## 2022-04-05 DIAGNOSIS — D72.820 LYMPHOCYTOSIS: ICD-10-CM

## 2022-04-05 PROCEDURE — 99214 OFFICE O/P EST MOD 30 MIN: CPT | Performed by: INTERNAL MEDICINE

## 2022-04-05 RX ORDER — PREDNISONE 20 MG/1
TABLET ORAL
Qty: 10 TABLET | Refills: 0 | Status: SHIPPED | OUTPATIENT
Start: 2022-04-05 | End: 2022-07-12

## 2022-04-05 NOTE — ASSESSMENT & PLAN NOTE
Lab Results   Component Value Date    WBC 5 84 02/28/2022    HGB 13 0 02/28/2022    HCT 40 5 02/28/2022    MCV 98 02/28/2022     02/28/2022

## 2022-04-05 NOTE — ASSESSMENT & PLAN NOTE
Restless leg fair  Recent flare-up of the cramps due to gabapentin she is no longer on gabapentin    Will continue Requip 0 5 mg at bedtime

## 2022-04-05 NOTE — ASSESSMENT & PLAN NOTE
Headache unclear history likely related to flare-up of the allergic rhinitis  We talked about doing MRI of the brain we will hold of  The of my last time prednisone work very well we will give short course of prednisone for allergic rhinitis fair a flare-up    And will monitor

## 2022-04-05 NOTE — PATIENT INSTRUCTIONS
Prednisone as directed  Bowel Bentyl for the cramps as directed    Stop gabapentin  May take Requip extra a for the cramps in the legs    Watch it your cramps in the abdomen home  If it persists you let me know next week    Follow with Consultants as per their and our suggestion    Follow up in 12 week(s) or as needed earlier    Follow all instructions as advised and discussed  Take your medications as prescribed  Call the office immediately if you experience any side effects  Ask questions if you do not understand  Keep your scheduled appointment as advised or come sooner if necessary or in doubt  Best time to call for non-urgent matter or questions on weekdays is between 9am and 12 noon  See physician for any new symptoms or worsening of current symptoms  Urgent or emergent situations call 911 and report to nearest emergency room      I spent  30 -40 minutes taking care of this patient including clinical care, conseling, collaboration, chart, lab and consultaion review as appropriate    Patient is to get labs 1 week(s) prior to next visit if advised

## 2022-04-05 NOTE — ASSESSMENT & PLAN NOTE
Blood pressure is somewhat high  But at home well-controlled  Recommend to check blood pressure every day  Edema is fair    Will continue Lasix 40 mg daily, Benicar 40 mg daily, potassium chloride 20 mEq daily,

## 2022-04-05 NOTE — ASSESSMENT & PLAN NOTE
Headache nonspecific with normal neurological examination is likely due to allergic rhinitis  Will give short course of prednisone of  Recheck back in week to couple of weeks    Will need MRI of the brain if the symptoms persist or any other new neurological

## 2022-04-05 NOTE — PROGRESS NOTES
Desirae Izquierdo Office Visit Note  22     Klever Siegel 78 y o  female MRN: [id number]  : 1940    Assessment:     1  Irritable bowel syndrome with diarrhea  Assessment & Plan:  I think she has a flare-up of IBS  We talked about doing CT scan of the abdomen pelvis as well as doing additional blood testing she would like to hold off at this time and treat symptomatically  We will recommend Bentyl for the cramps as needed 10 mg and recheck back in a week  Orders:  -     Comprehensive metabolic panel; Future; Expected date: 2022  -     CBC and differential; Future; Expected date: 2022    2  Gastroesophageal reflux disease without esophagitis  Assessment & Plan:  GERD is fair  Continue omeprazole 20 mg daily  3  Nonrheumatic mitral valve regurgitation  Assessment & Plan:  Mitral regurgitation is stable and compensated  4  Non-seasonal allergic rhinitis due to other allergic trigger  Assessment & Plan:  Headache unclear history likely related to flare-up of the allergic rhinitis  We talked about doing MRI of the brain we will hold of  The of my last time prednisone work very well we will give short course of prednisone for allergic rhinitis fair a flare-up  And will monitor      5  Glucose intolerance (impaired glucose tolerance)  Assessment & Plan:  Is the we going to try course of steroid might raise the sugar will monitor    Orders:  -     Lipid panel; Future; Expected date: 2022    6  Essential hypertension  Assessment & Plan:  Blood pressure is somewhat high  But at home well-controlled  Recommend to check blood pressure every day  Edema is fair  Will continue Lasix 40 mg daily, Benicar 40 mg daily, potassium chloride 20 mEq daily,    Orders:  -     Lipid panel; Future; Expected date: 2022    7  Raynaud's disease without gangrene  Assessment & Plan:  Raynaud's      8  Restless legs  Assessment & Plan:  Restless leg fair    Recent flare-up of the cramps due to gabapentin she is no longer on gabapentin  Will continue Requip 0 5 mg at bedtime    Orders:  -     Comprehensive metabolic panel; Future; Expected date: 07/05/2022  -     Lipid panel; Future; Expected date: 07/05/2022    9  Stage 3b chronic kidney disease [facility])  Assessment & Plan:  Lab Results   Component Value Date    EGFR 42 02/28/2022    EGFR 47 01/21/2022    EGFR 49 10/12/2021    CREATININE 1 19 02/28/2022    CREATININE 1 08 01/21/2022    CREATININE 1 06 10/12/2021     The baseline  PTH is high  Orders:  -     Comprehensive metabolic panel; Future; Expected date: 07/05/2022    10  Elevated PTHrP level  Assessment & Plan:   PTH is elevated  Calcium was elevated she is no longer taking the calcium  Vitamin-D is reasonable  I would recommend that she should consider seeing how endocrinologist sometime at her convenience  The calcium is not too bad with the week we can continue to monitor    Orders:  -     Comprehensive metabolic panel; Future; Expected date: 07/05/2022    11  Vitamin D deficiency  Assessment & Plan:  White a min D level is fair  30 8  Will continue vitamin-D a m  Current dose  Were calcium has been stable    Orders:  -     Comprehensive metabolic panel; Future; Expected date: 07/05/2022    12  Hypokalemia  Assessment & Plan:  Potassium will monitor  Not too bad    Lab Results   Component Value Date    SODIUM 139 02/28/2022    K 4 4 02/28/2022     02/28/2022    CO2 27 02/28/2022    BUN 31 (H) 02/28/2022    CREATININE 1 19 02/28/2022    GLUC 128 02/28/2022    CALCIUM 10 2 (H) 02/28/2022         Orders:  -     Comprehensive metabolic panel; Future; Expected date: 07/05/2022    13  Hypercalcemia  Assessment & Plan:  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 PTH 91 6 on 01/21/2022 calcium 10 2  Vitamin-D reasonable  Also has underlying CKD level 3:00 a m  Home    Recommend to get opinion of the endocrinologist   Will continue    Orders:  -     Comprehensive metabolic panel; Future; Expected date: 07/05/2022    14  Nonintractable headache, unspecified chronicity pattern, unspecified headache type  Assessment & Plan:  Headache nonspecific with normal neurological examination is likely due to allergic rhinitis  Will give short course of prednisone of  Recheck back in week to couple of weeks  Will need MRI of the brain if the symptoms persist or any other new neurological      15  Lumbar radiculopathy  Assessment & Plan:  Lumbar radiculopathy is real 0 the resolved  Will discontinue gabapentin which she already stopped it  Cramps in the feet is resolved stopping the gabapentin    Orders:  -     predniSONE 20 mg tablet; Two tablet daily for first 3 days then one tablet daily for next four days with food    16  Abnormal LFTs    17  Hypercholesteremia  -     Lipid panel; Future; Expected date: 07/05/2022    18  Primary osteoarthritis involving multiple joints    19  Lymphocytosis    20  Persistent lymphocytosis  Assessment & Plan:  Lab Results   Component Value Date    WBC 5 84 02/28/2022    HGB 13 0 02/28/2022    HCT 40 5 02/28/2022    MCV 98 02/28/2022     02/28/2022               Discussion Summary and Plan: Today's care plan and medications were reviewed with patient in detail and all their questions answered to their satisfaction  Chief Complaint   Patient presents with    Hypertension    Hyperlipidemia    Vitamin D Deficiency    Follow-up     Radiculopathy, IBS, a flare-up of the irritable bowel, cramps in the abdomen, headache, hypercalcemia, elevated PTH,    Abdominal Pain    GERD      Subjective:    Patient is here for chronic disease management however patient has a 2 new complaints  Patient complains of crampy abdominal pain for 2 -3 days ago  Patient denies any nausea vomiting difficulty swallowing hematemesis melena hematochezia  Patient denies any diarrhea  Cramps are off and on  No weight loss, pt gained weight  Patient is a known case of IBS    Past 1 month bowel movement was more on constipation side past 2 days it was somewhat lose patient did not eat anything unusual   Nobody in the family is sick  Patient does not have any travel history  Patient was seen last time with the right lower extremity radicular pain  Patient was started on the gabapentin  Patient does not have any right a radicular pain  However patient thinks that since she started taking gabapentin she is having headache     Patient's did not have any problem for 1st 25 days  Patient started having cramps in the feet and toes  Patient just top gabapentin 5 days ago  Interestingly see does not have any crampy in the feet anymore since he stop date  Headache history is unclear of  Patient is not sure or see thinks that it is related to sinus of the a and she is not sure about the duration or relationship to gabapentin  Allergic rhinitis:  Patient continues to have stuffy nose runny nose sinus congestion some chronic hoarseness of voice  Patient is under care of ear nose throat doctor  Patient remains on Xyzal and saline nasal spray  Patient's last so ENT physician last time was year ago  Patient denies any significant low back pain at this time  Other problems stable  Mitral regurgitation stable,MR: SX free, previous echo 2019, LVH, moderate TR, 2+ MR, PA systolic pressure of 09-72 for surveillance  CKD level 3 stable  Varicose vein stable  Diverticulosis stable  Edema of the legs fair  Cholesterol fair  The DJD multiple joint fair at this time under care of orthopedic left rotator cough tear and left biceps tear fair  A dextroscoliosis, degenerative joint disease and mild loss of the T11 and T12 are fair  DJD of multiple joints:  She has unrelated bilateral knee pain from DJD also has a advanced osteoarthritis of the hands but pain is reasonable        Bilirubin was slightly high previous time repeat 1 came back normal  Last calcium was slightly high in February 4-8-2021: MRI C Spine: Multilevel cervical spondylosis     02/26/2021:  Creatinine 1 15, blood sugar 109, calcium 10, , rest of the CMP lipid profile unremarkable  GFR 44, hemoglobin 11 8, lymphocytosis noted  Vitamin-D 36   3-: [place] , SSA, SSB, DS DNA, SCLE 70, CENROMERE AB, REM[place], RNP SPEP, ALL NEG, CRP AND SED RATE WNL   10/12/2021:  CMP normal except albumin 3 4, cholesterol 216 HDL 55,  total bilirubin 1 16  CBC normal except MCV 99 differential reveals lymphocytes 44%  01/21/2022:  CMP normal except bilirubin went up to 1 38,, PTH 91 6, vitamin-D 30 8, GFR 47, calcium was 9 9  02/28/2022:  CBC normal, CMP normal except blood sugar 128, BUN 31, sed rate normal, total bilirubin normal, and direct bilirubin normal 0 17, calcium was slightly high 10 2                The following portions of the patient's history were reviewed and updated as appropriate: allergies, current medications, past family history, past medical history, past social history, past surgical history and problem list     Review of Systems   All other systems reviewed and are negative          Historical Information   Patient Active Problem List   Diagnosis    Baker cyst, right    Gastroesophageal reflux disease without esophagitis    Glucose intolerance (impaired glucose tolerance)    Restless legs    Hypercholesteremia    Mitral regurgitation    Migraine    Essential hypertension    Varicose vein of leg    Edema of extremities    Rhinitis, allergic    Aspirin intolerance    Persistent lymphocytosis    Hypokalemia    Irritable bowel syndrome    Abnormal echocardiogram    Vitamin D deficiency    Diverticulosis    History of syncope    Hammer toe of left foot    Raynaud's disease    Stage 3b chronic kidney disease ([address] )    Neck pain    Medicare annual wellness visit, subsequent    Cervical radiculopathy    Left arm pain    Rupture of left distal biceps tendon    Primary osteoarthritis involving multiple joints    HL (hearing loss)    Elevated PTHrP level    Lumbar radiculopathy    Abnormal LFTs    Hypercalcemia    Headache     Past Medical History:   Diagnosis Date    Baker's cyst of knee 10/21/2016    right- burst on its own    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    hx of-fell when ice skating  Noted brain bleed w/swelling    HL (hearing loss)     Migraine     Raynaud's disease     both hands     Past Surgical History:   Procedure Laterality Date    AXILLARY SURGERY Left     fatty cyst removed, benign    CATARACT EXTRACTION Bilateral     CATARACT EXTRACTION W/ INTRAOCULAR LENS IMPLANT Right 11/10/2016    Procedure: EXTRACTION EXTRACAPSULAR CATARACT PHACO INTRAOCULAR LENS (IOL); Surgeon: Rupal Rodríguez MD;  Location: [facility] MAIN OR;  Service:    [place] [id number] OF UTERUS      MYRINGOTOMY W/ TUBES  2011    both ears-one tube out on the left    MYRINGOTOMY W/ TUBES      AK SHLDR ARTHROSCOP,SURG,W/ROTAT CUFF REPR Right 2017    Procedure: ARTHROSCOPY SHOULDER WITH ROTATOR CUFF REPAIR, BICEPS TENODESIS, AND SUBACROMIAL DECOMPRESSION;  Surgeon: Lady Francoise MD;  [facility];  Service: Orthopedics    AK XCAPSL CTRC RMVL INSJ IO LENS PROSTH W/O ECP Left 10/20/2016    Procedure: EXTRACTION EXTRACAPSULAR CATARACT PHACO INTRAOCULAR LENS (IOL);   Surgeon: Rupal Rodríguez MD;  Location: [facility] MAIN OR;  Service: Ophthalmology    SKIN BIOPSY      mole on chest    SKIN BIOPSY      under breast, benign     Social History     Substance and Sexual Activity   Alcohol Use Yes    Comment: socially     Social History     Substance and Sexual Activity   Drug Use No     Social History     Tobacco Use   Smoking Status Former Smoker    Packs/day: 1 50    Years: 30 00    Pack years: 45 00    Quit date: 18    Years since quittin 2   Smokeless Tobacco Never Used     Family History   Problem Relation Age of Onset    Heart disease Mother exp age 59 MI    Stroke Father     Macular degeneration Sister     Macular degeneration Brother     Diabetes Brother     Cancer Brother         kidney-nephrectomy    Kidney disease Brother         cancer     Health Maintenance Due   Topic    DTaP,Tdap,and Td Vaccines (1 - Tdap)    Osteoporosis Screening     Pneumococcal Vaccine: 65+ Years (1 of 1 - PPSV23)      Meds/Allergies       Current Outpatient Medications:     acetaminophen (TYLENOL) 500 mg tablet, Take 1,000 mg by mouth every 6 (six) hours as needed for mild pain, Disp: , Rfl:     ALPHAGAN P 0 1 %, , Disp: , Rfl: 0    ascorbic acid (VITAMIN C) 500 mg tablet, Take 500 mg by mouth every morning, Disp: , Rfl:     Azelastine HCl 137 MCG/SPRAY SOLN, use 1 spray into each nostril twice a day, Disp: 30 mL, Rfl: 6    Biotin (BIOTIN 5000) 5 MG CAPS, Take by mouth every morning, Disp: , Rfl:     Calcium Carbonate-Vitamin D (CALTRATE 600+D PO), Take by mouth every morning, Disp: , Rfl:     Cyanocobalamin (VITAMIN B 12 PO), Take by mouth every morning, Disp: , Rfl:     furosemide (LASIX) 40 mg tablet, Take 1 tablet (40 mg total) by mouth every morning, Disp: 90 tablet, Rfl: 1    ipratropium (ATROVENT) 0 06 % nasal spray, instill 2 sprays into each nostril four times a day, Disp: 15 mL, Rfl: 11    levocetirizine (XYZAL) 5 MG tablet, take 1 tablet by mouth every evening, Disp: 90 tablet, Rfl: 1    Lutein 40 MG CAPS, Take by mouth every morning, Disp: , Rfl:     Magnesium 250 MG TABS, Take by mouth every morning, Disp: , Rfl:     Misc Natural Products (TUMERSAID PO), Take 1 tablet by mouth daily, Disp: , Rfl:     olmesartan (BENICAR) 40 mg tablet, take 1 tablet by mouth once daily, Disp: 90 tablet, Rfl: 1    omeprazole (PriLOSEC) 20 mg delayed release capsule, take 1 tablet by mouth daily if needed for HEARTBURN, Disp: 90 capsule, Rfl: 1    Potassium Chloride ER 20 MEQ TBCR, Take 1 tablet (20 mEq total) by mouth daily, Disp: 90 tablet, Rfl: 1   predniSONE 20 mg tablet, Two tablet daily for first 3 days then one tablet daily for next four days with food, Disp: 10 tablet, Rfl: 0    rOPINIRole (REQUIP) 0 5 mg tablet, Take 1 tablet (0 5 mg total) by mouth daily at bedtime, Disp: 90 tablet, Rfl: 1    SUMAtriptan (IMITREX) 50 mg tablet, Take 1 tablet (50 mg total) by mouth once as needed for migraine, Disp: 9 tablet, Rfl: 1    vitamin A 7500 UNIT capsule, Take 7,500 Units by mouth every morning  , Disp: , Rfl:     vitamin E, tocopherol, 400 units capsule, Take 400 Units by mouth every morning Last dose 4/26/17, Disp: , Rfl:     zinc gluconate 50 mg tablet, Take 50 mg by mouth every morning, Disp: , Rfl:       Objective:    Vitals:   Pulse 60   Ht 5' 1" (1 549 m)   Wt 57 2 kg (126 lb)   SpO2 100%   BMI 23 81 kg/m²   Body mass index is 23 81 kg/m²  Vitals:    04/05/22 1046   Weight: 57 2 kg (126 lb)       Physical Exam  Vitals and nursing note reviewed  Constitutional:       General: She is not in acute distress  Appearance: She is well-developed  She is not ill-appearing, toxic-appearing or diaphoretic  HENT:      Head: Normocephalic and atraumatic  Right Ear: Tympanic membrane, ear canal and external ear normal       Left Ear: Tympanic membrane, ear canal and external ear normal       Nose: Congestion and rhinorrhea present  Right Turbinates: Swollen and pale  Left Turbinates: Swollen and pale  Right Sinus: No maxillary sinus tenderness or frontal sinus tenderness  Left Sinus: No maxillary sinus tenderness  Mouth/Throat:      Mouth: No oral lesions  Tongue: No lesions  Palate: No mass  Pharynx: Uvula midline  No oropharyngeal exudate or posterior oropharyngeal erythema  Tonsils: No tonsillar exudate or tonsillar abscesses  Eyes:      General: No visual field deficit or scleral icterus  Left eye: No discharge        Conjunctiva/sclera: Conjunctivae normal    Neck:      Thyroid: No thyromegaly  Vascular: No JVD  Cardiovascular:      Rate and Rhythm: Regular rhythm  Heart sounds: Murmur heard  Pulmonary:      Effort: No respiratory distress  Breath sounds: Normal breath sounds  Abdominal:      General: Bowel sounds are normal  There is no distension  Palpations: There is no mass  Tenderness: There is no abdominal tenderness  There is no left CVA tenderness, guarding or rebound  Musculoskeletal:      Lumbar back: No deformity, spasms, tenderness or bony tenderness  Negative right straight leg raise test and negative left straight leg raise test  No scoliosis  Right knee: Deformity present  No bony tenderness  Decreased range of motion  No tenderness  Right lower leg: No edema  Left lower leg: No edema  Lymphadenopathy:      Cervical: No cervical adenopathy  Skin:     General: Skin is warm  Coloration: Skin is not jaundiced or pale  Findings: No bruising or rash  Neurological:      General: No focal deficit present  Mental Status: She is oriented to person, place, and time  Cranial Nerves: No cranial nerve deficit, dysarthria or facial asymmetry  Sensory: No sensory deficit  Motor: Motor function is intact  Coordination: Coordination is intact  Gait: Gait is intact  Psychiatric:         Mood and Affect: Mood normal          Behavior: Behavior normal          Thought Content:  Thought content normal          Judgment: Judgment normal          Lab Review   Appointment on 02/28/2022   Component Date Value Ref Range Status    WBC 02/28/2022 5 84  4 31 - 10 16 Thousand/uL Final    RBC 02/28/2022 4 13  3 81 - 5 12 Million/uL Final    Hemoglobin 02/28/2022 13 0  11 5 - 15 4 g/dL Final    Hematocrit 02/28/2022 40 5  34 8 - 46 1 % Final    MCV 02/28/2022 98  82 - 98 fL Final    MCH 02/28/2022 31 5  26 8 - 34 3 pg Final    MCHC 02/28/2022 32 1  31 4 - 37 4 g/dL Final    RDW 02/28/2022 12 7  11 6 - 15 1 % Final    Platelets 60/73/6815 202  149 - 390 Thousands/uL Final    MPV 02/28/2022 11 4  8 9 - 12 7 fL Final    Sodium 02/28/2022 139  136 - 145 mmol/L Final    Potassium 02/28/2022 4 4  3 5 - 5 3 mmol/L Final    Chloride 02/28/2022 106  100 - 108 mmol/L Final    CO2 02/28/2022 27  21 - 32 mmol/L Final    ANION GAP 02/28/2022 6  4 - 13 mmol/L Final    BUN 02/28/2022 31* 5 - 25 mg/dL Final    Creatinine 02/28/2022 1 19  0 60 - 1 30 mg/dL Final    Standardized to IDMS reference method    Glucose 02/28/2022 128  65 - 140 mg/dL Final    If the patient is fasting, the ADA then defines impaired fasting glucose as > 100 mg/dL and diabetes as > or equal to 123 mg/dL  Specimen collection should occur prior to Sulfasalazine administration due to the potential for falsely depressed results  Specimen collection should occur prior to Sulfapyridine administration due to the potential for falsely elevated results   Calcium 02/28/2022 10 2* 8 3 - 10 1 mg/dL Final    AST 02/28/2022 21  5 - 45 U/L Final    Specimen collection should occur prior to Sulfasalazine administration due to the potential for falsely depressed results   ALT 02/28/2022 29  12 - 78 U/L Final    Specimen collection should occur prior to Sulfasalazine and/or Sulfapyridine administration due to the potential for falsely depressed results   Alkaline Phosphatase 02/28/2022 71  46 - 116 U/L Final    Total Protein 02/28/2022 8 2  6 4 - 8 2 g/dL Final    Albumin 02/28/2022 4 2  3 5 - 5 0 g/dL Final    Total Bilirubin 02/28/2022 0 73  0 20 - 1 00 mg/dL Final    Use of this assay is not recommended for patients undergoing treatment with eltrombopag due to the potential for falsely elevated results   eGFR 02/28/2022 42  ml/min/1 73sq m Final    Sed Rate 02/28/2022 8  0 - 29 mm/hour Final    Bilirubin, Direct 02/28/2022 0 17  0 00 - 0 20 mg/dL Final         Patient Instructions   Prednisone as directed      Bowel Bentyl for the cramps as directed    Stop gabapentin  May take Requip extra a for the cramps in the legs    Watch it your cramps in the abdomen home  If it persists you let me know next week    Follow with Consultants as per their and our suggestion    Follow up in 12 week(s) or as needed earlier    Follow all instructions as advised and discussed  Take your medications as prescribed  Call the office immediately if you experience any side effects  Ask questions if you do not understand  Keep your scheduled appointment as advised or come sooner if necessary or in doubt  Best time to call for non-urgent matter or questions on weekdays is between 9am and 12 noon  See physician for any new symptoms or worsening of current symptoms  Urgent or emergent situations call 911 and report to nearest emergency room  I spent  30 -40 minutes taking care of this patient including clinical care, conseling, collaboration, chart, lab and consultaion review as appropriate    Patient is to get labs 1 week(s) prior to next visit if advised               Dr Arian Birmingham MD  [facility]       "This note has been constructed using a voice recognition system  Therefore there may be syntax, spelling, and/or grammatical errors   Please call if you have any questions  "

## 2022-04-05 NOTE — ASSESSMENT & PLAN NOTE
PTH is elevated  Calcium was elevated she is no longer taking the calcium  Vitamin-D is reasonable  I would recommend that she should consider seeing how endocrinologist sometime at her convenience    The calcium is not too bad with the week we can continue to monitor

## 2022-04-05 NOTE — ASSESSMENT & PLAN NOTE
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

## 2022-04-05 NOTE — ASSESSMENT & PLAN NOTE
Lumbar radiculopathy is real 0 the resolved  Will discontinue gabapentin which she already stopped it    Cramps in the feet is resolved stopping the gabapentin

## 2022-04-05 NOTE — ASSESSMENT & PLAN NOTE
White a min D level is fair  30 8  Will continue vitamin-D a m  Current dose    Were calcium has been stable

## 2022-04-05 NOTE — ASSESSMENT & PLAN NOTE
Potassium will monitor    Not too bad    Lab Results   Component Value Date    SODIUM 139 02/28/2022    K 4 4 02/28/2022     02/28/2022    CO2 27 02/28/2022    BUN 31 (H) 02/28/2022    CREATININE 1 19 02/28/2022    GLUC 128 02/28/2022    CALCIUM 10 2 (H) 02/28/2022

## 2022-04-05 NOTE — ASSESSMENT & PLAN NOTE
I think she has a flare-up of IBS  We talked about doing CT scan of the abdomen pelvis as well as doing additional blood testing she would like to hold off at this time and treat symptomatically  We will recommend Bentyl for the cramps as needed 10 mg and recheck back in a week

## 2022-04-05 NOTE — ASSESSMENT & PLAN NOTE
Lab Results   Component Value Date    EGFR 42 02/28/2022    EGFR 47 01/21/2022    EGFR 49 10/12/2021    CREATININE 1 19 02/28/2022    CREATININE 1 08 01/21/2022    CREATININE 1 06 10/12/2021     The baseline  PTH is high

## 2022-05-24 ENCOUNTER — TELEPHONE (OUTPATIENT)
Dept: INTERNAL MEDICINE CLINIC | Facility: CLINIC | Age: 82
End: 2022-05-24

## 2022-05-24 NOTE — TELEPHONE ENCOUNTER
Patient called with quarantine questions as she tested positive for covid 19  She was in close contact with her  , who was covid positive 2 weeks ago  Leena Burns said she tested positive last Wednesday and has been quarantining  She wanted to know if she needed to continue to quarantine, she did retest and was still positive  I offered her an appointment for Wednesday which she declined  I explained to her that usually it is a 10 day illness and after the first 5 days she could go out in public with the mask on as long as she had no fever  I also again offered her an appointment if she was not feeling any better and dr Regino Monge may advise her accordingly   Again the appointment was refused

## 2022-07-11 ENCOUNTER — LAB (OUTPATIENT)
Dept: LAB | Facility: CLINIC | Age: 82
End: 2022-07-11
Payer: MEDICARE

## 2022-07-11 DIAGNOSIS — K58.0 IRRITABLE BOWEL SYNDROME WITH DIARRHEA: ICD-10-CM

## 2022-07-11 DIAGNOSIS — E78.00 HYPERCHOLESTEREMIA: ICD-10-CM

## 2022-07-11 DIAGNOSIS — E83.52 HYPERCALCEMIA: ICD-10-CM

## 2022-07-11 DIAGNOSIS — E87.6 HYPOKALEMIA: ICD-10-CM

## 2022-07-11 DIAGNOSIS — I10 ESSENTIAL HYPERTENSION: ICD-10-CM

## 2022-07-11 DIAGNOSIS — R73.02 GLUCOSE INTOLERANCE (IMPAIRED GLUCOSE TOLERANCE): ICD-10-CM

## 2022-07-11 DIAGNOSIS — N18.32 STAGE 3B CHRONIC KIDNEY DISEASE (HCC): ICD-10-CM

## 2022-07-11 DIAGNOSIS — R79.89 ELEVATED PTHRP LEVEL: ICD-10-CM

## 2022-07-11 DIAGNOSIS — G25.81 RESTLESS LEGS: ICD-10-CM

## 2022-07-11 DIAGNOSIS — E55.9 VITAMIN D DEFICIENCY: ICD-10-CM

## 2022-07-11 LAB
ALBUMIN SERPL BCP-MCNC: 3.7 G/DL (ref 3.5–5)
ALP SERPL-CCNC: 84 U/L (ref 46–116)
ALT SERPL W P-5'-P-CCNC: 22 U/L (ref 12–78)
ANION GAP SERPL CALCULATED.3IONS-SCNC: 5 MMOL/L (ref 4–13)
AST SERPL W P-5'-P-CCNC: 18 U/L (ref 5–45)
BASOPHILS # BLD AUTO: 0.03 THOUSANDS/ΜL (ref 0–0.1)
BASOPHILS NFR BLD AUTO: 1 % (ref 0–1)
BILIRUB SERPL-MCNC: 0.91 MG/DL (ref 0.2–1)
BUN SERPL-MCNC: 29 MG/DL (ref 5–25)
CALCIUM SERPL-MCNC: 10.2 MG/DL (ref 8.3–10.1)
CHLORIDE SERPL-SCNC: 108 MMOL/L (ref 100–108)
CHOLEST SERPL-MCNC: 212 MG/DL
CO2 SERPL-SCNC: 24 MMOL/L (ref 21–32)
CREAT SERPL-MCNC: 0.91 MG/DL (ref 0.6–1.3)
EOSINOPHIL # BLD AUTO: 0.11 THOUSAND/ΜL (ref 0–0.61)
EOSINOPHIL NFR BLD AUTO: 2 % (ref 0–6)
ERYTHROCYTE [DISTWIDTH] IN BLOOD BY AUTOMATED COUNT: 13.9 % (ref 11.6–15.1)
GFR SERPL CREATININE-BSD FRML MDRD: 58 ML/MIN/1.73SQ M
GLUCOSE P FAST SERPL-MCNC: 100 MG/DL (ref 65–99)
HCT VFR BLD AUTO: 38 % (ref 34.8–46.1)
HDLC SERPL-MCNC: 59 MG/DL
HGB BLD-MCNC: 12.1 G/DL (ref 11.5–15.4)
IMM GRANULOCYTES # BLD AUTO: 0.01 THOUSAND/UL (ref 0–0.2)
IMM GRANULOCYTES NFR BLD AUTO: 0 % (ref 0–2)
LDLC SERPL CALC-MCNC: 119 MG/DL (ref 0–100)
LYMPHOCYTES # BLD AUTO: 2.96 THOUSANDS/ΜL (ref 0.6–4.47)
LYMPHOCYTES NFR BLD AUTO: 44 % (ref 14–44)
MCH RBC QN AUTO: 30.8 PG (ref 26.8–34.3)
MCHC RBC AUTO-ENTMCNC: 31.8 G/DL (ref 31.4–37.4)
MCV RBC AUTO: 97 FL (ref 82–98)
MONOCYTES # BLD AUTO: 0.47 THOUSAND/ΜL (ref 0.17–1.22)
MONOCYTES NFR BLD AUTO: 7 % (ref 4–12)
NEUTROPHILS # BLD AUTO: 3.08 THOUSANDS/ΜL (ref 1.85–7.62)
NEUTS SEG NFR BLD AUTO: 46 % (ref 43–75)
NONHDLC SERPL-MCNC: 153 MG/DL
NRBC BLD AUTO-RTO: 0 /100 WBCS
PLATELET # BLD AUTO: 213 THOUSANDS/UL (ref 149–390)
PMV BLD AUTO: 11.8 FL (ref 8.9–12.7)
POTASSIUM SERPL-SCNC: 4.4 MMOL/L (ref 3.5–5.3)
PROT SERPL-MCNC: 7.2 G/DL (ref 6.4–8.2)
RBC # BLD AUTO: 3.93 MILLION/UL (ref 3.81–5.12)
SODIUM SERPL-SCNC: 137 MMOL/L (ref 136–145)
TRIGL SERPL-MCNC: 170 MG/DL
WBC # BLD AUTO: 6.66 THOUSAND/UL (ref 4.31–10.16)

## 2022-07-11 PROCEDURE — 80053 COMPREHEN METABOLIC PANEL: CPT

## 2022-07-11 PROCEDURE — 85025 COMPLETE CBC W/AUTO DIFF WBC: CPT

## 2022-07-11 PROCEDURE — 36415 COLL VENOUS BLD VENIPUNCTURE: CPT

## 2022-07-11 PROCEDURE — 80061 LIPID PANEL: CPT

## 2022-07-12 ENCOUNTER — OFFICE VISIT (OUTPATIENT)
Dept: INTERNAL MEDICINE CLINIC | Facility: CLINIC | Age: 82
End: 2022-07-12
Payer: MEDICARE

## 2022-07-12 VITALS
BODY MASS INDEX: 23.03 KG/M2 | DIASTOLIC BLOOD PRESSURE: 76 MMHG | SYSTOLIC BLOOD PRESSURE: 132 MMHG | WEIGHT: 122 LBS | OXYGEN SATURATION: 97 % | HEART RATE: 75 BPM | HEIGHT: 61 IN

## 2022-07-12 DIAGNOSIS — I10 ESSENTIAL HYPERTENSION: ICD-10-CM

## 2022-07-12 DIAGNOSIS — G25.81 RESTLESS LEGS: ICD-10-CM

## 2022-07-12 DIAGNOSIS — R60.0 EDEMA OF EXTREMITIES: ICD-10-CM

## 2022-07-12 DIAGNOSIS — M54.41 CHRONIC RIGHT-SIDED LOW BACK PAIN WITH RIGHT-SIDED SCIATICA: ICD-10-CM

## 2022-07-12 DIAGNOSIS — M81.0 OSTEOPOROSIS WITHOUT CURRENT PATHOLOGICAL FRACTURE, UNSPECIFIED OSTEOPOROSIS TYPE: ICD-10-CM

## 2022-07-12 DIAGNOSIS — E78.00 HYPERCHOLESTEREMIA: ICD-10-CM

## 2022-07-12 DIAGNOSIS — E55.9 VITAMIN D DEFICIENCY: ICD-10-CM

## 2022-07-12 DIAGNOSIS — E87.6 HYPOPOTASSEMIA: ICD-10-CM

## 2022-07-12 DIAGNOSIS — G89.29 CHRONIC RIGHT-SIDED LOW BACK PAIN WITH RIGHT-SIDED SCIATICA: ICD-10-CM

## 2022-07-12 DIAGNOSIS — R79.89 ELEVATED PTHRP LEVEL: ICD-10-CM

## 2022-07-12 DIAGNOSIS — K21.9 GASTROESOPHAGEAL REFLUX DISEASE WITHOUT ESOPHAGITIS: ICD-10-CM

## 2022-07-12 DIAGNOSIS — D72.820 PERSISTENT LYMPHOCYTOSIS: ICD-10-CM

## 2022-07-12 DIAGNOSIS — J30.1 SEASONAL ALLERGIC RHINITIS DUE TO POLLEN: ICD-10-CM

## 2022-07-12 DIAGNOSIS — I73.00 RAYNAUD'S DISEASE WITHOUT GANGRENE: ICD-10-CM

## 2022-07-12 DIAGNOSIS — E87.6 HYPOKALEMIA: ICD-10-CM

## 2022-07-12 DIAGNOSIS — R73.02 GLUCOSE INTOLERANCE (IMPAIRED GLUCOSE TOLERANCE): ICD-10-CM

## 2022-07-12 DIAGNOSIS — M17.11 PRIMARY OSTEOARTHRITIS OF RIGHT KNEE: ICD-10-CM

## 2022-07-12 DIAGNOSIS — M15.9 PRIMARY OSTEOARTHRITIS INVOLVING MULTIPLE JOINTS: ICD-10-CM

## 2022-07-12 DIAGNOSIS — G43.809 OTHER MIGRAINE WITHOUT STATUS MIGRAINOSUS, NOT INTRACTABLE: ICD-10-CM

## 2022-07-12 DIAGNOSIS — K58.0 IRRITABLE BOWEL SYNDROME WITH DIARRHEA: ICD-10-CM

## 2022-07-12 DIAGNOSIS — I34.0 NONRHEUMATIC MITRAL VALVE REGURGITATION: ICD-10-CM

## 2022-07-12 DIAGNOSIS — N18.32 STAGE 3B CHRONIC KIDNEY DISEASE (HCC): Primary | ICD-10-CM

## 2022-07-12 PROBLEM — M54.40 CHRONIC RIGHT-SIDED LOW BACK PAIN WITH SCIATICA: Status: ACTIVE | Noted: 2022-07-12

## 2022-07-12 PROCEDURE — 99214 OFFICE O/P EST MOD 30 MIN: CPT | Performed by: INTERNAL MEDICINE

## 2022-07-12 RX ORDER — FUROSEMIDE 40 MG/1
40 TABLET ORAL EVERY MORNING
Qty: 90 TABLET | Refills: 1 | Status: SHIPPED | OUTPATIENT
Start: 2022-07-12

## 2022-07-12 RX ORDER — OLMESARTAN MEDOXOMIL 40 MG/1
40 TABLET ORAL DAILY
Qty: 90 TABLET | Refills: 1 | Status: SHIPPED | OUTPATIENT
Start: 2022-07-12

## 2022-07-12 RX ORDER — LEVOCETIRIZINE DIHYDROCHLORIDE 5 MG/1
5 TABLET, FILM COATED ORAL EVERY EVENING
Qty: 90 TABLET | Refills: 1 | Status: SHIPPED | OUTPATIENT
Start: 2022-07-12

## 2022-07-12 RX ORDER — POTASSIUM CHLORIDE 1500 MG/1
1 TABLET, FILM COATED, EXTENDED RELEASE ORAL DAILY
Qty: 90 TABLET | Refills: 1 | Status: SHIPPED | OUTPATIENT
Start: 2022-07-12

## 2022-07-12 RX ORDER — OMEPRAZOLE 20 MG/1
20 CAPSULE, DELAYED RELEASE ORAL DAILY
Qty: 90 CAPSULE | Refills: 1 | Status: SHIPPED | OUTPATIENT
Start: 2022-07-12

## 2022-07-12 NOTE — ASSESSMENT & PLAN NOTE
Acid reflux symptoms are well controlled  IBS symptoms persist   Recommend to follow with gastroenterologist patient will call on her own    Remains on omeprazole unable to come of

## 2022-07-12 NOTE — ASSESSMENT & PLAN NOTE
Acid reflux symptoms are well controlled  IBS symptoms persist   Recommend to follow with gastroenterologist patient will call on her own    Remains on omeprazole unable to come of    Up-to-date with EGD and colonoscopy 2 years ago per patient

## 2022-07-12 NOTE — ASSESSMENT & PLAN NOTE
Lab Results   Component Value Date    CHOLESTEROL 212 (H) 07/11/2022    CHOLESTEROL 216 (H) 10/12/2021    CHOLESTEROL 235 (H) 02/26/2021     Lab Results   Component Value Date    HDL 59 07/11/2022    HDL 55 10/12/2021    HDL 71 02/26/2021     Lab Results   Component Value Date    TRIG 170 (H) 07/11/2022    TRIG 203 (H) 10/12/2021    TRIG 117 02/26/2021     Lab Results   Component Value Date    NONHDLC 153 07/11/2022    [place] 161 10/12/2021    [place] 164 02/26/2021   LDL in 120 range recommend diet

## 2022-07-12 NOTE — PROGRESS NOTES
Swapna Mehnazmark Office Visit Note  22     Raiza Henderson 78 y o  female MRN: [id number]  : 1940    Assessment:     1  Stage 3b chronic kidney disease [facility])  Assessment & Plan:  Lab Results   Component Value Date    EGFR 58 2022    EGFR 42 2022    EGFR 47 2022    CREATININE 0 91 2022    CREATININE 1 19 2022    CREATININE 1 08 2022   GFR is baseline  Creat is baseline  Recommend periodic blood test for monitoring of BUN and Creat  Avoid Non Steroidal Antiinflammatory such as ibuprofen, aleve etc   Potassium, HCO3 and calcium are wnl and will require periodic blood test   Bone and Mineral disease monitoring as appropriate  All patient with GFR less than 30( CKD 4 or 5 or 6) advised to follow with nephrologist       Orders:  -     DXA bone density spine hip and pelvis; Future; Expected date: 2022    2  Edema of extremities  Assessment & Plan:  Edema is fair not a major issue continue Lasix renal function fair    Orders:  -     furosemide (LASIX) 40 mg tablet; Take 1 tablet (40 mg total) by mouth every morning    3  Seasonal allergic rhinitis due to pollen  Assessment & Plan:  Chronic significant allergy symptoms under care of ENT also does have a DNS remains on Xyzal saline nasal spray as well as Astelin nasal spray continue same    Orders:  -     levocetirizine (XYZAL) 5 MG tablet; Take 1 tablet (5 mg total) by mouth every evening    4  Essential hypertension  Assessment & Plan:  Blood pressure is well controlled most of the time in the range of 120-130  Continue same regimen    Orders:  -     olmesartan (BENICAR) 40 mg tablet; Take 1 tablet (40 mg total) by mouth daily    5  Hypopotassemia  -     Potassium Chloride ER 20 MEQ TBCR; Take 1 tablet (20 mEq total) by mouth daily    6  Gastroesophageal reflux disease without esophagitis  Assessment & Plan:  Acid reflux symptoms are well controlled    IBS symptoms persist   Recommend to follow with gastroenterologist patient will call on her own  Remains on omeprazole unable to come of    Orders:  -     omeprazole (PriLOSEC) 20 mg delayed release capsule; Take 1 capsule (20 mg total) by mouth daily    7  Primary osteoarthritis involving multiple joints    8  Primary osteoarthritis of right knee  Assessment & Plan:  11/17/2021:  Right knee x-ray: Moderate to severe narrowing of the patellofemoral compartment  Moderate narrowing of the medial joint compartment  Findings are worse compared to the prior study  Worsening of the right knee pain in the last few months  Continue Tylenol  Continue CBD oil  Will refer to the orthopedic doctor probably for steroid injection may require gel injection all this explained to the patient's  Eventually may need knee replacement  Will let them make decision    Orders:  -     Ambulatory Referral to Pain Management; Future  -     XR hip/pelv 2-3 vws right if performed; Future; Expected date: 07/19/2022  -     Ambulatory referral to Orthopedic Surgery; Future    9  Chronic right-sided low back pain with right-sided sciatica  Assessment & Plan:  02/28/2022:  Lumbar spine x-ray:  There is no evidence of acute fracture or destructive osseous lesion in the lumbar region  Mild loss of height in T11 and T12, likely not acute      Mild dextroscoliosis      Moderate lumbar degenerative change noted  Disc space narrowing at several levels  Moderate degenerative spurring      The pedicles appear intact      Atherosclerotic calcifications present      IMPRESSION:  1  Dextroscoliosis  Degenerative changes  2   Mild loss of height in T11 and T12, likely not acute for which correlation is advised  Continue Tylenol or for the low back pain  Recommend to apply lidocaine or Aspercreme in the lower back twice a day  Recommend to consider physical therapy  May benefit from pain management  Pain is radicular to the right hip    Recommend the right hip x-ray they can do it while patient sees the orthopedic doctor  Will meanwhile refer to the physical therapy  Orders:  -     Ambulatory Referral to Physical Therapy; Future  -     Ambulatory Referral to Pain Management; Future  -     XR hip/pelv 2-3 vws right if performed; Future; Expected date: 07/19/2022  -     DXA bone density spine hip and pelvis; Future; Expected date: 07/26/2022    10  Raynaud's disease without gangrene  Assessment & Plan:  Raynaud seen summertime not bad      11  Irritable bowel syndrome with diarrhea  Assessment & Plan:  Acid reflux symptoms are well controlled  IBS symptoms persist   Recommend to follow with gastroenterologist patient will call on her own  Remains on omeprazole unable to come of    Up-to-date with EGD and colonoscopy 2 years ago per patient      12  Glucose intolerance (impaired glucose tolerance)  Assessment & Plan:  Blood sugar fair  Will monitor      13  Other migraine without status migrainosus, not intractable  Assessment & Plan:  My migraine is fair  Will monitor      14  Nonrheumatic mitral valve regurgitation  Assessment & Plan:  Mild regurgitation is compensated  15  Restless legs    16  Hypercholesteremia  Assessment & Plan:  Lab Results   Component Value Date    CHOLESTEROL 212 (H) 07/11/2022    CHOLESTEROL 216 (H) 10/12/2021    CHOLESTEROL 235 (H) 02/26/2021     Lab Results   Component Value Date    HDL 59 07/11/2022    HDL 55 10/12/2021    HDL 71 02/26/2021     Lab Results   Component Value Date    TRIG 170 (H) 07/11/2022    TRIG 203 (H) 10/12/2021    TRIG 117 02/26/2021     Lab Results   Component Value Date    NONHDLC 153 07/11/2022    [place] 161 10/12/2021    [place] 164 02/26/2021   LDL in 120 range recommend diet        17   Persistent lymphocytosis  Assessment & Plan:  Lab Results   Component Value Date    WBC 6 66 07/11/2022    HGB 12 1 07/11/2022    HCT 38 0 07/11/2022    MCV 97 07/11/2022     07/11/2022   No differe done but WBC normal total hemoglobin normal platelet normal ntial        18  Hypokalemia  Assessment & Plan:  Lab Results   Component Value Date    SODIUM 137 07/11/2022    K 4 4 07/11/2022     07/11/2022    CO2 24 07/11/2022    BUN 29 (H) 07/11/2022    CREATININE 0 91 07/11/2022    GLUC 128 02/28/2022    CALCIUM 10 2 (H) 07/11/2022   Potassium fair        19  Vitamin D deficiency  Assessment & Plan:  History of PTH 91 6  Calcium 10 2  Vitamin D January 30 0 8  Remains on vitamin-D supplement    Orders:  -     DXA bone density spine hip and pelvis; Future; Expected date: 07/26/2022    20  Elevated PTHrP level  Assessment & Plan:  January 21, 2022 PTH 91 6 calcium remains standpoint to will monitor  Recommend DEXA scan    Orders:  -     DXA bone density spine hip and pelvis; Future; Expected date: 07/26/2022    21  Osteoporosis without current pathological fracture, unspecified osteoporosis type  -     DXA bone density spine hip and pelvis; Future; Expected date: 07/26/2022        Discussion Summary and Plan: Today's care plan and medications were reviewed with patient in detail and all their questions answered to their satisfaction  Chief Complaint   Patient presents with    Hypertension    Hyperlipidemia    Allergic Rhinitis    Arthritis     Right knee pain, right low back pain with radiculopathy to the upper thigh  Possible right hip pain   GERD     IBS  Some bloating  Some chronic diarrhea   Abnormal Lab     Elevated calcium 10 2 history of mild hyperparathyroidism      Subjective:    Patient is here for follow-up of multiple medical problems  For so fall patient continues to have right knee pain moderately-severe stay if right knee x-ray reviewed she does have a moderate to severe arthritis progressively getting worse  Patient has been taking Tylenol CBD oil  We talked about home seeing orthopedic doctor probably will benefit from steroid injection may require chicken gel and may require knee replacement eventually we home    The patient is willing to go and see orthopedic doctor name telephone number is being given  Also patient continues to have chronic low back pain moderate worse at nighttime increase on standing does have some radiculopathy to the right upper thigh posteriorly  Did of also does have some stiffness  Denies any tingling numbness weakness of the lower extremity  Of note of lumbar spine x-ray reviewed does have a dextroscoliosis, moderate degenerative joint disease, weight we talked about seeing pain management we talked about going for physical therapy sees willing to do both we will give referral for that may require MRI eventually of the  See H MRI and wants to hold of that at this time  She could not tolerate gabapentin    GI:  No significant acid reflux symptoms known case of IBS some gurgling and bloating at times  No nausea vomiting abdominal pain does get some bloating and some multiple loose bowel movement  We talked about seeing gastroenterologist he would like to hold off at this time  Patient claims that she had EGD and colonoscopy 2 years ago which was unremarkable  Recommend to see how gastroenterologist he will:  Her own  Denies any weight loss denies any blood in the bowel mucus in the bowel  Chronic allergies unchanged a chronic runny nose remains on Xyzal and saline nasal spray sees ear nose throat doctor periodically he does have a day deviated nasal septum  No more ear for pain  Migraine and headache fair  Mitral regurgitation:  Symptom-free previous echocardiogram 2019 LVH moderate TR 2+ MR PSA select pressure 40-50  Paragraphs varicose vein unchanged  Diverticulosis no recent flare-up  Edema of leg fair  Hyperlipidemia continue diet  Calcium slightly high 10 2 January 2022 PTH was 91 6 will monitor trend at this time    May need to see Endocrine for hypercalcemia hyperparathyroidism the            Bilirubin was slightly high previous time repeat 1 came back normal  Last calcium was slightly high in February 4-8-2021: MRI C Spine: Multilevel cervical spondylosis     02/26/2021:  Creatinine 1 15, blood sugar 109, calcium 10, , rest of the CMP lipid profile unremarkable  GFR 44, hemoglobin 11 8, lymphocytosis noted  Vitamin-D 36   3-: [place] , SSA, SSB, DS DNA, SCLE 70, CENROMERE AB, [place], RNP SPEP, ALL NEG, CRP AND SED RATE WNL   10/12/2021:  CMP normal except albumin 3 4, cholesterol 216 HDL 55,  total bilirubin 1 16  CBC normal except MCV 99 differential reveals lymphocytes 44%  01/21/2022:  CMP normal except bilirubin went up to 1 38,, PTH 91 6, vitamin-D 30 8, GFR 47, calcium was 9 9  02/28/2022:  CBC normal, CMP normal except blood sugar 128, BUN 31, sed rate normal, total bilirubin normal, and direct bilirubin normal 0 17, calcium was slightly high 10 2  07/11/2022:  Cholesterol 212, , rest lipid profile normal, CMP normal except GFR 58 calcium slightly high 10 2 blood sugar 100 BUN 29 CBC normal CBC normal                The following portions of the patient's history were reviewed and updated as appropriate: allergies, current medications, past family history, past medical history, past social history, past surgical history and problem list     Review of Systems   All other systems reviewed and are negative          Historical Information   Patient Active Problem List   Diagnosis    Baker cyst, right    Gastroesophageal reflux disease without esophagitis    Glucose intolerance (impaired glucose tolerance)    Restless legs    Hypercholesteremia    Mitral regurgitation    Migraine    Essential hypertension    Varicose vein of leg    Edema of extremities    Rhinitis, allergic    Aspirin intolerance    Persistent lymphocytosis    Hypokalemia    Irritable bowel syndrome    Abnormal echocardiogram    Vitamin D deficiency    Diverticulosis    History of syncope    Hammer toe of left foot    Raynaud's disease    Stage 3b chronic kidney disease ([facility] [address] )    Neck pain    Medicare annual wellness visit, subsequent    Cervical radiculopathy    Left arm pain    Rupture of left distal biceps tendon    Primary osteoarthritis involving multiple joints    HL (hearing loss)    Elevated PTHrP level    Lumbar radiculopathy    Abnormal LFTs    Hypercalcemia    Headache    Primary osteoarthritis of right knee    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     Past Medical History:   Diagnosis Date    Baker's cyst of knee 10/21/2016    right- burst on its own    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    hx of-fell when ice skating  Noted brain bleed w/swelling    HL (hearing loss)     Migraine     Raynaud's disease     both hands     Past Surgical History:   Procedure Laterality Date    AXILLARY SURGERY Left     fatty cyst removed, benign    CATARACT EXTRACTION Bilateral     CATARACT EXTRACTION W/ INTRAOCULAR LENS IMPLANT Right 11/10/2016    Procedure: EXTRACTION EXTRACAPSULAR CATARACT PHACO INTRAOCULAR LENS (IOL); Surgeon: Russ Stewart MD;  Location: [facility] OR;  Service:    [address] UTERUS      MYRINGOTOMY W/ TUBES  2011    both ears-one tube out on the left    MYRINGOTOMY W/ TUBES      UT SHLDR ARTHROSCOP,SURG,W/ROTAT CUFF REPR Right 5/4/2017    Procedure: ARTHROSCOPY SHOULDER WITH ROTATOR CUFF REPAIR, BICEPS TENODESIS, AND SUBACROMIAL DECOMPRESSION;  Surgeon: Penny Puckett MD;  Location: [facility] OR;  Service: Orthopedics    UT XCAPSL CTRC RMVL INSJ IO LENS PROSTH W/O ECP Left 10/20/2016    Procedure: EXTRACTION EXTRACAPSULAR CATARACT PHACO INTRAOCULAR LENS (IOL);   Surgeon: Russ Stewart MD;  Location: [facility] MAIN OR;  Service: Ophthalmology    SKIN BIOPSY      mole on chest    SKIN BIOPSY      under breast, benign     Social History     Substance and Sexual Activity   Alcohol Use Yes    Comment: socially     Social History     Substance and Sexual Activity Drug Use No     Social History     Tobacco Use   Smoking Status Former Smoker    Packs/day: 1 50    Years: 30 00    Pack years: 45 00    Quit date: 18    Years since quittin 5   Smokeless Tobacco Never Used     Family History   Problem Relation Age of Onset    Heart disease Mother         exp age 59 MI    Stroke Father     Macular degeneration Sister     Macular degeneration Brother     Diabetes Brother     Cancer Brother         kidney-nephrectomy    Kidney disease Brother         cancer     Health Maintenance Due   Topic    Osteoporosis Screening     Pneumococcal Vaccine: 65+ Years (1 - PCV)    COVID-19 Vaccine (4 - Booster for VuMed[employer] series)    Influenza Vaccine (1)      Meds/Allergies       Current Outpatient Medications:     acetaminophen (TYLENOL) 500 mg tablet, Take 1,000 mg by mouth every 6 (six) hours as needed for mild pain, Disp: , Rfl:     ALPHAGAN P 0 1 %, , Disp: , Rfl: 0    ascorbic acid (VITAMIN C) 500 mg tablet, Take 500 mg by mouth every morning, Disp: , Rfl:     Azelastine HCl 137 MCG/SPRAY SOLN, use 1 spray into each nostril twice a day, Disp: 30 mL, Rfl: 6    Biotin 5 MG CAPS, Take by mouth every morning, Disp: , Rfl:     Cyanocobalamin (VITAMIN B 12 PO), Take by mouth every morning, Disp: , Rfl:     furosemide (LASIX) 40 mg tablet, Take 1 tablet (40 mg total) by mouth every morning, Disp: 90 tablet, Rfl: 1    ipratropium (ATROVENT) 0 06 % nasal spray, instill 2 sprays into each nostril four times a day, Disp: 15 mL, Rfl: 11    levocetirizine (XYZAL) 5 MG tablet, Take 1 tablet (5 mg total) by mouth every evening, Disp: 90 tablet, Rfl: 1    Lutein 40 MG CAPS, Take by mouth every morning, Disp: , Rfl:     Magnesium 250 MG TABS, Take by mouth every morning, Disp: , Rfl:     olmesartan (BENICAR) 40 mg tablet, Take 1 tablet (40 mg total) by mouth daily, Disp: 90 tablet, Rfl: 1    omeprazole (PriLOSEC) 20 mg delayed release capsule, Take 1 capsule (20 mg total) by mouth daily, Disp: 90 capsule, Rfl: 1    Potassium Chloride ER 20 MEQ TBCR, Take 1 tablet (20 mEq total) by mouth daily, Disp: 90 tablet, Rfl: 1    SUMAtriptan (IMITREX) 50 mg tablet, Take 1 tablet (50 mg total) by mouth once as needed for migraine, Disp: 9 tablet, Rfl: 1    vitamin A 7500 UNIT capsule, Take 7,500 Units by mouth every morning  , Disp: , Rfl:     vitamin E, tocopherol, 400 units capsule, Take 400 Units by mouth every morning Last dose 4/26/17, Disp: , Rfl:     zinc gluconate 50 mg tablet, Take 50 mg by mouth every morning, Disp: , Rfl:     Misc Natural Products (TUMERSAID PO), Take 1 tablet by mouth daily, Disp: , Rfl:     rOPINIRole (REQUIP) 0 5 mg tablet, Take 1 tablet (0 5 mg total) by mouth daily at bedtime (Patient not taking: Reported on 7/12/2022), Disp: 90 tablet, Rfl: 1      Objective:    Vitals:   /76   Pulse 75   Ht 5' 1" (1 549 m)   Wt 55 3 kg (122 lb)   SpO2 97%   BMI 23 05 kg/m²   Body mass index is 23 05 kg/m²  Vitals:    07/12/22 1014   Weight: 55 3 kg (122 lb)       Physical Exam  Vitals and nursing note reviewed  Constitutional:       Appearance: She is well-developed  HENT:      Head: Normocephalic and atraumatic  Nose: Rhinorrhea present  Mouth/Throat:      Pharynx: Uvula midline  No oropharyngeal exudate or posterior oropharyngeal erythema  Eyes:      Conjunctiva/sclera: Conjunctivae normal    Neck:      Thyroid: No thyromegaly  Vascular: No JVD  Cardiovascular:      Rate and Rhythm: Regular rhythm  Heart sounds: Normal heart sounds  Pulmonary:      Effort: No respiratory distress  Breath sounds: Normal breath sounds  No wheezing or rales  Abdominal:      General: Bowel sounds are normal  There is no distension  Palpations: There is no mass  Tenderness: There is no abdominal tenderness  There is no rebound  Musculoskeletal:      Lumbar back: No deformity, spasms, tenderness or bony tenderness   Negative right straight leg raise test and negative left straight leg raise test  No scoliosis  Right knee: Deformity present  No bony tenderness  Decreased range of motion  No tenderness  Right lower leg: No edema  Left lower leg: No edema  Lymphadenopathy:      Cervical: No cervical adenopathy  Skin:     General: Skin is warm  Coloration: Skin is not jaundiced or pale  Findings: No rash  Neurological:      General: No focal deficit present  Mental Status: She is oriented to person, place, and time  Psychiatric:         Mood and Affect: Mood normal          Behavior: Behavior normal          Thought Content: Thought content normal          Judgment: Judgment normal          Lab Review   Lab on 07/11/2022   Component Date Value Ref Range Status    Sodium 07/11/2022 137  136 - 145 mmol/L Final    Potassium 07/11/2022 4 4  3 5 - 5 3 mmol/L Final    Chloride 07/11/2022 108  100 - 108 mmol/L Final    CO2 07/11/2022 24  21 - 32 mmol/L Final    ANION GAP 07/11/2022 5  4 - 13 mmol/L Final    BUN 07/11/2022 29 (A) 5 - 25 mg/dL Final    Creatinine 07/11/2022 0 91  0 60 - 1 30 mg/dL Final    Standardized to IDMS reference method    Glucose, Fasting 07/11/2022 100 (A) 65 - 99 mg/dL Final    Specimen collection should occur prior to Sulfasalazine administration due to the potential for falsely depressed results  Specimen collection should occur prior to Sulfapyridine administration due to the potential for falsely elevated results   Calcium 07/11/2022 10 2 (A) 8 3 - 10 1 mg/dL Final    AST 07/11/2022 18  5 - 45 U/L Final    Specimen collection should occur prior to Sulfasalazine administration due to the potential for falsely depressed results   ALT 07/11/2022 22  12 - 78 U/L Final    Specimen collection should occur prior to Sulfasalazine and/or Sulfapyridine administration due to the potential for falsely depressed results       Alkaline Phosphatase 07/11/2022 84  46 - 116 U/L Final    Total Protein 07/11/2022 7 2  6 4 - 8 2 g/dL Final    Albumin 07/11/2022 3 7  3 5 - 5 0 g/dL Final    Total Bilirubin 07/11/2022 0 91  0 20 - 1 00 mg/dL Final    Use of this assay is not recommended for patients undergoing treatment with eltrombopag due to the potential for falsely elevated results      eGFR 07/11/2022 58  ml/min/1 73sq m Final    WBC 07/11/2022 6 66  4 31 - 10 16 Thousand/uL Final    RBC 07/11/2022 3 93  3 81 - 5 12 Million/uL Final    Hemoglobin 07/11/2022 12 1  11 5 - 15 4 g/dL Final    Hematocrit 07/11/2022 38 0  34 8 - 46 1 % Final    MCV 07/11/2022 97  82 - 98 fL Final    MCH 07/11/2022 30 8  26 8 - 34 3 pg Final    MCHC 07/11/2022 31 8  31 4 - 37 4 g/dL Final    RDW 07/11/2022 13 9  11 6 - 15 1 % Final    MPV 07/11/2022 11 8  8 9 - 12 7 fL Final    Platelets 89/97/6674 213  149 - 390 Thousands/uL Final    nRBC 07/11/2022 0  /100 WBCs Final    Neutrophils Relative 07/11/2022 46  43 - 75 % Final    Immat GRANS % 07/11/2022 0  0 - 2 % Final    Lymphocytes Relative 07/11/2022 44  14 - 44 % Final    Monocytes Relative 07/11/2022 7  4 - 12 % Final    Eosinophils Relative 07/11/2022 2  0 - 6 % Final    Basophils Relative 07/11/2022 1  0 - 1 % Final    Neutrophils Absolute 07/11/2022 3 08  1 85 - 7 62 Thousands/µL Final    Immature Grans Absolute 07/11/2022 0 01  0 00 - 0 20 Thousand/uL Final    Lymphocytes Absolute 07/11/2022 2 96  0 60 - 4 47 Thousands/µL Final    Monocytes Absolute 07/11/2022 0 47  0 17 - 1 22 Thousand/µL Final    Eosinophils Absolute 07/11/2022 0 11  0 00 - 0 61 Thousand/µL Final    Basophils Absolute 07/11/2022 0 03  0 00 - 0 10 Thousands/µL Final    Cholesterol 07/11/2022 212 (A) See Comment mg/dL Final    Cholesterol:         Pediatric <18 Years        Desirable          <170 mg/dL      Borderline High    170-199 mg/dL      High               >=200 mg/dL        Adult >=18 Years            Desirable         <200 mg/dL      Borderline High   200-239 mg/dL      High              >239 mg/dL      Triglycerides 07/11/2022 170 (A) See Comment mg/dL Final    Triglyceride:     0-9Y            <75mg/dL     10Y-17Y         <90 mg/dL       >=18Y     Normal          <150 mg/dL     Borderline High 150-199 mg/dL     High            200-499 mg/dL        Very High       >499 mg/dL    Specimen collection should occur prior to N-Acetylcysteine or Metamizole administration due to the potential for falsely depressed results   HDL, Direct 07/11/2022 59  >=50 mg/dL Final    Specimen collection should occur prior to Metamizole administration due to the potential for falsley depressed results   LDL Calculated 07/11/2022 119 (A) 0 - 100 mg/dL Final    LDL Cholesterol:     Optimal           <100 mg/dl     Near Optimal      100-129 mg/dl     Above Optimal       Borderline High 130-159 mg/dl       High            160-189 mg/dl       Very High       >189 mg/dl         This screening LDL is a calculated result  It does not have the accuracy of the Direct Measured LDL in the monitoring of patients with hyperlipidemia and/or statin therapy  Direct Measure LDL (QGT781) must be ordered separately in these patients   Non-HDL-Chol (CHOL-HDL) 07/11/2022 153  mg/dl Final         Patient Instructions   1  Go ahead and set up an appointment with knee specialist doctor Dr Jing Ivey regarding your moderate to severe right knee osteoarthritis before further evaluation possible injections  2  Get right hip x-ray done  At your convenience you do not need appointment  3  Go for physical therapy regarding your right low back pain with radiculopathy  4  See pain management regarding your right low back pain moderately severe arthritis home    May require MRI will hold off at this time per your request     5  You need to follow with your gastroenterologist regarding your IBS, bloating diarrhea etc     6  Your parathyroid is slightly high we will monitor calcium will hold of endocrinologist per your request at this time  Monitor the trend  Continue to follow with ear nose throat doctor  Go for physical therapy I like to see back in 6 weeksFollow with Consultants as per their and our suggestion    Follow up in 6  week(s) or as needed earlier    Follow all instructions as advised and discussed  Take your medications as prescribed  Call the office immediately if you experience any side effects  Ask questions if you do not understand  Keep your scheduled appointment as advised or come sooner if necessary or in doubt  Best time to call for non-urgent matter or questions on weekdays is between 9am and 12 noon  See physician for any new symptoms or worsening of current symptoms  Urgent or emergent situations call 911 and report to nearest emergency room  I spent  30 minutes taking care of this patient including clinical care, conseling, collaboration, chart, lab and consultaion review  T on the low-cholesterol diet your cholesterol is in the range of 120         Dr Laila Pedersen MD  [facility]       "This note has been constructed using a voice recognition system  Therefore there may be syntax, spelling, and/or grammatical errors   Please call if you have any questions  "

## 2022-07-12 NOTE — ASSESSMENT & PLAN NOTE
Lab Results   Component Value Date    EGFR 58 07/11/2022    EGFR 42 02/28/2022    EGFR 47 01/21/2022    CREATININE 0 91 07/11/2022    CREATININE 1 19 02/28/2022    CREATININE 1 08 01/21/2022   GFR is baseline  Creat is baseline  Recommend periodic blood test for monitoring of BUN and Creat  Avoid Non Steroidal Antiinflammatory such as ibuprofen, aleve etc   Potassium, HCO3 and calcium are wnl and will require periodic blood test   Bone and Mineral disease monitoring as appropriate    All patient with GFR less than 30( CKD 4 or 5 or 6) advised to follow with nephrologist

## 2022-07-12 NOTE — ASSESSMENT & PLAN NOTE
Lab Results   Component Value Date    WBC 6 66 07/11/2022    HGB 12 1 07/11/2022    HCT 38 0 07/11/2022    MCV 97 07/11/2022     07/11/2022   No differe done but WBC normal total hemoglobin normal platelet normal ntial

## 2022-07-12 NOTE — ASSESSMENT & PLAN NOTE
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

## 2022-07-12 NOTE — PATIENT INSTRUCTIONS
1  Go ahead and set up an appointment with knee specialist doctor Dr Nika Hines regarding your moderate to severe right knee osteoarthritis before further evaluation possible injections  2  Get right hip x-ray done  At your convenience you do not need appointment  3  Go for physical therapy regarding your right low back pain with radiculopathy  4  See pain management regarding your right low back pain moderately severe arthritis home  May require MRI will hold off at this time per your request     5  You need to follow with your gastroenterologist regarding your IBS, bloating diarrhea etc     6  Your parathyroid is slightly high we will monitor calcium will hold of endocrinologist per your request at this time  Monitor the trend  Continue to follow with ear nose throat doctor  Go for physical therapy I like to see back in 6 weeksFollow with Consultants as per their and our suggestion    Follow up in 6  week(s) or as needed earlier    Follow all instructions as advised and discussed  Take your medications as prescribed  Call the office immediately if you experience any side effects  Ask questions if you do not understand  Keep your scheduled appointment as advised or come sooner if necessary or in doubt  Best time to call for non-urgent matter or questions on weekdays is between 9am and 12 noon  See physician for any new symptoms or worsening of current symptoms  Urgent or emergent situations call 911 and report to nearest emergency room  I spent  30 minutes taking care of this patient including clinical care, conseling, collaboration, chart, lab and consultaion review      T on the low-cholesterol diet your cholesterol is in the range of 120

## 2022-07-12 NOTE — ASSESSMENT & PLAN NOTE
Lab Results   Component Value Date    SODIUM 137 07/11/2022    K 4 4 07/11/2022     07/11/2022    CO2 24 07/11/2022    BUN 29 (H) 07/11/2022    CREATININE 0 91 07/11/2022    GLUC 128 02/28/2022    CALCIUM 10 2 (H) 07/11/2022   Potassium fair

## 2022-07-12 NOTE — ASSESSMENT & PLAN NOTE
02/28/2022:  Lumbar spine x-ray:  There is no evidence of acute fracture or destructive osseous lesion in the lumbar region  Mild loss of height in T11 and T12, likely not acute      Mild dextroscoliosis      Moderate lumbar degenerative change noted  Disc space narrowing at several levels  Moderate degenerative spurring      The pedicles appear intact      Atherosclerotic calcifications present      IMPRESSION:  1  Dextroscoliosis  Degenerative changes  2   Mild loss of height in T11 and T12, likely not acute for which correlation is advised  Continue Tylenol or for the low back pain  Recommend to apply lidocaine or Aspercreme in the lower back twice a day  Recommend to consider physical therapy  May benefit from pain management  Pain is radicular to the right hip  Recommend the right hip x-ray they can do it while patient sees the orthopedic doctor  Will meanwhile refer to the physical therapy

## 2022-07-12 NOTE — ASSESSMENT & PLAN NOTE
11/17/2021:  Right knee x-ray: Moderate to severe narrowing of the patellofemoral compartment  Moderate narrowing of the medial joint compartment  Findings are worse compared to the prior study  Worsening of the right knee pain in the last few months  Continue Tylenol  Continue CBD oil  Will refer to the orthopedic doctor probably for steroid injection may require gel injection all this explained to the patient's  Eventually may need knee replacement    Will let them make decision

## 2022-08-24 ENCOUNTER — HOSPITAL ENCOUNTER (OUTPATIENT)
Dept: RADIOLOGY | Facility: HOSPITAL | Age: 82
Discharge: HOME | End: 2022-08-24
Payer: MEDICARE

## 2022-08-24 DIAGNOSIS — M54.41 CHRONIC RIGHT-SIDED LOW BACK PAIN WITH RIGHT-SIDED SCIATICA: ICD-10-CM

## 2022-08-24 DIAGNOSIS — G89.29 CHRONIC RIGHT-SIDED LOW BACK PAIN WITH RIGHT-SIDED SCIATICA: ICD-10-CM

## 2022-08-24 DIAGNOSIS — M17.11 PRIMARY OSTEOARTHRITIS OF RIGHT KNEE: ICD-10-CM

## 2022-08-24 PROCEDURE — 73502 X-RAY EXAM HIP UNI 2-3 VIEWS: CPT

## 2022-08-31 ENCOUNTER — OFFICE VISIT (OUTPATIENT)
Dept: OBGYN CLINIC | Facility: CLINIC | Age: 82
End: 2022-08-31
Payer: MEDICARE

## 2022-08-31 ENCOUNTER — APPOINTMENT (OUTPATIENT)
Dept: RADIOLOGY | Facility: CLINIC | Age: 82
End: 2022-08-31
Payer: MEDICARE

## 2022-08-31 VITALS
BODY MASS INDEX: 23.22 KG/M2 | DIASTOLIC BLOOD PRESSURE: 68 MMHG | TEMPERATURE: 98.2 F | RESPIRATION RATE: 19 BRPM | SYSTOLIC BLOOD PRESSURE: 132 MMHG | HEART RATE: 68 BPM | HEIGHT: 61 IN | WEIGHT: 123 LBS

## 2022-08-31 DIAGNOSIS — M17.11 PRIMARY OSTEOARTHRITIS OF RIGHT KNEE: Primary | ICD-10-CM

## 2022-08-31 DIAGNOSIS — M17.11 PRIMARY OSTEOARTHRITIS OF RIGHT KNEE: ICD-10-CM

## 2022-08-31 DIAGNOSIS — M16.11 PRIMARY OSTEOARTHRITIS OF ONE HIP, RIGHT: ICD-10-CM

## 2022-08-31 PROCEDURE — 73562 X-RAY EXAM OF KNEE 3: CPT

## 2022-08-31 PROCEDURE — 99214 OFFICE O/P EST MOD 30 MIN: CPT | Performed by: ORTHOPAEDIC SURGERY

## 2022-08-31 NOTE — PROGRESS NOTES
Assessment/Plan:  1  Primary osteoarthritis of right knee  Ambulatory referral to Orthopedic Surgery    XR knee 3 vw right non injury    Ambulatory Referral to Physical Therapy   2  Primary osteoarthritis of one hip, right  Ambulatory Referral to Physical Therapy     Scribe Attestation    I,:  Ashley Tapia am acting as a scribe while in the presence of the attending physician :       I,:  Iban Puente MD personally performed the services described in this documentation    as scribed in my presence :         Judi Collins, upon exam today and review of her x-ray of her right knee and right hip demonstrates signs and symptoms consistent with moderate osteoarthritis of the knee and the hip  She also presents with weak hip flexion mild right knee swelling  In order to help alleviate pain and limit joint stiffness, I do recommend Judi Collins remain physically active  I would like her to attend physical therapy to strengthen the hip and knee musculature, especially hip flexion  She was given a referral for physical therapy in the office today  I will follow up with her in 6 weeks for repeat clinical evaluation  Subjective:   Brandi Tran is a 78 y o  female who presents for consultation of her right knee referred to us by Dr Moe Zleaya  She has pain on the anterior and deep aspect of the right knee  This pain has been present for a few months intermittently with some days worse than others  She has difficulty going up and down the stairs kneeling during her [employer] services  She also remarks difficulty with hip flexion and some pain in her right hip is well  She has had no previous treatment of the right knee or hip  Her PCP recommended physical therapy prior to seeing notes that she was unable to attend and wanted to see Dr Lazarus Riis first      Review of Systems   Constitutional: Negative for chills, fever and unexpected weight change  HENT: Negative for nosebleeds and sore throat      Eyes: Negative for pain, redness and visual disturbance  Respiratory: Negative for cough, shortness of breath and wheezing  Cardiovascular: Negative for chest pain, palpitations and leg swelling  Gastrointestinal: Negative for nausea and vomiting  Endocrine: Negative for polydipsia and polyuria  Genitourinary: Negative for dysuria and hematuria  Musculoskeletal: Positive for arthralgias, gait problem and myalgias  As noted in HPI   Skin: Negative for rash and wound  Neurological: Negative for dizziness, numbness and headaches  Psychiatric/Behavioral: Negative for decreased concentration  The patient is not nervous/anxious  Past Medical History:   Diagnosis Date    Baker's cyst of knee 10/21/2016    right- burst on its own    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[facility] [address] ) 1992    hx of-fell when ice skating  Noted brain bleed w/swelling    HL (hearing loss)     Migraine     Raynaud's disease     both hands       Past Surgical History:   Procedure Laterality Date    AXILLARY SURGERY Left     fatty cyst removed, benign    CATARACT EXTRACTION Bilateral     CATARACT EXTRACTION W/ INTRAOCULAR LENS IMPLANT Right 11/10/2016    Procedure: EXTRACTION EXTRACAPSULAR CATARACT PHACO INTRAOCULAR LENS (IOL); Surgeon: Sylvia Guillen MD;  Location: [facility] MAIN OR;  [facility]    [facility]      MYRINGOTOMY W/ TUBES  2011    both ears-one tube out on the left    MYRINGOTOMY W/ TUBES      ME SHLDR ARTHROSCOP,SURG,W/ROTAT CUFF REPR Right 5/4/2017    Procedure: ARTHROSCOPY SHOULDER WITH ROTATOR CUFF REPAIR, BICEPS TENODESIS, AND SUBACROMIAL DECOMPRESSION;  Surgeon: Mau Montanez MD;  Location[facility] MAIN OR;  Service: Orthopedics    ME XCAPSL CTRC RMVL INSJ IO LENS PROSTH W/O ECP Left 10/20/2016    Procedure: EXTRACTION EXTRACAPSULAR CATARACT PHACO INTRAOCULAR LENS (IOL);   Surgeon: Sylvia Guillen MD;  Location: [facility] MAIN OR;  Service: Ophthalmology    SKIN BIOPSY      mole on chest  SKIN BIOPSY      under breast, benign       Family History   Problem Relation Age of Onset    Heart disease Mother         exp age 59 MI    Stroke Father     Macular degeneration Sister     Macular degeneration Brother     Diabetes Brother     Cancer Brother         kidney-nephrectomy    Kidney disease Brother         cancer       Social History     Occupational History    Not on file   Tobacco Use    Smoking status: Former Smoker     Packs/day: 1 50     Years: 30 00     Pack years: 45 00     Quit date:      Years since quittin 6    Smokeless tobacco: Never Used   Substance and Sexual Activity    Alcohol use: Yes     Comment: socially    Drug use: No    Sexual activity: Not on file         Current Outpatient Medications:     acetaminophen (TYLENOL) 500 mg tablet, Take 1,000 mg by mouth every 6 (six) hours as needed for mild pain, Disp: , Rfl:     ALPHAGAN P 0 1 %, , Disp: , Rfl: 0    ascorbic acid (VITAMIN C) 500 mg tablet, Take 500 mg by mouth every morning, Disp: , Rfl:     Azelastine HCl 137 MCG/SPRAY SOLN, use 1 spray into each nostril twice a day, Disp: 30 mL, Rfl: 6    Biotin 5 MG CAPS, Take by mouth every morning, Disp: , Rfl:     Cyanocobalamin (VITAMIN B 12 PO), Take by mouth every morning, Disp: , Rfl:     furosemide (LASIX) 40 mg tablet, Take 1 tablet (40 mg total) by mouth every morning, Disp: 90 tablet, Rfl: 1    ipratropium (ATROVENT) 0 06 % nasal spray, instill 2 sprays into each nostril four times a day, Disp: 15 mL, Rfl: 11    levocetirizine (XYZAL) 5 MG tablet, Take 1 tablet (5 mg total) by mouth every evening, Disp: 90 tablet, Rfl: 1    Lutein 40 MG CAPS, Take by mouth every morning, Disp: , Rfl:     Magnesium 250 MG TABS, Take by mouth every morning, Disp: , Rfl:     Misc Natural Products (TUMERSAID PO), Take 1 tablet by mouth daily, Disp: , Rfl:     olmesartan (BENICAR) 40 mg tablet, Take 1 tablet (40 mg total) by mouth daily, Disp: 90 tablet, Rfl: 1   omeprazole (PriLOSEC) 20 mg delayed release capsule, Take 1 capsule (20 mg total) by mouth daily, Disp: 90 capsule, Rfl: 1    Potassium Chloride ER 20 MEQ TBCR, Take 1 tablet (20 mEq total) by mouth daily, Disp: 90 tablet, Rfl: 1    rOPINIRole (REQUIP) 0 5 mg tablet, Take 1 tablet (0 5 mg total) by mouth daily at bedtime, Disp: 90 tablet, Rfl: 1    SUMAtriptan (IMITREX) 50 mg tablet, Take 1 tablet (50 mg total) by mouth once as needed for migraine, Disp: 9 tablet, Rfl: 1    vitamin A 7500 UNIT capsule, Take 7,500 Units by mouth every morning  , Disp: , Rfl:     vitamin E, tocopherol, 400 units capsule, Take 400 Units by mouth every morning Last dose 4/26/17, Disp: , Rfl:     zinc gluconate 50 mg tablet, Take 50 mg by mouth every morning, Disp: , Rfl:     Allergies   Allergen Reactions    Lactose - Food Allergy GI Intolerance    Latex Itching     Elastic,adhesive tape    Dilantin [Phenytoin Sodium Extended] Rash    Other Rash     Adhesive tape, environmental    Penicillins Rash       Objective:  Vitals:    08/31/22 1056   BP: 132/68   Pulse: 68   Resp: 19   Temp: 98 2 °F (36 8 °C)       Right Knee Exam     Range of Motion   Extension: 0   Flexion: 110     Tests   Dejon:  Medial - negative Lateral - negative  Varus: negative Valgus: positive (1a)  Drawer:  Anterior - negative    Posterior - negative    Other   Erythema: absent  Scars: absent  Sensation: normal  Pulse: present  Swelling: mild  Effusion: effusion present      Right Hip Exam     Range of Motion   External rotation: 60   Internal rotation: 20     Muscle Strength   Flexion: 4/5           Observations     Right Knee   Positive for effusion  Physical Exam  Vitals reviewed  HENT:      Head: Normocephalic and atraumatic  Eyes:      General:         Right eye: No discharge  Left eye: No discharge  Conjunctiva/sclera: Conjunctivae normal       Pupils: Pupils are equal, round, and reactive to light     Cardiovascular:      Rate and Rhythm: Normal rate  Pulses: Normal pulses  Musculoskeletal:         General: Normal range of motion  Cervical back: Normal range of motion and neck supple  Right hip: Decreased strength  Right knee: Effusion present  Instability Tests: Medial Dejon test negative and lateral Dejon test negative  Comments: As noted in HPI   Skin:     General: Skin is warm  Neurological:      Mental Status: She is alert  I have personally reviewed pertinent films in PACS and my interpretation is as follows:   Xray of the right knee obtained today demonstrate moderate medial compartment osteoarthritis  Xray of the right hip obtained on 8/24/22 demonstrate moderate osteoarthritis of the femoroacetabular joint      This document was created using speech voice recognition software  Grammatical errors, random word insertions, pronoun errors, and incomplete sentences are an occasional consequence of this system due to software limitations, ambient noise, and hardware issues  Any formal questions or concerns about content, text, or information contained within the body of this dictation should be directly addressed to the provider for clarification

## 2022-09-08 ENCOUNTER — EVALUATION (OUTPATIENT)
Dept: PHYSICAL THERAPY | Facility: CLINIC | Age: 82
End: 2022-09-08
Payer: MEDICARE

## 2022-09-08 DIAGNOSIS — M16.11 PRIMARY OSTEOARTHRITIS OF ONE HIP, RIGHT: ICD-10-CM

## 2022-09-08 DIAGNOSIS — M17.11 PRIMARY OSTEOARTHRITIS OF RIGHT KNEE: ICD-10-CM

## 2022-09-08 PROCEDURE — 97162 PT EVAL MOD COMPLEX 30 MIN: CPT

## 2022-09-08 PROCEDURE — 97112 NEUROMUSCULAR REEDUCATION: CPT

## 2022-09-08 NOTE — PATIENT INSTRUCTIONS
Access Code: [email or web address]  URL: [email or web address]  Date: 09/08/2022  Prepared by: Lynsey Connelly    Exercises  Standing Hip Flexion AROM - 1 x daily - 7 x weekly - 2 sets - 10 reps  Standing Hip Extension - 1 x daily - 7 x weekly - 2 sets - 10 reps  Standing Hip Abduction - 1 x daily - 7 x weekly - 2 sets - 10 reps  Prone Quadriceps Stretch with Strap - 1 x daily - 7 x weekly - 2 sets - 10 reps - 5s hold

## 2022-09-08 NOTE — PROGRESS NOTES
PT Evaluation   Today's date: 2022  Patient name: Juany Cohen  : 1940  MRN: [id number]  Referring provider: Jeb Bergman*  Dx:   Encounter Diagnosis     ICD-10-CM    1  Primary osteoarthritis of right knee  M17 11 Ambulatory Referral to Physical Therapy   2  Primary osteoarthritis of one hip, right  M16 11 Ambulatory Referral to Physical Therapy         Assessment  Assessment details: Patient is a 78 y o  Female who presents with referring diagnosis of OA of right hip and knee  Patient's greatest concern is worry over not knowing what's wrong, fear of not being able to keep active and future ill health (and wanting to prevent it)  Primary movement impairment diagnosis of strength deficits of the hip and knee resulting in pathoanatomical symptoms of restricted motion, pain with function  The aforementioned impairments have limited the patient's ability to walk, stairs  No further referral appears necessary at this time based upon examination results    Patient education performed during today's session included: prognosis and diagnosis, HEP, PT expectations, Posture, Heat and Ice    Primary movement impairments:  1) Strength deficits hip and knee   2) ROM deficits    Etiological factors include: none        Impairments: Abnormal gait, Abnormal or restricted ROM, Activity intolerance, Impaired physical strength, Lacks appropriate HEP, Poor posture, Poor body mechanics and Pain with function  Understanding of Dx/Px/POC: Excellent  Prognosis: Good   Positive prognostic factors: positive attitude toward recovery, absence of peripheralization and absence of observed red flags   Negative prognostic factors: chronicity of symptoms, hypertension, high symptom irritability and multiple concurrent orthopedic problems    Patient verbalized understanding of POC  Please contact me if you have any questions or recommendations  Thank you for the referral and the opportunity to share in [place] care  Plan  Plan details: Joint mobilizations, STM/IASTM, Strengthening, Motor coordination training, Gait training, Stair training, Muscle flexibility, Functional lifting, Balance training and Posture education    Patient would benefit from: skilled physical therapy  Planned modality interventions: Cryotherapy and Thermotherapy: Hydrocollator Packs  Planned therapy interventions: activity modification, joint mobilization, manual therapy, motor coordination training, neuromuscular re-education, patient education, postural training, self care, therapeutic activities, therapeutic exercise, home exercise program, balance, behavior modification and transfer training  Frequency: 2x/week  Duration in weeks: 8  Plan of Care beginning date: 2022  Plan of Care expiration date: 12 weeks - 2022  Treatment plan discussed with: Patient       Goals  Short Term Goals (4 weeks):    - Patient will be independent in basic HEP 2-3 weeks  - Patient will report >50% reduction in pain  - Patient will demonstrate >1/3 improvement in MMT grade as applicable  - Demonstrate consistent posture corrections without cueing during therapeutic exercise    Long Term Goals (8 weeks):  - Patient will be independent in a comprehensive home exercise program  - Patient FOTO score will improve to 66/100  - Patient will self-report >75% improvement in function  - Patient will walk 10 minutes, min sx  - Patient will step over step to descend a flight of stairs      Subjective    History of Present Illness  - Mechanism of injury: Long time persistent pain on the right side, used to run when she was younger and fell a few times  Limited in walking 0 5 miles, stairs down>up  Fluctuates day to day  Tylenol and CBD and THC and those both help         Pain  - Current pain ratin/10  - At best pain ratin/10  - At worst pain ratin-9/10  - Location: anterior knee, posterior glute  - Aggravating factors: walking    Social Support  - Stairs in house: 2 flights   - Bedroom/bathroom set up: same floor  - Lives with:       Objective     Red Flag Screening  - Positive for: age >47 years old and LE neurological deficits  N/T in hammertoe  - Negative for: history of cancer, fever, chills, night sweats, weight loss, recent infection, immunosuppression, rest/night pain, saddle anesthesia and bladder dysfunction      Sensation  - Light touch: intact      LE MMT  LEFT RIGHT  -Hip Flexion:   3+ 3+  -Hip Extension:  3+ 3   -Hip Abduction: 4- 3+  -Hip Adduction: 4 4  -Hip IR:  4 3+  -Hip ER:  4 3+    -Knee Flexion  4 3+  -Knee Extension 4 3+    -Ankle DF  55  -Ankle PF  55      Lumbar Spine Range of Motion  Full range of motion with no pain or limitations in flexion, extension, lateral flexion and rotation    Hip Range of Motion    LEFT   RIGHT  - Flexion [facility]  WFL  - IR  WFL  50% limited  - ER  WFL  WFL  - Extension WFL  WFL    Knee Range of Motion    LEFT   RIGHT  - Flexion WFL  90 deg  - Extension [facility]  WFL     Ankle Range of Motion    LEFT   RIGHT  - DF  WFL  WFL  - PF  WFL  WFL    Palpation  LEFT  - Negative    RIGHT  - Positive: greater trochanter hip  Negative: patella, joint lines    Functional Assessment  -Functional Squat: back dominant  -Stair Negotiation: step to with descent, avoids painful leg  -Gait Assessment:  Gait deviations Arthrogenic               Insurance:  [place]/CMS Eval/ Re-eval POC expires Al Theodore #/ Referral # Total units  Start date  Expiration date Extension  Visit limitation? PT only or  PT+OT?  Co-Insurance   [facility] - CMS 9/8/2022 12 weeks - 12/1/2022 63        20%                                                               "Va"    Date 9/8/2022        Visit Number IE        Manual         LAD 3x30s RLE                                   Neuro Re-Ed         Bridges         Squats         Hip 3 way 2x10 ea TherEx         Bike or NS warm up         Knee flexion ROM 2x10 5s hold prone        Add isometric         Abd isometric                                    TherAct         Patient education 5'        Lateral step down         Side steps         Step tap on stair                  Gait Training         Step over                           Modalities         CP               Precautions: none  Past Medical History:   Diagnosis Date    Baker's cyst of knee 10/21/2016    right- burst on its own    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[facility] [address] ) 1992    hx of-fell when ice skating   Noted brain bleed w/swelling    HL (hearing loss)     Migraine     Raynaud's disease     both hands

## 2022-09-13 ENCOUNTER — OFFICE VISIT (OUTPATIENT)
Dept: PHYSICAL THERAPY | Facility: CLINIC | Age: 82
End: 2022-09-13
Payer: MEDICARE

## 2022-09-13 DIAGNOSIS — M16.11 PRIMARY OSTEOARTHRITIS OF ONE HIP, RIGHT: ICD-10-CM

## 2022-09-13 DIAGNOSIS — M17.11 PRIMARY OSTEOARTHRITIS OF RIGHT KNEE: Primary | ICD-10-CM

## 2022-09-13 PROCEDURE — 97110 THERAPEUTIC EXERCISES: CPT

## 2022-09-13 PROCEDURE — 97112 NEUROMUSCULAR REEDUCATION: CPT

## 2022-09-13 PROCEDURE — 97140 MANUAL THERAPY 1/> REGIONS: CPT

## 2022-09-13 NOTE — PROGRESS NOTES
Daily Note     Today's date: 2022  Patient name: Kathy Huggins  : 1940  MRN: [id number]  Referring provider: Amada Yun*  Dx:   Encounter Diagnosis     ICD-10-CM    1  Primary osteoarthritis of right knee  M17 11    2  Primary osteoarthritis of one hip, right  M16 11                   Subjective: Patient reports she was able to be on her feet for a train festival this weekend with min sx  She was sore for 1 hour after last visit  Objective: See treatment diary below      Assessment: Tolerated treatment well  Patient demonstrated fatigue post treatment, exhibited good technique with therapeutic exercises and would benefit from continued PT Tolerated additional strengthening well, monitor response at f/u and update HEP  Plan: Continue per plan of care  Insurance:  [place]/CMS Eval/ Re-eval POC expires Shavon Choudhary #/ Referral # Total units  Start date  Expiration date Extension  Visit limitation? PT only or  PT+OT?  Co-Insurance   [facility] - CMS 2022 12 weeks - 2022 63        20%                                                               "Va"    Date 2022       Visit Number IE 2/10       Manual         LAD 3x30s RLE 3x30s RLE                                  Neuro Re-Ed         Bridges  3x10       Squats  2x10       Hip 3 way 2x10 ea 2x10 ea xlight loop                                                                      TherEx         Bike or NS warm up  NS L1 7'       Knee flexion ROM 2x10 5s hold prone x20 5s hold prone       Add isometric  3x10 5s hold       Abd isometric  3x10 5s hold                                  TherAct         Patient education 5'        Lateral step down         Side steps  2 laps 15' xlight loop       Step tap on stair  3x20 alt 10" step                Gait Training         Step over                           Modalities         CP               Precautions: none  Past Medical History:   Diagnosis Date    Baker's cyst of knee 10/21/2016    right- burst on its own    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[facility] [address] ) 1992    hx of-fell when ice skating   Noted brain bleed w/swelling    HL (hearing loss)     Migraine     Raynaud's disease     both hands

## 2022-09-15 ENCOUNTER — OFFICE VISIT (OUTPATIENT)
Dept: PHYSICAL THERAPY | Facility: CLINIC | Age: 82
End: 2022-09-15
Payer: MEDICARE

## 2022-09-15 DIAGNOSIS — M17.11 PRIMARY OSTEOARTHRITIS OF RIGHT KNEE: Primary | ICD-10-CM

## 2022-09-15 DIAGNOSIS — M16.11 PRIMARY OSTEOARTHRITIS OF ONE HIP, RIGHT: ICD-10-CM

## 2022-09-15 PROCEDURE — 97110 THERAPEUTIC EXERCISES: CPT

## 2022-09-15 PROCEDURE — 97112 NEUROMUSCULAR REEDUCATION: CPT

## 2022-09-15 NOTE — PATIENT INSTRUCTIONS
Access Code: [address] [facility]  URL: [email or web address]  Date: 09/15/2022  Prepared by: Antonio Johnson    Exercises  Supine Bridge - 1 x daily - 7 x weekly - 3 sets - 10 reps  Squat - 1 x daily - 7 x weekly - 3 sets - 10 reps

## 2022-09-15 NOTE — PROGRESS NOTES
Daily Note     Today's date: 9/15/2022  Patient name: Dilshad Law  : 1940  MRN: [id number]  Referring provider: Denis Mendiola*  Dx:   Encounter Diagnosis     ICD-10-CM    1  Primary osteoarthritis of right knee  M17 11    2  Primary osteoarthritis of one hip, right  M16 11                   Subjective: Patient reports soreness lasting about 30 minutes after last visit  Objective: See treatment diary below      Assessment: Tolerated treatment well  Patient demonstrated fatigue post treatment, exhibited good technique with therapeutic exercises and would benefit from continued PT Tolerated additional strengthening well, most challenged by ROM  Plan: Continue per plan of care  Insurance:  [place]/CMS Eval/ Re-eval POC expires Marlynn Favor #/ Referral # Total units  Start date  Expiration date Extension  Visit limitation? PT only or  PT+OT?  Co-Insurance   [facility] - CMS 2022 12 weeks - 2022 63        20%                                                               "Va"    Date 2022 9/13/22 9/15/22      Visit Number IE 2/10 3/10      Manual         LAD 3x30s RLE 3x30s RLE                                  Neuro Re-Ed         Bridges  3x10 3x10      Squats  2x10 2x10      Hip 3 way 2x10 ea 2x10 ea xlight loop 2x10 ea xlight loop      Forced stabilization on RLE   Tennis ball 3x30s                                                            TherEx         Bike or NS warm up  NS L1 7' NS L1 8'      Knee flexion ROM 2x10 5s hold prone x20 5s hold prone x20 5s hold prone      Add isometric  3x10 5s hold 3x10 5s hold      Abd isometric  3x10 5s hold 3x10 5s hold      HS curl peanut   3x10                        TherAct         Patient education 5'        Lateral step down   x10 ea 4" step      Side steps  2 laps 15' xlight loop 3 laps 15' xlight loop      Step tap on stair  3x20 alt 10" step 3x20 alt 10" step               Gait Training         Step over Modalities         CP               Precautions: none  Past Medical History:   Diagnosis Date    Baker's cyst of knee 10/21/2016    right- burst on its own    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[facility] [address] ) 1992    hx of-fell when ice skating   Noted brain bleed w/swelling    HL (hearing loss)     Migraine     Raynaud's disease     both hands

## 2022-09-20 ENCOUNTER — OFFICE VISIT (OUTPATIENT)
Dept: PHYSICAL THERAPY | Facility: CLINIC | Age: 82
End: 2022-09-20
Payer: MEDICARE

## 2022-09-20 DIAGNOSIS — M16.11 PRIMARY OSTEOARTHRITIS OF ONE HIP, RIGHT: ICD-10-CM

## 2022-09-20 DIAGNOSIS — M17.11 PRIMARY OSTEOARTHRITIS OF RIGHT KNEE: Primary | ICD-10-CM

## 2022-09-20 PROCEDURE — 97112 NEUROMUSCULAR REEDUCATION: CPT

## 2022-09-20 NOTE — PROGRESS NOTES
Daily Note     Today's date: 2022  Patient name: John Gao  : 1940  MRN: [id number]  Referring provider: Shakeel Gallardo*  Dx:   Encounter Diagnosis     ICD-10-CM    1  Primary osteoarthritis of right knee  M17 11    2  Primary osteoarthritis of one hip, right  M16 11                   Subjective: Patient reports soreness lasting until lunch after last visit  The right side is feeling better  She has been walking a lot and it has been feeling ok  Objective: See treatment diary below      Assessment: Tolerated treatment well  Patient demonstrated fatigue post treatment, exhibited good technique with therapeutic exercises and would benefit from continued PT Focused on using the hip abductors to control pelvic motion and using the glutes to drive the step up, stabilize the LE  Plan: Continue per plan of care  FOTO NV     Insurance:  [place]/CMS Eval/ Re-eval POC expires Carlos Boast #/ Referral # Total units  Start date  Expiration date Extension  Visit limitation? PT only or  PT+OT?  Co-Insurance   [facility] - CMS 2022 12 weeks - 2022 63        20%                                                               "Va"    Date 2022 9/13/22 9/15/22 9/20/22     Visit Number  3/10 4/10     Manual         LAD 3x30s RLE 3x30s RLE                                  Neuro Re-Ed         Bridges  3x10 3x10 3x10     Squats  2x10 2x10 3x10     Hip 3 way 2x10 ea 2x10 ea xlight loop 2x10 ea xlight loop VR     Forced stabilization on RLE   Tennis ball 3x30s Tennis ball 3x30s     Side steps   2 laps 15' xlight loop 3 laps 15' xlight loop 6 laps 6' xlight loop     Lateral step down   x10 ea 4" step 2x10 ea 4" step     Step up    3x10 8" step     Clamshell    2x10 ea                       TherEx         Bike or NS warm up  NS L1 7' NS L1 8' NS L2-4 8'     Knee flexion ROM 2x10 5s hold prone x20 5s hold prone x20 5s hold prone      Add isometric  3x10 5s hold 3x10 5s hold 3x10 5s hold     Abd isometric  3x10 5s hold 3x10 5s hold      HS curl peanut   3x10 x25     Piriformis stretch    2x30s              TherAct         Patient education 5'   5'                       Step tap on stair  3x20 alt 10" step 3x20 alt 10" step 3x20 alt 10" step              Gait Training         Step over                           Modalities         CP               Precautions: none  Past Medical History:   Diagnosis Date    Baker's cyst of knee 10/21/2016    right- burst on its own    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[facility] [address] ) 1992    hx of-fell when ice skating   Noted brain bleed w/swelling    HL (hearing loss)     Migraine     Raynaud's disease     both hands

## 2022-09-22 ENCOUNTER — OFFICE VISIT (OUTPATIENT)
Dept: PHYSICAL THERAPY | Facility: CLINIC | Age: 82
End: 2022-09-22
Payer: MEDICARE

## 2022-09-22 DIAGNOSIS — M16.11 PRIMARY OSTEOARTHRITIS OF ONE HIP, RIGHT: ICD-10-CM

## 2022-09-22 DIAGNOSIS — M17.11 PRIMARY OSTEOARTHRITIS OF RIGHT KNEE: Primary | ICD-10-CM

## 2022-09-22 PROCEDURE — 97110 THERAPEUTIC EXERCISES: CPT

## 2022-09-22 PROCEDURE — 97112 NEUROMUSCULAR REEDUCATION: CPT

## 2022-09-22 PROCEDURE — 97530 THERAPEUTIC ACTIVITIES: CPT

## 2022-09-22 NOTE — PROGRESS NOTES
Daily Note     Today's date: 2022  Patient name: Lul Hsieh  : 1940  MRN: [id number]  Referring provider: Dolly Rodriguez  Dx:   Encounter Diagnosis     ICD-10-CM    1  Primary osteoarthritis of right knee  M17 11    2  Primary osteoarthritis of one hip, right  M16 11                   Subjective: Patient reports she felt ok after last visit, she had some stiffness after sitting for a few hours but less soreness  Objective: See treatment diary below      Assessment: Tolerated treatment well  Patient demonstrated fatigue post treatment, exhibited good technique with therapeutic exercises and would benefit from continued PT Patient tolerated progressions well with no reports of pain within session  Continued focuse on using the hip abductors to control pelvic motion, using the glutes to drive the step up, recruitment of the hip musculature to support the lower extremity and use of tactile cues as biofeedback for recruitment of appropriate musculature     Plan: Continue per plan of care  Potential DC in 2 visits  Insurance:  [place]/CMS Eval/ Re-eval POC expires Alexys Wilson #/ Referral # Total units  Start date  Expiration date Extension  Visit limitation? PT only or  PT+OT?  Co-Insurance   [facility] - CMS 2022 12 weeks - 2022 63        20%       - DC                                                         "Va"    Date 2022 9/13/22 9/15/22 9/20/22 9/22/22    Visit Number IE 2/10 3/10 4/10 5/10    Manual         LAD 3x30s RLE 3x30s RLE                                  Neuro Re-Ed         Bridges  3x10 3x10 3x10 3x10 marching    Squats  2x10 2x10 3x10 3x10    Hip 3 way 2x10 ea 2x10 ea xlight loop 2x10 ea xlight loop VR     Forced stabilization on RLE   Tennis ball 3x30s Tennis ball 3x30s Tennis ball 3x30s    Side steps   2 laps 15' xlight loop 3 laps 15' xlight loop 6 laps 6' xlight loop 3 laps x15' xlight loop    Lateral step down   x10 ea 4" step 2x10 ea 4" step 4x5 ea 8" step TC    Step up    3x10 8" step 3x10 8" step    Clamshell    2x10 ea 2x10 ea                      TherEx         Bike or NS warm up  NS L1 7' NS L1 8' NS L2-4 8' NS L4 8'    Knee flexion ROM 2x10 5s hold prone x20 5s hold prone x20 5s hold prone      Add isometric  3x10 5s hold 3x10 5s hold 3x10 5s hold 3x10 5s hold    Abd isometric  3x10 5s hold 3x10 5s hold DC     HS curl peanut   3x10 x25 x30    Piriformis stretch    2x30s 2x30s    Leg press     65# ecc 3x10    TherAct         Patient education 5'   5' 5'                      Step tap on stair  3x20 alt 10" step 3x20 alt 10" step 3x20 alt 10" step DC             Gait Training         Step over                           Modalities         CP               Precautions: none  Past Medical History:   Diagnosis Date    Baker's cyst of knee 10/21/2016    right- burst on its own    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    hx of-fell when ice skating   Noted brain bleed w/swelling    HL (hearing loss)     Migraine     Raynaud's disease     both hands

## 2022-09-27 ENCOUNTER — OFFICE VISIT (OUTPATIENT)
Dept: PHYSICAL THERAPY | Facility: CLINIC | Age: 82
End: 2022-09-27
Payer: MEDICARE

## 2022-09-27 DIAGNOSIS — M16.11 PRIMARY OSTEOARTHRITIS OF ONE HIP, RIGHT: ICD-10-CM

## 2022-09-27 DIAGNOSIS — M17.11 PRIMARY OSTEOARTHRITIS OF RIGHT KNEE: Primary | ICD-10-CM

## 2022-09-27 PROCEDURE — 97112 NEUROMUSCULAR REEDUCATION: CPT

## 2022-09-27 PROCEDURE — 97110 THERAPEUTIC EXERCISES: CPT

## 2022-09-27 NOTE — PROGRESS NOTES
Daily Note     Today's date: 2022  Patient name: Dilshad Law  : 1940  MRN: [id number]  Referring provider: Denis Mendiola*  Dx:   Encounter Diagnosis     ICD-10-CM    1  Primary osteoarthritis of right knee  M17 11    2  Primary osteoarthritis of one hip, right  M16 11        Start Time: 08  Stop Time: 925  Total time in clinic (min): 40 minutes    Subjective: Patient with no new complaints at the start of today's session  "I survived all the rain we had over the weekend without more pain even with all my arthritis "      Objective: See treatment diary below      Assessment: Tolerated treatment well  Patient did well w/ glute progressions during today's session; provided w/ graded vc to limit compensatory lumbar extension w/ clamshells  Patient w/ knee valgus during standing progressions that improved w/ verbal cues  Patient w/ fatigue post session  Patient w/ fatigue post session  Patient will continue to benefit from skilled PT to improve functional mobility and activity tolerance  Plan: Continue per plan of care  Progress per primary PT nv  Insurance:  [place]/CMS Eval/ Re-eval POC expires Bob Favor #/ Referral # Total units  Start date  Expiration date Extension  Visit limitation? PT only or  PT+OT?  Co-Insurance   [facility] - CMS 2022 12 weeks - 2022 63        20%       - DC                                                         "Va"    Date 2022 9/13/22 9/15/22 9/20/22 9/22/22 9/27/22   Visit Number IE 2/10 3/10 4/10 5/10 6/10   Manual         LAD 3x30s RLE 3x30s RLE                                  Neuro Re-Ed         Bridges  3x10 3x10 3x10 3x10 marching 2x10 each LE   Squats  2x10 2x10 3x10 3x10 3x10 (circuit train w/ step ups and lateral step down)   Hip 3 way 2x10 ea 2x10 ea xlight loop 2x10 ea xlight loop VR     Forced stabilization on RLE   Tennis ball 3x30s Tennis ball 3x30s Tennis ball 3x30s Yellow t-band ball, 2x15 each   Side steps   2 laps 15' xlight loop 3 laps 15' xlight loop 6 laps 6' xlight loop 3 laps x15' xlight loop    Lateral step down   x10 ea 4" step 2x10 ea 4" step 4x5 ea 8" step TC 6" 2x10 each (circuit train w/ step ups and squats)   Step up    3x10 8" step 3x10 8" step 6" 3x10 (circuit train w/ lateral step downs and squats)   Clamshell    2x10 ea 2x10 ea 3x10 each                     TherEx         Bike or NS warm up  NS L1 7' NS L1 8' NS L2-4 8' NS L4 8' NS, level 4, 10 min   Knee flexion ROM 2x10 5s hold prone x20 5s hold prone x20 5s hold prone      Add isometric  3x10 5s hold 3x10 5s hold 3x10 5s hold 3x10 5s hold 5" x30   Abd isometric  3x10 5s hold 3x10 5s hold DC     HS curl peanut   3x10 x25 x30 x30   Piriformis stretch    2x30s 2x30s 3x30" each   Leg press     65# ecc 3x10 65# 3x10   TherAct         Patient education 5'   5' 5'                      Step tap on stair  3x20 alt 10" step 3x20 alt 10" step 3x20 alt 10" step DC             Gait Training         Step over                           Modalities         CP               Precautions: none  Past Medical History:   Diagnosis Date    Baker's cyst of knee 10/21/2016    right- burst on its own    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[facility] [address] ) 1992    hx of-fell when ice skating   Noted brain bleed w/swelling    HL (hearing loss)     Migraine     Raynaud's disease     both hands

## 2022-09-28 ENCOUNTER — HOSPITAL ENCOUNTER (OUTPATIENT)
Dept: RADIOLOGY | Facility: HOSPITAL | Age: 82
Discharge: HOME | End: 2022-09-28
Attending: INTERNAL MEDICINE
Payer: MEDICARE

## 2022-09-28 DIAGNOSIS — N18.32 STAGE 3B CHRONIC KIDNEY DISEASE (HCC): ICD-10-CM

## 2022-09-28 DIAGNOSIS — M81.0 OSTEOPOROSIS WITHOUT CURRENT PATHOLOGICAL FRACTURE, UNSPECIFIED OSTEOPOROSIS TYPE: ICD-10-CM

## 2022-09-28 DIAGNOSIS — M54.41 CHRONIC RIGHT-SIDED LOW BACK PAIN WITH RIGHT-SIDED SCIATICA: ICD-10-CM

## 2022-09-28 DIAGNOSIS — G89.29 CHRONIC RIGHT-SIDED LOW BACK PAIN WITH RIGHT-SIDED SCIATICA: ICD-10-CM

## 2022-09-28 DIAGNOSIS — R79.89 ELEVATED PTHRP LEVEL: ICD-10-CM

## 2022-09-28 DIAGNOSIS — E55.9 VITAMIN D DEFICIENCY: ICD-10-CM

## 2022-09-28 PROCEDURE — 77080 DXA BONE DENSITY AXIAL: CPT

## 2022-09-29 ENCOUNTER — OFFICE VISIT (OUTPATIENT)
Dept: PHYSICAL THERAPY | Facility: CLINIC | Age: 82
End: 2022-09-29
Payer: MEDICARE

## 2022-09-29 DIAGNOSIS — M16.11 PRIMARY OSTEOARTHRITIS OF ONE HIP, RIGHT: ICD-10-CM

## 2022-09-29 DIAGNOSIS — M17.11 PRIMARY OSTEOARTHRITIS OF RIGHT KNEE: Primary | ICD-10-CM

## 2022-09-29 PROCEDURE — 97110 THERAPEUTIC EXERCISES: CPT

## 2022-09-29 PROCEDURE — 97112 NEUROMUSCULAR REEDUCATION: CPT

## 2022-09-29 NOTE — PROGRESS NOTES
Daily Note     Today's date: 2022  Patient name: Regina Stewart  : 1940  MRN: [id number]  Referring provider: Mariangel Allred*  Dx:   Encounter Diagnosis     ICD-10-CM    1  Primary osteoarthritis of right knee  M17 11    2  Primary osteoarthritis of one hip, right  M16 11        Start Time: 845  Stop Time: 928  Total time in clinic (min): 43 minutes    Subjective: Patient with no new complaints at the start of today's session  States she is pleased with her progress to this point  Objective: See treatment diary below      Assessment: Tolerated treatment well  Patient req consistent UE support despite vc during force stab during today's session  Pt with improving motor control w/ lateral step downs, with improved ability to limit knee valgus  Patient did well w/ glute progressions, able to progress reps and sets  Patient progressing well towards previously set goals  Patient will continue to benefit from skilled PT to improve functional mobility and activity tolerance  Plan: Continue per plan of care  Insurance:  [place]/CMS Eval/ Re-eval POC expires Therisa Rosibel #/ Referral # Total units  Start date  Expiration date Extension  Visit limitation? PT only or  PT+OT?  Co-Insurance   [facility] - CMS 2022 12 weeks - 2022 63        20%       DC                                                         "Va"    Date 9/29/22  9/15/22 9/20/22 9/22/22 9/27/22   Visit Number 7/10  3/10 4/10 5/10 6/10   Manual         LAD                                    Neuro Re-Ed         Bridges 3x10  3x10 3x10 3x10 marching 2x10 each LE   Squats 3x10 (circuit train w/ step ups and lateral step down)  2x10 3x10 3x10 3x10 (circuit train w/ step ups and lateral step down)   Hip 3 way   2x10 ea xlight loop VR     Forced stabilization on RLE Yellow t-band ball, 2x15 each  Tennis ball 3x30s Tennis ball 3x30s Tennis ball 3x30s Yellow t-band ball, 2x15 each   Side steps  5 laps, 15 ft, x-light loop at ankles  3 laps 15' xlight loop 6 laps 6' xlight loop 3 laps x15' xlight loop    Lateral step down 6" 2x10 each (circuit train w/ step ups and squats)  x10 ea 4" step 2x10 ea 4" step 4x5 ea 8" step TC 6" 2x10 each (circuit train w/ step ups and squats)   Step up 6" 3x10 (circuit train w/ lateral step downs and squats)   3x10 8" step 3x10 8" step 6" 3x10 (circuit train w/ lateral step downs and squats)   Clamshell 3x10 each   2x10 ea 2x10 ea 3x10 each                     TherEx         Bike or NS warm up Bike, 10 min  NS L1 8' NS L2-4 8' NS L4 8' NS, level 4, 10 min   Knee flexion ROM   x20 5s hold prone      Add isometric 5" x30  3x10 5s hold 3x10 5s hold 3x10 5s hold 5" x30   Abd isometric   3x10 5s hold DC     HS curl peanut x30  3x10 x25 x30 x30   Piriformis stretch 3x30" each   2x30s 2x30s 3x30" each   Leg press 65# 3x12    65# ecc 3x10 65# 3x10   HR x30 (active rest)                          TherAct         Patient education    5' 5'                      Step tap on stair   3x20 alt 10" step 3x20 alt 10" step DC             Gait Training         Step over                           Modalities         CP               Precautions: none  Past Medical History:   Diagnosis Date    Baker's cyst of knee 10/21/2016    right- burst on its own    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[facility] [address] ) 1992    hx of-fell when ice skating   Noted brain bleed w/swelling    HL (hearing loss)     Migraine     Raynaud's disease     both hands

## 2022-10-04 ENCOUNTER — APPOINTMENT (OUTPATIENT)
Dept: PHYSICAL THERAPY | Facility: CLINIC | Age: 82
End: 2022-10-04

## 2022-10-06 ENCOUNTER — OFFICE VISIT (OUTPATIENT)
Dept: PHYSICAL THERAPY | Facility: CLINIC | Age: 82
End: 2022-10-06
Payer: MEDICARE

## 2022-10-06 DIAGNOSIS — M17.11 PRIMARY OSTEOARTHRITIS OF RIGHT KNEE: Primary | ICD-10-CM

## 2022-10-06 DIAGNOSIS — M16.11 PRIMARY OSTEOARTHRITIS OF ONE HIP, RIGHT: ICD-10-CM

## 2022-10-06 PROCEDURE — 97530 THERAPEUTIC ACTIVITIES: CPT

## 2022-10-06 PROCEDURE — 97110 THERAPEUTIC EXERCISES: CPT

## 2022-10-06 PROCEDURE — 97112 NEUROMUSCULAR REEDUCATION: CPT

## 2022-10-06 NOTE — PROGRESS NOTES
Daily Note     Today's date: 10/6/2022  Patient name: Andres Fernandez  : 1940  MRN: [id number]  Referring provider: Meche Nair*  Dx:   Encounter Diagnosis     ICD-10-CM    1  Primary osteoarthritis of right knee  M17 11    2  Primary osteoarthritis of one hip, right  M16 11                   Subjective: Patient reports no new complaints and is agreeable to DC today  Objective: See treatment diary below      Assessment: Tolerated treatment well  Patient has met goals and is DC'd with HEP to maintain progress from formal therapy  Goals  Short Term Goals (4 weeks):    - Patient will be independent in basic HEP 2-3 weeks  - Patient will report >50% reduction in pain  - Patient will demonstrate >1/3 improvement in MMT grade as applicable  - Demonstrate consistent posture corrections without cueing during therapeutic exercise    Long Term Goals (8 weeks):  - Patient will be independent in a comprehensive home exercise program  - Patient FOTO score will improve to 66/100  - Patient will self-report >75% improvement in function  - Patient will walk 10 minutes, min sx  - Patient will step over step to descend a flight of stairs      Plan: Discharge with HEP     Insurance:  [place]/CMS Eval/ Re-eval POC expires Ana M Bee #/ Referral # Total units  Start date  Expiration date Extension  Visit limitation? PT only or  PT+OT?  Co-Insurance   [facility] - CMS 2022 12 weeks - 2022 63        20%       - SEE                                                         "Va"    Date 9/29/22 10/6/22  9/20/22 9/22/22 9/27/22   Visit Number 7/10 8/10  4/10 5/10 6/10   Manual         LAD                                    Neuro Re-Ed         Bridges 3x10 3x10 (circuit with clams)  3x10 3x10 marching 2x10 each LE   Squats 3x10 (circuit train w/ step ups and lateral step down) 3x10 (circuit train w/ step ups and lateral step down)  3x10 3x10 3x10 (circuit train w/ step ups and lateral step down)   Forced stabilization on RLE Yellow t-band ball, 2x15 each Yellow t-band ball, 2x15 each  Tennis ball 3x30s Tennis ball 3x30s Yellow t-band ball, 2x15 each   Side steps  5 laps, 15 ft, x-light loop at ankles 5 laps, 15 ft, x-light loop at ankles  6 laps 6' xlight loop 3 laps x15' xlight loop    Lateral step down 6" 2x10 each (circuit train w/ step ups and squats) 6" 3x10 each (circuit train w/ step ups and squats)  2x10 ea 4" step 4x5 ea 8" step TC 6" 2x10 each (circuit train w/ step ups and squats)   Step up 6" 3x10 (circuit train w/ lateral step downs and squats) 6" 3x10 (circuit train w/ lateral step downs and squats)  3x10 8" step 3x10 8" step 6" 3x10 (circuit train w/ lateral step downs and squats)   Clamshell 3x10 each 3x10 ea (circuit with bridges)  2x10 ea 2x10 ea 3x10 each                     TherEx         Bike or NS warm up Bike, 10 min Bike 10'  NS L2-4 8' NS L4 8' NS, level 4, 10 min   Add isometric 5" x30 5sx30  3x10 5s hold 3x10 5s hold 5" x30   HS curl peanut x30 x30  x25 x30 x30   Piriformis stretch 3x30" each 2x30s ea  2x30s 2x30s 3x30" each   Leg press 65# 3x12 Total gym 3x10   65# ecc 3x10 65# 3x10   HR x30 (active rest) x30                          TherAct         Patient education    5' 5'                      Step tap on stair    3x20 alt 10" step DC             Gait Training         Step over                           Modalities         CP               Precautions: none  Past Medical History:   Diagnosis Date    Baker's cyst of knee 10/21/2016    right- burst on its own    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[facility] [address] ) 1992    hx of-fell when ice skating   Noted brain bleed w/swelling    HL (hearing loss)     Migraine     Raynaud's disease     both hands

## 2022-10-06 NOTE — PATIENT INSTRUCTIONS
Access Code: [email or web address]  URL: [email or web address]  Date: 10/06/2022  Prepared by: Billie Counter    Exercises  Supine Bridge - 1 x daily - 7 x weekly - 3 sets - 10 reps  Clamshell - 1 x daily - 7 x weekly - 3 sets - 10 reps  Squat with Chair Touch - 1 x daily - 7 x weekly - 3 sets - 10 reps  Step Up - 1 x daily - 7 x weekly - 3 sets - 10 reps  Lateral Step Down - 1 x daily - 7 x weekly - 3 sets - 10 reps

## 2022-10-12 ENCOUNTER — OFFICE VISIT (OUTPATIENT)
Dept: OBGYN CLINIC | Facility: CLINIC | Age: 82
End: 2022-10-12
Payer: MEDICARE

## 2022-10-12 VITALS
HEIGHT: 61 IN | WEIGHT: 123 LBS | BODY MASS INDEX: 23.22 KG/M2 | SYSTOLIC BLOOD PRESSURE: 131 MMHG | DIASTOLIC BLOOD PRESSURE: 76 MMHG | HEART RATE: 71 BPM

## 2022-10-12 DIAGNOSIS — M16.11 PRIMARY OSTEOARTHRITIS OF ONE HIP, RIGHT: ICD-10-CM

## 2022-10-12 DIAGNOSIS — M17.11 PRIMARY OSTEOARTHRITIS OF RIGHT KNEE: Primary | ICD-10-CM

## 2022-10-12 PROBLEM — Z00.00 MEDICARE ANNUAL WELLNESS VISIT, SUBSEQUENT: Status: RESOLVED | Noted: 2021-03-11 | Resolved: 2022-10-12

## 2022-10-12 PROCEDURE — 99213 OFFICE O/P EST LOW 20 MIN: CPT | Performed by: ORTHOPAEDIC SURGERY

## 2022-10-12 NOTE — PROGRESS NOTES
Assessment/Plan:  1  Primary osteoarthritis of right knee     2  Primary osteoarthritis of one hip, right       Scribe Attestation    I,:  Mariposa Hollins am acting as a scribe while in the presence of the attending physician :       I,:  Kaley Colorado MD personally performed the services described in this documentation    as scribed in my presence :         Catalina Ventura upon exam today demonstrates excellent range of motion and strength of her right hip and right knee following knee and hip osteoarthritis  She does present with slight laxity to valgus stress and medial joint line tenderness the knee consistent with her osteoarthritis  Overall, I am very pleased with her presentation  Patient inquired about her recent DXA scan which I reviewed with her in the office today  I explained she has osteoporosis and has a high risk of fracture if she has a fall  She may continue with her activities as tolerated  Patient verbalized understanding and had no further questions at this time  She may follow-up with me on an as-needed basis  Subjective:   Thiaog Walters is a 78 y o  female who presents today for follow-up evaluation of her right hip and knee  She reports doing very well today  She was compliant with physical therapy and had significant improvement following her sessions  She has now discontinued physical therapy and does exercises at home with out difficulty  She denies any pain  She denies distal paresthesia  She is able to perform an ADLs without pain or difficulty  Review of Systems   Constitutional: Negative for chills, fever and unexpected weight change  HENT: Negative for nosebleeds and sore throat  Eyes: Negative for pain, redness and visual disturbance  Respiratory: Negative for cough, shortness of breath and wheezing  Cardiovascular: Negative for chest pain, palpitations and leg swelling  Gastrointestinal: Negative for nausea and vomiting     Endocrine: Negative for polydipsia and polyuria  Genitourinary: Negative for dysuria and hematuria  Musculoskeletal: Negative for arthralgias, gait problem and myalgias  Skin: Negative for rash and wound  Neurological: Negative for dizziness, numbness and headaches  Psychiatric/Behavioral: Negative for decreased concentration  The patient is not nervous/anxious  Past Medical History:   Diagnosis Date   • Baker's cyst of knee 10/21/2016    right- burst on its own   • Brain injury 1992    hx of-fell when ice skating  Noted brain bleed w/swelling   • HL (hearing loss)    • Migraine    • Raynaud's disease     both hands       Past Surgical History:   Procedure Laterality Date   • AXILLARY SURGERY Left     fatty cyst removed, benign   • CATARACT EXTRACTION Bilateral    • CATARACT EXTRACTION W/ INTRAOCULAR LENS IMPLANT Right 11/10/2016    Procedure: EXTRACTION EXTRACAPSULAR CATARACT PHACO INTRAOCULAR LENS (IOL); Surgeon: Nelia Parmar MD;  Location: [facility] MAIN OR;  Service:    • COSMETIC SURGERY      Eye lift   • DILATION AND CURETTAGE OF UTERUS     • MYRINGOTOMY W/ TUBES  2011    both ears-one tube out on the left   • MYRINGOTOMY W/ TUBES     • AR SHLDR ARTHROSCOP,SURG,W/ROTAT CUFF REPR Right 5/4/2017    Procedure: ARTHROSCOPY SHOULDER WITH ROTATOR CUFF REPAIR, BICEPS TENODESIS, AND SUBACROMIAL DECOMPRESSION;  Surgeon: Cee Jeff MD;  Location: [facility] MAIN OR;  Service: Orthopedics   • AR XCAPSL CTRC RMVL INSJ IO LENS PROSTH W/O ECP Left 10/20/2016    Procedure: EXTRACTION EXTRACAPSULAR CATARACT PHACO INTRAOCULAR LENS (IOL);   Surgeon: Nelia Parmar MD;  Location: [facility] MAIN OR;  Service: Ophthalmology   • SKIN BIOPSY      mole on chest   • SKIN BIOPSY      under breast, benign       Family History   Problem Relation Age of Onset   • Heart disease Mother         exp age 59 MI   • Stroke Father    • Macular degeneration Sister    • Macular degeneration Brother    • Diabetes Brother    • Cancer Brother         kidney-nephrectomy • Kidney disease Brother         cancer       Social History     Occupational History   • Not on file   Tobacco Use   • Smoking status: Former Smoker     Packs/day: 1 50     Years: 30 00     Pack years: 45 00     Quit date:      Years since quittin 7   • Smokeless tobacco: Never Used   Substance and Sexual Activity   • Alcohol use: Yes     Comment: socially   • Drug use: No   • Sexual activity: Not on file         Current Outpatient Medications:   •  acetaminophen (TYLENOL) 500 mg tablet, Take 1,000 mg by mouth every 6 (six) hours as needed for mild pain, Disp: , Rfl:   •  ALPHAGAN P 0 1 %, , Disp: , Rfl: 0  •  ascorbic acid (VITAMIN C) 500 mg tablet, Take 500 mg by mouth every morning, Disp: , Rfl:   •  Azelastine HCl 137 MCG/SPRAY SOLN, use 1 spray into each nostril twice a day, Disp: 30 mL, Rfl: 6  •  Biotin 5 MG CAPS, Take by mouth every morning, Disp: , Rfl:   •  Cyanocobalamin (VITAMIN B 12 PO), Take by mouth every morning, Disp: , Rfl:   •  furosemide (LASIX) 40 mg tablet, Take 1 tablet (40 mg total) by mouth every morning, Disp: 90 tablet, Rfl: 1  •  ipratropium (ATROVENT) 0 06 % nasal spray, instill 2 sprays into each nostril four times a day, Disp: 15 mL, Rfl: 11  •  levocetirizine (XYZAL) 5 MG tablet, Take 1 tablet (5 mg total) by mouth every evening, Disp: 90 tablet, Rfl: 1  •  Lutein 40 MG CAPS, Take by mouth every morning, Disp: , Rfl:   •  Magnesium 250 MG TABS, Take by mouth every morning, Disp: , Rfl:   •  Misc Natural Products (TUMERSAID PO), Take 1 tablet by mouth daily, Disp: , Rfl:   •  olmesartan (BENICAR) 40 mg tablet, Take 1 tablet (40 mg total) by mouth daily, Disp: 90 tablet, Rfl: 1  •  omeprazole (PriLOSEC) 20 mg delayed release capsule, Take 1 capsule (20 mg total) by mouth daily, Disp: 90 capsule, Rfl: 1  •  Potassium Chloride ER 20 MEQ TBCR, Take 1 tablet (20 mEq total) by mouth daily, Disp: 90 tablet, Rfl: 1  •  rOPINIRole (REQUIP) 0 5 mg tablet, Take 1 tablet (0 5 mg total) by mouth daily at bedtime, Disp: 90 tablet, Rfl: 1  •  SUMAtriptan (IMITREX) 50 mg tablet, Take 1 tablet (50 mg total) by mouth once as needed for migraine, Disp: 9 tablet, Rfl: 1  •  vitamin A 7500 UNIT capsule, Take 7,500 Units by mouth every morning  , Disp: , Rfl:   •  vitamin E, tocopherol, 400 units capsule, Take 400 Units by mouth every morning Last dose 4/26/17, Disp: , Rfl:   •  zinc gluconate 50 mg tablet, Take 50 mg by mouth every morning, Disp: , Rfl:     Allergies   Allergen Reactions   • Lactose - Food Allergy GI Intolerance   • Latex Itching     Elastic,adhesive tape   • Dilantin [Phenytoin Sodium Extended] Rash   • Other Rash     Adhesive tape, environmental   • Penicillins Rash       Objective:  Vitals:    10/12/22 1117   BP: 131/76   Pulse: 71       Right Knee Exam     Tenderness   The patient is experiencing tenderness in the medial joint line  Range of Motion   Extension: 0   Flexion: 120     Tests   Varus: negative Valgus: positive (slight instability)    Other   Sensation: normal  Pulse: present  Swelling: none  Effusion: no effusion present    Comments:  5/5 quadriceps, hamstring, hip flexors          Observations     Right Knee   Negative for effusion  Physical Exam  Vitals reviewed  HENT:      Head: Normocephalic and atraumatic  Eyes:      General:         Right eye: No discharge  Left eye: No discharge  Conjunctiva/sclera: Conjunctivae normal       Pupils: Pupils are equal, round, and reactive to light  Cardiovascular:      Rate and Rhythm: Normal rate  Pulses: Normal pulses  Musculoskeletal:         General: No swelling or tenderness  Normal range of motion  Cervical back: Normal range of motion and neck supple  Right knee: No swelling or effusion  Normal range of motion  Comments: As noted in HPI   Skin:     General: Skin is warm  Neurological:      Mental Status: She is alert           I have personally reviewed pertinent films in PACS and my interpretation is as follows: No imaging to review today  This document was created using speech voice recognition software  Grammatical errors, random word insertions, pronoun errors, and incomplete sentences are an occasional consequence of this system due to software limitations, ambient noise, and hardware issues  Any formal questions or concerns about content, text, or information contained within the body of this dictation should be directly addressed to the provider for clarification

## 2022-10-17 ENCOUNTER — OFFICE VISIT (OUTPATIENT)
Dept: INTERNAL MEDICINE CLINIC | Facility: CLINIC | Age: 82
End: 2022-10-17
Payer: MEDICARE

## 2022-10-17 VITALS
HEIGHT: 61 IN | TEMPERATURE: 97.6 F | BODY MASS INDEX: 22.84 KG/M2 | DIASTOLIC BLOOD PRESSURE: 70 MMHG | HEART RATE: 74 BPM | SYSTOLIC BLOOD PRESSURE: 108 MMHG | OXYGEN SATURATION: 98 % | WEIGHT: 121 LBS

## 2022-10-17 DIAGNOSIS — E87.6 HYPOKALEMIA: ICD-10-CM

## 2022-10-17 DIAGNOSIS — G89.29 CHRONIC RIGHT-SIDED LOW BACK PAIN WITH RIGHT-SIDED SCIATICA: ICD-10-CM

## 2022-10-17 DIAGNOSIS — I10 ESSENTIAL HYPERTENSION: ICD-10-CM

## 2022-10-17 DIAGNOSIS — R60.0 EDEMA OF EXTREMITIES: ICD-10-CM

## 2022-10-17 DIAGNOSIS — E21.3 HYPERPARATHYROIDISM (HCC): ICD-10-CM

## 2022-10-17 DIAGNOSIS — N18.32 STAGE 3B CHRONIC KIDNEY DISEASE (HCC): Primary | ICD-10-CM

## 2022-10-17 DIAGNOSIS — M54.41 CHRONIC RIGHT-SIDED LOW BACK PAIN WITH RIGHT-SIDED SCIATICA: ICD-10-CM

## 2022-10-17 DIAGNOSIS — M54.12 CERVICAL RADICULOPATHY: ICD-10-CM

## 2022-10-17 DIAGNOSIS — J30.89 NON-SEASONAL ALLERGIC RHINITIS DUE TO OTHER ALLERGIC TRIGGER: ICD-10-CM

## 2022-10-17 DIAGNOSIS — I34.0 NONRHEUMATIC MITRAL VALVE REGURGITATION: ICD-10-CM

## 2022-10-17 DIAGNOSIS — R73.02 GLUCOSE INTOLERANCE (IMPAIRED GLUCOSE TOLERANCE): ICD-10-CM

## 2022-10-17 DIAGNOSIS — D72.820 PERSISTENT LYMPHOCYTOSIS: ICD-10-CM

## 2022-10-17 DIAGNOSIS — K21.9 GASTROESOPHAGEAL REFLUX DISEASE WITHOUT ESOPHAGITIS: ICD-10-CM

## 2022-10-17 DIAGNOSIS — K58.0 IRRITABLE BOWEL SYNDROME WITH DIARRHEA: ICD-10-CM

## 2022-10-17 DIAGNOSIS — R79.89 ELEVATED PTHRP LEVEL: ICD-10-CM

## 2022-10-17 DIAGNOSIS — R51.9 NONINTRACTABLE HEADACHE, UNSPECIFIED CHRONICITY PATTERN, UNSPECIFIED HEADACHE TYPE: ICD-10-CM

## 2022-10-17 PROBLEM — M54.2 NECK PAIN: Status: RESOLVED | Noted: 2021-03-02 | Resolved: 2022-10-17

## 2022-10-17 PROCEDURE — 99214 OFFICE O/P EST MOD 30 MIN: CPT | Performed by: INTERNAL MEDICINE

## 2022-10-17 NOTE — ASSESSMENT & PLAN NOTE
Patient is low back pain with the right-sided radiculopathy is resolved at this time  Physical therapy did help her a lot  She does not need to see pain management anymore

## 2022-10-17 NOTE — PATIENT INSTRUCTIONS
Follow with Consultants as per their and our suggestion    Follow up in 12 week(s) or as needed earlier    Follow all instructions as advised and discussed  Take your medications as prescribed  Call the office immediately if you experience any side effects  Ask questions if you do not understand  Keep your scheduled appointment as advised or come sooner if necessary or in doubt  Best time to call for non-urgent matter or questions on weekdays is between 9am and 12 noon  See physician for any new symptoms or worsening of current symptoms  Urgent or emergent situations call 911 and report to nearest emergency room  I spent  30 -40 minutes taking care of this patient including clinical care, conseling, collaboration, chart, lab and consultaion review as appropriate    Patient is to get labs 1 week(s) prior to next visit if advised     Will give you refer for the endocrinologist regarding hyperparathyroidism evaluation call and set up an appointment    Also think about taking a Prolia in injections regarding osteoporosis

## 2022-10-17 NOTE — ASSESSMENT & PLAN NOTE
Neck pain and radiculopathy is resolved  The physical therapy helped  She does not need to see pain management anymore

## 2022-10-17 NOTE — ASSESSMENT & PLAN NOTE
01/21/2022:  PTH was 91 6  Calcium was 10 2  Recent DEXA scan revealed osteoporosis  Will refer to the of endocrinologist for hyperparathyroidism    Will recheck PTH with next blood test

## 2022-10-17 NOTE — ASSESSMENT & PLAN NOTE
Lab Results   Component Value Date    PTH 91 6 (H) 01/21/2022    CALCIUM 10 2 (H) 07/11/2022   Patient DEXA scan revealed osteoporosis  Will require 24 hour urine collection as well as further evaluation by endocrinologist   Will refer to the endocrinologist   Will hold of treating and osteoporosis due to gastroesophageal reflux  Paragraphs he will think about Prolia  Also endocrinologist input awaited    See will call and set up an appointment

## 2022-10-17 NOTE — PROGRESS NOTES
Name: Mike Marcos      : 1940      MRN: [id number]  Encounter Provider: Klaus Zee MD  Encounter Date: 10/17/2022   Encounter department: [address]     1  Stage 3b chronic kidney disease ([facility] [address] )  -     Comprehensive metabolic panel; Future; Expected date: 10/31/2022    2  Gastroesophageal reflux disease without esophagitis  -     CBC and differential; Future    3  Irritable bowel syndrome with diarrhea    4  Glucose intolerance (impaired glucose tolerance)  -     Comprehensive metabolic panel; Future; Expected date: 10/31/2022    5  Non-seasonal allergic rhinitis due to other allergic trigger    6  Essential hypertension  -     Lipid panel; Future; Expected date: 10/31/2022  -     Comprehensive metabolic panel; Future; Expected date: 10/31/2022  -     CBC and differential; Future    7  Nonrheumatic mitral valve regurgitation    8  Chronic right-sided low back pain with right-sided sciatica  Assessment & Plan:  Patient is low back pain with the right-sided radiculopathy is resolved at this time  Physical therapy did help her a lot  She does not need to see pain management anymore  9  Cervical radiculopathy  Assessment & Plan:  Neck pain and radiculopathy is resolved  The physical therapy helped  She does not need to see pain management anymore  10  Edema of extremities  Assessment & Plan:  No major issue with the edema of the legs  Orders:  -     Comprehensive metabolic panel; Future; Expected date: 10/31/2022    11  Nonintractable headache, unspecified chronicity pattern, unspecified headache type  Assessment & Plan:  Headache a due to stress  Migraine this morning   going through major health issues  Will monitor      12  Elevated PTHrP level  Assessment & Plan:  2022:  PTH was 91 6  Calcium was 10 2  Recent DEXA scan revealed osteoporosis  Will refer to the of endocrinologist for hyperparathyroidism  Will recheck PTH with next blood test    Orders:  -     Comprehensive metabolic panel; Future; Expected date: 10/31/2022  -     CBC and differential; Future    13  Hypokalemia  -     Comprehensive metabolic panel; Future; Expected date: 10/31/2022    14  Persistent lymphocytosis  -     CBC and differential; Future    15  Hyperparathyroidism [facility])  Assessment & Plan:  Lab Results   Component Value Date    PTH 91 6 (H) 01/21/2022    CALCIUM 10 2 (H) 07/11/2022   Patient DEXA scan revealed osteoporosis  Will require 24 hour urine collection as well as further evaluation by endocrinologist   Will refer to the endocrinologist   Will hold of treating and osteoporosis due to gastroesophageal reflux  Paragraphs he will think about Prolia  Also endocrinologist input awaited  See will call and set up an appointment      Orders:  -     PTH, intact; Future  -     Comprehensive metabolic panel; Future; Expected date: 10/31/2022  -     CBC and differential; Future  -     Ambulatory referral to Endocrinology; Future        Depression Screening and Follow-up Plan: Patient was screened for depression during today's encounter  They screened negative with a PHQ-2 score of 0  Subjective       Patient is here for follow-up of multiple medical problems  Neck pain with radiculopathy, low back pain with radiculopathy, knee pain all are resolved at this time  Hypertension symptom-free  GI no acid reflux symptoms  IBS stable  We talked about taking oral medications for osteoporosis however the GERD will be relative contraindications  Patient is somewhat stressed out due to family member going through major surgery today  Migraines 1 episode of migraine today but generally speaking fair  Paragraphs mitral regurgitation symptom-free  Last echocardiogram 2019 LVH with moderate TR, 2+ MR, pulmonary artery pressure 40-50  Paragraphs varicose vein fair  Raynaud's fair  Diverticulosis reasonable      A edema better  Hyperlipidemia continue diet  Calcium slightly high 10 2 January 2022 PTH was 91 6 will monitor trend at this time  Refer to the endocrinologist   Will repeat PTH  DEXA scan revealed osteoporosis treatment options reviewed cannot take oral due to GERD  For the talked about Prolia  Will also refer to the endocrinologist in this reference  CKD level 3 stable  Tolerating current medications without side effect  Bilirubin was slightly high previous time repeat 1 came back normal  Last calcium was slightly high in February 4-8-2021: MRI C Spine: Multilevel cervical spondylosis     02/26/2021:  Creatinine 1 15, blood sugar 109, calcium 10, , rest of the CMP lipid profile unremarkable  GFR 44, hemoglobin 11 8, lymphocytosis noted  Vitamin-D 36   3-: [place] , SSA, SSB, DS DNA, SCLE 70, CENROMERE AB, [place], RNP SPEP, ALL NEG, CRP AND SED RATE WNL   10/12/2021:  CMP normal except albumin 3 4, cholesterol 216 HDL 55,  total bilirubin 1 16  CBC normal except MCV 99 differential reveals lymphocytes 44%  01/21/2022:  CMP normal except bilirubin went up to 1 38,, PTH 91 6, vitamin-D 30 8, GFR 47, calcium was 9 9  02/28/2022:  CBC normal, CMP normal except blood sugar 128, BUN 31, sed rate normal, total bilirubin normal, and direct bilirubin normal 0 17, calcium was slightly high 10 2  07/11/2022:  Cholesterol 212, , rest lipid profile normal, CMP normal except GFR 58 calcium slightly high 10 2 blood sugar 100 BUN 29 CBC normal CBC normal              Review of Systems   Constitutional: Negative for chills, fatigue, fever and unexpected weight change  HENT: Negative for ear pain, postnasal drip, sinus pain and sore throat  Eyes: Negative for pain and redness  Respiratory: Negative for cough and shortness of breath  Cardiovascular: Negative for chest pain, palpitations and leg swelling  Gastrointestinal: Negative for abdominal pain, diarrhea and nausea  Endocrine: Negative for cold intolerance, polydipsia and polyuria  Genitourinary: Negative for dysuria, frequency and urgency  Musculoskeletal: Negative for arthralgias, gait problem and myalgias  Skin: Negative for rash  Allergic/Immunologic: Negative  Neurological: Negative for dizziness and headaches  Hematological: Negative for adenopathy  Psychiatric/Behavioral: Negative for behavioral problems  Past Medical History:   Diagnosis Date   • Baker's cyst of knee 10/21/2016    right- burst on its own   • Brain injury 1992    hx of-fell when ice skating  Noted brain bleed w/swelling   • HL (hearing loss)    • Migraine    • Raynaud's disease     both hands     Past Surgical History:   Procedure Laterality Date   • AXILLARY SURGERY Left     fatty cyst removed, benign   • CATARACT EXTRACTION Bilateral    • CATARACT EXTRACTION W/ INTRAOCULAR LENS IMPLANT Right 11/10/2016    Procedure: EXTRACTION EXTRACAPSULAR CATARACT PHACO INTRAOCULAR LENS (IOL); Surgeon: Shania Love MD;  Location: [facility] MAIN OR;  Service:    • COSMETIC SURGERY      Eye lift   • DILATION AND CURETTAGE OF UTERUS     • MYRINGOTOMY W/ TUBES  2011    both ears-one tube out on the left   • MYRINGOTOMY W/ TUBES     • MA SHLDR ARTHROSCOP,SURG,W/ROTAT CUFF REPR Right 5/4/2017    Procedure: ARTHROSCOPY SHOULDER WITH ROTATOR CUFF REPAIR, BICEPS TENODESIS, AND SUBACROMIAL DECOMPRESSION;  Surgeon: Siddhartha Ferraro MD;  Location: [facility] MAIN OR;  Service: Orthopedics   • MA XCAPSL CTRC RMVL INSJ IO LENS PROSTH W/O ECP Left 10/20/2016    Procedure: EXTRACTION EXTRACAPSULAR CATARACT PHACO INTRAOCULAR LENS (IOL);   Surgeon: Shania Love MD;  Location: [facility] MAIN OR;  Service: Ophthalmology   • SKIN BIOPSY      mole on chest   • SKIN BIOPSY      under breast, benign     Family History   Problem Relation Age of Onset   • Heart disease Mother         exp age 59 MI   • Stroke Father    • Macular degeneration Sister    • Macular degeneration Brother    • Diabetes Brother    • Cancer Brother         kidney-nephrectomy   • Kidney disease Brother         cancer     Social History     Socioeconomic History   • Marital status: /Civil Union     Spouse name: None   • Number of children: None   • Years of education: None   • Highest education level: None   Occupational History   • None   Tobacco Use   • Smoking status: Former Smoker     Packs/day: 1 50     Years: 30 00     Pack years: 45 00     Quit date:      Years since quittin 8   • Smokeless tobacco: Never Used   Substance and Sexual Activity   • Alcohol use: Yes     Comment: socially   • Drug use: No   • Sexual activity: None   Other Topics Concern   • None   Social History Narrative   • None     Social Determinants of Health     Financial Resource Strain: Not on file   Food Insecurity: Not on file   Transportation Needs: Not on file   Physical Activity: Not on file   Stress: Not on file   Social Connections: Not on file   Intimate Partner Violence: Not on file   Housing Stability: Not on file     Current Outpatient Medications on File Prior to Visit   Medication Sig   • acetaminophen (TYLENOL) 500 mg tablet Take 1,000 mg by mouth every 6 (six) hours as needed for mild pain   • ALPHAGAN P 0 1 %    • ascorbic acid (VITAMIN C) 500 mg tablet Take 500 mg by mouth every morning   • Azelastine HCl 137 MCG/SPRAY SOLN use 1 spray into each nostril twice a day   • Biotin 5 MG CAPS Take by mouth every morning   • Cyanocobalamin (VITAMIN B 12 PO) Take by mouth every morning   • furosemide (LASIX) 40 mg tablet Take 1 tablet (40 mg total) by mouth every morning   • ipratropium (ATROVENT) 0 06 % nasal spray instill 2 sprays into each nostril four times a day   • levocetirizine (XYZAL) 5 MG tablet Take 1 tablet (5 mg total) by mouth every evening   • Lutein 40 MG CAPS Take by mouth every morning   • Magnesium 250 MG TABS Take by mouth every morning   • Misc Natural Products (TUMERSAID PO) Take 1 tablet by mouth daily   • olmesartan (BENICAR) 40 mg tablet Take 1 tablet (40 mg total) by mouth daily   • omeprazole (PriLOSEC) 20 mg delayed release capsule Take 1 capsule (20 mg total) by mouth daily   • Potassium Chloride ER 20 MEQ TBCR Take 1 tablet (20 mEq total) by mouth daily   • rOPINIRole (REQUIP) 0 5 mg tablet Take 1 tablet (0 5 mg total) by mouth daily at bedtime   • SUMAtriptan (IMITREX) 50 mg tablet Take 1 tablet (50 mg total) by mouth once as needed for migraine   • vitamin A 7500 UNIT capsule Take 7,500 Units by mouth every morning     • vitamin E, tocopherol, 400 units capsule Take 400 Units by mouth every morning Last dose 4/26/17   • zinc gluconate 50 mg tablet Take 50 mg by mouth every morning     Allergies   Allergen Reactions   • Lactose - Food Allergy GI Intolerance   • Latex Itching     Elastic,adhesive tape   • Dilantin [Phenytoin Sodium Extended] Rash   • Other Rash     Adhesive tape, environmental   • Penicillins Rash     Immunization History   Administered Date(s) Administered   • COVID-19 MODERNA VACC 0 25 ML IM BOOSTER 12/10/2021   • COVID-19 PFIZER VACCINE 0 3 ML IM 01/21/2021, 02/11/2021   • Influenza, high dose seasonal 0 7 mL 12/01/2020, 10/18/2021   • Influenza, injectable, quadrivalent, preservative free 0 5 mL 10/31/2019       Objective     /70   Pulse 74   Temp 97 6 °F (36 4 °C)   Ht 5' 1" (1 549 m)   Wt 54 9 kg (121 lb)   SpO2 98%   BMI 22 86 kg/m²     Physical Exam  Vitals and nursing note reviewed  Constitutional:       General: She is not in acute distress  Appearance: She is well-developed  She is not ill-appearing or diaphoretic  HENT:      Head: Normocephalic and atraumatic  Right Ear: External ear normal       Left Ear: External ear normal    Eyes:      General: No scleral icterus  Right eye: No discharge  Left eye: No discharge  Conjunctiva/sclera: Conjunctivae normal    Neck:      Thyroid: No thyromegaly        Vascular: No carotid bruit or JVD  Cardiovascular:      Rate and Rhythm: Regular rhythm  Heart sounds: Normal heart sounds  Pulmonary:      Effort: No respiratory distress  Breath sounds: Normal breath sounds  No wheezing or rales  Musculoskeletal:      Cervical back: No tenderness  Right lower leg: No edema  Left lower leg: No edema  Lymphadenopathy:      Cervical: No cervical adenopathy  Skin:     General: Skin is warm  Coloration: Skin is not jaundiced or pale  Findings: No rash  Neurological:      Mental Status: She is oriented to person, place, and time  Mental status is at baseline  Psychiatric:         Mood and Affect: Mood normal          Behavior: Behavior normal          Thought Content:  Thought content normal          Judgment: Judgment normal        Montserrat Thibodeaux MD

## 2022-12-05 ENCOUNTER — CONSULT (OUTPATIENT)
Dept: ENDOCRINOLOGY | Facility: CLINIC | Age: 82
End: 2022-12-05

## 2022-12-05 VITALS
HEART RATE: 69 BPM | OXYGEN SATURATION: 98 % | DIASTOLIC BLOOD PRESSURE: 92 MMHG | HEIGHT: 61 IN | WEIGHT: 128.2 LBS | TEMPERATURE: 98.5 F | SYSTOLIC BLOOD PRESSURE: 142 MMHG | BODY MASS INDEX: 24.2 KG/M2

## 2022-12-05 DIAGNOSIS — R73.02 GLUCOSE INTOLERANCE (IMPAIRED GLUCOSE TOLERANCE): ICD-10-CM

## 2022-12-05 DIAGNOSIS — E55.9 VITAMIN D DEFICIENCY: ICD-10-CM

## 2022-12-05 DIAGNOSIS — M81.0 AGE-RELATED OSTEOPOROSIS WITHOUT CURRENT PATHOLOGICAL FRACTURE: ICD-10-CM

## 2022-12-05 DIAGNOSIS — E21.3 HYPERPARATHYROIDISM (HCC): Primary | ICD-10-CM

## 2022-12-05 NOTE — PATIENT INSTRUCTIONS
24 hr urine collection method:   Discard first morning urine by voiding into toilet  Then collect all future samples into the bottle through the day and night and finish by collecting the next morning urine into bottle  Bottle can be stored in a cool dark place  Ok to use refrigeration but not necessary  Then stop collecting and drop the bottle at the lab  Romosozumab-aqqg (By injection)   Romosozumab-aqqg (pzm-oru-LSU-ue-mab - aqqg)  Treats osteoporosis in postmenopausal women  Brand Name(s): Evenity   There may be other brand names for this medicine  When This Medicine Should Not Be Used: This medicine is not right for everyone  You should not receive it if you had an allergic reaction to romosozumab-aqqg, or if you have low calcium levels in the blood (hypocalcemia)  How to Use This Medicine:   Injectable  Your doctor will prescribe your exact dose and tell you how often it should be given  This medicine is given as a shot under your skin  A nurse or other health provider will give you this medicine  Your doctor may give you calcium and vitamin D supplements while you are receiving this medicine to prevent unwanted effects  This medicine should come with a Medication Guide  Ask your pharmacist for a copy if you do not have one  Drugs and Foods to Avoid:   Ask your doctor or pharmacist before using any other medicine, including over-the-counter medicines, vitamins, and herbal products  Some medicines can affect how romosozumab-aqqg works  Tell your doctor if you are using denosumab, bisphosphonate medicine, cancer medicine, or steroids  Warnings While Using This Medicine:   Tell your doctor if you are pregnant or breastfeeding, or if you have kidney disease, cancer, anemia, blood clotting problems, dental problems, or if you had a heart attack or stroke within the last year    This medicine may cause the following problems:  Increased risk of heart attack or stroke  Increased risk for a thigh bone fracture  Tell any doctor or dentist who treats you that you are using this medicine  This medicine may cause jaw problems, especially if you have a tooth pulled or have other dental work  Your doctor will do lab tests at regular visits to check on the effects of this medicine  Keep all appointments  Possible Side Effects While Using This Medicine:   Call your doctor right away if you notice any of these side effects: Allergic reaction: Itching or hives, swelling in your face or hands, swelling or tingling in your mouth or throat, chest tightness, trouble breathing  Chest pain that may spread to your arms, jaw, back, or neck, trouble breathing, nausea, unusual sweating, fainting  Confusion, seizures, uneven heartbeat, muscle cramps or spasms, numbness and tingling around the mouth, fingertips, or feet  New or unusual thigh, hip, or groin pain  Numbness or weakness in your arm or leg, or on one side of your body  Pain in your lower leg (calf)  Sudden or severe headache, problems with vision, speech, or walking  If you notice these less serious side effects, talk with your doctor:   Headache  Joint or muscle pain  Pain, itching, burning, swelling, or a lump under your skin where the shot was given  If you notice other side effects that you think are caused by this medicine, tell your doctor  Call your doctor for medical advice about side effects  You may report side effects to FDA at 6-273-FDA-8636    © Copyright [address] 2022 Information is for End User's use only and may not be sold, redistributed or otherwise used for commercial purposes  The above information is an  only  It is not intended as medical advice for individual conditions or treatments  Talk to your doctor, nurse or pharmacist before following any medical regimen to see if it is safe and effective for you        Denosumab (By injection)   Denosumab (hzy-QIM-kn-mab)  Treats osteoporosis, bone cancer, hypercalcemia, and other bone problems in patients who have cancer  Brand Name(s): Prolia, Xgeva   There may be other brand names for this medicine  When This Medicine Should Not Be Used: This medicine is not right for everyone  You should not receive it if you had an allergic reaction to denosumab, or if you are pregnant  How to Use This Medicine:   Injectable  A doctor or other health professional will give you this medicine  It is usually given as a shot under the skin of your upper arm, upper thigh, or stomach  You may receive other medicines (including calcium supplements, Vitamin D) to prevent unwanted effects  This medicine should come with a Medication Guide  Ask your pharmacist for a copy if you do not have one  Missed dose: Call your doctor or pharmacist for instructions  Drugs and Foods to Avoid:   Ask your doctor or pharmacist before using any other medicine, including over-the-counter medicines, vitamins, and herbal products  Do not use Prolia® and Xgeva® together  They contain the same medicine  Some medicines can affect how denosumab works  Tell your doctor if you are also using medicine that weakens your immune system, including a steroid or cancer medicine  Warnings While Using This Medicine: This medicine may cause birth defects if either partner is using it during conception or pregnancy  Tell your doctor right away if you or your partner becomes pregnant  Use an effective form of birth control during treatment with this medicine and for at least 5 months after the last dose  Tell your doctor if you are breastfeeding, or if you have kidney disease, diabetes, gum disease, allergy to latex, or a history of fractures  Tell your doctor if you have problems with your thyroid, parathyroid, or digestive system    This medicine may cause the following problems:  Increased risk of broken thigh bone  Increased risk of infections  Serious skin reactions  Severe bone, joint, or muscle pain  This medicine can cause jaw problems  You must have regular dental exams while you are being treated with this medicine  Tell your dentist that you are receiving this medicine  Practice good oral hygiene  Do not suddenly stop using this medicine without checking first with your doctor  Doing so may increase your risk for more fractures  Talk to your doctor about other medicines that you can take  Your doctor will do lab tests at regular visits to check on the effects of this medicine  Keep all appointments  Possible Side Effects While Using This Medicine:   Call your doctor right away if you notice any of these side effects: Allergic reaction: Itching or hives, swelling in your face or hands, swelling or tingling in your mouth or throat, chest tightness, trouble breathing  Blistering, peeling, red skin rash  Chest pain, fast or uneven heartbeat, trouble breathing  Fever, chills, cough, sore throat, body aches  Lightheadedness, dizziness, fainting  Muscle spasms or twitching, numbness or tingling in your fingers, toes, or lips  Pain or burning during urination, change in how much or how often you urinate  Pain, swelling, heavy feeling, or numbness in your mouth or jaw, loose teeth or other teeth problems  Severe bone, joint, or muscle pain  Unusual pain in your thigh, groin, or hip  If you notice these less serious side effects, talk with your doctor:   Diarrhea, nausea  Redness, pain, itching, burning, swelling, or a lump under your skin where the shot was given  Tiredness or weakness  If you notice other side effects that you think are caused by this medicine, tell your doctor  Call your doctor for medical advice about side effects  You may report side effects to FDA at 4-949-FDA-1561    © Copyright [employer] 2022 Information is for End User's use only and may not be sold, redistributed or otherwise used for commercial purposes  The above information is an  only   It is not intended as medical advice for individual conditions or treatments  Talk to your doctor, nurse or pharmacist before following any medical regimen to see if it is safe and effective for you  Zoledronic Acid (By injection)   Zoledronic Acid (vxt-ur-PWRF-ik AS-id)  Treats high blood calcium levels  Also treats bone damage caused by Paget disease, multiple myeloma, and cancers that spread to the bone  Also treats osteoporosis and reduces the risk of hip fractures in certain patients  Brand Name(s): Reclast, Zoledronic Acid Novaplus   There may be other brand names for this medicine  When This Medicine Should Not Be Used: This medicine is not right for everyone  You should not receive it if you had an allergic reaction to zoledronic acid, or if you are pregnant  How to Use This Medicine:   Injectable  A nurse or other health provider will give you this medicine  Your doctor will prescribe your dose and schedule  This medicine is given through a needle placed in a vein  Your doctor may tell you to drink extra liquids before your treatment to prevent kidney problems  Your doctor may also give you vitamin D and calcium supplements  Tell your doctor if you are not able to take these medicines  This medicine should come with a Medication Guide  Ask your pharmacist for a copy if you do not have one  Missed dose: You must use this medicine on a fixed schedule  Call your doctor or pharmacist if you miss a dose  Drugs and Foods to Avoid:   Ask your doctor or pharmacist before using any other medicine, including over-the-counter medicines, vitamins, and herbal products  Do not use other medicines that also contain zoledronic acid  Do not use zoledronic acid together with another bisphosphonate medicine  Some foods and medicines can affect how zoledronic acid works   Tell your doctor if you are using digoxin, antibiotics, diuretics (water pills), an NSAID pain or arthritis medicine (such as aspirin, celecoxib, ibuprofen, naproxen), steroid medicines, or cancer medicines  Warnings While Using This Medicine: It is not safe to take this medicine during pregnancy  It could harm an unborn baby  Tell your doctor right away if you become pregnant  Tell your doctor if you are breastfeeding, or if you have kidney disease, anemia, aspirin-sensitive asthma, bleeding problems, cancer, congestive heart failure, low blood calcium levels, stomach absorption problems, mineral imbalance, dental problems or gum disease  Also tell your doctor if you had surgery on your bowel or parathyroid or thyroid gland  This medicine may cause the following problems:  Jaw or teeth problems  Severe bone, joint, or muscle pain  Increased risk of thigh bone fracture  Low calcium levels in your blood  You must have regular dental exams while you are being treated with this medicine  Tell your dentist or oral surgeon that you are using this medicine  Your doctor will do lab tests at regular visits to check on the effects of this medicine  Keep all appointments  Possible Side Effects While Using This Medicine:   Call your doctor right away if you notice any of these side effects:   Allergic reaction: Itching or hives, swelling in your face or hands, swelling or tingling in your mouth or throat, chest tightness, trouble breathing  Chest pain, trouble breathing, fast or uneven heartbeat  Decrease in how much or how often you urinate, blood in the urine, lower back or side pain, burning or painful urination  Muscle spasm or twitching, or numbness or tingling in your fingers, feet, or around your mouth  Pain, swelling, or numbness in the mouth or jaw, loose teeth or other teeth problems  Severe muscle, bone, or joint pain  Unusual pain in your thigh, groin, or hip  If you notice these less serious side effects, talk with your doctor:   Fever, chills, cough, sore throat, and body aches  Headache  Mild nausea, constipation, diarrhea, stomach pain or upset  Redness, pain, or swelling of your skin where the needle is placed  If you notice other side effects that you think are caused by this medicine, tell your doctor  Call your doctor for medical advice about side effects  You may report side effects to FDA at 0-694-MSU-3514    © Copyright [employer] 2022 Information is for End User's use only and may not be sold, redistributed or otherwise used for commercial purposes  The above information is an  only  It is not intended as medical advice for individual conditions or treatments  Talk to your doctor, nurse or pharmacist before following any medical regimen to see if it is safe and effective for you

## 2022-12-05 NOTE — PROGRESS NOTES
New Consult note       CC: osteoporosis, PHPT    History of Present Illness:   58-year-old female with HTN, HLD, prediabetes, DJD, hyperparathyroidism associated with hypercalcemia over 10months and recently diagnosed osteoporosis  She does report some polyuria but is on a diuretic  No mental health symptoms, abdominal pain or other symptoms  No nephrolithiasis  DEXA 9/28/22:  T-scores -2 2 LS, -2 4 LTH, -2 1 LFN, -2 8 RTH and -4 left forearm  Ten year FRAX score was 13 4% hip and 32 9% major osteoporotic fracture  Patient Active Problem List   Diagnosis   • Baker cyst, right   • Gastroesophageal reflux disease without esophagitis   • Glucose intolerance (impaired glucose tolerance)   • Restless legs   • Hypercholesteremia   • Mitral regurgitation   • Migraine   • Essential hypertension   • Varicose vein of leg   • Edema of extremities   • Rhinitis, allergic   • Aspirin intolerance   • Persistent lymphocytosis   • Hypokalemia   • Irritable bowel syndrome   • Abnormal echocardiogram   • Vitamin D deficiency   • Diverticulosis   • History of syncope   • Hammer toe of left foot   • Raynaud's disease   • Stage 3b chronic kidney disease (HCC)   • Cervical radiculopathy   • Left arm pain   • Rupture of left distal biceps tendon   • Primary osteoarthritis involving multiple joints   • HL (hearing loss)   • Elevated PTHrP level   • Lumbar radiculopathy   • Abnormal LFTs   • Hypercalcemia   • Headache   • Primary osteoarthritis of right knee   • 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   • Hyperparathyroidism [facility])     Past Medical History:   Diagnosis Date   • Baker's cyst of knee 10/21/2016    right- burst on its own   • Brain injury 1992    hx of-fell when ice skating   Noted brain bleed w/swelling   • HL (hearing loss)    • Migraine    • Raynaud's disease     both hands      Past Surgical History:   Procedure Laterality Date   • AXILLARY SURGERY Left     fatty cyst removed, benign   • CATARACT EXTRACTION Bilateral    • CATARACT EXTRACTION W/ INTRAOCULAR LENS IMPLANT Right 11/10/2016    Procedure: EXTRACTION EXTRACAPSULAR CATARACT PHACO INTRAOCULAR LENS (IOL); Surgeon: Sylvia Guillen MD;  Location: [facility] MAIN OR;  Service:    • COSMETIC SURGERY      Eye lift   • DILATION AND CURETTAGE OF UTERUS     • MYRINGOTOMY W/ TUBES  2011    both ears-one tube out on the left   • MYRINGOTOMY W/ TUBES     • NH SHLDR ARTHROSCOP,SURG,W/ROTAT CUFF REPR Right 2017    Procedure: ARTHROSCOPY SHOULDER WITH ROTATOR CUFF REPAIR, BICEPS TENODESIS, AND SUBACROMIAL DECOMPRESSION;  Surgeon: Mau Montanez MD;  Location: [facility] MAIN OR;  Service: Orthopedics   • NH XCAPSL CTRC RMVL INSJ IO LENS PROSTH W/O ECP Left 10/20/2016    Procedure: EXTRACTION EXTRACAPSULAR CATARACT PHACO INTRAOCULAR LENS (IOL);   Surgeon: Sylvia Guillen MD;  Location: [facility] MAIN OR;  Service: Ophthalmology   • SKIN BIOPSY      mole on chest   • SKIN BIOPSY      under breast, benign      Family History   Problem Relation Age of Onset   • Heart disease Mother         exp age 59 MI   • Stroke Father    • Macular degeneration Sister    • Macular degeneration Brother    • Diabetes Brother    • Cancer Brother         kidney-nephrectomy   • Kidney disease Brother         cancer     Social History     Tobacco Use   • Smoking status: Former     Packs/day: 1 50     Years: 30 00     Pack years: 45 00     Types: Cigarettes     Quit date:      Years since quittin 9   • Smokeless tobacco: Never   Substance Use Topics   • Alcohol use: Yes     Comment: socially     Allergies   Allergen Reactions   • Lactose - Food Allergy GI Intolerance   • Latex Itching     Elastic,adhesive tape   • Dilantin [Phenytoin Sodium Extended] Rash   • Other Rash     Adhesive tape, environmental   • Penicillins Rash         Current Outpatient Medications:   •  acetaminophen (TYLENOL) 500 mg tablet, Take 1,000 mg by mouth every 6 (six) hours as needed for mild pain, Disp: , Rfl:   • ALPHAGAN P 0 1 %, , Disp: , Rfl: 0  •  ascorbic acid (VITAMIN C) 500 mg tablet, Take 500 mg by mouth every morning, Disp: , Rfl:   •  Azelastine HCl 137 MCG/SPRAY SOLN, instill 1 spray into each nostril twice a day, Disp: 30 mL, Rfl: 6  •  Biotin 5 MG CAPS, Take by mouth every morning, Disp: , Rfl:   •  Cyanocobalamin (VITAMIN B 12 PO), Take by mouth every morning, Disp: , Rfl:   •  furosemide (LASIX) 40 mg tablet, Take 1 tablet (40 mg total) by mouth every morning, Disp: 90 tablet, Rfl: 1  •  ipratropium (ATROVENT) 0 06 % nasal spray, instill 2 sprays into each nostril four times a day, Disp: 15 mL, Rfl: 11  •  levocetirizine (XYZAL) 5 MG tablet, Take 1 tablet (5 mg total) by mouth every evening, Disp: 90 tablet, Rfl: 1  •  Lutein 40 MG CAPS, Take by mouth every morning, Disp: , Rfl:   •  Magnesium 250 MG TABS, Take by mouth every morning, Disp: , Rfl:   •  olmesartan (BENICAR) 40 mg tablet, Take 1 tablet (40 mg total) by mouth daily, Disp: 90 tablet, Rfl: 1  •  omeprazole (PriLOSEC) 20 mg delayed release capsule, Take 1 capsule (20 mg total) by mouth daily, Disp: 90 capsule, Rfl: 1  •  Potassium Chloride ER 20 MEQ TBCR, Take 1 tablet (20 mEq total) by mouth daily, Disp: 90 tablet, Rfl: 1  •  rOPINIRole (REQUIP) 0 5 mg tablet, Take 1 tablet (0 5 mg total) by mouth daily at bedtime, Disp: 90 tablet, Rfl: 1  •  SUMAtriptan (IMITREX) 50 mg tablet, Take 1 tablet (50 mg total) by mouth once as needed for migraine, Disp: 9 tablet, Rfl: 1  •  vitamin A 7500 UNIT capsule, Take 7,500 Units by mouth every morning  , Disp: , Rfl:   •  vitamin E, tocopherol, 400 units capsule, Take 400 Units by mouth every morning Last dose 4/26/17, Disp: , Rfl:   •  zinc gluconate 50 mg tablet, Take 50 mg by mouth every morning, Disp: , Rfl:   •  Misc Natural Products (TUMERSAID PO), Take 1 tablet by mouth daily (Patient not taking: Reported on 12/5/2022), Disp: , Rfl:     Review of Systems   Constitutional: Positive for fatigue     HENT: Negative  Eyes: Negative  Respiratory: Negative  Cardiovascular: Negative  Gastrointestinal: Negative  Endocrine: Negative  Musculoskeletal: Negative  Skin: Negative  Allergic/Immunologic: Negative  Neurological: Negative  Hematological: Negative  Psychiatric/Behavioral: Negative  Physical Exam:  Body mass index is 24 22 kg/m²  /92 (BP Location: Left arm, Patient Position: Sitting, Cuff Size: Standard)   Pulse 69   Temp 98 5 °F (36 9 °C) (Tympanic)   Ht 5' 1" (1 549 m)   Wt 58 2 kg (128 lb 3 2 oz)   SpO2 98%   BMI 24 22 kg/m²    Vitals:    12/05/22 1114   Weight: 58 2 kg (128 lb 3 2 oz)        Physical Exam  Constitutional:       Appearance: She is well-developed  HENT:      Head: Normocephalic  Mouth/Throat:      Mouth: Oropharynx is clear and moist    Eyes:      Pupils: Pupils are equal, round, and reactive to light  Neck:      Thyroid: No thyromegaly  Cardiovascular:      Rate and Rhythm: Normal rate  Heart sounds: Normal heart sounds  Pulmonary:      Effort: Pulmonary effort is normal       Breath sounds: Normal breath sounds  Abdominal:      General: Bowel sounds are normal       Palpations: Abdomen is soft  Musculoskeletal:         General: No deformity or edema  Cervical back: Normal range of motion  Skin:     Capillary Refill: Capillary refill takes less than 2 seconds  Coloration: Skin is not pale  Findings: No rash  Neurological:      Mental Status: She is alert and oriented to person, place, and time     Psychiatric:         Mood and Affect: Mood and affect normal          Labs:   No results found for: HGBA1C    Lab Results   Component Value Date    PDF3NHKUTBMA 0 509 10/22/2016       Lab Results   Component Value Date    CREATININE 0 91 07/11/2022    CREATININE 1 19 02/28/2022    CREATININE 1 08 01/21/2022    BUN 29 (H) 07/11/2022     11/02/2015    K 4 4 07/11/2022     07/11/2022    CO2 24 07/11/2022 eGFR   Date Value Ref Range Status   07/11/2022 58 ml/min/1 73sq m Final       Lab Results   Component Value Date    ALT 22 07/11/2022    AST 18 07/11/2022    ALKPHOS 84 07/11/2022    BILITOT 1 3 (H) 11/02/2015       Lab Results   Component Value Date    CHOLESTEROL 212 (H) 07/11/2022    CHOLESTEROL 216 (H) 10/12/2021    CHOLESTEROL 235 (H) 02/26/2021     Lab Results   Component Value Date    HDL 59 07/11/2022    HDL 55 10/12/2021    HDL 71 02/26/2021     Lab Results   Component Value Date    TRIG 170 (H) 07/11/2022    TRIG 203 (H) 10/12/2021    TRIG 117 02/26/2021     Lab Results   Component Value Date    NONHDLC 153 07/11/2022    [place] 161 10/12/2021    [place] 164 02/26/2021         Impression:  1  Hyperparathyroidism ([address] )    2  Glucose intolerance (impaired glucose tolerance)    3  Vitamin D deficiency    4  Age-related osteoporosis without current pathological fracture         Plan:    Tabby Weber was seen today for advice only  Diagnoses and all orders for this visit:    Hyperparathyroidism ([address] )  Suspicious for primary hyperparathyroidism  Unlikely to be [address]  Will start workup as listed below to established PTH mediated hypercalcemia and to localize source  She may then need surgery given history of low EGFR and significant osteoporosis with maximum loss in cortical bone  She does not have nephrolithiasis  Follow-up in 4-6 weeks with listed data to make further management plans   -     Ambulatory referral to Endocrinology  -     Calcium, ionized; Future  -     Comprehensive metabolic panel; Future  -     NTX Panel, Urine  -     Phosphorus; Future  -     Vitamin D 25 hydroxy; Future  -     PTH, intact; Future  -     NM parathyroid scan w spect; Future  -     Creatinine, urine, 24 hour; Future  -     Calcium, urine, 24 hour; Future    Osteoporosis without pathological fracture  She has severe osteoporosis based on T-score of -4 left forearm which is the lowest T-score    This is suggestive of PTH mediated cortical bone loss as a contributing factor for osteoporosis  She probably also has age related osteoporosis  She will definitely need treatment  Today we discussed all aspects of osteoporosis including pathophysiology, risk factors, complications, dietary calcium 1200mg/day, vitamin D supplementation to maintain goal >30ng/dL, lifestyle modifications, medical fitness training, goals of therapy, follow up needs and medications including zolendronate, denosumab, romosozumab  She is not a candidate for alendronate due to GERD or PTH analogue due to elevated PTH  Written information was provided for prospective medications  Over next visits, will suggest using romosozumab followed by Prolia  Vitamin D deficiency        I have spent 50 minutes with patient today in which greater than 50% of this time was spent in counseling/coordination of care  Discussed with the patient and all questioned fully answered  She will call me if any problems arise  Educated/ Counseled patient on diagnostic test results, prognosis, risk vs benefit of treatment options, importance of treatment compliance, healthy life and lifestyle choices        [address]

## 2022-12-09 ENCOUNTER — APPOINTMENT (OUTPATIENT)
Dept: LAB | Facility: CLINIC | Age: 82
End: 2022-12-09

## 2022-12-09 DIAGNOSIS — I10 ESSENTIAL HYPERTENSION: ICD-10-CM

## 2022-12-09 DIAGNOSIS — D72.820 PERSISTENT LYMPHOCYTOSIS: ICD-10-CM

## 2022-12-09 DIAGNOSIS — K21.9 GASTROESOPHAGEAL REFLUX DISEASE WITHOUT ESOPHAGITIS: ICD-10-CM

## 2022-12-09 DIAGNOSIS — E21.3 HYPERPARATHYROIDISM (HCC): ICD-10-CM

## 2022-12-09 DIAGNOSIS — R79.89 ELEVATED PTHRP LEVEL: ICD-10-CM

## 2022-12-09 LAB
25(OH)D3 SERPL-MCNC: 45.1 NG/ML (ref 30–100)
ALBUMIN SERPL BCP-MCNC: 3.9 G/DL (ref 3.5–5)
ALP SERPL-CCNC: 90 U/L (ref 46–116)
ALT SERPL W P-5'-P-CCNC: 22 U/L (ref 12–78)
ANION GAP SERPL CALCULATED.3IONS-SCNC: 5 MMOL/L (ref 4–13)
AST SERPL W P-5'-P-CCNC: 18 U/L (ref 5–45)
BASOPHILS # BLD AUTO: 0.05 THOUSANDS/ÂΜL (ref 0–0.1)
BASOPHILS NFR BLD AUTO: 1 % (ref 0–1)
BILIRUB SERPL-MCNC: 1.44 MG/DL (ref 0.2–1)
BUN SERPL-MCNC: 26 MG/DL (ref 5–25)
CA-I BLD-SCNC: 1.31 MMOL/L (ref 1.12–1.32)
CALCIUM SERPL-MCNC: 9.9 MG/DL (ref 8.3–10.1)
CHLORIDE SERPL-SCNC: 107 MMOL/L (ref 96–108)
CO2 SERPL-SCNC: 26 MMOL/L (ref 21–32)
CREAT SERPL-MCNC: 0.97 MG/DL (ref 0.6–1.3)
EOSINOPHIL # BLD AUTO: 0.08 THOUSAND/ÂΜL (ref 0–0.61)
EOSINOPHIL NFR BLD AUTO: 1 % (ref 0–6)
ERYTHROCYTE [DISTWIDTH] IN BLOOD BY AUTOMATED COUNT: 12.4 % (ref 11.6–15.1)
GFR SERPL CREATININE-BSD FRML MDRD: 53 ML/MIN/1.73SQ M
GLUCOSE SERPL-MCNC: 110 MG/DL (ref 65–140)
HCT VFR BLD AUTO: 42.7 % (ref 34.8–46.1)
HGB BLD-MCNC: 14.2 G/DL (ref 11.5–15.4)
IMM GRANULOCYTES # BLD AUTO: 0.01 THOUSAND/UL (ref 0–0.2)
IMM GRANULOCYTES NFR BLD AUTO: 0 % (ref 0–2)
LYMPHOCYTES # BLD AUTO: 2.28 THOUSANDS/ÂΜL (ref 0.6–4.47)
LYMPHOCYTES NFR BLD AUTO: 39 % (ref 14–44)
MCH RBC QN AUTO: 32.3 PG (ref 26.8–34.3)
MCHC RBC AUTO-ENTMCNC: 33.3 G/DL (ref 31.4–37.4)
MCV RBC AUTO: 97 FL (ref 82–98)
MONOCYTES # BLD AUTO: 0.49 THOUSAND/ÂΜL (ref 0.17–1.22)
MONOCYTES NFR BLD AUTO: 8 % (ref 4–12)
NEUTROPHILS # BLD AUTO: 3.01 THOUSANDS/ÂΜL (ref 1.85–7.62)
NEUTS SEG NFR BLD AUTO: 51 % (ref 43–75)
NRBC BLD AUTO-RTO: 0 /100 WBCS
PHOSPHATE SERPL-MCNC: 2.9 MG/DL (ref 2.3–4.1)
PLATELET # BLD AUTO: 200 THOUSANDS/UL (ref 149–390)
PMV BLD AUTO: 10.8 FL (ref 8.9–12.7)
POTASSIUM SERPL-SCNC: 3.8 MMOL/L (ref 3.5–5.3)
PROT SERPL-MCNC: 7.5 G/DL (ref 6.4–8.4)
PTH-INTACT SERPL-MCNC: 103 PG/ML (ref 18.4–80.1)
RBC # BLD AUTO: 4.39 MILLION/UL (ref 3.81–5.12)
SODIUM SERPL-SCNC: 138 MMOL/L (ref 135–147)
WBC # BLD AUTO: 5.92 THOUSAND/UL (ref 4.31–10.16)

## 2022-12-12 ENCOUNTER — APPOINTMENT (OUTPATIENT)
Dept: LAB | Facility: CLINIC | Age: 82
End: 2022-12-12

## 2022-12-12 DIAGNOSIS — E21.3 HYPERPARATHYROIDISM (HCC): ICD-10-CM

## 2022-12-12 LAB
CALCIUM 24H UR-MCNC: 127.6 MG/24 HRS (ref 42–353)
CREAT 24H UR-MRATE: 0.8 G/24HR (ref 0.6–1.8)
SPECIMEN VOL UR: 2200 ML
SPECIMEN VOL UR: 2200 ML

## 2022-12-14 LAB
COLLAGEN NTX UR-SCNC: 179 NMOL BCE
COLLAGEN NTX/CREAT UR-SRTO: 59 NM BCE/MM CR (ref 0–89)
CREAT UR-MCNC: 34.1 MG/DL
INTERPRETIVE GUIDE:: NORMAL

## 2022-12-19 ENCOUNTER — HOSPITAL ENCOUNTER (OUTPATIENT)
Dept: RADIOLOGY | Facility: HOSPITAL | Age: 82
Discharge: HOME | End: 2022-12-19

## 2022-12-19 DIAGNOSIS — E21.3 HYPERPARATHYROIDISM (HCC): ICD-10-CM

## 2022-12-22 ENCOUNTER — TELEPHONE (OUTPATIENT)
Dept: OTHER | Facility: HOSPITAL | Age: 82
End: 2022-12-22

## 2022-12-22 DIAGNOSIS — E21.3 HYPERPARATHYROIDISM (HCC): Primary | ICD-10-CM

## 2022-12-22 NOTE — TELEPHONE ENCOUNTER
Called and reviewed all recent testing which is suggestive of primary hyperparathyroidism  She has mild symptoms of hypercalcemia with polyuria but is also on a diuretic  Urine NTX shows suspicion for BMD loss  There is significant osteoporosis with high risk of fracture on DEXA bone scan  24-hour urine was normal   Sestamibi was negative to identify a lesion  Highest corrected calcium was 10 2 mg/dL with persistent PTH elevation over the last year or so  Although we do not have a target lesion, total of her clinical burden would favor a surgical approach to help overall improvement in calcium metabolic him in spite of her age  Patient was in agreement with this plan  Referral was made to surgeon

## 2022-12-23 ENCOUNTER — TELEPHONE (OUTPATIENT)
Dept: HEMATOLOGY ONCOLOGY | Facility: CLINIC | Age: 82
End: 2022-12-23

## 2022-12-30 ENCOUNTER — TELEPHONE (OUTPATIENT)
Dept: HEMATOLOGY ONCOLOGY | Facility: CLINIC | Age: 82
End: 2022-12-30

## 2022-12-30 DIAGNOSIS — I10 ESSENTIAL HYPERTENSION: ICD-10-CM

## 2022-12-30 RX ORDER — OLMESARTAN MEDOXOMIL 40 MG/1
TABLET ORAL
Qty: 90 TABLET | Refills: 1 | Status: SHIPPED | OUTPATIENT
Start: 2022-12-30

## 2023-01-03 ENCOUNTER — TELEPHONE (OUTPATIENT)
Dept: HEMATOLOGY ONCOLOGY | Facility: CLINIC | Age: 83
End: 2023-01-03

## 2023-01-07 DIAGNOSIS — K21.9 GASTROESOPHAGEAL REFLUX DISEASE WITHOUT ESOPHAGITIS: ICD-10-CM

## 2023-01-09 RX ORDER — OMEPRAZOLE 20 MG/1
CAPSULE, DELAYED RELEASE ORAL
Qty: 90 CAPSULE | Refills: 1 | Status: SHIPPED | OUTPATIENT
Start: 2023-01-09

## 2023-01-18 DIAGNOSIS — E87.6 HYPOPOTASSEMIA: ICD-10-CM

## 2023-01-18 RX ORDER — POTASSIUM CHLORIDE 1500 MG/1
TABLET, FILM COATED, EXTENDED RELEASE ORAL
Qty: 90 TABLET | Refills: 1 | Status: SHIPPED | OUTPATIENT
Start: 2023-01-18

## 2023-01-23 ENCOUNTER — OFFICE VISIT (OUTPATIENT)
Dept: INTERNAL MEDICINE CLINIC | Facility: CLINIC | Age: 83
End: 2023-01-23

## 2023-01-23 VITALS — HEIGHT: 61 IN | WEIGHT: 126 LBS | BODY MASS INDEX: 23.79 KG/M2

## 2023-01-23 DIAGNOSIS — M15.9 PRIMARY OSTEOARTHRITIS INVOLVING MULTIPLE JOINTS: ICD-10-CM

## 2023-01-23 DIAGNOSIS — I10 ESSENTIAL HYPERTENSION: ICD-10-CM

## 2023-01-23 DIAGNOSIS — E83.52 HYPERCALCEMIA: ICD-10-CM

## 2023-01-23 DIAGNOSIS — R79.89 ABNORMAL LFTS: ICD-10-CM

## 2023-01-23 DIAGNOSIS — G43.809 OTHER MIGRAINE WITHOUT STATUS MIGRAINOSUS, NOT INTRACTABLE: ICD-10-CM

## 2023-01-23 DIAGNOSIS — R60.0 EDEMA OF EXTREMITIES: ICD-10-CM

## 2023-01-23 DIAGNOSIS — N18.32 STAGE 3B CHRONIC KIDNEY DISEASE (HCC): ICD-10-CM

## 2023-01-23 DIAGNOSIS — K21.9 GASTROESOPHAGEAL REFLUX DISEASE WITHOUT ESOPHAGITIS: ICD-10-CM

## 2023-01-23 DIAGNOSIS — E87.6 HYPOKALEMIA: ICD-10-CM

## 2023-01-23 DIAGNOSIS — G25.81 RESTLESS LEGS: ICD-10-CM

## 2023-01-23 DIAGNOSIS — D72.820 PERSISTENT LYMPHOCYTOSIS: ICD-10-CM

## 2023-01-23 DIAGNOSIS — K58.0 IRRITABLE BOWEL SYNDROME WITH DIARRHEA: ICD-10-CM

## 2023-01-23 DIAGNOSIS — I34.0 NONRHEUMATIC MITRAL VALVE REGURGITATION: ICD-10-CM

## 2023-01-23 DIAGNOSIS — J30.89 NON-SEASONAL ALLERGIC RHINITIS DUE TO OTHER ALLERGIC TRIGGER: ICD-10-CM

## 2023-01-23 DIAGNOSIS — E21.3 HYPERPARATHYROIDISM (HCC): ICD-10-CM

## 2023-01-23 DIAGNOSIS — E78.00 HYPERCHOLESTEREMIA: ICD-10-CM

## 2023-01-23 DIAGNOSIS — R73.02 GLUCOSE INTOLERANCE (IMPAIRED GLUCOSE TOLERANCE): Primary | ICD-10-CM

## 2023-01-23 DIAGNOSIS — I73.00 RAYNAUD'S DISEASE WITHOUT GANGRENE: ICD-10-CM

## 2023-01-23 RX ORDER — AZITHROMYCIN 250 MG/1
TABLET, FILM COATED ORAL
Qty: 6 TABLET | Refills: 0 | Status: SHIPPED | OUTPATIENT
Start: 2023-01-23 | End: 2023-01-28

## 2023-01-23 NOTE — ASSESSMENT & PLAN NOTE
4/5/2021: MRI of the cervical spine:   Multilevel cervical spondylosis    Chronic pain in the back of neck  2-3 times a week  Tylenol help  No radiculopathy  Some pain in LUE due to as slipped of the couch last week, getting better

## 2023-01-23 NOTE — ASSESSMENT & PLAN NOTE
Lab Results   Component Value Date    WBC 5 92 12/09/2022    HGB 14 2 12/09/2022    HCT 42 7 12/09/2022    MCV 97 12/09/2022     12/09/2022   Lymphocyte count is down to 39 from previous 45 8    We will monitor

## 2023-01-23 NOTE — ASSESSMENT & PLAN NOTE
Lab Results   Component Value Date    EGFR 53 12/09/2022    EGFR 58 07/11/2022    EGFR 42 02/28/2022    CREATININE 0 97 12/09/2022    CREATININE 0 91 07/11/2022    CREATININE 1 19 02/28/2022     lastckd  GFR is baseline  Creat is baseline  Electrolytes stable  Recommend periodic blood test for monitoring of  Renal Function, lytes, GFR and urine for MA/Creat  Avoid Non Steroidal Antiinflammatory such as ibuprofen, aleve etc     Bone and Mineral disease monitoring as appropriate      All patient with GFR less than 30( CKD 4 or 5 or 6) are advised to follow with nephrologist

## 2023-01-23 NOTE — ASSESSMENT & PLAN NOTE
C/o sinus pain, pain in the ear, getting headache, for one week, nasal discharge clear, on xyzal and azelastine for chronic allergy sx- helps to open in the morning, then starts running and using atrovent nasal sx, Pt reports some hearing loss recent past, no fever, no sore throat, no cough, ( some dry cough ) , does have some post nasal drip    Component of sinusitis will give Z-Jacob as he tolerated in the past very well    Continue other symptomatic treatment as outlined

## 2023-01-23 NOTE — ASSESSMENT & PLAN NOTE
Hypercalcemia: Previous calcium 10 2, January PTH 2000 2291 6,  12/9/2022 up to 12/12/2022 work-up revealed NTX: Antario/creatinine ratio 59, 24-hour urine for creatinine 0 8, , CBC normal, ionized calcium 1 21, CMP normal except GFR 53 and calcium 9 9, phosphorus 2 9, vitamin D 45,  9-: DEXA scan osteoporosis  12/19/2022: Parathyroid scan:1  No definite scintigraphic evidence for parathyroid adenoma  Consider contrast CT neck with parathyroid protocol if clinically warranted  Refer to her notes of normocalcemic hyperparathyroidism    Patient to be seen by surgeon at [facility] in near future

## 2023-01-23 NOTE — ASSESSMENT & PLAN NOTE
Hypercalcemia: Previous calcium 10 2, January PTH 2000 2291 6,  12/9/2022 up to 12/12/2022 work-up revealed NTX: Antario/creatinine ratio 59, 24-hour urine for creatinine 0 8, , CBC normal, ionized calcium 1 21, CMP normal except GFR 53 and calcium 9 9, phosphorus 2 9, vitamin D 45,  9-: DEXA scan osteoporosis  12/19/2022: Parathyroid scan:1  No definite scintigraphic evidence for parathyroid adenoma  Consider contrast CT neck with parathyroid protocol if clinically warranted      To the surgeon for further evaluation

## 2023-01-23 NOTE — ASSESSMENT & PLAN NOTE
IBS is worse with stress recently usually 2 loose bowel movement early in the morning rest of the day is not too bad

## 2023-01-23 NOTE — PROGRESS NOTES
Name: Maryjo Lanes      : 1940      MRN: [id number]  Encounter Provider: Mallory Mckenna MD  Encounter Date: 2023   Encounter department: [facility] INTERNAL MEDICINE    Assessment & Plan     1  Glucose intolerance (impaired glucose tolerance)  Assessment & Plan:  Blood sugar fair  We will continue same regimen      2  Hyperparathyroidism [facility])  Assessment & Plan:  Hypercalcemia: Previous calcium 10  PTH  2291 6,  2022 up to 2022 work-up revealed NTX: Antario/creatinine ratio 59, 24-hour urine for creatinine 0 8, , CBC normal, ionized calcium 1 21, CMP normal except GFR 53 and calcium 9 9, phosphorus 2 9, vitamin D 45,  2022: DEXA scan osteoporosis  2022: Parathyroid scan:1  No definite scintigraphic evidence for parathyroid adenoma  Consider contrast CT neck with parathyroid protocol if clinically warranted  Refer to her notes of normocalcemic hyperparathyroidism  Patient to be seen by surgeon at [facility] in near future      3  Stage 3b chronic kidney disease [facility])  Assessment & Plan:  Lab Results   Component Value Date    EGFR 53 2022    EGFR 58 2022    EGFR 42 2022    CREATININE 0 97 2022    CREATININE 0 91 2022    CREATININE 1 19 2022     lastckd  GFR is baseline  Creat is baseline  Electrolytes stable  Recommend periodic blood test for monitoring of  Renal Function, lytes, GFR and urine for MA/Creat  Avoid Non Steroidal Antiinflammatory such as ibuprofen, aleve etc     Bone and Mineral disease monitoring as appropriate      All patient with GFR less than 30( CKD 4 or 5 or 6) are advised to follow with nephrologist         4  Non-seasonal allergic rhinitis due to other allergic trigger  Assessment & Plan:  C/o sinus pain, pain in the ear, getting headache, for one week, nasal discharge clear, on xyzal and azelastine for chronic allergy sx- helps to open in the morning, then starts running and using atrovent nasal sx, Pt reports some hearing loss recent past, no fever, no sore throat, no cough, ( some dry cough ) , does have some post nasal drip    Component of sinusitis will give YOLANDAJacob as he tolerated in the past very well  Continue other symptomatic treatment as outlined    Orders:  -     azithromycin (Zithromax) 250 mg tablet; Take 2 tablets (500 mg total) by mouth daily for 1 day, THEN 1 tablet (250 mg total) daily for 4 days  5  Hypercalcemia  Assessment & Plan:  Hypercalcemia: Previous calcium 10 2, January PTH 2000 2291 6,  12/9/2022 up to 12/12/2022 work-up revealed NTX: Antario/creatinine ratio 59, 24-hour urine for creatinine 0 8, , CBC normal, ionized calcium 1 21, CMP normal except GFR 53 and calcium 9 9, phosphorus 2 9, vitamin D 45,  9-: DEXA scan osteoporosis  12/19/2022: Parathyroid scan:1  No definite scintigraphic evidence for parathyroid adenoma  Consider contrast CT neck with parathyroid protocol if clinically warranted  To the surgeon for further evaluation      6  Hypercholesteremia  Assessment & Plan:  Lab Results   Component Value Date    LDLCALC 119 (H) 07/11/2022     Lab Results   Component Value Date    ALT 22 12/09/2022    ALT 21 11/02/2015     Lab Results   Component Value Date    CHOLESTEROL 212 (H) 07/11/2022    CHOLESTEROL 216 (H) 10/12/2021    CHOLESTEROL 235 (H) 02/26/2021     Lab Results   Component Value Date    HDL 59 07/11/2022    HDL 55 10/12/2021    HDL 71 02/26/2021     Lab Results   Component Value Date    TRIG 170 (H) 07/11/2022    TRIG 203 (H) 10/12/2021    TRIG 117 02/26/2021     Lab Results   Component Value Date    NONHDLC 153 07/11/2022    [place] 161 10/12/2021    [place] 164 02/26/2021   Cholesterol diet continued        7   Persistent lymphocytosis  Assessment & Plan:  Lab Results   Component Value Date    WBC 5 92 12/09/2022    HGB 14 2 12/09/2022    HCT 42 7 12/09/2022    MCV 97 12/09/2022     12/09/2022 Lymphocyte count is down to 39 from previous 45 8  We will monitor        8  Restless legs  Assessment & Plan:  Fair  Patient is a very prepared home but not requiring any medication      9  Edema of extremities  Assessment & Plan:  As long as she takes Lasix it is not indicated currently on Lasix 40 mg daily      10  Hypokalemia  Assessment & Plan:  Lab Results   Component Value Date    SODIUM 138 12/09/2022    K 3 8 12/09/2022     12/09/2022    CO2 26 12/09/2022    BUN 26 (H) 12/09/2022    CREATININE 0 97 12/09/2022    GLUC 110 12/09/2022    CALCIUM 9 9 12/09/2022   Potassium fair  Remains on Lasix 40 mg daily  Remains on potassium chloride 20 M EQ daily        11  Abnormal LFTs  Assessment & Plan:  Lab Results   Component Value Date    ALT 22 12/09/2022    AST 18 12/09/2022    ALKPHOS 90 12/09/2022    BILITOT 1 3 (H) 11/02/2015           12  Nonrheumatic mitral valve regurgitation  Assessment & Plan:  2019 echocardiogram revealed mild mitral regurgitation and mild to moderate aortic regurgitation as well as mild to moderate tricuspid regurgitation with PA systolic pressure 45  Recommend to consider getting another echocardiogram sometime this year      15  Raynaud's disease without gangrene  Assessment & Plan:  Fair at this time      15  Primary osteoarthritis involving multiple joints  Assessment & Plan:  Seen by ortho,  Seen by ortho for right knee pain, knee pain better as did not have to help  On CBD cream- excellent help      15  Essential hypertension  Assessment & Plan:  Blood pressure at home in the range of 135      Remains on Benicar 40 mg daily, Lasix 40 mg daily, potassium chloride 20 M EQ daily,    Hypertension( High Blood Pressure ):    Check your blood pressure at home periodically, keep the diary and bring records at your chronic disease management/Hypertension visit    Continue your Blood Pressure Medicine as per your current Medication List/ as advised    Avoid excessive salt    Know your BP target range ( Usually Systolic 277-833 and diastolic less than 85- In some cases it may be different )          16  Other migraine without status migrainosus, not intractable  Assessment & Plan:  Follow-up migraine related to stress but generally fair      17  Gastroesophageal reflux disease without esophagitis  Assessment & Plan:  Remains on omeprazole 20 mg daily with well control of the symptoms      18  Irritable bowel syndrome with diarrhea  Assessment & Plan:  IBS is worse with stress recently usually 2 loose bowel movement early in the morning rest of the day is not too bad             Subjective       Patient is here for follow-up of multiple medical problems  New complain:  C/o sinus pain, pain in the ear, getting headache, for one week, nasal discharge clear, on xyzal and azelastine for chronic allergy sx- helps to open in the morning, then starts running and using atrovent nasal sx, Pt reports some hearing loss recent past, no fever, no sore throat, no cough, ( some dry cough ) , does have some post nasal drip    Hypercalcemia: Previous calcium 10 2, January PTH 2000 2291 6,  12/9/2022 up to 12/12/2022 work-up revealed NTX: Antario/creatinine ratio 59, 24-hour urine for creatinine 0 8, , CBC normal, ionized calcium 1 21, CMP normal except GFR 53 and calcium 9 9, phosphorus 2 9, vitamin D 45,  9-: DEXA scan osteoporosis  12/19/2022: Parathyroid scan:1  No definite scintigraphic evidence for parathyroid adenoma  Consider contrast CT neck with parathyroid protocol if clinically warranted        Neck pain with radiculopathy, low back pain with radiculopathy, knee pain all are resolved at this time  Hypertension symptom-free  GI no acid reflux symptoms  IBS stable  We talked about taking oral medications for osteoporosis however the GERD will be relative contraindications      Patient is somewhat stressed out due to family member going through major surgery today     Migraines 1 episode of migraine today but generally speaking fair  Paragraphs mitral regurgitation symptom-free  Last echocardiogram 2019 LVH with moderate TR, 2+ MR, pulmonary artery pressure 40-50  Paragraphs varicose vein fair  Raynaud's fair  Diverticulosis reasonable  A edema better  Hyperlipidemia continue diet  Calcium slightly high 10 2  January 2022 PTH was 91 6 will monitor trend at this time  Refer to the endocrinologist   Will repeat PTH  DEXA scan revealed osteoporosis treatment options reviewed cannot take oral due to GERD  For the talked about Prolia  Will also refer to the endocrinologist in this reference  CKD level 3 stable  Tolerating current medications without side effect  Bilirubin was slightly high previous time repeat 1 came back normal  Last calcium was slightly high in February 4-8-2021: MRI C Spine: Multilevel cervical spondylosis     02/26/2021:  Creatinine 1 15, blood sugar 109, calcium 10, , rest of the CMP lipid profile unremarkable  GFR 44, hemoglobin 11 8, lymphocytosis noted  Vitamin-D 36   3-: [place] , SSA, SSB, DS DNA, SCLE 70, CENROMERE AB, [place], RNP SPEP, ALL NEG, CRP AND SED RATE WNL   10/12/2021:  CMP normal except albumin 3 4, cholesterol 216 HDL 55,  total bilirubin 1 16  CBC normal except MCV 99 differential reveals lymphocytes 44%  01/21/2022:  CMP normal except bilirubin went up to 1 38,, PTH 91 6, vitamin-D 30 8, GFR 47, calcium was 9 9  02/28/2022:  CBC normal, CMP normal except blood sugar 128, BUN 31, sed rate normal, total bilirubin normal, and direct bilirubin normal 0 17, calcium was slightly high 10 2  07/11/2022:  Cholesterol 212, , rest lipid profile normal, CMP normal except GFR 58 calcium slightly high 10 2 blood sugar 100 BUN 29 CBC normal CBC normal              Review of Systems   Constitutional: Negative for chills, fatigue, fever and unexpected weight change     HENT: Positive for hearing loss, postnasal drip, rhinorrhea, sinus pressure, sneezing and tinnitus  Negative for ear discharge, ear pain, sinus pain, sore throat, trouble swallowing and voice change  Eyes: Positive for visual disturbance  Negative for pain and redness  Respiratory: Negative for cough and shortness of breath  Cardiovascular: Negative for chest pain, palpitations and leg swelling  Gastrointestinal: Negative for abdominal pain, diarrhea and nausea  Endocrine: Negative for cold intolerance, polydipsia and polyuria  Genitourinary: Negative for dysuria, frequency and urgency  Musculoskeletal: Positive for arthralgias  Negative for gait problem and myalgias  Skin: Negative for rash  Allergic/Immunologic: Negative  Neurological: Positive for headaches  Negative for dizziness  Hematological: Negative for adenopathy  Psychiatric/Behavioral: Negative for behavioral problems  Past Medical History:   Diagnosis Date   • Baker's cyst of knee 10/21/2016    right- burst on its own   • Brain injury 1992    hx of-fell when ice skating  Noted brain bleed w/swelling   • HL (hearing loss)    • Migraine    • Raynaud's disease     both hands     Past Surgical History:   Procedure Laterality Date   • AXILLARY SURGERY Left     fatty cyst removed, benign   • CATARACT EXTRACTION Bilateral    • CATARACT EXTRACTION W/ INTRAOCULAR LENS IMPLANT Right 11/10/2016    Procedure: EXTRACTION EXTRACAPSULAR CATARACT PHACO INTRAOCULAR LENS (IOL);   Surgeon: Lorna Leo MD;  Location: [facility] OR;  Service:    • COSMETIC SURGERY      Eye lift   • DILATION AND CURETTAGE OF UTERUS     • MYRINGOTOMY W/ TUBES  2011    both ears-one tube out on the left   • MYRINGOTOMY W/ TUBES     • IA SURGICAL ARTHROSCOPY SHOULDER W/ROTATOR CUFF RPR Right 5/4/2017    Procedure: ARTHROSCOPY SHOULDER WITH ROTATOR CUFF REPAIR, BICEPS TENODESIS, AND SUBACROMIAL DECOMPRESSION;  Surgeon: Kori Monroy MD;  Location: [facility] OR;  Service: Orthopedics   • ND XCAPSL CTRC RMVL INSJ IO LENS PROSTH W/O ECP Left 10/20/2016    Procedure: EXTRACTION EXTRACAPSULAR CATARACT PHACO INTRAOCULAR LENS (IOL);   Surgeon: Zuleyka Jacob MD;  Location: [facility] MAIN OR;  Service: Ophthalmology   • SKIN BIOPSY      mole on chest   • SKIN BIOPSY      under breast, benign     Family History   Problem Relation Age of Onset   • Heart disease Mother         exp age 59 MI   • Stroke Father    • Macular degeneration Sister    • Macular degeneration Brother    • Diabetes Brother    • Cancer Brother         kidney-nephrectomy   • Kidney disease Brother         cancer     Social History     Socioeconomic History   • Marital status: /Civil Union     Spouse name: None   • Number of children: None   • Years of education: None   • Highest education level: None   Occupational History   • None   Tobacco Use   • Smoking status: Former     Packs/day: 1 50     Years: 30 00     Pack years: 45 00     Types: Cigarettes     Quit date:      Years since quittin 0   • Smokeless tobacco: Never   Substance and Sexual Activity   • Alcohol use: Yes     Comment: socially   • Drug use: No   • Sexual activity: None   Other Topics Concern   • None   Social History Narrative   • None     Social Determinants of Health     Financial Resource Strain: Not on file   Food Insecurity: Not on file   Transportation Needs: Not on file   Physical Activity: Not on file   Stress: Not on file   Social Connections: Not on file   Intimate Partner Violence: Not on file   Housing Stability: Not on file     Current Outpatient Medications on File Prior to Visit   Medication Sig   • acetaminophen (TYLENOL) 500 mg tablet Take 1,000 mg by mouth every 6 (six) hours as needed for mild pain   • ALPHAGAN P 0 1 %    • ascorbic acid (VITAMIN C) 500 mg tablet Take 500 mg by mouth every morning   • Azelastine HCl 137 MCG/SPRAY SOLN instill 1 spray into each nostril twice a day   • Biotin 5 MG CAPS Take by mouth every morning   • Cyanocobalamin (VITAMIN B 12 PO) Take by mouth every morning   • furosemide (LASIX) 40 mg tablet Take 1 tablet (40 mg total) by mouth every morning   • ipratropium (ATROVENT) 0 06 % nasal spray instill 2 sprays into each nostril four times a day   • levocetirizine (XYZAL) 5 MG tablet Take 1 tablet (5 mg total) by mouth every evening   • Lutein 40 MG CAPS Take by mouth every morning   • Magnesium 250 MG TABS Take by mouth every morning   • olmesartan (BENICAR) 40 mg tablet take 1 tablet by mouth once daily   • omeprazole (PriLOSEC) 20 mg delayed release capsule take 1 capsule by mouth once daily   • Potassium Chloride ER 20 MEQ TBCR take 1 tablet by mouth once daily   • rOPINIRole (REQUIP) 0 5 mg tablet Take 1 tablet (0 5 mg total) by mouth daily at bedtime   • SUMAtriptan (IMITREX) 50 mg tablet Take 1 tablet (50 mg total) by mouth once as needed for migraine   • vitamin A 7500 UNIT capsule Take 7,500 Units by mouth every morning     • vitamin E, tocopherol, 400 units capsule Take 400 Units by mouth every morning Last dose 4/26/17   • zinc gluconate 50 mg tablet Take 50 mg by mouth every morning   • [DISCONTINUED] Misc Natural Products (TUMERSAID PO) Take 1 tablet by mouth daily (Patient not taking: Reported on 12/5/2022)     Allergies   Allergen Reactions   • Lactose - Food Allergy GI Intolerance   • Latex Itching     Elastic,adhesive tape   • Dilantin [Phenytoin Sodium Extended] Rash   • Other Rash     Adhesive tape, environmental   • Penicillins Rash     Immunization History   Administered Date(s) Administered   • COVID-19 MODERNA VACC 0 25 ML IM BOOSTER 12/10/2021   • COVID-19 PFIZER VACCINE 0 3 ML IM 01/21/2021, 02/11/2021   • Influenza, high dose seasonal 0 7 mL 12/01/2020, 10/18/2021   • Influenza, injectable, quadrivalent, preservative free 0 5 mL 10/31/2019       Objective     Ht 5' 1" (1 549 m)   Wt 57 2 kg (126 lb)   BMI 23 81 kg/m²     Physical Exam  Vitals and nursing note reviewed  Constitutional:       General: She is not in acute distress  Appearance: She is well-developed  She is not ill-appearing or diaphoretic  HENT:      Head: Normocephalic and atraumatic  Right Ear: Tympanic membrane, ear canal and external ear normal       Left Ear: Tympanic membrane, ear canal and external ear normal       Nose: Congestion and rhinorrhea present  Eyes:      General: No scleral icterus  Right eye: No discharge  Left eye: No discharge  Conjunctiva/sclera: Conjunctivae normal    Neck:      Thyroid: No thyromegaly  Vascular: No carotid bruit or JVD  Cardiovascular:      Rate and Rhythm: Regular rhythm  Heart sounds: Normal heart sounds  Pulmonary:      Effort: No respiratory distress  Breath sounds: Normal breath sounds  No wheezing or rales  Abdominal:      General: There is no distension  Palpations: There is no mass  Tenderness: There is abdominal tenderness  There is rebound  There is no guarding  Hernia: No hernia is present  Musculoskeletal:      Cervical back: No tenderness  Right lower leg: No edema  Left lower leg: No edema  Lymphadenopathy:      Cervical: No cervical adenopathy  Skin:     General: Skin is warm  Coloration: Skin is not jaundiced or pale  Findings: No rash  Neurological:      Mental Status: She is oriented to person, place, and time  Mental status is at baseline  Psychiatric:         Mood and Affect: Mood normal          Behavior: Behavior normal          Thought Content:  Thought content normal          Judgment: Judgment normal        Rosana Sr MD

## 2023-01-23 NOTE — ASSESSMENT & PLAN NOTE
Lab Results   Component Value Date    ALT 22 12/09/2022    AST 18 12/09/2022    ALKPHOS 90 12/09/2022    BILITOT 1 3 (H) 11/02/2015

## 2023-01-23 NOTE — ASSESSMENT & PLAN NOTE
2019 echocardiogram revealed mild mitral regurgitation and mild to moderate aortic regurgitation as well as mild to moderate tricuspid regurgitation with PA systolic pressure 45    Recommend to consider getting another echocardiogram sometime this year

## 2023-01-23 NOTE — ASSESSMENT & PLAN NOTE
Lab Results   Component Value Date    LDLCALC 119 (H) 07/11/2022     Lab Results   Component Value Date    ALT 22 12/09/2022    ALT 21 11/02/2015     Lab Results   Component Value Date    CHOLESTEROL 212 (H) 07/11/2022    CHOLESTEROL 216 (H) 10/12/2021    CHOLESTEROL 235 (H) 02/26/2021     Lab Results   Component Value Date    HDL 59 07/11/2022    HDL 55 10/12/2021    HDL 71 02/26/2021     Lab Results   Component Value Date    TRIG 170 (H) 07/11/2022    TRIG 203 (H) 10/12/2021    TRIG 117 02/26/2021     Lab Results   Component Value Date    NONHDLC 153 07/11/2022    [facility] 161 10/12/2021    [facility] 164 02/26/2021   Cholesterol diet continued

## 2023-01-23 NOTE — PATIENT INSTRUCTIONS
You will need echocardiogram this year regarding your heart murmur follow-up  Take Zithromax for your sinus infection  If you are not better in 3 to 5 days let us know  Follow-up with the surgeon regarding her hyperparathyroidism  Follow with Consultants as per their and our suggestion    Follow up in 12 week(s) or as needed earlier    Follow all instructions as advised and discussed  Take your medications as prescribed  Call the office immediately if you experience any side effects  Ask questions if you do not understand  Keep your scheduled appointment as advised or come sooner if necessary or in doubt  Best time to call for non-urgent matter or questions on weekdays is between 9am and 12 noon  See physician for any new symptoms or worsening of current symptoms  Urgent or emergent situations call 911 and report to nearest emergency room      I spent  30 -40 minutes taking care of this patient including clinical care, conseling, collaboration, chart, lab and consultaion review as appropriate    Patient is to get labs 1 week(s) prior to next visit if advised

## 2023-01-23 NOTE — ASSESSMENT & PLAN NOTE
Lab Results   Component Value Date    SODIUM 138 12/09/2022    K 3 8 12/09/2022     12/09/2022    CO2 26 12/09/2022    BUN 26 (H) 12/09/2022    CREATININE 0 97 12/09/2022    GLUC 110 12/09/2022    CALCIUM 9 9 12/09/2022   Potassium fair  Remains on Lasix 40 mg daily    Remains on potassium chloride 20 M EQ daily

## 2023-01-23 NOTE — ASSESSMENT & PLAN NOTE
Seen by ortho,  Seen by ortho for right knee pain, knee pain better as did not have to help  On CBD cream- excellent help

## 2023-01-23 NOTE — ASSESSMENT & PLAN NOTE
Blood pressure at home in the range of 135      Remains on Benicar 40 mg daily, Lasix 40 mg daily, potassium chloride 20 M EQ daily,    Hypertension( High Blood Pressure ):    Check your blood pressure at home periodically, keep the diary and bring records at your chronic disease management/Hypertension visit    Continue your Blood Pressure Medicine as per your current Medication List/ as advised    Avoid excessive salt    Know your BP target range ( Usually Systolic 153-798 and diastolic less than 85- In some cases it may be different )

## 2023-01-25 ENCOUNTER — CONSULT (OUTPATIENT)
Dept: SURGICAL ONCOLOGY | Facility: CLINIC | Age: 83
End: 2023-01-25

## 2023-01-25 VITALS
HEIGHT: 61 IN | HEART RATE: 61 BPM | DIASTOLIC BLOOD PRESSURE: 70 MMHG | BODY MASS INDEX: 23.79 KG/M2 | OXYGEN SATURATION: 100 % | TEMPERATURE: 97.5 F | SYSTOLIC BLOOD PRESSURE: 140 MMHG | WEIGHT: 126 LBS

## 2023-01-25 DIAGNOSIS — E21.3 HYPERPARATHYROIDISM (HCC): Primary | ICD-10-CM

## 2023-01-25 NOTE — PROGRESS NOTES
Surgical Oncology Consult       [address] [facility] Claudette Olsen [address]  [place] [address] [id number] [phone]    Maki Wongjose daniel  1940  [id number]  [address] [facility] CANCER [facility] [address] [address]  [phone]    Chief Complaint   Patient presents with   • Consult       Assessment/Plan:    No problem-specific Assessment & Plan notes found for this encounter  Diagnoses and all orders for this visit:    Hyperparathyroidism [facility])  -     Ambulatory referral to Surgical Oncology  -     CT parathyroid study w wo contrast; Future        Advance Care Planning/Advance Directives:  Discussed disease status, cancer treatment plans and/or cancer treatment goals with the patient  Oncology History    No history exists  History of Present Illness: Patient is an 78-year-old woman found to have elevated calcium and PTH levels  She was worked up for primary hyperparathyroidism, and underwent sestambi scan  This was negative  She did have a history of kidney stones several years ago  Otherwise she complains of achiness, fatigue, and muscle aches and pains  No issues with her mood or memory or concentration  Review of Systems   Constitutional: Positive for fatigue  Negative for unexpected weight change  HENT: Negative  Negative for trouble swallowing and voice change  Eyes: Negative  Respiratory: Negative  Cardiovascular: Negative  Gastrointestinal: Negative  Endocrine: Negative  Genitourinary: Negative  Musculoskeletal: Negative  Skin: Negative  Allergic/Immunologic: Negative  Neurological: Negative  Hematological: Negative  Psychiatric/Behavioral: Negative  All other systems reviewed and are negative          Patient Active Problem List   Diagnosis   • Baker cyst, right   • Gastroesophageal reflux disease without esophagitis   • Glucose intolerance (impaired glucose tolerance)   • Restless legs   • Hypercholesteremia   • Mitral regurgitation   • Migraine   • Essential hypertension   • Varicose vein of leg   • Edema of extremities   • Rhinitis, allergic   • Aspirin intolerance   • Persistent lymphocytosis   • Hypokalemia   • Irritable bowel syndrome   • Abnormal echocardiogram   • Vitamin D deficiency   • Diverticulosis   • History of syncope   • Hammer toe of left foot   • Raynaud's disease   • Stage 3b chronic kidney disease (HCC)   • Cervical radiculopathy   • Left arm pain   • Rupture of left distal biceps tendon   • Primary osteoarthritis involving multiple joints   • HL (hearing loss)   • Elevated PTHrP level   • Lumbar radiculopathy   • Abnormal LFTs   • Hypercalcemia   • Headache   • Primary osteoarthritis of right knee   • 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   • Hyperparathyroidism [facility])     Past Medical History:   Diagnosis Date   • Baker's cyst of knee 10/21/2016    right- burst on its own   • Brain injury 1992    hx of-fell when ice skating  Noted brain bleed w/swelling   • HL (hearing loss)    • Migraine    • Raynaud's disease     both hands     Past Surgical History:   Procedure Laterality Date   • AXILLARY SURGERY Left     fatty cyst removed, benign   • CATARACT EXTRACTION Bilateral    • CATARACT EXTRACTION W/ INTRAOCULAR LENS IMPLANT Right 11/10/2016    Procedure: EXTRACTION EXTRACAPSULAR CATARACT PHACO INTRAOCULAR LENS (IOL);   Surgeon: Brooklyn Acosta MD;  [place] [facility] [place];  Service:    • COSMETIC SURGERY      Eye lift   • DILATION AND CURETTAGE OF UTERUS     • MYRINGOTOMY W/ TUBES  2011    both ears-one tube out on the left   • MYRINGOTOMY W/ TUBES     • ID SURGICAL ARTHROSCOPY SHOULDER W/ROTATOR CUFF RPR Right 5/4/2017    Procedure: ARTHROSCOPY SHOULDER WITH ROTATOR CUFF REPAIR, BICEPS TENODESIS, AND SUBACROMIAL DECOMPRESSION;  Surgeon: Nina Rosales MD;  Location: [facility] MAIN OR; Service: Orthopedics   • VT XCAPSL CTRC RMVL INSJ IO LENS PROSTH W/O ECP Left 10/20/2016    Procedure: EXTRACTION EXTRACAPSULAR CATARACT PHACO INTRAOCULAR LENS (IOL);   Surgeon: Martin Marquez MD;  Location: [facility] MAIN OR;  Service: Ophthalmology   • SKIN BIOPSY      mole on chest   • SKIN BIOPSY      under breast, benign     Family History   Problem Relation Age of Onset   • Heart disease Mother         exp age 59 MI   • Stroke Father    • Macular degeneration Sister    • Macular degeneration Brother    • Diabetes Brother    • Cancer Brother         kidney-nephrectomy   • Kidney disease Brother         cancer     Social History     Socioeconomic History   • Marital status: /Civil Union     Spouse name: Not on file   • Number of children: Not on file   • Years of education: Not on file   • Highest education level: Not on file   Occupational History   • Not on file   Tobacco Use   • Smoking status: Former     Packs/day: 1 50     Years: 30 00     Pack years: 45 00     Types: Cigarettes     Quit date:      Years since quittin 0   • Smokeless tobacco: Never   Substance and Sexual Activity   • Alcohol use: Yes     Comment: socially   • Drug use: No   • Sexual activity: Not on file   Other Topics Concern   • Not on file   Social History Narrative   • Not on file     Social Determinants of Health     Financial Resource Strain: Not on file   Food Insecurity: Not on file   Transportation Needs: Not on file   Physical Activity: Not on file   Stress: Not on file   Social Connections: Not on file   Intimate Partner Violence: Not on file   Housing Stability: Not on file       Current Outpatient Medications:   •  acetaminophen (TYLENOL) 500 mg tablet, Take 1,000 mg by mouth every 6 (six) hours as needed for mild pain, Disp: , Rfl:   •  ALPHAGAN P 0 1 %, , Disp: , Rfl: 0  •  ascorbic acid (VITAMIN C) 500 mg tablet, Take 500 mg by mouth every morning, Disp: , Rfl:   •  Azelastine HCl 137 MCG/SPRAY SOLN, instill 1 spray into each nostril twice a day, Disp: 30 mL, Rfl: 6  •  azithromycin (Zithromax) 250 mg tablet, Take 2 tablets (500 mg total) by mouth daily for 1 day, THEN 1 tablet (250 mg total) daily for 4 days  , Disp: 6 tablet, Rfl: 0  •  Biotin 5 MG CAPS, Take by mouth every morning, Disp: , Rfl:   •  Cyanocobalamin (VITAMIN B 12 PO), Take by mouth every morning, Disp: , Rfl:   •  furosemide (LASIX) 40 mg tablet, Take 1 tablet (40 mg total) by mouth every morning, Disp: 90 tablet, Rfl: 1  •  ipratropium (ATROVENT) 0 06 % nasal spray, instill 2 sprays into each nostril four times a day, Disp: 15 mL, Rfl: 11  •  levocetirizine (XYZAL) 5 MG tablet, Take 1 tablet (5 mg total) by mouth every evening, Disp: 90 tablet, Rfl: 1  •  Lutein 40 MG CAPS, Take by mouth every morning, Disp: , Rfl:   •  Magnesium 250 MG TABS, Take by mouth every morning, Disp: , Rfl:   •  olmesartan (BENICAR) 40 mg tablet, take 1 tablet by mouth once daily, Disp: 90 tablet, Rfl: 1  •  omeprazole (PriLOSEC) 20 mg delayed release capsule, take 1 capsule by mouth once daily, Disp: 90 capsule, Rfl: 1  •  Potassium Chloride ER 20 MEQ TBCR, take 1 tablet by mouth once daily, Disp: 90 tablet, Rfl: 1  •  rOPINIRole (REQUIP) 0 5 mg tablet, Take 1 tablet (0 5 mg total) by mouth daily at bedtime, Disp: 90 tablet, Rfl: 1  •  SUMAtriptan (IMITREX) 50 mg tablet, Take 1 tablet (50 mg total) by mouth once as needed for migraine, Disp: 9 tablet, Rfl: 1  •  vitamin A 7500 UNIT capsule, Take 7,500 Units by mouth every morning  , Disp: , Rfl:   •  vitamin E, tocopherol, 400 units capsule, Take 400 Units by mouth every morning Last dose 4/26/17, Disp: , Rfl:   •  zinc gluconate 50 mg tablet, Take 50 mg by mouth every morning, Disp: , Rfl:   Allergies   Allergen Reactions   • Lactose - Food Allergy GI Intolerance   • Latex Itching     Elastic,adhesive tape   • Dilantin [Phenytoin Sodium Extended] Rash   • Other Rash     Adhesive tape, environmental   • Penicillins Rash Vitals:    01/25/23 1111   BP: 140/70   Pulse: 61   Temp: 97 5 °F (36 4 °C)   SpO2: 100%       Physical Exam  Vitals reviewed  Constitutional:       Appearance: Normal appearance  HENT:      Head: Normocephalic and atraumatic  Right Ear: External ear normal       Left Ear: External ear normal       Nose: Nose normal    Eyes:      Extraocular Movements: Extraocular movements intact  Pupils: Pupils are equal, round, and reactive to light  Cardiovascular:      Rate and Rhythm: Regular rhythm  Pulses: Normal pulses  Heart sounds: Normal heart sounds  Pulmonary:      Effort: Pulmonary effort is normal       Breath sounds: Normal breath sounds  Abdominal:      General: Abdomen is flat  Palpations: Abdomen is soft  Musculoskeletal:         General: Normal range of motion  Cervical back: Normal range of motion and neck supple  No rigidity or tenderness  Lymphadenopathy:      Cervical: No cervical adenopathy  Skin:     General: Skin is warm and dry  Neurological:      General: No focal deficit present  Mental Status: She is alert and oriented to person, place, and time  Psychiatric:         Mood and Affect: Mood normal          Behavior: Behavior normal          Pathology:  none    Labs:  Lab Results   Component Value Date     0 (H) 12/09/2022    CALCIUM 9 9 12/09/2022    PHOS 2 9 12/09/2022         Imaging    DXA SCAN     CLINICAL HISTORY: 78 years postmenopausal female   OTHER RISK FACTORS:  Prior fracture as a result of minor injury, oral corticosteroid use, but arthritis, PPI therapy, Lasix therapy      PHARMACOLOGIC THERAPY FOR OSTEOPOROSIS:  None      TECHNIQUE: Bone densitometry was performed using a [employer]   bone densitometer    Regions of interest appear properly placed       COMPARISON: 1/23/2012      RESULTS:      LUMBAR SPINE  Level: L2-L4  (L1 vertebra excluded from analysis due to local structural abnormalities or artifact) :   BMD: 0 947  gm/cm2   T-score: -2 2         LEFT TOTAL HIP:   BMD: 0 705  gm/cm2   T-score: -2 4      LEFT FEMORAL NECK:   BMD: 0 609  gm/cm2   T score: -2 1      RIGHT TOTAL HIP:   BMD:  0 66  gm/cm2   T-score: -2 8     RIGHT FEMORAL NECK:   BMD:  0 607  gm/cm2   T score: -3 1      LEFT  FOREARM:    33% RADIUS BMD:  0 527  gm/cm2    T-score: -4 0         IMPRESSION:     1  Osteoporosis      2  Since a DXA study from 1/23/2012, there has been:  A  STATISTICALLY SIGNIFICANT DECREASE in bone mineral density of  -0 129 g/cm2 (-12 0)% in the lumbar spine  A  STATISTICALLY SIGNIFICANT DECREASE in bone mineral density of  -0 148 g/cm2 (-17 8)% in the hips         3  The 10 year risk of hip fracture is 13 4% with the 10 year risk of major osteoporotic fracture being 32 9% as calculated by the [facility]/WHO fracture risk assessment tool (FRAX)     4  The current NOF guidelines recommend treating patients with a T-score of -2 5 or less in the lumbar spine or hips, or in post-menopausal women and men over the age of 48 with low bone mass (osteopenia) and a FRAX 10 year risk score of >3% for hip   fracture and/or >20% for major osteoporotic fracture      5  The NOF recommends follow-up DXA in 1-2 years after initiating therapy for osteoporosis and every 2 years thereafter  More frequent evaluation is appropriate for patients with conditions associated with rapid bone loss, such as glucocorticoid   therapy  The interval between DXA screenings may be longer for individuals without major risk factors and initial T-score in the normal or upper low bone mass range    PARATHYROID SCAN     INDICATION:  E21 3: Hyperparathyroidism, unspecified     COMPARISON:  None      TECHNIQUE:   Following the intravenous administration of 26 mCi Tc-99m Cardiolite, anterior and bilateral anterior oblique projection images of the neck and mediastinum were obtained at approximately 10 minutes post injection followed at 2 hours post injection by static anterior and bilateral oblique projections as well as SPECT images in coronal, sagittal and axial projections       FINDINGS: Immediate planar images demonstrate homogeneous uptake of the radiotracer by the thyroid  Delayed planar and SPECT images demonstrate washout of most of the radiotracer from the thyroid  There are no persistent foci of activity that would be   suggestive of a parathyroid adenoma      IMPRESSION:     1  No definite scintigraphic evidence for parathyroid adenoma  Consider contrast CT neck with parathyroid protocol if clinically warranted         Workstation performed: [id number]     I reviewed the above laboratory and imaging data  Discussion/Summary: 24-year-old woman, primary hyperparathyroidism, negative sestamibi scan  Will order CAT scan with parathyroid protocol to look for potential target and follow-up here afterwards  All questions answered and copies of reports given her for her records

## 2023-01-25 NOTE — LETTER
January 25, 2023     Philpi Mclaughlin, [address]    Patient: Erica Camargo   YOB: 1940   Date of Visit: 1/25/2023       Dear Dr Lizzeth Malhotra: Thank you for referring Tony Blanco to me for evaluation  Below are my notes for this consultation  If you have questions, please do not hesitate to call me  I look forward to following your patient along with you  Sincerely,        Dario Randle MD        CC: MD Dario Esteves MD  1/25/2023 12:03 PM  Sign when Signing Visit               Surgical Oncology Consult       [address] [facility]  [address]  [address] [place] [address]  [phone]    Erica Camargo  1940  [id number]  [address] [facility] Particia Rohit  [address] [place] [address] [address] [facility]  [phone]    Chief Complaint   Patient presents with   • Consult       Assessment/Plan:    No problem-specific Assessment & Plan notes found for this encounter  Diagnoses and all orders for this visit:    Hyperparathyroidism [facility])  -     Ambulatory referral to Surgical Oncology  -     CT parathyroid study w wo contrast; Future       Advance Care Planning/Advance Directives:  Discussed disease status, cancer treatment plans and/or cancer treatment goals with the patient  Oncology History    No history exists  History of Present Illness: Patient is an 78-year-old woman found to have elevated calcium and PTH levels  She was worked up for primary hyperparathyroidism, and underwent sestambi scan  This was negative  She did have a history of kidney stones several years ago  Otherwise she complains of achiness, fatigue, and muscle aches and pains  No issues with her mood or memory or concentration  Review of Systems   Constitutional: Positive for fatigue  Negative for unexpected weight change     HENT: Negative  Negative for trouble swallowing and voice change  Eyes: Negative  Respiratory: Negative  Cardiovascular: Negative  Gastrointestinal: Negative  Endocrine: Negative  Genitourinary: Negative  Musculoskeletal: Negative  Skin: Negative  Allergic/Immunologic: Negative  Neurological: Negative  Hematological: Negative  Psychiatric/Behavioral: Negative  All other systems reviewed and are negative  Patient Active Problem List   Diagnosis   • Baker cyst, right   • Gastroesophageal reflux disease without esophagitis   • Glucose intolerance (impaired glucose tolerance)   • Restless legs   • Hypercholesteremia   • Mitral regurgitation   • Migraine   • Essential hypertension   • Varicose vein of leg   • Edema of extremities   • Rhinitis, allergic   • Aspirin intolerance   • Persistent lymphocytosis   • Hypokalemia   • Irritable bowel syndrome   • Abnormal echocardiogram   • Vitamin D deficiency   • Diverticulosis   • History of syncope   • Hammer toe of left foot   • Raynaud's disease   • Stage 3b chronic kidney disease (HCC)   • Cervical radiculopathy   • Left arm pain   • Rupture of left distal biceps tendon   • Primary osteoarthritis involving multiple joints   • HL (hearing loss)   • Elevated PTHrP level   • Lumbar radiculopathy   • Abnormal LFTs   • Hypercalcemia   • Headache   • Primary osteoarthritis of right knee   • 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   • Hyperparathyroidism [facility])     Past Medical History:   Diagnosis Date   • Baker's cyst of knee 10/21/2016    right- burst on its own   • Brain injury 1992    hx of-fell when ice skating   Noted brain bleed w/swelling   • HL (hearing loss)    • Migraine    • Raynaud's disease     both hands     Past Surgical History:   Procedure Laterality Date   • AXILLARY SURGERY Left     fatty cyst removed, benign   • CATARACT EXTRACTION Bilateral    • CATARACT EXTRACTION W/ INTRAOCULAR LENS IMPLANT Right 11/10/2016 Procedure: EXTRACTION EXTRACAPSULAR CATARACT PHACO INTRAOCULAR LENS (IOL); Surgeon: Jaxon Ta MD;  Location: [facility] MAIN OR;  Service:    • COSMETIC SURGERY      Eye lift   • DILATION AND CURETTAGE OF UTERUS     • MYRINGOTOMY W/ TUBES  2011    both ears-one tube out on the left   • MYRINGOTOMY W/ TUBES     • TN SURGICAL ARTHROSCOPY SHOULDER W/ROTATOR CUFF RPR Right 2017    Procedure: ARTHROSCOPY SHOULDER WITH ROTATOR CUFF REPAIR, BICEPS TENODESIS, AND SUBACROMIAL DECOMPRESSION;  Surgeon: Ana Tom MD;  Location: [facility] MAIN OR;  Service: Orthopedics   • TN XCAPSL CTRC RMVL INSJ IO LENS PROSTH W/O ECP Left 10/20/2016    Procedure: EXTRACTION EXTRACAPSULAR CATARACT PHACO INTRAOCULAR LENS (IOL);   Surgeon: Jaxon Ta MD;  Location: [facility] MAIN OR;  Service: Ophthalmology   • SKIN BIOPSY      mole on chest   • SKIN BIOPSY      under breast, benign     Family History   Problem Relation Age of Onset   • Heart disease Mother         exp age 59 MI   • Stroke Father    • Macular degeneration Sister    • Macular degeneration Brother    • Diabetes Brother    • Cancer Brother         kidney-nephrectomy   • Kidney disease Brother         cancer     Social History     Socioeconomic History   • Marital status: /Civil Union     Spouse name: Not on file   • Number of children: Not on file   • Years of education: Not on file   • Highest education level: Not on file   Occupational History   • Not on file   Tobacco Use   • Smoking status: Former     Packs/day: 1 50     Years: 30 00     Pack years: 45 00     Types: Cigarettes     Quit date:      Years since quittin 0   • Smokeless tobacco: Never   Substance and Sexual Activity   • Alcohol use: Yes     Comment: socially   • Drug use: No   • Sexual activity: Not on file   Other Topics Concern   • Not on file   Social History Narrative   • Not on file     Social Determinants of Health     Financial Resource Strain: Not on file   Food Insecurity: Not on file   Transportation Needs: Not on file   Physical Activity: Not on file   Stress: Not on file   Social Connections: Not on file   Intimate Partner Violence: Not on file   Housing Stability: Not on file       Current Outpatient Medications:   •  acetaminophen (TYLENOL) 500 mg tablet, Take 1,000 mg by mouth every 6 (six) hours as needed for mild pain, Disp: , Rfl:   •  ALPHAGAN P 0 1 %, , Disp: , Rfl: 0  •  ascorbic acid (VITAMIN C) 500 mg tablet, Take 500 mg by mouth every morning, Disp: , Rfl:   •  Azelastine HCl 137 MCG/SPRAY SOLN, instill 1 spray into each nostril twice a day, Disp: 30 mL, Rfl: 6  •  azithromycin (Zithromax) 250 mg tablet, Take 2 tablets (500 mg total) by mouth daily for 1 day, THEN 1 tablet (250 mg total) daily for 4 days  , Disp: 6 tablet, Rfl: 0  •  Biotin 5 MG CAPS, Take by mouth every morning, Disp: , Rfl:   •  Cyanocobalamin (VITAMIN B 12 PO), Take by mouth every morning, Disp: , Rfl:   •  furosemide (LASIX) 40 mg tablet, Take 1 tablet (40 mg total) by mouth every morning, Disp: 90 tablet, Rfl: 1  •  ipratropium (ATROVENT) 0 06 % nasal spray, instill 2 sprays into each nostril four times a day, Disp: 15 mL, Rfl: 11  •  levocetirizine (XYZAL) 5 MG tablet, Take 1 tablet (5 mg total) by mouth every evening, Disp: 90 tablet, Rfl: 1  •  Lutein 40 MG CAPS, Take by mouth every morning, Disp: , Rfl:   •  Magnesium 250 MG TABS, Take by mouth every morning, Disp: , Rfl:   •  olmesartan (BENICAR) 40 mg tablet, take 1 tablet by mouth once daily, Disp: 90 tablet, Rfl: 1  •  omeprazole (PriLOSEC) 20 mg delayed release capsule, take 1 capsule by mouth once daily, Disp: 90 capsule, Rfl: 1  •  Potassium Chloride ER 20 MEQ TBCR, take 1 tablet by mouth once daily, Disp: 90 tablet, Rfl: 1  •  rOPINIRole (REQUIP) 0 5 mg tablet, Take 1 tablet (0 5 mg total) by mouth daily at bedtime, Disp: 90 tablet, Rfl: 1  •  SUMAtriptan (IMITREX) 50 mg tablet, Take 1 tablet (50 mg total) by mouth once as needed for migraine, Disp: 9 tablet, Rfl: 1  •  vitamin A 7500 UNIT capsule, Take 7,500 Units by mouth every morning  , Disp: , Rfl:   •  vitamin E, tocopherol, 400 units capsule, Take 400 Units by mouth every morning Last dose 4/26/17, Disp: , Rfl:   •  zinc gluconate 50 mg tablet, Take 50 mg by mouth every morning, Disp: , Rfl:   Allergies   Allergen Reactions   • Lactose - Food Allergy GI Intolerance   • Latex Itching     Elastic,adhesive tape   • Dilantin [Phenytoin Sodium Extended] Rash   • Other Rash     Adhesive tape, environmental   • Penicillins Rash     Vitals:    01/25/23 1111   BP: 140/70   Pulse: 61   Temp: 97 5 °F (36 4 °C)   SpO2: 100%       Physical Exam  Vitals reviewed  Constitutional:       Appearance: Normal appearance  HENT:      Head: Normocephalic and atraumatic  Right Ear: External ear normal       Left Ear: External ear normal       Nose: Nose normal    Eyes:      Extraocular Movements: Extraocular movements intact  Pupils: Pupils are equal, round, and reactive to light  Cardiovascular:      Rate and Rhythm: Regular rhythm  Pulses: Normal pulses  Heart sounds: Normal heart sounds  Pulmonary:      Effort: Pulmonary effort is normal       Breath sounds: Normal breath sounds  Abdominal:      General: Abdomen is flat  Palpations: Abdomen is soft  Musculoskeletal:         General: Normal range of motion  Cervical back: Normal range of motion and neck supple  No rigidity or tenderness  Lymphadenopathy:      Cervical: No cervical adenopathy  Skin:     General: Skin is warm and dry  Neurological:      General: No focal deficit present  Mental Status: She is alert and oriented to person, place, and time     Psychiatric:         Mood and Affect: Mood normal          Behavior: Behavior normal          Pathology:  none    Labs:  Lab Results   Component Value Date     0 (H) 12/09/2022    CALCIUM 9 9 12/09/2022    PHOS 2 9 12/09/2022         Imaging    DXA SCAN     CLINICAL HISTORY: 78 years postmenopausal female   OTHER RISK FACTORS:  Prior fracture as a result of minor injury, oral corticosteroid use, but arthritis, PPI therapy, Lasix therapy      PHARMACOLOGIC THERAPY FOR OSTEOPOROSIS:  None      TECHNIQUE: Bone densitometry was performed using a [employer] MediaXA   bone densitometer  Regions of interest appear properly placed       COMPARISON: 1/23/2012      RESULTS:      LUMBAR SPINE  Level: L2-L4  (L1 vertebra excluded from analysis due to local structural abnormalities or artifact) :   BMD:  0 947  gm/cm2   T-score: -2 2         LEFT TOTAL HIP:   BMD: 0 705  gm/cm2   T-score: -2 4      LEFT FEMORAL NECK:   BMD: 0 609  gm/cm2   T score: -2 1      RIGHT TOTAL HIP:   BMD:  0 66  gm/cm2   T-score: -2 8     RIGHT FEMORAL NECK:   BMD:  0 607  gm/cm2   T score: -3 1      LEFT  FOREARM:    33% RADIUS BMD:  0 527  gm/cm2    T-score: -4 0         IMPRESSION:     1  Osteoporosis      2  Since a DXA study from 1/23/2012, there has been:  A  STATISTICALLY SIGNIFICANT DECREASE in bone mineral density of  -0 129 g/cm2 (-12 0)% in the lumbar spine  A  STATISTICALLY SIGNIFICANT DECREASE in bone mineral density of  -0 148 g/cm2 (-17 8)% in the hips         3  The 10 year risk of hip fracture is 13 4% with the 10 year risk of major osteoporotic fracture being 32 9% as calculated by the [facility]/WHO fracture risk assessment tool (FRAX)     4  The current NOF guidelines recommend treating patients with a T-score of -2 5 or less in the lumbar spine or hips, or in post-menopausal women and men over the age of 48 with low bone mass (osteopenia) and a FRAX 10 year risk score of >3% for hip   fracture and/or >20% for major osteoporotic fracture      5  The NOF recommends follow-up DXA in 1-2 years after initiating therapy for osteoporosis and every 2 years thereafter   More frequent evaluation is appropriate for patients with conditions associated with rapid bone loss, such as glucocorticoid   therapy  The interval between DXA screenings may be longer for individuals without major risk factors and initial T-score in the normal or upper low bone mass range  PARATHYROID SCAN     INDICATION:  E21 3: Hyperparathyroidism, unspecified     COMPARISON:  None      TECHNIQUE:   Following the intravenous administration of 26 mCi Tc-99m Cardiolite, anterior and bilateral anterior oblique projection images of the neck and mediastinum were obtained at approximately 10 minutes post injection followed at 2 hours post   injection by static anterior and bilateral oblique projections as well as SPECT images in coronal, sagittal and axial projections       FINDINGS: Immediate planar images demonstrate homogeneous uptake of the radiotracer by the thyroid  Delayed planar and SPECT images demonstrate washout of most of the radiotracer from the thyroid  There are no persistent foci of activity that would be   suggestive of a parathyroid adenoma      IMPRESSION:     1  No definite scintigraphic evidence for parathyroid adenoma  Consider contrast CT neck with parathyroid protocol if clinically warranted         Workstation performed: [id number]     I reviewed the above laboratory and imaging data  Discussion/Summary: 53-year-old woman, primary hyperparathyroidism, negative sestamibi scan  Will order CAT scan with parathyroid protocol to look for potential target and follow-up here afterwards  All questions answered and copies of reports given her for her records

## 2023-01-30 ENCOUNTER — HOSPITAL ENCOUNTER (OUTPATIENT)
Dept: RADIOLOGY | Facility: HOSPITAL | Age: 83
Discharge: HOME | End: 2023-01-30

## 2023-01-30 DIAGNOSIS — E21.3 HYPERPARATHYROIDISM (HCC): ICD-10-CM

## 2023-01-30 RX ADMIN — IOHEXOL 85 ML: 350 INJECTION, SOLUTION INTRAVENOUS at 10:39

## 2023-02-06 ENCOUNTER — OFFICE VISIT (OUTPATIENT)
Dept: SURGICAL ONCOLOGY | Facility: CLINIC | Age: 83
End: 2023-02-06

## 2023-02-06 VITALS
OXYGEN SATURATION: 96 % | BODY MASS INDEX: 23.79 KG/M2 | HEIGHT: 61 IN | SYSTOLIC BLOOD PRESSURE: 132 MMHG | HEART RATE: 78 BPM | DIASTOLIC BLOOD PRESSURE: 60 MMHG | WEIGHT: 126 LBS

## 2023-02-06 DIAGNOSIS — E21.3 HYPERPARATHYROIDISM (HCC): Primary | ICD-10-CM

## 2023-02-06 NOTE — LETTER
February 6, 2023     [address]ik [address] [facility] [address]    Patient: Carroll Padgett   YOB: 1940   Date of Visit: 2/6/2023       Dear Dr Melecio Miles: Thank you for referring Dewayne Quintero to me for evaluation  Below are my notes for this consultation  If you have questions, please do not hesitate to call me  I look forward to following your patient along with you  Sincerely,        Dorita Roberts MD        CC: MD Dorita Benavidez MD  2/6/2023 11:40 AM  Sign when Signing Visit     Surgical Oncology Follow Up       [address] [facility] [address] [address]  [phone]    Carroll Padgett  1940  [id number]  [address] [facility] SURG[facility] ONCOL[facility] [address] [id number] [phone]    Chief Complaint   Patient presents with   • Follow-up     Pt presents for a f/u for Hyperparathyroidism  Pt reports doing well with no new issues or pain  Pt last labs 12/12/2022       Assessment/Plan:    No problem-specific Assessment & Plan notes found for this encounter  Diagnoses and all orders for this visit:    Hyperparathyroidism ([address] )  -     US thyroid; Future  -     PTH, intact; Future  -     Calcium; Future  -     Vitamin D Panel; Future       Advance Care Planning/Advance Directives:  Discussed disease status, cancer treatment plans and/or cancer treatment goals with the patient  Oncology History    No history exists  History of Present Illness: 78year old woman here for follow-up post scheduled for potential parathyroid adenoma   -Interval History: No issues since test provided was completed  Review of Systems:  Review of Systems   Constitutional: Negative  HENT: Negative  Eyes: Negative  Respiratory: Negative  Cardiovascular: Negative      Gastrointestinal: Negative  Endocrine: Negative  Genitourinary: Negative  Musculoskeletal: Negative  Skin: Negative  Neurological: Negative  Hematological: Negative  Psychiatric/Behavioral: Negative  All other systems reviewed and are negative  Patient Active Problem List   Diagnosis   • Baker cyst, right   • Gastroesophageal reflux disease without esophagitis   • Glucose intolerance (impaired glucose tolerance)   • Restless legs   • Hypercholesteremia   • Mitral regurgitation   • Migraine   • Essential hypertension   • Varicose vein of leg   • Edema of extremities   • Rhinitis, allergic   • Aspirin intolerance   • Persistent lymphocytosis   • Hypokalemia   • Irritable bowel syndrome   • Abnormal echocardiogram   • Vitamin D deficiency   • Diverticulosis   • History of syncope   • Hammer toe of left foot   • Raynaud's disease   • Stage 3b chronic kidney disease (HCC)   • Cervical radiculopathy   • Left arm pain   • Rupture of left distal biceps tendon   • Primary osteoarthritis involving multiple joints   • HL (hearing loss)   • Elevated PTHrP level   • Lumbar radiculopathy   • Abnormal LFTs   • Hypercalcemia   • Headache   • Primary osteoarthritis of right knee   • 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   • Hyperparathyroidism [facility])     Past Medical History:   Diagnosis Date   • Baker's cyst of knee 10/21/2016    right- burst on its own   • Brain injury 1992    hx of-fell when ice skating  Noted brain bleed w/swelling   • HL (hearing loss)    • Migraine    • Raynaud's disease     both hands     Past Surgical History:   Procedure Laterality Date   • AXILLARY SURGERY Left     fatty cyst removed, benign   • CATARACT EXTRACTION Bilateral    • CATARACT EXTRACTION W/ INTRAOCULAR LENS IMPLANT Right 11/10/2016    Procedure: EXTRACTION EXTRACAPSULAR CATARACT PHACO INTRAOCULAR LENS (IOL);   Surgeon: Jaxon Ta MD;  Location: [facility] MAIN OR;  Service:    • COSMETIC SURGERY      Eye lift   • DILATION AND CURETTAGE OF UTERUS     • MYRINGOTOMY W/ TUBES  2011    both ears-one tube out on the left   • MYRINGOTOMY W/ TUBES     • [place] SURGICAL ARTHROSCOPY SHOULDER W/ROTATOR CUFF RPR Right 2017    Procedure: ARTHROSCOPY SHOULDER WITH ROTATOR CUFF REPAIR, BICEPS TENODESIS, AND SUBACROMIAL DECOMPRESSION;  Surgeon: Edgar Arias MD;  Location: [facility] MAIN OR;  Service: Orthopedics   • NY XCAPSL CTRC RMVL INSJ IO LENS PROSTH W/O ECP Left 10/20/2016    Procedure: EXTRACTION EXTRACAPSULAR CATARACT PHACO INTRAOCULAR LENS (IOL);   Surgeon: Cinthia Mcardle, MD;  Location: [facility] MAIN OR;  Service: Ophthalmology   • SKIN BIOPSY      mole on chest   • SKIN BIOPSY      under breast, benign     Family History   Problem Relation Age of Onset   • Heart disease Mother         exp age 59 MI   • Stroke Father    • Macular degeneration Sister    • Macular degeneration Brother    • Diabetes Brother    • Cancer Brother         kidney-nephrectomy   • Kidney disease Brother         cancer     Social History     Socioeconomic History   • Marital status: /Civil Union     Spouse name: Not on file   • Number of children: Not on file   • Years of education: Not on file   • Highest education level: Not on file   Occupational History   • Not on file   Tobacco Use   • Smoking status: Former     Packs/day: 1 50     Years: 30 00     Pack years: 45 00     Types: Cigarettes     Quit date:      Years since quittin 1   • Smokeless tobacco: Never   Substance and Sexual Activity   • Alcohol use: Yes     Comment: socially   • Drug use: No   • Sexual activity: Not on file   Other Topics Concern   • Not on file   Social History Narrative   • Not on file     Social Determinants of Health     Financial Resource Strain: Not on file   Food Insecurity: Not on file   Transportation Needs: Not on file   Physical Activity: Not on file   Stress: Not on file   Social Connections: Not on file   Intimate Partner Violence: Not on file   Housing Stability: Not on file       Current Outpatient Medications:   •  acetaminophen (TYLENOL) 500 mg tablet, Take 1,000 mg by mouth every 6 (six) hours as needed for mild pain, Disp: , Rfl:   •  ALPHAGAN P 0 1 %, , Disp: , Rfl: 0  •  ascorbic acid (VITAMIN C) 500 mg tablet, Take 500 mg by mouth every morning, Disp: , Rfl:   •  Azelastine HCl 137 MCG/SPRAY SOLN, instill 1 spray into each nostril twice a day, Disp: 30 mL, Rfl: 6  •  Biotin 5 MG CAPS, Take by mouth every morning, Disp: , Rfl:   •  Cyanocobalamin (VITAMIN B 12 PO), Take by mouth every morning, Disp: , Rfl:   •  furosemide (LASIX) 40 mg tablet, Take 1 tablet (40 mg total) by mouth every morning, Disp: 90 tablet, Rfl: 1  •  ipratropium (ATROVENT) 0 06 % nasal spray, instill 2 sprays into each nostril four times a day, Disp: 15 mL, Rfl: 11  •  levocetirizine (XYZAL) 5 MG tablet, Take 1 tablet (5 mg total) by mouth every evening, Disp: 90 tablet, Rfl: 1  •  Lutein 40 MG CAPS, Take by mouth every morning, Disp: , Rfl:   •  Magnesium 250 MG TABS, Take by mouth every morning, Disp: , Rfl:   •  olmesartan (BENICAR) 40 mg tablet, take 1 tablet by mouth once daily, Disp: 90 tablet, Rfl: 1  •  omeprazole (PriLOSEC) 20 mg delayed release capsule, take 1 capsule by mouth once daily, Disp: 90 capsule, Rfl: 1  •  Potassium Chloride ER 20 MEQ TBCR, take 1 tablet by mouth once daily, Disp: 90 tablet, Rfl: 1  •  rOPINIRole (REQUIP) 0 5 mg tablet, Take 1 tablet (0 5 mg total) by mouth daily at bedtime, Disp: 90 tablet, Rfl: 1  •  SUMAtriptan (IMITREX) 50 mg tablet, Take 1 tablet (50 mg total) by mouth once as needed for migraine, Disp: 9 tablet, Rfl: 1  •  vitamin A 7500 UNIT capsule, Take 7,500 Units by mouth every morning  , Disp: , Rfl:   •  vitamin E, tocopherol, 400 units capsule, Take 400 Units by mouth every morning Last dose 4/26/17, Disp: , Rfl:   •  zinc gluconate 50 mg tablet, Take 50 mg by mouth every morning, Disp: , Rfl:   Allergies   Allergen Reactions   • Lactose - Food Allergy GI Intolerance   • Latex Itching     Elastic,adhesive tape   • Dilantin [Phenytoin Sodium Extended] Rash   • Other Rash     Adhesive tape, environmental   • Penicillins Rash     Vitals:    02/06/23 1119   BP: 132/60   Pulse: 78   SpO2: 96%       Physical Exam  Vitals reviewed  Constitutional:       Appearance: Normal appearance  HENT:      Head: Normocephalic and atraumatic  Right Ear: External ear normal       Left Ear: External ear normal    Skin:     General: Skin is warm and dry  Neurological:      General: No focal deficit present  Mental Status: She is alert and oriented to person, place, and time  Results:  Labs:  Lab Results   Component Value Date     0 (H) 12/09/2022    CALCIUM 9 9 12/09/2022    PHOS 2 9 12/09/2022         Imaging  CT parathyroid study w wo contrast    Result Date: 1/30/2023  Narrative: CT  NECK  WITHOUT AND WITH CONTRAST FROM JANELL TO SKULL BASE INDICATION: Hyperparathyroidism  Negative sestamibi  COMPARISON:  None  TECHNIQUE:This examination, like all CT scans performed in the [facility], was performed utilizing techniques to minimize radiation dose exposure, including the use of iterative reconstruction and automated exposure control  Both noncontrast, contrast, and post contrast delayed images were performed according to standard parathyroid protocol (4D technique) Coronal and sagittal reconstructions were performed  3D reconstructions were performed on an independent workstation, and are supplied for review  85 mL of iohexol (OMNIPAQUE) was injected intravenously without immediate consequence  Radiation dose: 1207  11 mGy-cm FINDINGS: SERIAL NONCONTRAST, POST CONTRAST AND DELAYS: There is no mass lesion demonstrated which meets the enhancement pattern suspicious for parathyroid adenoma  VASCULAR STRUCTURES: Diffuse atherosclerosis is present  There is calcific plaque involving both carotid bulbs, more extensive on the left  There is no stenosis which is significant by Nascet criteria  VISUALIZED PARANASAL SINUSES: Normal  NASAL CAVITY AND NASOPHARYNX: Normal  SUPRAHYOID NECK: Normal oropharynx, oral cavity, parapharyngeal and retropharyngeal spaces  INFRAHYOID NECK: Normal oropharynx, oral cavity, parapharyngeal and retropharyngeal spaces  THYROID GLAND: Diffusely heterogeneous thyroid  Likely, nodules are present but ill-defined  A carotid ultrasound is recommended for further evaluation  LYMPH NODES: No pathologic or enlarged adenopathy  MEDIASTINUM: Unremarkable  BONY STRUCTURES Diffuse osteopenia is present with severe degenerative changes in the mid cervical spine  LUNG APICES: Unremarkable  Calcification is noted in the apical pleural plaques  Impression: 1  No lesion identified which meets the CT enhancement criteria suspicious for parathyroid adenoma  2   Heterogeneous thyroid  Probable ill-defined nodules  Recommend thyroid ultrasound for further evaluation  3   Diffuse osteopenia with severe degenerative changes of the mid cervical spine  Workstation performed: [id number]     I reviewed the above laboratory and imaging data  Discussion/Summary: Suspected prior hyperparathyroidism though her most recent calcium levels are normal   PTH level  Both sestamibi scan and CAT scan negative for target  Some suspicion of thyroid nodule seen meriting ultrasound  We will have her plan to follow-up in 6 months with repeat blood work at that time including calcium, PTH, and vitamin D levels, along with a thyroid ultrasound  She is not too keen on surgery at this point but is amenable to interval blood work

## 2023-02-06 NOTE — PROGRESS NOTES
Surgical Oncology Follow Up       [id number] [facility] CANCER [facility] [place]  [address] [facility] [address] [address] [phone]    [place]  1940  [id number]  [address] [facility]  [facility] [address] [address] [phone]    Chief Complaint   Patient presents with   • Follow-up     Pt presents for a f/u for Hyperparathyroidism  Pt reports doing well with no new issues or pain  Pt last labs 12/12/2022       Assessment/Plan:    No problem-specific Assessment & Plan notes found for this encounter  Diagnoses and all orders for this visit:    Hyperparathyroidism ([address] )  -     US thyroid; Future  -     PTH, intact; Future  -     Calcium; Future  -     Vitamin D Panel; Future        Advance Care Planning/Advance Directives:  Discussed disease status, cancer treatment plans and/or cancer treatment goals with the patient  Oncology History    No history exists  History of Present Illness: 78year old woman here for follow-up post scheduled for potential parathyroid adenoma   -Interval History: No issues since test provided was completed  Review of Systems:  Review of Systems   Constitutional: Negative  HENT: Negative  Eyes: Negative  Respiratory: Negative  Cardiovascular: Negative  Gastrointestinal: Negative  Endocrine: Negative  Genitourinary: Negative  Musculoskeletal: Negative  Skin: Negative  Neurological: Negative  Hematological: Negative  Psychiatric/Behavioral: Negative  All other systems reviewed and are negative        Patient Active Problem List   Diagnosis   • Baker cyst, right   • Gastroesophageal reflux disease without esophagitis   • Glucose intolerance (impaired glucose tolerance)   • Restless legs   • Hypercholesteremia   • Mitral regurgitation   • Migraine   • Essential hypertension   • Varicose vein of leg   • Edema of extremities   • Rhinitis, allergic   • Aspirin intolerance   • Persistent lymphocytosis   • Hypokalemia   • Irritable bowel syndrome   • Abnormal echocardiogram   • Vitamin D deficiency   • Diverticulosis   • History of syncope   • Hammer toe of left foot   • Raynaud's disease   • Stage 3b chronic kidney disease (HCC)   • Cervical radiculopathy   • Left arm pain   • Rupture of left distal biceps tendon   • Primary osteoarthritis involving multiple joints   • HL (hearing loss)   • Elevated PTHrP level   • Lumbar radiculopathy   • Abnormal LFTs   • Hypercalcemia   • Headache   • Primary osteoarthritis of right knee   • 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   • Hyperparathyroidism [facility])     Past Medical History:   Diagnosis Date   • Baker's cyst of knee 10/21/2016    right- burst on its own   • Brain injury 1992    hx of-fell when ice skating  Noted brain bleed w/swelling   • HL (hearing loss)    • Migraine    • Raynaud's disease     both hands     Past Surgical History:   Procedure Laterality Date   • AXILLARY SURGERY Left     fatty cyst removed, benign   • CATARACT EXTRACTION Bilateral    • CATARACT EXTRACTION W/ INTRAOCULAR LENS IMPLANT Right 11/10/2016    Procedure: EXTRACTION EXTRACAPSULAR CATARACT PHACO INTRAOCULAR LENS (IOL); Surgeon: Cinthia Mcardle, MD;  Location: [facility] MAIN OR;  Service:    • COSMETIC SURGERY      Eye lift   • DILATION AND CURETTAGE OF UTERUS     • MYRINGOTOMY W/ TUBES  2011    both ears-one tube out on the left   • MYRINGOTOMY W/ TUBES     • [place] SURGICAL ARTHROSCOPY SHOULDER W/ROTATOR CUFF RPR Right 5/4/2017    Procedure: ARTHROSCOPY SHOULDER WITH ROTATOR CUFF REPAIR, BICEPS TENODESIS, AND SUBACROMIAL DECOMPRESSION;  Surgeon: Edgar Arias MD;  Location: [place] [address] MAIN OR;  Service: Orthopedics   • WI XCAPSL CTRC RMVL INSJ IO LENS PROSTH W/O ECP Left 10/20/2016    Procedure: EXTRACTION EXTRACAPSULAR CATARACT PHACO INTRAOCULAR LENS (IOL);   Surgeon: Cinthia Mcardle, MD;  Location: [facility] SURGICAL [place] MAIN OR;  Service: Ophthalmology   • SKIN BIOPSY      mole on chest   • SKIN BIOPSY      under breast, benign     Family History   Problem Relation Age of Onset   • Heart disease Mother         exp age 59 MI   • Stroke Father    • Macular degeneration Sister    • Macular degeneration Brother    • Diabetes Brother    • Cancer Brother         kidney-nephrectomy   • Kidney disease Brother         cancer     Social History     Socioeconomic History   • Marital status: /Civil Union     Spouse name: Not on file   • Number of children: Not on file   • Years of education: Not on file   • Highest education level: Not on file   Occupational History   • Not on file   Tobacco Use   • Smoking status: Former     Packs/day: 1 50     Years: 30 00     Pack years: 45 00     Types: Cigarettes     Quit date:      Years since quittin 1   • Smokeless tobacco: Never   Substance and Sexual Activity   • Alcohol use: Yes     Comment: socially   • Drug use: No   • Sexual activity: Not on file   Other Topics Concern   • Not on file   Social History Narrative   • Not on file     Social Determinants of Health     Financial Resource Strain: Not on file   Food Insecurity: Not on file   Transportation Needs: Not on file   Physical Activity: Not on file   Stress: Not on file   Social Connections: Not on file   Intimate Partner Violence: Not on file   Housing Stability: Not on file       Current Outpatient Medications:   •  acetaminophen (TYLENOL) 500 mg tablet, Take 1,000 mg by mouth every 6 (six) hours as needed for mild pain, Disp: , Rfl:   •  ALPHAGAN P 0 1 %, , Disp: , Rfl: 0  •  ascorbic acid (VITAMIN C) 500 mg tablet, Take 500 mg by mouth every morning, Disp: , Rfl:   •  Azelastine HCl 137 MCG/SPRAY SOLN, instill 1 spray into each nostril twice a day, Disp: 30 mL, Rfl: 6  •  Biotin 5 MG CAPS, Take by mouth every morning, Disp: , Rfl:   •  Cyanocobalamin (VITAMIN B 12 PO), Take by mouth every morning, Disp: , Rfl:   • furosemide (LASIX) 40 mg tablet, Take 1 tablet (40 mg total) by mouth every morning, Disp: 90 tablet, Rfl: 1  •  ipratropium (ATROVENT) 0 06 % nasal spray, instill 2 sprays into each nostril four times a day, Disp: 15 mL, Rfl: 11  •  levocetirizine (XYZAL) 5 MG tablet, Take 1 tablet (5 mg total) by mouth every evening, Disp: 90 tablet, Rfl: 1  •  Lutein 40 MG CAPS, Take by mouth every morning, Disp: , Rfl:   •  Magnesium 250 MG TABS, Take by mouth every morning, Disp: , Rfl:   •  olmesartan (BENICAR) 40 mg tablet, take 1 tablet by mouth once daily, Disp: 90 tablet, Rfl: 1  •  omeprazole (PriLOSEC) 20 mg delayed release capsule, take 1 capsule by mouth once daily, Disp: 90 capsule, Rfl: 1  •  Potassium Chloride ER 20 MEQ TBCR, take 1 tablet by mouth once daily, Disp: 90 tablet, Rfl: 1  •  rOPINIRole (REQUIP) 0 5 mg tablet, Take 1 tablet (0 5 mg total) by mouth daily at bedtime, Disp: 90 tablet, Rfl: 1  •  SUMAtriptan (IMITREX) 50 mg tablet, Take 1 tablet (50 mg total) by mouth once as needed for migraine, Disp: 9 tablet, Rfl: 1  •  vitamin A 7500 UNIT capsule, Take 7,500 Units by mouth every morning  , Disp: , Rfl:   •  vitamin E, tocopherol, 400 units capsule, Take 400 Units by mouth every morning Last dose 4/26/17, Disp: , Rfl:   •  zinc gluconate 50 mg tablet, Take 50 mg by mouth every morning, Disp: , Rfl:   Allergies   Allergen Reactions   • Lactose - Food Allergy GI Intolerance   • Latex Itching     Elastic,adhesive tape   • Dilantin [Phenytoin Sodium Extended] Rash   • Other Rash     Adhesive tape, environmental   • Penicillins Rash     Vitals:    02/06/23 1119   BP: 132/60   Pulse: 78   SpO2: 96%       Physical Exam  Vitals reviewed  Constitutional:       Appearance: Normal appearance  HENT:      Head: Normocephalic and atraumatic  Right Ear: External ear normal       Left Ear: External ear normal    Skin:     General: Skin is warm and dry  Neurological:      General: No focal deficit present  Mental Status: She is alert and oriented to person, place, and time  Results:  Labs:  Lab Results   Component Value Date     0 (H) 12/09/2022    CALCIUM 9 9 12/09/2022    PHOS 2 9 12/09/2022         Imaging  CT parathyroid study w wo contrast    Result Date: 1/30/2023  Narrative: CT  NECK  WITHOUT AND WITH CONTRAST FROM JANELL TO SKULL BASE INDICATION: Hyperparathyroidism  Negative sestamibi  COMPARISON:  None  TECHNIQUE:This examination, like [facility], was performed utilizing techniques to minimize radiation dose exposure, including the use of iterative reconstruction and automated exposure control  Both noncontrast, contrast, and post contrast delayed images were performed according to standard parathyroid protocol (4D technique) Coronal and sagittal reconstructions were performed  3D reconstructions were performed on an independent workstation, and are supplied for review  85 mL of iohexol (OMNIPAQUE) was injected intravenously without immediate consequence  Radiation dose: 1207  11 mGy-cm FINDINGS: SERIAL NONCONTRAST, POST CONTRAST AND DELAYS: There is no mass lesion demonstrated which meets the enhancement pattern suspicious for parathyroid adenoma  VASCULAR STRUCTURES: Diffuse atherosclerosis is present  There is calcific plaque involving both carotid bulbs, more extensive on the left  There is no stenosis which is significant by Nascet criteria  VISUALIZED PARANASAL SINUSES: Normal  NASAL CAVITY AND NASOPHARYNX: Normal  SUPRAHYOID NECK: Normal oropharynx, oral cavity, parapharyngeal and retropharyngeal spaces  INFRAHYOID NECK: Normal oropharynx, oral cavity, parapharyngeal and retropharyngeal spaces  THYROID GLAND: Diffusely heterogeneous thyroid  Likely, nodules are present but ill-defined  A carotid ultrasound is recommended for further evaluation  LYMPH NODES: No pathologic or enlarged adenopathy  MEDIASTINUM: Unremarkable   BONY STRUCTURES Diffuse osteopenia is present with severe degenerative changes in the mid cervical spine  LUNG APICES: Unremarkable  Calcification is noted in the apical pleural plaques  Impression: 1  No lesion identified which meets the CT enhancement criteria suspicious for parathyroid adenoma  2   Heterogeneous thyroid  Probable ill-defined nodules  Recommend thyroid ultrasound for further evaluation  3   Diffuse osteopenia with severe degenerative changes of the mid cervical spine  Workstation performed: [id number]     I reviewed the above laboratory and imaging data  Discussion/Summary: Suspected prior hyperparathyroidism though her most recent calcium levels are normal   PTH level  Both sestamibi scan and CAT scan negative for target  Some suspicion of thyroid nodule seen meriting ultrasound  We will have her plan to follow-up in 6 months with repeat blood work at that time including calcium, PTH, and vitamin D levels, along with a thyroid ultrasound  She is not too keen on surgery at this point but is amenable to interval blood work

## 2023-02-12 DIAGNOSIS — R60.0 EDEMA OF EXTREMITIES: ICD-10-CM

## 2023-02-12 RX ORDER — FUROSEMIDE 40 MG/1
TABLET ORAL
Qty: 90 TABLET | Refills: 1 | Status: SHIPPED | OUTPATIENT
Start: 2023-02-12

## 2023-04-24 ENCOUNTER — OFFICE VISIT (OUTPATIENT)
Dept: INTERNAL MEDICINE CLINIC | Facility: CLINIC | Age: 83
End: 2023-04-24

## 2023-04-24 VITALS — WEIGHT: 119 LBS | BODY MASS INDEX: 22.47 KG/M2 | HEIGHT: 61 IN | HEART RATE: 66 BPM | OXYGEN SATURATION: 98 %

## 2023-04-24 DIAGNOSIS — K58.0 IRRITABLE BOWEL SYNDROME WITH DIARRHEA: ICD-10-CM

## 2023-04-24 DIAGNOSIS — R60.0 EDEMA OF EXTREMITIES: ICD-10-CM

## 2023-04-24 DIAGNOSIS — I34.0 NONRHEUMATIC MITRAL VALVE REGURGITATION: ICD-10-CM

## 2023-04-24 DIAGNOSIS — R73.02 GLUCOSE INTOLERANCE (IMPAIRED GLUCOSE TOLERANCE): ICD-10-CM

## 2023-04-24 DIAGNOSIS — J30.89 NON-SEASONAL ALLERGIC RHINITIS DUE TO OTHER ALLERGIC TRIGGER: ICD-10-CM

## 2023-04-24 DIAGNOSIS — N18.32 STAGE 3B CHRONIC KIDNEY DISEASE (HCC): ICD-10-CM

## 2023-04-24 DIAGNOSIS — M54.16 LUMBAR RADICULOPATHY: ICD-10-CM

## 2023-04-24 DIAGNOSIS — E21.3 HYPERPARATHYROIDISM (HCC): ICD-10-CM

## 2023-04-24 DIAGNOSIS — I83.93 VARICOSE VEINS OF BOTH LOWER EXTREMITIES, UNSPECIFIED WHETHER COMPLICATED: ICD-10-CM

## 2023-04-24 DIAGNOSIS — I10 ESSENTIAL HYPERTENSION: Primary | ICD-10-CM

## 2023-04-24 DIAGNOSIS — K21.9 GASTROESOPHAGEAL REFLUX DISEASE WITHOUT ESOPHAGITIS: ICD-10-CM

## 2023-04-24 DIAGNOSIS — I73.00 RAYNAUD'S DISEASE WITHOUT GANGRENE: ICD-10-CM

## 2023-04-24 DIAGNOSIS — Z00.00 MEDICARE ANNUAL WELLNESS VISIT, SUBSEQUENT: ICD-10-CM

## 2023-04-24 DIAGNOSIS — R51.9 NONINTRACTABLE HEADACHE, UNSPECIFIED CHRONICITY PATTERN, UNSPECIFIED HEADACHE TYPE: ICD-10-CM

## 2023-04-24 DIAGNOSIS — K57.90 DIVERTICULOSIS: ICD-10-CM

## 2023-04-24 DIAGNOSIS — E87.6 HYPOPOTASSEMIA: ICD-10-CM

## 2023-04-24 RX ORDER — FUROSEMIDE 40 MG/1
40 TABLET ORAL EVERY MORNING
Qty: 90 TABLET | Refills: 1 | Status: SHIPPED | OUTPATIENT
Start: 2023-04-24

## 2023-04-24 RX ORDER — OLMESARTAN MEDOXOMIL 40 MG/1
40 TABLET ORAL DAILY
Qty: 90 TABLET | Refills: 1 | Status: SHIPPED | OUTPATIENT
Start: 2023-04-24

## 2023-04-24 RX ORDER — TIMOLOL MALEATE 5 MG/ML
SOLUTION/ DROPS OPHTHALMIC
COMMUNITY
Start: 2023-04-10

## 2023-04-24 RX ORDER — POTASSIUM CHLORIDE 1500 MG/1
1 TABLET, FILM COATED, EXTENDED RELEASE ORAL DAILY
Qty: 90 TABLET | Refills: 1 | Status: SHIPPED | OUTPATIENT
Start: 2023-04-24

## 2023-04-24 NOTE — ASSESSMENT & PLAN NOTE
Lab Results   Component Value Date    EGFR 53 12/09/2022    EGFR 58 07/11/2022    EGFR 42 02/28/2022    CREATININE 0 97 12/09/2022    CREATININE 0 91 07/11/2022    CREATININE 1 19 02/28/2022   GFR is baseline  Creat is baseline  Electrolytes stable  Recommend periodic blood test for monitoring of  Renal Function, lytes, GFR and urine for MA/Creat  Avoid Non Steroidal Antiinflammatory such as ibuprofen, aleve etc     Bone and Mineral disease monitoring as appropriate      All patient with GFR less than 30( CKD 4 or 5 or 6) are advised to follow with nephrologist

## 2023-04-24 NOTE — ASSESSMENT & PLAN NOTE
Chronic low back pain without any radiculopathy usually at nighttime    She has been taking couple of Tylenol and CBD oil seems to be working

## 2023-04-24 NOTE — ASSESSMENT & PLAN NOTE
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

## 2023-04-24 NOTE — ASSESSMENT & PLAN NOTE
Hypertension well controlled  Mitral regurgitation has been stable    We will continue risk factor management continue potassium chloride, Lasix 40 mg daily, Benicar 40 mg daily, Pre op instructions reviewed with patient per phone on 1/10/23: Spoke about pre op process and surgery instructions, verbalized understanding.    Surgery is scheduled on 1/12/23.  We will call you the business day prior to surgery to confirm arrival time (after 2:30 pm), as it is subject to change due to cancellations & emergencies.    Please report to the [facility] (1st Floor) at [facility] located off of [facility] (2nd building on the left, in front of the [facility]),address: [address] [place] [address] [place]. [address]    INSTRUCTIONS IMPORTANT!!!  Do Not Eat, Drink, or Smoke after 12 midnight! NO WATER after midnight! OK to brush teeth, no gum, candy or mints!    Take only these medicines with a small swallow of water-morning of surgery:  Prilosec    ____  NO Acrylic/fake nails or nail polish worn day of surgery (specifically hand/arm & foot surgeries).  ____  NO powder, lotions, deodorants, oils or creams on body.  ____  Please Remove All jewelry & piercings prior to surgery.  ____  Please Remove Dentures, Hearing Aids & Contact Lens prior to the start of surgery.  ____  Please bring photo ID and insurance information to hospital (Leave Valuables at Home).  ____  If going home the same day, arrange for a ride home. You will not be able to drive 24 hrs if Anesthesia was used.   ____  Females (ages 11-60) may need to give a urine sample the morning of surgery; please see Pre op Nurse prior to voiding.  ____  Wear clean, loose fitting clothing. Allow for dressings, bandages.  ____  Stop all Aspirin products, Ibuprofen, Advil, Motrin & Aleve at least 5-7 days before surgery, unless otherwise instructed by your doctor, or the nurse.   ____  Blood Thinners are stopped based on your Provider's recommendation; Call Surgeon's Office to inquire when to stop/hold.  ____  Stop taking any Fish Oil supplements or Vitamins at least 5 days prior to surgery, unless instructed otherwise by your Doctor.            Diabetic  Patients: If you take diabetic medication, do NOT take morning of surgery unless instructed by             Doctor. Metformin to be stopped 24 hrs prior to surgery time. DO NOT take long-acting insulin the evening before surgery. Blood sugars will be checked in pre-op morning of.    Bathing Instructions:    -Do not shave your face or body the day before or the day of surgery.              -Shower & Rinse your body as usual with anti-bacterial Soap (Dial)              -Rinse your body thoroughly.     [facility] Visitor/Ride Policy:  Only 2 adults allowed (over the age of 18) to accompany you to the Hospital. You Must have a ride home from a responsible adult that you know and trust. Medical Transport, Uber or Lyft can only be used if patient has a responsible adult to accompany them during ride home.    Post-Op Instructions: You will receive Post-op/Discharge instructions by your Discharge Nurse prior to going home. Please call your Surgeon's office with any post-surgery questions/concerns.    *Call [facility] Pre-Admissions Department with surgery instruction questions @ [phone], [phone] or [phone] (Mon-Fri 8 am to 4 pm)    *If you are running late or have questions the morning of surgery, please call the Surgery Dept @ [phone]  *Insurance/ Financial Questions, please call [phone].

## 2023-04-24 NOTE — ASSESSMENT & PLAN NOTE
She is better right now that this is a springtime    Recommend to continue saline nasal spray, Xyzal, Flonase, consider wearing mask when you go out    Also remains on azelastine nasal spray as well as Atrovent nasal spray    Care of ENT physician

## 2023-04-24 NOTE — ASSESSMENT & PLAN NOTE
Diverticulosis fair  Occasional IBS flareup    Recommend to stay on IV on probiotic as well as high-fiber diet

## 2023-04-24 NOTE — ASSESSMENT & PLAN NOTE
Mitral regurgitation has been stable    We will continue risk factor management continue potassium chloride, Lasix 40 mg daily, Benicar 40 mg daily,

## 2023-04-24 NOTE — PATIENT INSTRUCTIONS
Follow with Consultants as per their and our suggestion    Follow up in 12 week(s) or as needed earlier    Follow all instructions as advised and discussed  Take your medications as prescribed  Call the office immediately if you experience any side effects  Ask questions if you do not understand  Keep your scheduled appointment as advised or come sooner if necessary or in doubt  Best time to call for non-urgent matter or questions on weekdays is between 9am and 12 noon  See physician for any new symptoms or worsening of current symptoms  Urgent or emergent situations call 911 and report to nearest emergency room  I spent  time taking care of this patient including clinical care, conseling, collaboration, chart, lab and consultant's follow up note,images report, documentation, pre visit  review as appropriate  Medicare Preventive Visit Patient Instructions  Thank you for completing your Welcome to Medicare Visit or Medicare Annual Wellness Visit today  Your next wellness visit will be due in one year (4/24/2024)  The screening/preventive services that you may require over the next 5-10 years are detailed below  Some tests may not apply to you based off risk factors and/or age  Screening tests ordered at today's visit but not completed yet may show as past due  Also, please note that scanned in results may not display below  Preventive Screenings:  Service Recommendations Previous Testing/Comments   Colorectal Cancer Screening  * Colonoscopy    * Fecal Occult Blood Test (FOBT)/Fecal Immunochemical Test (FIT)  * Fecal DNA/Cologuard Test  * Flexible Sigmoidoscopy Age: 39-70 years old   Colonoscopy: every 10 years (may be performed more frequently if at higher risk)  OR  FOBT/FIT: every 1 year  OR  Cologuard: every 3 years  OR  Sigmoidoscopy: every 5 years  Screening may be recommended earlier than age 39 if at higher risk for colorectal cancer   Also, an individualized decision between you and your healthcare provider will decide whether screening between the ages of 74-80 would be appropriate  Colonoscopy: Not on file  FOBT/FIT: Not on file  Cologuard: Not on file  Sigmoidoscopy: Not on file          Breast Cancer Screening Age: 36 years old  Frequency: every 1-2 years  Not required if history of left and right mastectomy Mammogram: Not on file        Cervical Cancer Screening Between the ages of 21-29, pap smear recommended once every 3 years  Between the ages of 33-67, can perform pap smear with HPV co-testing every 5 years  Recommendations may differ for women with a history of total hysterectomy, cervical cancer, or abnormal pap smears in past  Pap Smear: Not on file        Hepatitis C Screening Once for adults born between 1945 and 1965  More frequently in patients at high risk for Hepatitis C Hep C Antibody: Not on file        Diabetes Screening 1-2 times per year if you're at risk for diabetes or have pre-diabetes Fasting glucose: 100 mg/dL (7/11/2022)  A1C: No results in last 5 years (No results in last 5 years)      Cholesterol Screening Once every 5 years if you don't have a lipid disorder  May order more often based on risk factors  Lipid panel: 07/11/2022          Other Preventive Screenings Covered by Medicare:  Abdominal Aortic Aneurysm (AAA) Screening: covered once if your at risk  You're considered to be at risk if you have a family history of AAA  Lung Cancer Screening: covers low dose CT scan once per year if you meet all of the following conditions: (1) Age 50-69; (2) No signs or symptoms of lung cancer; (3) Current smoker or have quit smoking within the last 15 years; (4) You have a tobacco smoking history of at least 20 pack years (packs per day multiplied by number of years you smoked); (5) You get a written order from a healthcare provider    Glaucoma Screening: covered annually if you're considered high risk: (1) You have diabetes OR (2) Family history of glaucoma OR (3)  aged 48 and older OR (3)  American aged 72 and older  Osteoporosis Screening: covered every 2 years if you meet one of the following conditions: (1) You're estrogen deficient and at risk for osteoporosis based off medical history and other findings; (2) Have a vertebral abnormality; (3) On glucocorticoid therapy for more than 3 months; (4) Have primary hyperparathyroidism; (5) On osteoporosis medications and need to assess response to drug therapy  Last bone density test (DXA Scan): 09/28/2022  HIV Screening: covered annually if you're between the age of 12-76  Also covered annually if you are younger than 13 and older than 72 with risk factors for HIV infection  For pregnant patients, it is covered up to 3 times per pregnancy  Immunizations:  Immunization Recommendations   Influenza Vaccine Annual influenza vaccination during flu season is recommended for all persons aged >= 6 months who do not have contraindications   Pneumococcal Vaccine   * Pneumococcal conjugate vaccine = PCV13 (Prevnar 13), PCV15 (Vaxneuvance), PCV20 (Prevnar 20)  * Pneumococcal polysaccharide vaccine = PPSV23 (Pneumovax) Adults 25-60 years old: 1-3 doses may be recommended based on certain risk factors  Adults 72 years old: 1-2 doses may be recommended based off what pneumonia vaccine you previously received   Hepatitis B Vaccine 3 dose series if at intermediate or high risk (ex: diabetes, end stage renal disease, liver disease)   Tetanus (Td) Vaccine - COST NOT COVERED BY MEDICARE PART B Following completion of primary series, a booster dose should be given every 10 years to maintain immunity against tetanus  Td may also be given as tetanus wound prophylaxis  Tdap Vaccine - COST NOT COVERED BY MEDICARE PART B Recommended at least once for all adults  For pregnant patients, recommended with each pregnancy     Shingles Vaccine (Shingrix) - COST NOT COVERED BY MEDICARE PART B  2 shot series recommended in those aged 48 and above     Health Maintenance Due:  There are no preventive care reminders to display for this patient  Immunizations Due:      Topic Date Due    Pneumococcal Vaccine: 65+ Years (1 - PCV) Never done    COVID-19 Vaccine (5 - Booster) 02/04/2022    Influenza Vaccine (1) 09/01/2022     Advance Directives   What are advance directives? Advance directives are legal documents that state your wishes and plans for medical care  These plans are made ahead of time in case you lose your ability to make decisions for yourself  Advance directives can apply to any medical decision, such as the treatments you want, and if you want to donate organs  What are the types of advance directives? There are many types of advance directives, and each state has rules about how to use them  You may choose a combination of any of the following:  Living will: This is a written record of the treatment you want  You can also choose which treatments you do not want, which to limit, and which to stop at a certain time  This includes surgery, medicine, IV fluid, and tube feedings  Durable power of  for healthcare [facility]): This is a written record that states who you want to make healthcare choices for you when you are unable to make them for yourself  This person, called a proxy, is usually a family member or a friend  You may choose more than 1 proxy  Do not resuscitate (DNR) order:  A DNR order is used in case your heart stops beating or you stop breathing  It is a request not to have certain forms of treatment, such as CPR  A DNR order may be included in other types of advance directives  Medical directive: This covers the care that you want if you are in a coma, near death, or unable to make decisions for yourself  You can list the treatments you want for each condition   Treatment may include pain medicine, surgery, blood transfusions, dialysis, IV or tube feedings, and a ventilator (breathing machine)  Values history: This document has questions about your views, beliefs, and how you feel and think about life  This information can help others choose the care that you would choose  Why are advance directives important? An advance directive helps you control your care  Although spoken wishes may be used, it is better to have your wishes written down  Spoken wishes can be misunderstood, or not followed  Treatments may be given even if you do not want them  An advance directive may make it easier for your family to make difficult choices about your care  © Copyright [employer] 2018 Information is for End User's use only and may not be sold, redistributed or otherwise used for commercial purposes   All illustrations and images included in CareNotes® are the copyrighted property of [employer] A M [employer]  or [address] [place] [address]

## 2023-04-24 NOTE — ASSESSMENT & PLAN NOTE
Lab Results   Component Value Date    SODIUM 138 12/09/2022    K 3 8 12/09/2022     12/09/2022    CO2 26 12/09/2022    BUN 26 (H) 12/09/2022    CREATININE 0 97 12/09/2022    GLUC 110 12/09/2022    CALCIUM 9 9 12/09/2022

## 2023-04-24 NOTE — ASSESSMENT & PLAN NOTE
Acid reflux symptoms are well controlled IBS at times flareup  Tolerating omeprazole  When flareups he gets enough 3-4 bowel movement  No blood in the bowel no weight loss abdominal pain    Remains on high-fiber diet

## 2023-04-24 NOTE — PROGRESS NOTES
Assessment and Plan:     Problem List Items Addressed This Visit        Digestive    Gastroesophageal reflux disease without esophagitis     Acid reflux symptoms are well controlled IBS at times flareup  Tolerating omeprazole  When flareups he gets enough 3-4 bowel movement  No blood in the bowel no weight loss abdominal pain  Remains on high-fiber diet         Irritable bowel syndrome     Acid reflux symptoms are well controlled IBS at times flareup  Tolerating omeprazole  When flareups he gets enough 3-4 bowel movement  No blood in the bowel no weight loss abdominal pain  Remains on high-fiber diet    Commend to continue high-fiber diet as well as the probiotic         Diverticulosis     Diverticulosis fair  Occasional IBS flareup  Recommend to stay on IV on probiotic as well as high-fiber diet            Endocrine    Glucose intolerance (impaired glucose tolerance)     Lab Results   Component Value Date    SODIUM 138 12/09/2022    K 3 8 12/09/2022     12/09/2022    CO2 26 12/09/2022    BUN 26 (H) 12/09/2022    CREATININE 0 97 12/09/2022    GLUC 110 12/09/2022    CALCIUM 9 9 12/09/2022              Hyperparathyroidism ([address] )     Lab Results   Component Value Date     0 (H) 12/09/2022    CALCIUM 9 9 12/09/2022    PHOS 2 9 12/09/2022   Seen by endocrinologist   Last PTH was 103  Patient to be followed by them in August   We will continue to monitor              Respiratory    Rhinitis, allergic     She is better right now that this is a springtime  Recommend to continue saline nasal spray, Xyzal, Flonase, consider wearing mask when you go out    Also remains on azelastine nasal spray as well as Atrovent nasal spray    Care of ENT physician            Cardiovascular and Mediastinum    Mitral regurgitation     Mitral regurgitation has been stable    We will continue risk factor management continue potassium chloride, Lasix 40 mg daily, Benicar 40 mg daily,         Essential hypertension - Primary     Hypertension well controlled  Mitral regurgitation has been stable  We will continue risk factor management continue potassium chloride, Lasix 40 mg daily, Benicar 40 mg daily,         Relevant Medications    olmesartan (BENICAR) 40 mg tablet    furosemide (LASIX) 40 mg tablet    Varicose vein of leg     Varicose vein has been fairly stable  Raynaud's disease     Raynaud's should be better now that summer is coming  We will continue same regimen could not tolerate amlodipine/Procardia due to edema of legs            Nervous and Auditory    Lumbar radiculopathy     Chronic low back pain without any radiculopathy usually at nighttime  She has been taking couple of Tylenol and CBD oil seems to be working            Genitourinary    Stage 3b chronic kidney disease [facility])     Lab Results   Component Value Date    EGFR 53 12/09/2022    EGFR 58 07/11/2022    EGFR 42 02/28/2022    CREATININE 0 97 12/09/2022    CREATININE 0 91 07/11/2022    CREATININE 1 19 02/28/2022   GFR is baseline  Creat is baseline  Electrolytes stable  Recommend periodic blood test for monitoring of  Renal Function, lytes, GFR and urine for MA/Creat  Avoid Non Steroidal Antiinflammatory such as ibuprofen, aleve etc     Bone and Mineral disease monitoring as appropriate  All patient with GFR less than 30( CKD 4 or 5 or 6) are advised to follow with nephrologist            Relevant Medications    furosemide (LASIX) 40 mg tablet       Other    Edema of extremities     Recommend to wear stockings to help with the edema         Relevant Medications    furosemide (LASIX) 40 mg tablet    Headache     Gets migraine couple of times a week     She takes generic sumatriptan with relief of symptoms  Her symptoms are mostly visual along with the headache    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 will monitor she prefers not to follow with neurologist at this time        Other Visit Diagnoses     Medicare annual wellness visit, subsequent        Hypopotassemia        Relevant Medications    Potassium Chloride ER 20 MEQ TBCR          Depression Screening and Follow-up Plan: Patient was screened for depression during today's encounter  They screened negative with a PHQ-2 score of 0  Preventive health issues were discussed with patient, and age appropriate screening tests were ordered as noted in patient's After Visit Summary  Personalized health advice and appropriate referrals for health education or preventive services given if needed, as noted in patient's After Visit Summary  History of Present Illness:     Patient presents for a Medicare Wellness Visit    Here for chronic disease management  Offers no complaint  Allergic rhinitis remains symptomatic remains on Astelin nasal spray, Atrovent nasal spray, Xyzal, saline nasal spray  GI: GERD fair some IBS symptoms at times for symptomatic treatment remains on high-fiber diet and not taking probiotic recommend encouraged to take them diverticulosis fair  Endocrine blood sugar reasonable will monitor hyperparathyroidism being followed by endocrinologist recent PTH was 103 to be seen by the [facility]  Cardiac wise hypertension fair control varicose vein unchanged, trace edema, Raynaud's fair, mitral regurgitation compensated, continue same regimen edema fair trace at this time taking Lasix regularly  Could not tolerate amlodipine in the past also remains on Coreg    CKD level 3B we will continue to monitor  Electrolytes fair  Restless leg reasonable not taking Requip most of the time  Hyperlipidemia continue diet  Abnormal LFTs stable calcium fair headache reasonable we will continue same regimen migraine a couple of times a week symptomatic improvement with sumatriptan  General status is very good  No visits with results within 2 Week(s) from this visit    Latest known visit with results is:  Appointment on 12/12/2022  Creatinine, 24H Ur        Value: 0 8(g/24Hr)        Dt: 12/12/2022  TOTAL URINE VOLUME        Value: 2,200(ml)          Dt: 12/12/2022  24H Urine Volume          Value: 2,200(mL)          Dt: 12/12/2022  Calcium, 24H Urine        Value: 127  6(mg/24 hrs)   Dt: 12/12/2022  ------------ - 2 weeks       Patient Care Team:  Sg Sousa MD as PCP - General (Internal Medicine)  Rolf Krishnan MD (Oncology)  Lisa Farmer MD (Otolaryngology)     Review of Systems:     Review of Systems   Constitutional: Negative for appetite change, fatigue, fever and unexpected weight change  HENT: Negative for congestion, ear pain and sore throat  Eyes: Negative for pain and redness  Respiratory: Negative for cough and shortness of breath  Cardiovascular: Negative for chest pain, palpitations and leg swelling  Gastrointestinal: Negative for abdominal pain, diarrhea, nausea and vomiting  Endocrine: Negative for cold intolerance, heat intolerance, polydipsia, polyphagia and polyuria  Genitourinary: Negative for dysuria, frequency and urgency  Musculoskeletal: Negative for arthralgias, gait problem and myalgias  Skin: Negative for rash  Allergic/Immunologic: Negative  Neurological: Negative for dizziness and headaches  Hematological: Negative for adenopathy  Psychiatric/Behavioral: Negative for behavioral problems, decreased concentration, dysphoric mood, hallucinations and self-injury  The patient is not nervous/anxious and is not hyperactive           Problem List:     Patient Active Problem List   Diagnosis   • Baker cyst, right   • Gastroesophageal reflux disease without esophagitis   • Glucose intolerance (impaired glucose tolerance)   • Restless legs   • Hypercholesteremia   • Mitral regurgitation   • Migraine   • Essential hypertension   • Varicose vein of leg   • Edema of extremities   • Rhinitis, allergic   • Aspirin intolerance   • Persistent lymphocytosis   • Hypokalemia   • Irritable bowel syndrome   • Abnormal echocardiogram   • Vitamin D deficiency   • Diverticulosis   • History of syncope   • Hammer toe of left foot   • Raynaud's disease   • Stage 3b chronic kidney disease (HCC)   • Cervical radiculopathy   • Left arm pain   • Rupture of left distal biceps tendon   • Primary osteoarthritis involving multiple joints   • HL (hearing loss)   • Elevated PTHrP level   • Lumbar radiculopathy   • Abnormal LFTs   • Hypercalcemia   • Headache   • Primary osteoarthritis of right knee   • 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   • Hyperparathyroidism [facility])      Past Medical and Surgical History:     Past Medical History:   Diagnosis Date   • Baker's cyst of knee 10/21/2016    right- burst on its own   • 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[facility] [address] ) 1992    hx of-fell when ice skating  Noted brain bleed w/swelling   • HL (hearing loss)    • Migraine    • Raynaud's disease     both hands     Past Surgical History:   Procedure Laterality Date   • AXILLARY SURGERY Left     fatty cyst removed, benign   • CATARACT EXTRACTION Bilateral    • CATARACT EXTRACTION W/ INTRAOCULAR LENS IMPLANT Right 11/10/2016    Procedure: EXTRACTION EXTRACAPSULAR CATARACT PHACO INTRAOCULAR LENS (IOL); Surgeon: Tiffanie Yousif MD;  Location: [facility] MAIN OR;  Service:    • COSMETIC SURGERY      Eye lift   • DILATION AND CURETTAGE OF UTERUS     • MYRINGOTOMY W/ TUBES  2011    both ears-one tube out on the left   • MYRINGOTOMY W/ TUBES     • CA SURGICAL ARTHROSCOPY SHOULDER W/ROTATOR CUFF RPR Right 5/4/2017    Procedure: ARTHROSCOPY SHOULDER WITH ROTATOR CUFF REPAIR, BICEPS TENODESIS, AND SUBACROMIAL DECOMPRESSION;  Surgeon: Micky Martinez MD;  Location: [place] [address] MAIN OR;  Service: Orthopedics   • CA XCAPSL CTRC RMVL INSJ IO LENS PROSTH W/O ECP Left 10/20/2016    Procedure: EXTRACTION EXTRACAPSULAR CATARACT PHACO INTRAOCULAR LENS (IOL);   Surgeon: Tiffanie Yousif MD;  Location: [facility] MAIN OR;  Service: Ophthalmology   • SKIN BIOPSY      mole on chest   • SKIN BIOPSY      under breast, benign      Family History:     Family History   Problem Relation Age of Onset   • Heart disease Mother         exp age 59 MI   • Stroke Father    • Macular degeneration Sister    • Macular degeneration Brother    • Diabetes Brother    • Cancer Brother         kidney-nephrectomy   • Kidney disease Brother         cancer      Social History:     Social History     Socioeconomic History   • Marital status: /Civil Union     Spouse name: None   • Number of children: None   • Years of education: None   • Highest education level: None   Occupational History   • None   Tobacco Use   • Smoking status: Former     Packs/day: 1 50     Years: 30 00     Pack years: 45 00     Types: Cigarettes     Quit date:      Years since quittin 3   • Smokeless tobacco: Never   Substance and Sexual Activity   • Alcohol use: Yes     Comment: socially   • Drug use: No   • Sexual activity: None   Other Topics Concern   • None   Social History Narrative   • None     Social Determinants of Health     Financial Resource Strain: Low Risk    • Difficulty of Paying Living Expenses: Not hard at all   Food Insecurity: Not on file   Transportation Needs: No Transportation Needs   • Lack of Transportation (Medical): No   • Lack of Transportation (Non-Medical):  No   Physical Activity: Not on file   Stress: Not on file   Social Connections: Not on file   Intimate Partner Violence: Not on file   Housing Stability: Not on file      Medications and Allergies:     Current Outpatient Medications   Medication Sig Dispense Refill   • acetaminophen (TYLENOL) 500 mg tablet Take 1,000 mg by mouth every 6 (six) hours as needed for mild pain     • ALPHAGAN P 0 1 %   0   • ascorbic acid (VITAMIN C) 500 mg tablet Take 500 mg by mouth every morning     • Azelastine HCl 137 MCG/SPRAY SOLN instill 1 spray into each nostril twice a day 30 mL 6   • Biotin 5 MG CAPS Take by mouth every morning     • Cyanocobalamin (VITAMIN B 12 PO) Take by mouth every morning     • furosemide (LASIX) 40 mg tablet Take 1 tablet (40 mg total) by mouth every morning 90 tablet 1   • ipratropium (ATROVENT) 0 06 % nasal spray instill 2 sprays into each nostril four times a day 15 mL 11   • levocetirizine (XYZAL) 5 MG tablet Take 1 tablet (5 mg total) by mouth every evening 90 tablet 1   • Lutein 40 MG CAPS Take by mouth every morning     • Magnesium 250 MG TABS Take by mouth every morning     • olmesartan (BENICAR) 40 mg tablet Take 1 tablet (40 mg total) by mouth daily 90 tablet 1   • omeprazole (PriLOSEC) 20 mg delayed release capsule take 1 capsule by mouth once daily 90 capsule 1   • Potassium Chloride ER 20 MEQ TBCR Take 1 tablet (20 mEq total) by mouth daily 90 tablet 1   • rOPINIRole (REQUIP) 0 5 mg tablet Take 1 tablet (0 5 mg total) by mouth daily at bedtime 90 tablet 1   • SUMAtriptan (IMITREX) 50 mg tablet Take 1 tablet (50 mg total) by mouth once as needed for migraine 9 tablet 1   • timolol (TIMOPTIC) 0 5 % ophthalmic solution instill 1 drop into right eye twice a day     • vitamin A 7500 UNIT capsule Take 7,500 Units by mouth every morning       • vitamin E, tocopherol, 400 units capsule Take 400 Units by mouth every morning Last dose 4/26/17     • zinc gluconate 50 mg tablet Take 50 mg by mouth every morning       No current facility-administered medications for this visit       Allergies   Allergen Reactions   • Lactose - Food Allergy GI Intolerance   • Latex Itching     Elastic,adhesive tape   • Dilantin [Phenytoin Sodium Extended] Rash   • Other Rash     Adhesive tape, environmental   • Penicillins Rash      Immunizations:     Immunization History   Administered Date(s) Administered   • COVID-19 MODERNA VACC 0 25 ML IM BOOSTER 12/10/2021   • COVID-19 MODERNA VACC 0 5 ML IM 12/10/2021   • COVID-19 PFIZER VACCINE 0 3 ML IM 01/21/2021, 02/11/2021   • Influenza, high dose seasonal 0 7 mL 12/01/2020, 10/18/2021   • Influenza, injectable, quadrivalent, preservative free 0 5 mL 10/31/2019      Health Maintenance: There are no preventive care reminders to display for this patient  Topic Date Due   • Pneumococcal Vaccine: 65+ Years (1 - PCV) Never done   • COVID-19 Vaccine (5 - Booster) 02/04/2022   • Influenza Vaccine (1) 09/01/2022      Medicare Screening Tests and Risk Assessments:     Alek Anna is here for her Subsequent Wellness visit  Last Medicare Wellness visit information reviewed, patient interviewed and updates made to the record today  Health Risk Assessment:   Patient rates overall health as good  Patient feels that their physical health rating is same  Patient is satisfied with their life  Eyesight was rated as slightly worse  Hearing was rated as same  Patient feels that their emotional and mental health rating is same  Patients states they are never, rarely angry  Patient states they are sometimes unusually tired/fatigued  Pain experienced in the last 7 days has been none  Patient states that she has experienced no weight loss or gain in last 6 months  Gets tired tired at the end of the day  Hearing is ok  Anger not a problem  Weight stays stable  Depression Screening:   PHQ-2 Score: 0      Fall Risk Screening: In the past year, patient has experienced: no history of falling in past year      Urinary Incontinence Screening:   Patient has not leaked urine accidently in the last six months  No issues  Home Safety:  Patient does not have trouble with stairs inside or outside of their home  Patient has working smoke alarms and has working carbon monoxide detector  Home safety hazards include: none  No home hazrads  No issues with stairs  Runs stairs for cardio  Pt feels safe in her home  Lives with   Nutrition:   Current diet is Regular  Balanced  Eats fruits and veggies and proteins  Medications:   Patient is currently taking over-the-counter supplements  OTC medications include: see medication list  Patient is able to manage medications   Has no issues with meds  Does not take opiods  Activities of Daily Living (ADLs)/Instrumental Activities of Daily Living (IADLs):   Walk and transfer into and out of bed and chair?: Yes  Dress and groom yourself?: Yes    Bathe or shower yourself?: Yes    Feed yourself? Yes  Do your laundry/housekeeping?: Yes  Manage your money, pay your bills and track your expenses?: Yes  Make your own meals?: Yes    Do your own shopping?: Yes    ADL comments: She still works full time  Drives and is independent  Previous Hospitalizations:   Any hospitalizations or ED visits within the last 12 months?: No      Hospitalization Comments: Chronic Conditions have been stable  Advance Care Planning:   Living will: No    Durable POA for healthcare: No    Advanced directive: No    Advanced directive counseling given: Yes    Five wishes given: No    Patient declined ACP directive: No    End of Life Decisions reviewed with patient: Yes    Provider agrees with end of life decisions: Yes      Comments: Will send pt a POLST  She will review and bring to next visit      Cognitive Screening:   Provider or family/friend/caregiver concerned regarding cognition?: No    PREVENTIVE SCREENINGS      Cardiovascular Screening:    General: Screening Not Indicated and History Lipid Disorder      Diabetes Screening:     General: Screening Current      Colorectal Cancer Screening:     General: Patient Declines and Risks and Benefits Discussed      Breast Cancer Screening:     General: Risks and Benefits Discussed and Patient Declines      Cervical Cancer Screening:    General: Screening Not Indicated      Osteoporosis Screening:    General: Screening Not Indicated and History Osteoporosis      Abdominal Aortic Aneurysm (AAA) Screening:        General: Screening Not Indicated and Risks and Benefits Discussed      Lung Cancer Screening:     General: Screening Not Indicated      Hepatitis C Screening:    General: Risks and Benefits Discussed and Patient Declines    Hep C Screening Accepted: No       Preventive Screening Comments: UTD with flu and Covid  Rec Prevnar  Sees eye doctor regularly  No longer wants CRC or mammogram     Screening, Brief Intervention, and Referral to Treatment (SBIRT)    Screening  Typical number of drinks in a day: 1  Typical number of drinks in a week: 5  Interpretation: Low risk drinking behavior  AUDIT-C Screenin) How often did you have a drink containing alcohol in the past year? 4 or more times a week  2) How many drinks did you have on a typical day when you were drinking in the past year? 1 to 2  3) How often did you have 6 or more drinks on one occasion in the past year? never    AUDIT-C Score: 4  Interpretation: Score 3-12 (female): POSITIVE screen for alcohol misuse    AUDIT Screenin) How often during the last year have you found that you were not able to stop drinking once you had started? 0 - never  5) How often during the last year have you failed to do what was normally expected from you because of drinking? 0 - never  6) How often during the last year have you needed a first drink in the morning to get yourself going after a heavy drinking session?  0 - never  7) How often during the last year have you had a feeling of guilt or remorse after drinking? 0 - never  8) How often during the last year have you been unable to remember what happened the night before because you had been drinking? 0 - never  9) Have you or someone else been injured as a result of your drinking? 0 - no  10) Has a relative or friend or a doctor or another health worker been concerned about your drinking or suggested you cut down? 0 - no    AUDIT Score: 4  Interpretation: Low risk alcohol consumption    Single Item Drug Screening:  How often have you used an illegal drug (including marijuana) or a prescription medication for non-medical reasons in the past year? never    Single Item Drug Screen Score: 0  Interpretation: Negative screen for "possible drug use disorder    Brief Intervention  Healthy alcohol use/limits discussed  Other Counseling Topics:   Skin self-exam      No results found  Physical Exam:     Pulse 66   Ht 5' 1\" (1 549 m)   Wt 54 kg (119 lb)   SpO2 98%   BMI 22 48 kg/m²     Physical Exam  Constitutional:       Appearance: She is well-developed  HENT:      Head: Normocephalic and atraumatic  Eyes:      Conjunctiva/sclera: Conjunctivae normal    Neck:      Thyroid: No thyromegaly  Vascular: No JVD  Cardiovascular:      Rate and Rhythm: Regular rhythm  Heart sounds: Murmur heard  Systolic murmur is present with a grade of 1/6  Comments: Bilateral trace edema  Pulmonary:      Effort: No respiratory distress  Breath sounds: Normal breath sounds  No wheezing or rales  Abdominal:      General: Bowel sounds are normal  There is no distension  Palpations: There is no mass  Tenderness: There is no abdominal tenderness  There is no rebound  Lymphadenopathy:      Cervical: No cervical adenopathy  Skin:     General: Skin is warm  Coloration: Skin is not pale  Findings: No rash     Neurological:      Coordination: Coordination normal    Psychiatric:         Behavior: Behavior normal          Judgment: Judgment normal           Marion Green MD  "

## 2023-04-24 NOTE — ASSESSMENT & PLAN NOTE
Lab Results   Component Value Date     0 (H) 12/09/2022    CALCIUM 9 9 12/09/2022    PHOS 2 9 12/09/2022   Seen by endocrinologist   Last PTH was 103    Patient to be followed by them in August   We will continue to monitor

## 2023-04-24 NOTE — ASSESSMENT & PLAN NOTE
Raynaud's should be better now that summer is coming    We will continue same regimen could not tolerate amlodipine/Procardia due to edema of legs

## 2023-04-24 NOTE — ASSESSMENT & PLAN NOTE
Acid reflux symptoms are well controlled IBS at times flareup  Tolerating omeprazole  When flareups he gets enough 3-4 bowel movement  No blood in the bowel no weight loss abdominal pain    Remains on high-fiber diet    Commend to continue high-fiber diet as well as the probiotic

## 2023-07-06 DIAGNOSIS — K21.9 GASTROESOPHAGEAL REFLUX DISEASE WITHOUT ESOPHAGITIS: ICD-10-CM

## 2023-07-06 RX ORDER — OMEPRAZOLE 20 MG/1
CAPSULE, DELAYED RELEASE ORAL
Qty: 90 CAPSULE | Refills: 1 | Status: SHIPPED | OUTPATIENT
Start: 2023-07-06

## 2023-07-24 ENCOUNTER — OFFICE VISIT (OUTPATIENT)
Dept: INTERNAL MEDICINE CLINIC | Facility: CLINIC | Age: 83
End: 2023-07-24
Payer: MEDICARE

## 2023-07-24 VITALS
HEART RATE: 59 BPM | HEIGHT: 61 IN | BODY MASS INDEX: 21.79 KG/M2 | DIASTOLIC BLOOD PRESSURE: 70 MMHG | OXYGEN SATURATION: 98 % | SYSTOLIC BLOOD PRESSURE: 122 MMHG | WEIGHT: 115.4 LBS

## 2023-07-24 DIAGNOSIS — N18.32 STAGE 3B CHRONIC KIDNEY DISEASE (HCC): ICD-10-CM

## 2023-07-24 DIAGNOSIS — Z78.9 ASPIRIN INTOLERANCE: ICD-10-CM

## 2023-07-24 DIAGNOSIS — D72.820 PERSISTENT LYMPHOCYTOSIS: ICD-10-CM

## 2023-07-24 DIAGNOSIS — I34.0 NONRHEUMATIC MITRAL VALVE REGURGITATION: ICD-10-CM

## 2023-07-24 DIAGNOSIS — E21.3 HYPERPARATHYROIDISM (HCC): ICD-10-CM

## 2023-07-24 DIAGNOSIS — E83.52 HYPERCALCEMIA: ICD-10-CM

## 2023-07-24 DIAGNOSIS — R60.0 EDEMA OF EXTREMITIES: ICD-10-CM

## 2023-07-24 DIAGNOSIS — I10 ESSENTIAL HYPERTENSION: Primary | ICD-10-CM

## 2023-07-24 DIAGNOSIS — R79.89 ABNORMAL LFTS: ICD-10-CM

## 2023-07-24 DIAGNOSIS — R73.02 GLUCOSE INTOLERANCE (IMPAIRED GLUCOSE TOLERANCE): ICD-10-CM

## 2023-07-24 DIAGNOSIS — E87.6 HYPOPOTASSEMIA: ICD-10-CM

## 2023-07-24 DIAGNOSIS — K21.9 GASTROESOPHAGEAL REFLUX DISEASE WITHOUT ESOPHAGITIS: ICD-10-CM

## 2023-07-24 DIAGNOSIS — I73.00 RAYNAUD'S DISEASE WITHOUT GANGRENE: ICD-10-CM

## 2023-07-24 DIAGNOSIS — E55.9 VITAMIN D DEFICIENCY: ICD-10-CM

## 2023-07-24 DIAGNOSIS — K58.0 IRRITABLE BOWEL SYNDROME WITH DIARRHEA: ICD-10-CM

## 2023-07-24 PROCEDURE — 99214 OFFICE O/P EST MOD 30 MIN: CPT | Performed by: INTERNAL MEDICINE

## 2023-07-24 RX ORDER — OLMESARTAN MEDOXOMIL 40 MG/1
40 TABLET ORAL DAILY
Qty: 90 TABLET | Refills: 1 | Status: SHIPPED | OUTPATIENT
Start: 2023-07-24

## 2023-07-24 RX ORDER — FUROSEMIDE 40 MG/1
40 TABLET ORAL EVERY MORNING
Qty: 90 TABLET | Refills: 1 | Status: SHIPPED | OUTPATIENT
Start: 2023-07-24

## 2023-07-24 RX ORDER — POTASSIUM CHLORIDE 1500 MG/1
1 TABLET, EXTENDED RELEASE ORAL DAILY
Qty: 90 TABLET | Refills: 1 | Status: SHIPPED | OUTPATIENT
Start: 2023-07-24

## 2023-07-24 NOTE — ASSESSMENT & PLAN NOTE
Lab Results   Component Value Date    EGFR 53 12/09/2022    EGFR 58 07/11/2022    EGFR 42 02/28/2022    CREATININE 0.97 12/09/2022    CREATININE 0.91 07/11/2022    CREATININE 1.19 02/28/2022   GFR is baseline  Creat is baseline. Electrolytes stable  Recommend periodic blood test for monitoring of  Renal Function, lytes, GFR and urine for MA/Creat  Avoid Non Steroidal Antiinflammatory such as ibuprofen, aleve etc.    Bone and Mineral disease monitoring as appropriate.

## 2023-07-24 NOTE — ASSESSMENT & PLAN NOTE
Lab Results   Component Value Date     11/02/2015    SODIUM 138 12/09/2022    K 3.8 12/09/2022     12/09/2022    CO2 26 12/09/2022    ANIONGAP 12.2 11/02/2015    AGAP 5 12/09/2022    BUN 26 (H) 12/09/2022    CREATININE 0.97 12/09/2022    GLUC 110 12/09/2022    GLUF 100 (H) 07/11/2022    CALCIUM 9.9 12/09/2022    AST 18 12/09/2022    ALT 22 12/09/2022    ALKPHOS 90 12/09/2022    PROT 7.2 11/02/2015    TP 7.5 12/09/2022    BILITOT 1.3 (H) 11/02/2015    TBILI 1.44 (H) 12/09/2022    EGFR 53 12/09/2022     Lab Results   Component Value Date    .0 (H) 12/09/2022    CALCIUM 9.9 12/09/2022    PHOS 2.9 12/09/2022     To be seen by endocrinologist.  Will check PTH and CMP prior to that visit. Going for MRI of parathyroid    . Symptom-free.   No symptoms related to hypercalcemia

## 2023-07-24 NOTE — PROGRESS NOTES
Name: Trent Villanueva      : 1940      MRN: [id number]  Encounter Provider: Hayden Melendez MD  Encounter Date: 2023   Encounter department: [address]     1. Essential hypertension  Assessment & Plan:  Denies chest pain palpitation. Pulm edema. No symptoms related to hypertension        5. Mitral regurgitation stable. Renal insulin. We will continue current regimen with potassium chloride, Benicar as well as Lasix. Peripheral edema is well controlled. Orders:  -     olmesartan (BENICAR) 40 mg tablet; Take 1 tablet (40 mg total) by mouth daily  -     CBC and differential; Future  -     Comprehensive metabolic panel; Future; Expected date: 10/24/2023  -     Comprehensive metabolic panel; Future; Expected date: 2023    2. Hypopotassemia  -     Potassium Chloride ER 20 MEQ TBCR; Take 1 tablet (20 mEq total) by mouth daily  -     Comprehensive metabolic panel; Future; Expected date: 10/24/2023  -     Comprehensive metabolic panel; Future; Expected date: 2023    3. Edema of extremities  Assessment & Plan:  Denies chest pain palpitation. Pulm edema. No symptoms related to hypertension        5. Mitral regurgitation stable. Renal insulin. We will continue current regimen with potassium chloride, Benicar as well as Lasix. Peripheral edema is well controlled. Orders:  -     furosemide (LASIX) 40 mg tablet; Take 1 tablet (40 mg total) by mouth every morning  -     CBC and differential; Future  -     Comprehensive metabolic panel; Future; Expected date: 10/24/2023    4. Gastroesophageal reflux disease without esophagitis  Assessment & Plan:  GERD symptom-free tolerating omeprazole without side effect. IBS stable chronic baseline 3-5 bowel movements. Diverticulosis compensated. Recommend high-fiber diet symptomatic treatment check CBC CMP next visit    Orders:  -     CBC and differential; Future    5.  Irritable bowel syndrome with diarrhea  Assessment & Plan:  GERD symptom-free tolerating omeprazole without side effect. IBS stable chronic baseline 3-5 bowel movements. Diverticulosis compensated. Recommend high-fiber diet symptomatic treatment check CBC CMP next visit      6. Glucose intolerance (impaired glucose tolerance)  Assessment & Plan:  Lab Results   Component Value Date    SODIUM 138 12/09/2022    K 3.8 12/09/2022     12/09/2022    CO2 26 12/09/2022    BUN 26 (H) 12/09/2022    CREATININE 0.97 12/09/2022    GLUC 110 12/09/2022    CALCIUM 9.9 12/09/2022   Blood sugar prior to next visit as well as in 4 weeks continue carb controlled diet    Orders:  -     Comprehensive metabolic panel; Future; Expected date: 10/24/2023  -     Comprehensive metabolic panel; Future; Expected date: 08/11/2023    7. Hyperparathyroidism [facility])  Assessment & Plan:  Lab Results   Component Value Date    .0 (H) 12/09/2022    CALCIUM 9.9 12/09/2022    PHOS 2.9 12/09/2022         Orders:  -     Comprehensive metabolic panel; Future; Expected date: 10/24/2023  -     Comprehensive metabolic panel; Future; Expected date: 08/11/2023  -     PTH, intact; Future; Expected date: 08/11/2023    8. Nonrheumatic mitral valve regurgitation  Assessment & Plan:  Denies chest pain palpitation. Pulm edema. No symptoms related to hypertension        5. Mitral regurgitation stable. Renal insulin. We will continue current regimen with potassium chloride, Benicar as well as Lasix. Peripheral edema is well controlled. 9. Raynaud's disease without gangrene  Assessment & Plan:  Denies chest pain palpitation. Pulm edema. No symptoms related to hypertension        5. Mitral regurgitation stable. Renal insulin. We will continue current regimen with potassium chloride, Benicar as well as Lasix. Peripheral edema is well controlled.       10. Stage 3b chronic kidney disease ([address])  Assessment & Plan:  Lab Results   Component Value Date    EGFR 53 12/09/2022    EGFR 58 07/11/2022    EGFR 42 02/28/2022    CREATININE 0.97 12/09/2022    CREATININE 0.91 07/11/2022    CREATININE 1.19 02/28/2022   GFR is baseline  Creat is baseline. Electrolytes stable  Recommend periodic blood test for monitoring of  Renal Function, lytes, GFR and urine for MA/Creat  Avoid Non Steroidal Antiinflammatory such as ibuprofen, aleve etc.    Bone and Mineral disease monitoring as appropriate. Orders:  -     Comprehensive metabolic panel; Future; Expected date: 10/24/2023    11. Aspirin intolerance    12. Persistent lymphocytosis  Assessment & Plan: We will do CBC. If next visit    Lab Results   Component Value Date    WBC 5.92 12/09/2022    HGB 14.2 12/09/2022    HCT 42.7 12/09/2022    MCV 97 12/09/2022     12/09/2022             13. Vitamin D deficiency  Assessment & Plan:  Remains on vitamin D supplement. 14. Hypercalcemia  Assessment & Plan:  Lab Results   Component Value Date     11/02/2015    SODIUM 138 12/09/2022    K 3.8 12/09/2022     12/09/2022    CO2 26 12/09/2022    ANIONGAP 12.2 11/02/2015    AGAP 5 12/09/2022    BUN 26 (H) 12/09/2022    CREATININE 0.97 12/09/2022    GLUC 110 12/09/2022    GLUF 100 (H) 07/11/2022    CALCIUM 9.9 12/09/2022    AST 18 12/09/2022    ALT 22 12/09/2022    ALKPHOS 90 12/09/2022    PROT 7.2 11/02/2015    TP 7.5 12/09/2022    BILITOT 1.3 (H) 11/02/2015    TBILI 1.44 (H) 12/09/2022    EGFR 53 12/09/2022     Lab Results   Component Value Date    .0 (H) 12/09/2022    CALCIUM 9.9 12/09/2022    PHOS 2.9 12/09/2022     To be seen by endocrinologist.  Will check PTH and CMP prior to that visit. Going for MRI of parathyroid    . Symptom-free. No symptoms related to hypercalcemia    Orders:  -     Comprehensive metabolic panel; Future; Expected date: 10/24/2023    15.  Abnormal LFTs  Assessment & Plan:  Lab Results   Component Value Date    ALT 22 12/09/2022    AST 18 12/09/2022    ALKPHOS 90 12/09/2022 BILITOT 1.3 (H) 11/02/2015   Left    Next visit as well as in 4 weeks      Orders:  -     Comprehensive metabolic panel; Future; Expected date: 10/24/2023           Subjective             Here for chronic disease management. Offers no complaint. \    GERD fair, IBS some 3-5 times a day, no abdominal pain diverticulosis same. Endocrine blood sugar acceptable next follow-up. Hyperparathyroidism due for PTH going for MRI of the neck to be seen by endocrinologist on 814 we will check PTH and CMP. Cardiac mitral regurgitation compensated, hypertension symptom-free, varicose veins stable, Raynaud's not a problem,. Chronic low back pain at times but not a major issue. CKD level 3 symptom-free labs needs to be done. Next    Persistent macrocytosis we will check CBC with differential.    Vitamin D remains on supplement. Abnormal LFT, calcium: Potassium well will follow. My headache is not a problem      No visits with results within 6 Month(s) from this visit. Latest known visit with results is:  Appointment on 12/12/2022  Creatinine, 24H Ur        Value: 0.8(g/24Hr)        Dt: 12/12/2022  TOTAL URINE VOLUME        Value: 2,200(ml)          Dt: 12/12/2022  24H Urine Volume          Value: 2,200(mL)          Dt: 12/12/2022  Calcium, 24H Urine        Value: 127. 6(mg/24 hrs)   Dt: 12/12/2022  ------------ - 6 moths labs      Review of Systems   Constitutional: Negative for chills, fatigue and fever. HENT: Negative for ear pain, sore throat and trouble swallowing. Eyes: Negative for pain and visual disturbance. Respiratory: Negative for cough and shortness of breath. Cardiovascular: Negative for chest pain and palpitations. Gastrointestinal: Negative for abdominal pain, constipation, diarrhea and vomiting. Genitourinary: Negative for difficulty urinating, dysuria, flank pain and hematuria. Musculoskeletal: Negative for arthralgias, back pain and myalgias.    Skin: Negative for rash and wound.   Neurological: Negative for dizziness, seizures, syncope and headaches. Psychiatric/Behavioral: Negative for confusion and sleep disturbance. The patient is not nervous/anxious. All other systems reviewed and are negative. 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 Noted brain bleed w/swelling   • HL (hearing loss)    • Migraine    • Raynaud's disease     both hands     Past Surgical History:   Procedure Laterality Date   • AXILLARY SURGERY Left     fatty cyst removed, benign   • CATARACT EXTRACTION Bilateral    • CATARACT EXTRACTION W/ INTRAOCULAR LENS IMPLANT Right 11/10/2016    Procedure: EXTRACTION EXTRACAPSULAR CATARACT PHACO INTRAOCULAR LENS (IOL); Surgeon: Luanne Mcgregor MD;  Location: [facility] MAIN OR;  Service:    • COSMETIC SURGERY      Eye lift   • DILATION AND CURETTAGE OF UTERUS     • MYRINGOTOMY W/ TUBES  2011    both ears-one tube out on the left   • MYRINGOTOMY W/ TUBES     • OR SURGICAL ARTHROSCOPY SHOULDER W/ROTATOR CUFF RPR Right 5/4/2017    Procedure: ARTHROSCOPY SHOULDER WITH ROTATOR CUFF REPAIR, BICEPS TENODESIS, AND SUBACROMIAL DECOMPRESSION;  Surgeon: Billie Connor MD;  Location: [facility] MAIN OR;  Service: Orthopedics   • OR XCAPSL CTRC RMVL INSJ IO LENS PROSTH W/O ECP Left 10/20/2016    Procedure: EXTRACTION EXTRACAPSULAR CATARACT PHACO INTRAOCULAR LENS (IOL);   Surgeon: Luanne Mcgregor MD;  Location: [facility] MAIN OR;  Service: Ophthalmology   • SKIN BIOPSY      mole on chest   • SKIN BIOPSY      under breast, benign     Family History   Problem Relation Age of Onset   • Heart disease Mother         exp age 59 MI   • Stroke Father    • Macular degeneration Sister    • Macular degeneration Brother    • Diabetes Brother    • Cancer Brother         kidney-nephrectomy   • Kidney disease Brother         cancer     Social History     Socioeconomic History   • Marital status: /Civil Union     Spouse name: None   • Number of children: None   • Years of education: None   • Highest education level: None   Occupational History   • None   Tobacco Use   • Smoking status: Former     Packs/day: 1.50     Years: 30.00     Total pack years: 45.00     Types: Cigarettes     Quit date:      Years since quittin.5   • Smokeless tobacco: Never   Substance and Sexual Activity   • Alcohol use: Yes     Comment: socially   • Drug use: No   • Sexual activity: None   Other Topics Concern   • None   Social History Narrative   • None     Social Determinants of Health     Financial Resource Strain: Low Risk  (2023)    Overall Financial Resource Strain (CARDIA)    • Difficulty of Paying Living Expenses: Not hard at all   Food Insecurity: Not on file   Transportation Needs: No Transportation Needs (2023)    PRAPARE - Transportation    • Lack of Transportation (Medical): No    • Lack of Transportation (Non-Medical):  No   Physical Activity: Not on file   Stress: Not on file   Social Connections: Not on file   Intimate Partner Violence: Not on file   Housing Stability: Not on file     Current Outpatient Medications on File Prior to Visit   Medication Sig   • acetaminophen (TYLENOL) 500 mg tablet Take 1,000 mg by mouth every 6 (six) hours as needed for mild pain   • ALPHAGAN P 0.1 %    • ascorbic acid (VITAMIN C) 500 mg tablet Take 500 mg by mouth every morning   • Azelastine HCl 137 MCG/SPRAY SOLN instill 1 spray into each nostril twice a day   • Biotin 5 MG CAPS Take by mouth every morning   • Cyanocobalamin (VITAMIN B 12 PO) Take by mouth every morning   • ipratropium (ATROVENT) 0.06 % nasal spray instill 2 sprays into each nostril four times a day   • levocetirizine (XYZAL) 5 MG tablet Take 1 tablet (5 mg total) by mouth every evening   • Lutein 40 MG CAPS Take by mouth every morning   • Magnesium 250 MG TABS Take by mouth every morning   • omeprazole (PriLOSEC) 20 mg delayed release capsule take 1 capsule by mouth once daily   • rOPINIRole (REQUIP) 0.5 mg tablet Take 1 tablet (0.5 mg total) by mouth daily at bedtime   • SUMAtriptan (IMITREX) 50 mg tablet Take 1 tablet (50 mg total) by mouth once as needed for migraine   • timolol (TIMOPTIC) 0.5 % ophthalmic solution instill 1 drop into right eye twice a day   • vitamin A 7500 UNIT capsule Take 7,500 Units by mouth every morning     • vitamin E, tocopherol, 400 units capsule Take 400 Units by mouth every morning Last dose 4/26/17   • zinc gluconate 50 mg tablet Take 50 mg by mouth every morning   • [DISCONTINUED] furosemide (LASIX) 40 mg tablet Take 1 tablet (40 mg total) by mouth every morning   • [DISCONTINUED] olmesartan (BENICAR) 40 mg tablet Take 1 tablet (40 mg total) by mouth daily   • [DISCONTINUED] Potassium Chloride ER 20 MEQ TBCR Take 1 tablet (20 mEq total) by mouth daily     Allergies   Allergen Reactions   • Lactose - Food Allergy GI Intolerance   • Latex Itching     Elastic,adhesive tape   • Dilantin [Phenytoin Sodium Extended] Rash   • Other Rash     Adhesive tape, environmental   • Penicillins Rash     Immunization History   Administered Date(s) Administered   • COVID-19 MODERNA VACC 0.25 ML IM BOOSTER 12/10/2021   • COVID-19 MODERNA VACC 0.5 ML IM 12/10/2021   • COVID-19 PFIZER VACCINE 0.3 ML IM 01/21/2021, 02/11/2021   • Influenza, high dose seasonal 0.7 mL 12/01/2020, 10/18/2021   • Influenza, injectable, quadrivalent, preservative free 0.5 mL 10/31/2019       Objective     /70   Pulse 59   Ht 5' 1" (1.549 m)   Wt 52.3 kg (115 lb 6.4 oz)   SpO2 98%   BMI 21.80 kg/m²     Physical Exam  Vitals and nursing note reviewed. Constitutional:       Appearance: Normal appearance. She is well-developed and normal weight. She is not ill-appearing. HENT:      Head: Normocephalic and atraumatic.       Right Ear: Tympanic membrane, ear canal and external ear normal.      Left Ear: Tympanic membrane, ear canal and external ear normal.   Eyes:      General: No scleral icterus. Right eye: No discharge. Left eye: No discharge. Conjunctiva/sclera: Conjunctivae normal.   Neck:      Thyroid: No thyromegaly. Vascular: No JVD. Cardiovascular:      Rate and Rhythm: Regular rhythm. Heart sounds: Normal heart sounds. Pulmonary:      Effort: No respiratory distress. Breath sounds: Normal breath sounds. No stridor. No rhonchi. Chest:      Chest wall: No tenderness. Musculoskeletal:      Right lower leg: No edema. Left lower leg: No edema. Skin:     General: Skin is warm. Coloration: Skin is not pale. Findings: No bruising or rash. Neurological:      Mental Status: She is alert and oriented to person, place, and time. Mental status is at baseline. Sensory: No sensory deficit. Motor: No weakness. Gait: Gait normal.   Psychiatric:         Mood and Affect: Mood normal.         Behavior: Behavior normal.         Thought Content:  Thought content normal.         Judgment: Judgment normal.       Jena De Los Santos MD

## 2023-07-24 NOTE — ASSESSMENT & PLAN NOTE
Lab Results   Component Value Date    ALT 22 12/09/2022    AST 18 12/09/2022    ALKPHOS 90 12/09/2022    BILITOT 1.3 (H) 11/02/2015   Left    Next visit as well as in 4 weeks

## 2023-07-24 NOTE — ASSESSMENT & PLAN NOTE
We will do CBC.   If next visit    Lab Results   Component Value Date    WBC 5.92 12/09/2022    HGB 14.2 12/09/2022    HCT 42.7 12/09/2022    MCV 97 12/09/2022     12/09/2022

## 2023-07-24 NOTE — ASSESSMENT & PLAN NOTE
Lab Results   Component Value Date    .0 (H) 12/09/2022    CALCIUM 9.9 12/09/2022    PHOS 2.9 12/09/2022

## 2023-07-24 NOTE — ASSESSMENT & PLAN NOTE
Lab Results   Component Value Date    SODIUM 138 12/09/2022    K 3.8 12/09/2022     12/09/2022    CO2 26 12/09/2022    BUN 26 (H) 12/09/2022    CREATININE 0.97 12/09/2022    GLUC 110 12/09/2022    CALCIUM 9.9 12/09/2022   Blood sugar prior to next visit as well as in 4 weeks continue carb controlled diet

## 2023-07-24 NOTE — ASSESSMENT & PLAN NOTE
Denies chest pain palpitation. Pulm edema. No symptoms related to hypertension        5. Mitral regurgitation stable. Renal insulin. We will continue current regimen with potassium chloride, Benicar as well as Lasix. Peripheral edema is well controlled.

## 2023-07-24 NOTE — ASSESSMENT & PLAN NOTE
Lab Results   Component Value Date    LDLCALC 119 (H) 07/11/2022     Lab Results   Component Value Date    ALT 22 12/09/2022    ALT 21 11/02/2015     Lab Results   Component Value Date    CHOLESTEROL 212 (H) 07/11/2022    CHOLESTEROL 216 (H) 10/12/2021    CHOLESTEROL 235 (H) 02/26/2021     Lab Results   Component Value Date    HDL 59 07/11/2022    HDL 55 10/12/2021    HDL 71 02/26/2021     Lab Results   Component Value Date    TRIG 170 (H) 07/11/2022    TRIG 203 (H) 10/12/2021    TRIG 117 02/26/2021     Lab Results   Component Value Date    NONHDLC 153 07/11/2022    [address] 161 10/12/2021    [address] 164 02/26/2021

## 2023-07-24 NOTE — PATIENT INSTRUCTIONS
Follow with Consultants as per their and our suggestion    Follow up in 12 week(s) or as needed earlier    Follow all instructions as advised and discussed. Take your medications as prescribed. Call the office immediately if you experience any side effects. Ask questions if you do not understand. Keep your scheduled appointment as advised or come sooner if necessary or in doubt. Best time to call for non-urgent matter or questions on weekdays is between 9am and 12 noon. See physician for any new symptoms or worsening of current symptoms. Urgent or emergent situations call 911 and report to nearest emergency room. I spent  time taking care of this patient including clinical care, conseling, collaboration, chart, lab and consultant's follow up note,images report, documentation, pre visit  review as appropriate.     Patient is to get labs 1 week(s) prior to next visit if advised    Also get the blood test on 8/2023 before seeing your endocrinologist

## 2023-07-24 NOTE — ASSESSMENT & PLAN NOTE
GERD symptom-free tolerating omeprazole without side effect. IBS stable chronic baseline 3-5 bowel movements. Diverticulosis compensated.   Recommend high-fiber diet symptomatic treatment check CBC CMP next visit

## 2023-07-25 NOTE — ASSESSMENT & PLAN NOTE
Edema is fair not a major issue continue Lasix renal function fair What Type Of Note Output Would You Prefer (Optional)?: Standard Output How Severe Is Your Skin Lesion?: mild Has Your Skin Lesion Been Treated?: not been treated Is This A New Presentation, Or A Follow-Up?: Growth

## 2023-08-01 ENCOUNTER — HOSPITAL ENCOUNTER (OUTPATIENT)
Dept: RADIOLOGY | Facility: HOSPITAL | Age: 83
Discharge: HOME | End: 2023-08-01
Payer: MEDICARE

## 2023-08-01 DIAGNOSIS — E21.3 HYPERPARATHYROIDISM (HCC): ICD-10-CM

## 2023-08-01 PROCEDURE — 76536 US EXAM OF HEAD AND NECK: CPT

## 2023-08-04 ENCOUNTER — APPOINTMENT (OUTPATIENT)
Dept: LAB | Facility: CLINIC | Age: 83
End: 2023-08-04
Payer: MEDICARE

## 2023-08-04 DIAGNOSIS — R73.02 GLUCOSE INTOLERANCE (IMPAIRED GLUCOSE TOLERANCE): ICD-10-CM

## 2023-08-04 DIAGNOSIS — E87.6 HYPOPOTASSEMIA: ICD-10-CM

## 2023-08-04 DIAGNOSIS — E87.6 HYPOKALEMIA: ICD-10-CM

## 2023-08-04 DIAGNOSIS — K21.9 GASTROESOPHAGEAL REFLUX DISEASE WITHOUT ESOPHAGITIS: ICD-10-CM

## 2023-08-04 DIAGNOSIS — I10 ESSENTIAL HYPERTENSION: ICD-10-CM

## 2023-08-04 DIAGNOSIS — R79.89 ELEVATED PTHRP LEVEL: ICD-10-CM

## 2023-08-04 DIAGNOSIS — E21.3 HYPERPARATHYROIDISM (HCC): ICD-10-CM

## 2023-08-04 DIAGNOSIS — N18.32 STAGE 3B CHRONIC KIDNEY DISEASE (HCC): ICD-10-CM

## 2023-08-04 DIAGNOSIS — R60.0 EDEMA OF EXTREMITIES: ICD-10-CM

## 2023-08-04 LAB
ALBUMIN SERPL BCP-MCNC: 3.5 G/DL (ref 3.5–5)
ALP SERPL-CCNC: 69 U/L (ref 46–116)
ALT SERPL W P-5'-P-CCNC: 22 U/L (ref 12–78)
ANION GAP SERPL CALCULATED.3IONS-SCNC: 4 MMOL/L
AST SERPL W P-5'-P-CCNC: 16 U/L (ref 5–45)
BASOPHILS # BLD AUTO: 0.05 THOUSANDS/ÂΜL (ref 0–0.1)
BASOPHILS NFR BLD AUTO: 1 % (ref 0–1)
BILIRUB SERPL-MCNC: 0.7 MG/DL (ref 0.2–1)
BUN SERPL-MCNC: 27 MG/DL (ref 5–25)
CALCIUM SERPL-MCNC: 9.6 MG/DL (ref 8.3–10.1)
CHLORIDE SERPL-SCNC: 110 MMOL/L (ref 96–108)
CO2 SERPL-SCNC: 25 MMOL/L (ref 21–32)
CREAT SERPL-MCNC: 0.93 MG/DL (ref 0.6–1.3)
EOSINOPHIL # BLD AUTO: 0.19 THOUSAND/ÂΜL (ref 0–0.61)
EOSINOPHIL NFR BLD AUTO: 3 % (ref 0–6)
ERYTHROCYTE [DISTWIDTH] IN BLOOD BY AUTOMATED COUNT: 13.3 % (ref 11.6–15.1)
GFR SERPL CREATININE-BSD FRML MDRD: 56 ML/MIN/1.73SQ M
GLUCOSE P FAST SERPL-MCNC: 98 MG/DL (ref 65–99)
HCT VFR BLD AUTO: 37.6 % (ref 34.8–46.1)
HGB BLD-MCNC: 12.6 G/DL (ref 11.5–15.4)
IMM GRANULOCYTES # BLD AUTO: 0.01 THOUSAND/UL (ref 0–0.2)
IMM GRANULOCYTES NFR BLD AUTO: 0 % (ref 0–2)
LYMPHOCYTES # BLD AUTO: 2.64 THOUSANDS/ÂΜL (ref 0.6–4.47)
LYMPHOCYTES NFR BLD AUTO: 40 % (ref 14–44)
MCH RBC QN AUTO: 32.4 PG (ref 26.8–34.3)
MCHC RBC AUTO-ENTMCNC: 33.5 G/DL (ref 31.4–37.4)
MCV RBC AUTO: 97 FL (ref 82–98)
MONOCYTES # BLD AUTO: 0.68 THOUSAND/ÂΜL (ref 0.17–1.22)
MONOCYTES NFR BLD AUTO: 10 % (ref 4–12)
NEUTROPHILS # BLD AUTO: 3.07 THOUSANDS/ÂΜL (ref 1.85–7.62)
NEUTS SEG NFR BLD AUTO: 46 % (ref 43–75)
NRBC BLD AUTO-RTO: 0 /100 WBCS
PLATELET # BLD AUTO: 179 THOUSANDS/UL (ref 149–390)
PMV BLD AUTO: 11.7 FL (ref 8.9–12.7)
POTASSIUM SERPL-SCNC: 4 MMOL/L (ref 3.5–5.3)
PROT SERPL-MCNC: 6.8 G/DL (ref 6.4–8.4)
PTH-INTACT SERPL-MCNC: 102.7 PG/ML (ref 12–88)
RBC # BLD AUTO: 3.89 MILLION/UL (ref 3.81–5.12)
SODIUM SERPL-SCNC: 139 MMOL/L (ref 135–147)
WBC # BLD AUTO: 6.64 THOUSAND/UL (ref 4.31–10.16)

## 2023-08-04 PROCEDURE — 82306 VITAMIN D 25 HYDROXY: CPT

## 2023-08-04 PROCEDURE — 36415 COLL VENOUS BLD VENIPUNCTURE: CPT

## 2023-08-04 PROCEDURE — 83970 ASSAY OF PARATHORMONE: CPT

## 2023-08-04 PROCEDURE — 85025 COMPLETE CBC W/AUTO DIFF WBC: CPT

## 2023-08-04 PROCEDURE — 80053 COMPREHEN METABOLIC PANEL: CPT

## 2023-08-09 LAB
25(OH)D2 SERPL-MCNC: <1 NG/ML
25(OH)D3 SERPL-MCNC: 62 NG/ML
25(OH)D3+25(OH)D2 SERPL-MCNC: 63 NG/ML

## 2023-08-14 ENCOUNTER — OFFICE VISIT (OUTPATIENT)
Dept: SURGICAL ONCOLOGY | Facility: CLINIC | Age: 83
End: 2023-08-14
Payer: MEDICARE

## 2023-08-14 VITALS
BODY MASS INDEX: 21.9 KG/M2 | HEART RATE: 69 BPM | WEIGHT: 116 LBS | TEMPERATURE: 97.9 F | HEIGHT: 61 IN | OXYGEN SATURATION: 97 % | DIASTOLIC BLOOD PRESSURE: 79 MMHG | RESPIRATION RATE: 15 BRPM | SYSTOLIC BLOOD PRESSURE: 137 MMHG

## 2023-08-14 DIAGNOSIS — E21.3 HYPERPARATHYROIDISM (HCC): Primary | ICD-10-CM

## 2023-08-14 PROCEDURE — 99213 OFFICE O/P EST LOW 20 MIN: CPT | Performed by: SURGERY

## 2023-08-14 NOTE — PROGRESS NOTES
Surgical Oncology Follow Up       [address] [facility] [place]  [facility] [address] [phone]    Devin Mcallister  1940  [id number]  [address] [facility] CANCER [facility] SURGICAL ONCOLOGY [facility]  [address] [facility] [address] [phone]    Chief Complaint   Patient presents with   • Follow-up       Assessment/Plan:    No problem-specific Assessment & Plan notes found for this encounter. Diagnoses and all orders for this visit:    Hyperparathyroidism [facility])        Advance Care Planning/Advance Directives:  Discussed disease status, cancer treatment plans and/or cancer treatment goals with the patient. Oncology History    No history exists. History of Present Illness: 25-year-old woman here for follow-up status post recent thyroid ultrasound to look for potential thyroid nodules. She is also being worked up for hyperparathyroidism. She feels well and has no complaints to report. -Interval History: She had recent blood work done as well in anticipation of today's visit. Review of Systems:  Review of Systems   Constitutional: Negative. HENT: Negative. Eyes: Negative. Respiratory: Negative. Cardiovascular: Negative. Gastrointestinal: Negative. Endocrine: Negative. Genitourinary: Negative. Musculoskeletal: Negative. Skin: Negative. Allergic/Immunologic: Negative. Neurological: Negative. Hematological: Negative. Psychiatric/Behavioral: Negative.         Patient Active Problem List   Diagnosis   • Baker cyst, right   • Gastroesophageal reflux disease without esophagitis   • Glucose intolerance (impaired glucose tolerance)   • Restless legs   • Hypercholesteremia   • Mitral regurgitation   • Migraine   • Essential hypertension   • Varicose vein of leg   • Edema of extremities   • Rhinitis, allergic   • Aspirin intolerance   • Persistent lymphocytosis • Hypokalemia   • Irritable bowel syndrome   • Abnormal echocardiogram   • Vitamin D deficiency   • Diverticulosis   • History of syncope   • Hammer toe of left foot   • Raynaud's disease   • Stage 3b chronic kidney disease (HCC)   • Cervical radiculopathy   • Left arm pain   • Rupture of left distal biceps tendon   • Primary osteoarthritis involving multiple joints   • HL (hearing loss)   • Elevated PTHrP level   • Lumbar radiculopathy   • Abnormal LFTs   • Hypercalcemia   • Headache   • Primary osteoarthritis of right knee   • 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 Noted brain bleed w/swelling   • HL (hearing loss)    • Migraine    • Raynaud's disease     both hands     Past Surgical History:   Procedure Laterality Date   • AXILLARY SURGERY Left     fatty cyst removed, benign   • CATARACT EXTRACTION Bilateral    • CATARACT EXTRACTION W/ INTRAOCULAR LENS IMPLANT Right 11/10/2016    Procedure: EXTRACTION EXTRACAPSULAR CATARACT PHACO INTRAOCULAR LENS (IOL); Surgeon: Alfie Smith MD;  Location: [facility] MAIN OR;  Service:    • COSMETIC SURGERY      Eye lift   • DILATION AND CURETTAGE OF UTERUS     • MYRINGOTOMY W/ TUBES  2011    both ears-one tube out on the left   • MYRINGOTOMY W/ TUBES     • [place] SURGICAL ARTHROSCOPY SHOULDER W/ROTATOR CUFF RPR Right 5/4/2017    Procedure: ARTHROSCOPY SHOULDER WITH ROTATOR CUFF REPAIR, BICEPS TENODESIS, AND SUBACROMIAL DECOMPRESSION;  Surgeon: Angel Polanco MD;  Location: [address] [place] [address] [address] MAIN OR;  Service: Orthopedics   • NY XCAPSL CTRC RMVL INSJ IO LENS PROSTH W/O ECP Left 10/20/2016    Procedure: EXTRACTION EXTRACAPSULAR CATARACT PHACO INTRAOCULAR LENS (IOL);   Surgeon: Alfie Smith MD;  Location: [facility] MAIN OR;  Service: Ophthalmology   • SKIN BIOPSY      mole on chest   • SKIN BIOPSY      under breast, benign     Family History   Problem Relation Age of Onset   • Heart disease Mother         exp age 59 MI   • Stroke Father    • Macular degeneration Sister    • Macular degeneration Brother    • Diabetes Brother    • Cancer Brother         kidney-nephrectomy   • Kidney disease Brother         cancer     Social History     Socioeconomic History   • Marital status: /Civil Union     Spouse name: Not on file   • Number of children: Not on file   • Years of education: Not on file   • Highest education level: Not on file   Occupational History   • Not on file   Tobacco Use   • Smoking status: Former     Packs/day: 1.50     Years: 30.00     Total pack years: 45.00     Types: Cigarettes     Quit date:      Years since quittin.6   • Smokeless tobacco: Never   Substance and Sexual Activity   • Alcohol use: Yes     Comment: socially   • Drug use: No   • Sexual activity: Not on file   Other Topics Concern   • Not on file   Social History Narrative   • Not on file     Social Determinants of Health     Financial Resource Strain: Low Risk  (2023)    Overall Financial Resource Strain (CARDIA)    • Difficulty of Paying Living Expenses: Not hard at all   Food Insecurity: Not on file   Transportation Needs: No Transportation Needs (2023)    PRAPARE - Transportation    • Lack of Transportation (Medical): No    • Lack of Transportation (Non-Medical):  No   Physical Activity: Not on file   Stress: Not on file   Social Connections: Not on file   Intimate Partner Violence: Not on file   Housing Stability: Not on file       Current Outpatient Medications:   •  acetaminophen (TYLENOL) 500 mg tablet, Take 1,000 mg by mouth every 6 (six) hours as needed for mild pain, Disp: , Rfl:   •  ALPHAGAN P 0.1 %, , Disp: , Rfl: 0  •  ascorbic acid (VITAMIN C) 500 mg tablet, Take 500 mg by mouth every morning, Disp: , Rfl:   •  Azelastine HCl 137 MCG/SPRAY SOLN, instill 1 spray into each nostril twice a day, Disp: 30 mL, Rfl: 6  •  Biotin 5 MG CAPS, Take by mouth every morning, Disp: , Rfl:   •  Cyanocobalamin (VITAMIN B 12 PO), Take by mouth every morning, Disp: , Rfl:   •  furosemide (LASIX) 40 mg tablet, Take 1 tablet (40 mg total) by mouth every morning, Disp: 90 tablet, Rfl: 1  •  ipratropium (ATROVENT) 0.06 % nasal spray, instill 2 sprays into each nostril four times a day, Disp: 15 mL, Rfl: 11  •  levocetirizine (XYZAL) 5 MG tablet, Take 1 tablet (5 mg total) by mouth every evening, Disp: 90 tablet, Rfl: 1  •  Lutein 40 MG CAPS, Take by mouth every morning, Disp: , Rfl:   •  Magnesium 250 MG TABS, Take by mouth every morning, Disp: , Rfl:   •  olmesartan (BENICAR) 40 mg tablet, Take 1 tablet (40 mg total) by mouth daily, Disp: 90 tablet, Rfl: 1  •  omeprazole (PriLOSEC) 20 mg delayed release capsule, take 1 capsule by mouth once daily, Disp: 90 capsule, Rfl: 1  •  Potassium Chloride ER 20 MEQ TBCR, Take 1 tablet (20 mEq total) by mouth daily, Disp: 90 tablet, Rfl: 1  •  rOPINIRole (REQUIP) 0.5 mg tablet, Take 1 tablet (0.5 mg total) by mouth daily at bedtime, Disp: 90 tablet, Rfl: 1  •  SUMAtriptan (IMITREX) 50 mg tablet, Take 1 tablet (50 mg total) by mouth once as needed for migraine, Disp: 9 tablet, Rfl: 1  •  timolol (TIMOPTIC) 0.5 % ophthalmic solution, instill 1 drop into right eye twice a day, Disp: , Rfl:   •  vitamin A 7500 UNIT capsule, Take 7,500 Units by mouth every morning  , Disp: , Rfl:   •  vitamin E, tocopherol, 400 units capsule, Take 400 Units by mouth every morning Last dose 4/26/17, Disp: , Rfl:   •  zinc gluconate 50 mg tablet, Take 50 mg by mouth every morning, Disp: , Rfl:   Allergies   Allergen Reactions   • Lactose - Food Allergy GI Intolerance   • Latex Itching     Elastic,adhesive tape   • Dilantin [Phenytoin Sodium Extended] Rash   • Other Rash     Adhesive tape, environmental   • Penicillins Rash     Vitals:    08/14/23 1029   BP: 137/79   Pulse: 69   Resp: 15   Temp: 97.9 °F (36.6 °C)   SpO2: 97%       Physical Exam  Vitals reviewed. Constitutional:       Appearance: Normal appearance. HENT:      Head: Normocephalic and atraumatic. Right Ear: External ear normal.      Left Ear: External ear normal.   Eyes:      Extraocular Movements: Extraocular movements intact. Pupils: Pupils are equal, round, and reactive to light. Cardiovascular:      Rate and Rhythm: Regular rhythm. Pulses: Normal pulses. Heart sounds: Normal heart sounds. Pulmonary:      Effort: Pulmonary effort is normal.      Breath sounds: Normal breath sounds. Abdominal:      General: Abdomen is flat. Palpations: Abdomen is soft. Musculoskeletal:         General: Normal range of motion. Cervical back: Normal range of motion and neck supple. No rigidity or tenderness. Lymphadenopathy:      Cervical: No cervical adenopathy. Skin:     General: Skin is warm and dry. Neurological:      General: No focal deficit present. Mental Status: She is alert and oriented to person, place, and time. Psychiatric:         Mood and Affect: Mood normal.         Behavior: Behavior normal.         Thought Content: Thought content normal.         Judgment: Judgment normal.           Results:  Labs:  Lab Results   Component Value Date    .7 (H) 08/04/2023    CALCIUM 9.6 08/04/2023    PHOS 2.9 12/09/2022         Imaging  US thyroid    Result Date: 8/7/2023  Narrative: THYROID ULTRASOUND INDICATION:    E21.3: Hyperparathyroidism, unspecified. COMPARISON: CT parathyroid study 1/30/2023 TECHNIQUE:   Ultrasound of the thyroid was performed with a high frequency linear transducer in transverse and sagittal planes including volumetric imaging sweeps as well as traditional still imaging technique. FINDINGS:  Normal homogeneous smooth echotexture. Right lobe: 5.4 x 1.8 x 1.9 cm. Volume 8.7 mL Left lobe:  5.4 x 2.0 x 2.4 cm. Volume 12.1 mL Isthmus: 0.4  cm.  There are no dominant nodules that meet current ACR criteria for biopsy or follow-up. Impression: No nodule meets current ACR criteria for requiring biopsy or followup ultrasounds. Reference: ACR Thyroid Imaging, Reporting and Data System (TI-RADS): White Paper of the [employer]. J AM Veronika Radiol 3948;00:807-500. (additional recommendations based on American Thyroid Association 2015 guidelines.) Workstation performed: [id number]     I reviewed the above laboratory and imaging data. Discussion/Summary: Suspected primary hyperparathyroidism, normal calcium levels, and PTH levels being barely elevated. At this point, no surgical targets. We will therefore continue follow-up with repeat blood work in 6 months.

## 2023-10-27 ENCOUNTER — APPOINTMENT (OUTPATIENT)
Dept: LAB | Facility: CLINIC | Age: 83
End: 2023-10-27
Payer: MEDICARE

## 2023-10-27 DIAGNOSIS — E21.3 HYPERPARATHYROIDISM (HCC): ICD-10-CM

## 2023-10-27 DIAGNOSIS — R79.89 ABNORMAL LFTS: ICD-10-CM

## 2023-10-27 DIAGNOSIS — E83.52 HYPERCALCEMIA: ICD-10-CM

## 2023-10-27 DIAGNOSIS — I10 ESSENTIAL HYPERTENSION: ICD-10-CM

## 2023-10-27 DIAGNOSIS — E87.6 HYPOPOTASSEMIA: ICD-10-CM

## 2023-10-27 DIAGNOSIS — N18.32 STAGE 3B CHRONIC KIDNEY DISEASE (HCC): ICD-10-CM

## 2023-10-27 DIAGNOSIS — R60.0 EDEMA OF EXTREMITIES: ICD-10-CM

## 2023-10-27 DIAGNOSIS — R73.02 GLUCOSE INTOLERANCE (IMPAIRED GLUCOSE TOLERANCE): ICD-10-CM

## 2023-10-27 LAB
ALBUMIN SERPL BCP-MCNC: 3.7 G/DL (ref 3.5–5)
ALP SERPL-CCNC: 59 U/L (ref 34–104)
ALT SERPL W P-5'-P-CCNC: 15 U/L (ref 7–52)
ANION GAP SERPL CALCULATED.3IONS-SCNC: 7 MMOL/L
AST SERPL W P-5'-P-CCNC: 16 U/L (ref 13–39)
BILIRUB SERPL-MCNC: 1.1 MG/DL (ref 0.2–1)
BUN SERPL-MCNC: 22 MG/DL (ref 5–25)
CALCIUM SERPL-MCNC: 9.4 MG/DL (ref 8.4–10.2)
CHLORIDE SERPL-SCNC: 108 MMOL/L (ref 96–108)
CO2 SERPL-SCNC: 27 MMOL/L (ref 21–32)
CREAT SERPL-MCNC: 0.77 MG/DL (ref 0.6–1.3)
GFR SERPL CREATININE-BSD FRML MDRD: 70 ML/MIN/1.73SQ M
GLUCOSE P FAST SERPL-MCNC: 108 MG/DL (ref 65–99)
POTASSIUM SERPL-SCNC: 4.6 MMOL/L (ref 3.5–5.3)
PROT SERPL-MCNC: 6.1 G/DL (ref 6.4–8.4)
SODIUM SERPL-SCNC: 142 MMOL/L (ref 135–147)

## 2023-10-27 PROCEDURE — 36415 COLL VENOUS BLD VENIPUNCTURE: CPT

## 2023-10-27 PROCEDURE — 80053 COMPREHEN METABOLIC PANEL: CPT

## 2023-10-30 ENCOUNTER — OFFICE VISIT (OUTPATIENT)
Dept: INTERNAL MEDICINE CLINIC | Facility: CLINIC | Age: 83
End: 2023-10-30
Payer: MEDICARE

## 2023-10-30 VITALS
BODY MASS INDEX: 21.14 KG/M2 | WEIGHT: 112 LBS | HEART RATE: 67 BPM | OXYGEN SATURATION: 100 % | HEIGHT: 61 IN | SYSTOLIC BLOOD PRESSURE: 118 MMHG | DIASTOLIC BLOOD PRESSURE: 74 MMHG

## 2023-10-30 DIAGNOSIS — I73.00 RAYNAUD'S DISEASE WITHOUT GANGRENE: ICD-10-CM

## 2023-10-30 DIAGNOSIS — K21.9 GASTROESOPHAGEAL REFLUX DISEASE WITHOUT ESOPHAGITIS: ICD-10-CM

## 2023-10-30 DIAGNOSIS — E78.00 HYPERCHOLESTEREMIA: ICD-10-CM

## 2023-10-30 DIAGNOSIS — G43.809 OTHER MIGRAINE WITHOUT STATUS MIGRAINOSUS, NOT INTRACTABLE: ICD-10-CM

## 2023-10-30 DIAGNOSIS — N18.32 STAGE 3B CHRONIC KIDNEY DISEASE (HCC): ICD-10-CM

## 2023-10-30 DIAGNOSIS — R60.0 EDEMA OF EXTREMITIES: ICD-10-CM

## 2023-10-30 DIAGNOSIS — I83.93 VARICOSE VEINS OF BOTH LOWER EXTREMITIES, UNSPECIFIED WHETHER COMPLICATED: ICD-10-CM

## 2023-10-30 DIAGNOSIS — J30.1 SEASONAL ALLERGIC RHINITIS DUE TO POLLEN: ICD-10-CM

## 2023-10-30 DIAGNOSIS — I10 ESSENTIAL HYPERTENSION: ICD-10-CM

## 2023-10-30 DIAGNOSIS — E87.6 HYPOPOTASSEMIA: ICD-10-CM

## 2023-10-30 DIAGNOSIS — G25.81 RESTLESS LEGS: ICD-10-CM

## 2023-10-30 DIAGNOSIS — R60.0 EDEMA OF BOTH LEGS: ICD-10-CM

## 2023-10-30 DIAGNOSIS — R93.1 ABNORMAL ECHOCARDIOGRAM: ICD-10-CM

## 2023-10-30 DIAGNOSIS — I34.0 NONRHEUMATIC MITRAL VALVE REGURGITATION: Primary | ICD-10-CM

## 2023-10-30 DIAGNOSIS — K58.0 IRRITABLE BOWEL SYNDROME WITH DIARRHEA: ICD-10-CM

## 2023-10-30 DIAGNOSIS — R73.02 GLUCOSE INTOLERANCE (IMPAIRED GLUCOSE TOLERANCE): ICD-10-CM

## 2023-10-30 DIAGNOSIS — E21.3 HYPERPARATHYROIDISM (HCC): ICD-10-CM

## 2023-10-30 PROBLEM — R51.9 HEADACHE: Status: RESOLVED | Noted: 2022-04-05 | Resolved: 2023-10-30

## 2023-10-30 PROCEDURE — 99214 OFFICE O/P EST MOD 30 MIN: CPT | Performed by: INTERNAL MEDICINE

## 2023-10-30 RX ORDER — POTASSIUM CHLORIDE 1500 MG/1
1 TABLET, EXTENDED RELEASE ORAL DAILY
Qty: 90 TABLET | Refills: 1 | Status: SHIPPED | OUTPATIENT
Start: 2023-10-30

## 2023-10-30 RX ORDER — LEVOCETIRIZINE DIHYDROCHLORIDE 5 MG/1
5 TABLET, FILM COATED ORAL EVERY EVENING
Qty: 90 TABLET | Refills: 1 | Status: SHIPPED | OUTPATIENT
Start: 2023-10-30

## 2023-10-30 RX ORDER — FUROSEMIDE 40 MG/1
40 TABLET ORAL EVERY MORNING
Qty: 90 TABLET | Refills: 1 | Status: SHIPPED | OUTPATIENT
Start: 2023-10-30

## 2023-10-30 RX ORDER — OMEPRAZOLE 20 MG/1
20 CAPSULE, DELAYED RELEASE ORAL DAILY
Qty: 90 CAPSULE | Refills: 1 | Status: SHIPPED | OUTPATIENT
Start: 2023-10-30

## 2023-10-30 RX ORDER — OLMESARTAN MEDOXOMIL 40 MG/1
40 TABLET ORAL DAILY
Qty: 90 TABLET | Refills: 1 | Status: SHIPPED | OUTPATIENT
Start: 2023-10-30

## 2023-10-30 NOTE — ASSESSMENT & PLAN NOTE
Endocrine: Hyperparathyroidism being followed by endocrinologist going for blood test in February 2024. Blood sugar minimally elevated will monitor. 8/1/2023 thyroid ultrasound did not reveal any nodule:No nodule meets current ACR criteria for requiring biopsy or followup ultrasounds. Lab Results   Component Value Date    .7 (H) 08/04/2023    CALCIUM 9.4 10/27/2023    PHOS 2.9 12/09/2022     Going for blood test by endocrinologist in February.

## 2023-10-30 NOTE — ASSESSMENT & PLAN NOTE
Last echocardiogram was done on 2019. No symptoms of LV failure. Given her age and edema of legs recommend to see cardiologist at her convenience. No symptoms of LV failure.

## 2023-10-30 NOTE — ASSESSMENT & PLAN NOTE
Cardiac: Mitral regurgitation, hypertension, Raynaud's, edema of legs predominantly systolic hypertension generally seems to be doing fair she has increasing swelling in the legs though weight has been stable no symptoms of LV failure. Last echocardiogram from 2019 were reviewed. Will be a good idea to see a cardiologist.  She does have a also underlying CKD level 3 which contributes to swelling too, Amlodipine obviously has been a big contributing factor. She remains on Lasix 40 mg daily as well as potassium 20 mEq daily. Recommend daily weight.   If the weight is greater than 116 she will let us know or if she has any shortness of breath or let us know we will refer to the appropriate cardiologist

## 2023-10-30 NOTE — ASSESSMENT & PLAN NOTE
IBS is somewhat worse she has been through a lot with her 's health recently. She is taking symptomatic treatment. Recommend high-fiber diet. Up-to-date with the colonoscopy. No further colonoscopy per her choice.

## 2023-10-30 NOTE — PROGRESS NOTES
Name: Royce Morillo      : 1940      MRN: [id number]  Encounter Provider: Raj Soriano MD  Encounter Date: 10/30/2023   Encounter department: [place] [id number]. Nonrheumatic mitral valve regurgitation  Assessment & Plan:  Cardiac: Mitral regurgitation, hypertension, Raynaud's, edema of legs predominantly systolic hypertension generally seems to be doing fair she has increasing swelling in the legs though weight has been stable no symptoms of LV failure. Last echocardiogram from 2019 were reviewed. Will be a good idea to see a cardiologist.  She does have a also underlying CKD level 3 which contributes to swelling too, Amlodipine obviously has been a big contributing factor. She remains on Lasix 40 mg daily as well as potassium 20 mEq daily. Recommend daily weight. If the weight is greater than 116 she will let us know or if she has any shortness of breath or let us know we will refer to the appropriate cardiologist      2. Essential hypertension  Assessment & Plan:  Cardiac: Mitral regurgitation, hypertension, Raynaud's, edema of legs predominantly systolic hypertension generally seems to be doing fair she has increasing swelling in the legs though weight has been stable no symptoms of LV failure. Last echocardiogram from 2019 were reviewed. Will be a good idea to see a cardiologist.  She does have a also underlying CKD level 3 which contributes to swelling too, Amlodipine obviously has been a big contributing factor. She remains on Lasix 40 mg daily as well as potassium 20 mEq daily. Recommend daily weight. If the weight is greater than 116 she will let us know or if she has any shortness of breath or let us know we will refer to the appropriate cardiologist    Orders:  -     olmesartan (BENICAR) 40 mg tablet; Take 1 tablet (40 mg total) by mouth daily  -     Comprehensive metabolic panel;  Future; Expected date: 2024  -     CBC and differential; Future; Expected date: 02/14/2024  -     Lipid panel; Future; Expected date: 02/14/2024  -     Vitamin D 25 hydroxy; Future; Expected date: 02/14/2024  -     Ambulatory Referral to Cardiology; Future    3. Varicose veins of both lower extremities, unspecified whether complicated    4. Raynaud's disease without gangrene    5. Edema of extremities  Assessment & Plan:  Last echocardiogram was done on 2019. No symptoms of LV failure. Given her age and edema of legs recommend to see cardiologist at her convenience. No symptoms of LV failure. Orders:  -     furosemide (LASIX) 40 mg tablet; Take 1 tablet (40 mg total) by mouth every morning  -     Comprehensive metabolic panel; Future; Expected date: 02/14/2024  -     CBC and differential; Future; Expected date: 02/14/2024  -     Lipid panel; Future; Expected date: 02/14/2024  -     Vitamin D 25 hydroxy; Future; Expected date: 02/14/2024  -     Ambulatory Referral to Cardiology; Future    6. Gastroesophageal reflux disease without esophagitis  Assessment & Plan:  GERD symptom-free tolerating omeprazole without side effect. IBS stable chronic baseline 3-5 bowel movements. Orders:  -     omeprazole (PriLOSEC) 20 mg delayed release capsule; Take 1 capsule (20 mg total) by mouth daily    7. Hypopotassemia  -     Potassium Chloride ER 20 MEQ TBCR; Take 1 tablet (20 mEq total) by mouth daily  -     Comprehensive metabolic panel; Future; Expected date: 02/14/2024    8. Seasonal allergic rhinitis due to pollen  Assessment & Plan:  Chronic allergies somewhat worse for least a year recommend saline nasal spray. Remains on Xyzal as well as the needed nasal spray with    Orders:  -     levocetirizine (XYZAL) 5 MG tablet; Take 1 tablet (5 mg total) by mouth every evening    9. Glucose intolerance (impaired glucose tolerance)  Assessment & Plan:  Blood sugar is fair control. Will monitor      10.  Irritable bowel syndrome with diarrhea  Assessment & Plan:  IBS is somewhat worse she has been through a lot with her 's health recently. She is taking symptomatic treatment. Recommend high-fiber diet. Up-to-date with the colonoscopy. No further colonoscopy per her choice. 11. Other migraine without status migrainosus, not intractable  Assessment & Plan:  Migraine is fair. On Imitrex as needed. 12. Stage 3b chronic kidney disease ([address])  -     Comprehensive metabolic panel; Future; Expected date: 02/14/2024  -     CBC and differential; Future; Expected date: 02/14/2024  -     Vitamin D 25 hydroxy; Future; Expected date: 02/14/2024    13. Restless legs  Assessment & Plan:  Restless leg fair. We will continue Requip     Orders:  -     Comprehensive metabolic panel; Future; Expected date: 02/14/2024    14. Hypercholesteremia  Assessment & Plan:  Lab Results   Component Value Date    LDLCALC 119 (H) 07/11/2022     Lab Results   Component Value Date    ALT 15 10/27/2023    ALT 21 11/02/2015     Lab Results   Component Value Date    CHOLESTEROL 212 (H) 07/11/2022    CHOLESTEROL 216 (H) 10/12/2021    CHOLESTEROL 235 (H) 02/26/2021     Lab Results   Component Value Date    HDL 59 07/11/2022    HDL 55 10/12/2021    HDL 71 02/26/2021     Lab Results   Component Value Date    TRIG 170 (H) 07/11/2022    TRIG 203 (H) 10/12/2021    TRIG 117 02/26/2021     Lab Results   Component Value Date    NONHDLC 153 07/11/2022    [address] 161 10/12/2021    [address] 164 02/26/2021     Continue diet for the low cholesterol due lipid profile next year in February    Orders:  -     Comprehensive metabolic panel; Future; Expected date: 02/14/2024  -     Lipid panel; Future; Expected date: 02/14/2024  -     Ambulatory Referral to Cardiology; Future    15. Edema of both legs    16. Hyperparathyroidism [facility])  Assessment & Plan:  Endocrine: Hyperparathyroidism being followed by endocrinologist going for blood test in February 2024.   Blood sugar minimally elevated will monitor. 8/1/2023 thyroid ultrasound did not reveal any nodule:No nodule meets current ACR criteria for requiring biopsy or followup ultrasounds. Lab Results   Component Value Date    .7 (H) 08/04/2023    CALCIUM 9.4 10/27/2023    PHOS 2.9 12/09/2022     Going for blood test by endocrinologist in February. 17. Abnormal echocardiogram  -     Ambulatory Referral to Cardiology; Future        Depression Screening and Follow-up Plan: Patient was screened for depression during today's encounter. They screened negative with a PHQ-2 score of 1. Subjective       Memo Lemus is here for chronic disease management. She recently lost her  who has been through a lot. Memo Lemus is generally seems to be doing fair and and handling well. Cardiac: Mitral regurgitation, hypertension, Raynaud's, edema of legs predominantly systolic hypertension generally seems to be doing fair she has increasing swelling in the legs though weight has been stable no symptoms of LV failure. Last echocardiogram from 2019 were reviewed. Will be a good idea to see a cardiologist.  She does have a also underlying CKD level 3 which contributes to swelling too, Amlodipine obviously has been a big contributing factor. She remains on Lasix 40 mg daily as well as potassium 20 mEq daily. Recommend daily weight. If the weight is greater than 116 she will let us know or if she has any shortness of breath or let us know we will refer to the appropriate cardiologist    Endocrine: Hyperparathyroidism being followed by endocrinologist going for blood test in February 2024. Blood sugar minimally elevated will monitor. 8/1/2023 thyroid ultrasound did not reveal any nodule:No nodule meets current ACR criteria for requiring biopsy or followup ultrasounds. GERD and IBS remains unchanged. No heartburn however 3-5 loose bowel movement under the circumstances he is holding fairly well.   Recommend continue symptomatic treatment Remains on omeprazole. Vitamin D remains on supplement. Macrocytosis will follow with CBC. CKD level 3 will continue to monitor lab. Restless leg fair. Remains on Requip. Chronic allergy fair remains on nasal spray. Chronic low back pain at times but not a major issue. Persistent macrocytosis we will check CBC with differential.    Vitamin D remains on supplement. Abnormal LFT, calcium: Potassium well will follow. My headache is not a problem            Review of Systems   Constitutional:  Negative for chills, fatigue and fever. HENT:  Negative for ear pain, sore throat and trouble swallowing. Eyes:  Negative for pain and visual disturbance. Respiratory:  Negative for cough and shortness of breath. Cardiovascular:  Positive for leg swelling. Negative for chest pain and palpitations. Gastrointestinal:  Negative for abdominal pain, constipation, diarrhea and vomiting. Genitourinary:  Negative for difficulty urinating, dysuria, frequency and hematuria. Musculoskeletal:  Negative for arthralgias and back pain. Skin:  Negative for color change and rash. Neurological:  Negative for dizziness, seizures, syncope, light-headedness and headaches. Psychiatric/Behavioral:  Negative for agitation, confusion, decreased concentration, dysphoric mood, hallucinations and self-injury. The patient is nervous/anxious. The patient is not hyperactive. All other systems reviewed and are negative. 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  Noted brain bleed w/swelling   • HL (hearing loss)    • Migraine    • Raynaud's disease     both hands     Past Surgical History:   Procedure Laterality Date   • AXILLARY SURGERY Left     fatty cyst removed, benign   • CATARACT EXTRACTION Bilateral    • CATARACT EXTRACTION W/ INTRAOCULAR LENS IMPLANT Right 11/10/2016    Procedure: EXTRACTION EXTRACAPSULAR CATARACT PHACO INTRAOCULAR LENS (IOL); Surgeon: Madelyn Werner MD;  Location: [facility] MAIN OR;  Service:    • COSMETIC SURGERY      Eye lift   • DILATION AND CURETTAGE OF UTERUS     • MYRINGOTOMY W/ TUBES  2011    both ears-one tube out on the left   • MYRINGOTOMY W/ TUBES     • IA SURGICAL ARTHROSCOPY SHOULDER W/ROTATOR CUFF RPR Right 2017    Procedure: ARTHROSCOPY SHOULDER WITH ROTATOR CUFF REPAIR, BICEPS TENODESIS, AND SUBACROMIAL DECOMPRESSION;  Surgeon: Carl Hollis MD;  Location: [address] [place] [address] MAIN OR;  Service: Orthopedics   • IA XCAPSL CTRC RMVL INSJ IO LENS PROSTH W/O ECP Left 10/20/2016    Procedure: EXTRACTION EXTRACAPSULAR CATARACT PHACO INTRAOCULAR LENS (IOL);   Surgeon: Madelyn Werner MD;  Location: [facility] MAIN OR;  Service: Ophthalmology   • SKIN BIOPSY      mole on chest   • SKIN BIOPSY      under breast, benign     Family History   Problem Relation Age of Onset   • Heart disease Mother         exp age 59 MI   • Stroke Father    • Macular degeneration Sister    • Macular degeneration Brother    • Diabetes Brother    • Cancer Brother         kidney-nephrectomy   • Kidney disease Brother         cancer     Social History     Socioeconomic History   • Marital status: /Civil Union     Spouse name: None   • Number of children: None   • Years of education: None   • Highest education level: None   Occupational History   • None   Tobacco Use   • Smoking status: Former     Packs/day: 1.50     Years: 30.00     Total pack years: 45.00     Types: Cigarettes     Quit date:      Years since quittin.8   • Smokeless tobacco: Never   Substance and Sexual Activity   • Alcohol use: Yes     Comment: socially   • Drug use: No   • Sexual activity: None   Other Topics Concern   • None   Social History Narrative   • None     Social Determinants of Health     Financial Resource Strain: Low Risk  (2023)    Overall Financial Resource Strain (CARDIA)    • Difficulty of Paying Living Expenses: Not hard at all   Food Insecurity: Not on file   Transportation Needs: No Transportation Needs (4/24/2023)    PRAPARE - Transportation    • Lack of Transportation (Medical): No    • Lack of Transportation (Non-Medical):  No   Physical Activity: Not on file   Stress: Not on file   Social Connections: Not on file   Intimate Partner Violence: Not on file   Housing Stability: Not on file     Current Outpatient Medications on File Prior to Visit   Medication Sig   • acetaminophen (TYLENOL) 500 mg tablet Take 1,000 mg by mouth every 6 (six) hours as needed for mild pain   • ALPHAGAN P 0.1 %    • ascorbic acid (VITAMIN C) 500 mg tablet Take 500 mg by mouth every morning   • Azelastine HCl 137 MCG/SPRAY SOLN instill 1 spray into each nostril twice a day   • Biotin 5 MG CAPS Take by mouth every morning   • Cyanocobalamin (VITAMIN B 12 PO) Take by mouth every morning   • ipratropium (ATROVENT) 0.06 % nasal spray instill 2 sprays into each nostril four times a day   • Lutein 40 MG CAPS Take by mouth every morning   • Magnesium 250 MG TABS Take by mouth every morning   • rOPINIRole (REQUIP) 0.5 mg tablet Take 1 tablet (0.5 mg total) by mouth daily at bedtime   • SUMAtriptan (IMITREX) 50 mg tablet Take 1 tablet (50 mg total) by mouth once as needed for migraine   • timolol (TIMOPTIC) 0.5 % ophthalmic solution instill 1 drop into right eye twice a day   • vitamin A 7500 UNIT capsule Take 7,500 Units by mouth every morning     • vitamin E, tocopherol, 400 units capsule Take 400 Units by mouth every morning Last dose 4/26/17   • zinc gluconate 50 mg tablet Take 50 mg by mouth every morning   • [DISCONTINUED] furosemide (LASIX) 40 mg tablet Take 1 tablet (40 mg total) by mouth every morning   • [DISCONTINUED] levocetirizine (XYZAL) 5 MG tablet Take 1 tablet (5 mg total) by mouth every evening   • [DISCONTINUED] olmesartan (BENICAR) 40 mg tablet Take 1 tablet (40 mg total) by mouth daily   • [DISCONTINUED] omeprazole (PriLOSEC) 20 mg delayed release capsule take 1 capsule by mouth once daily   • [DISCONTINUED] Potassium Chloride ER 20 MEQ TBCR Take 1 tablet (20 mEq total) by mouth daily     Allergies   Allergen Reactions   • Lactose - Food Allergy GI Intolerance   • Latex Itching     Elastic,adhesive tape   • Dilantin [Phenytoin Sodium Extended] Rash   • Other Rash     Adhesive tape, environmental   • Penicillins Rash     Immunization History   Administered Date(s) Administered   • COVID-19 MODERNA VACC 0.25 ML IM BOOSTER 12/10/2021   • COVID-19 MODERNA VACC 0.5 ML IM 12/10/2021   • COVID-19 PFIZER VACCINE 0.3 ML IM 01/21/2021, 02/11/2021   • Influenza, high dose seasonal 0.7 mL 12/01/2020, 10/18/2021   • Influenza, injectable, quadrivalent, preservative free 0.5 mL 10/31/2019       Objective     /74   Pulse 67   Ht 5' 1" (1.549 m)   Wt 50.8 kg (112 lb)   SpO2 100%   BMI 21.16 kg/m²     Physical Exam  Vitals and nursing note reviewed. Constitutional:       Appearance: Normal appearance. She is well-developed and normal weight. She is not ill-appearing. HENT:      Head: Normocephalic and atraumatic. Right Ear: Tympanic membrane, ear canal and external ear normal.      Left Ear: Tympanic membrane, ear canal and external ear normal.   Eyes:      General:         Right eye: No discharge. Left eye: No discharge. Conjunctiva/sclera: Conjunctivae normal.   Neck:      Thyroid: No thyromegaly. Vascular: No carotid bruit or JVD. Cardiovascular:      Rate and Rhythm: Normal rate and regular rhythm. Pulses: Normal pulses. Heart sounds: Normal heart sounds. Pulmonary:      Effort: Pulmonary effort is normal. No respiratory distress. Breath sounds: Normal breath sounds. No wheezing. Abdominal:      Tenderness: There is no abdominal tenderness. There is no guarding or rebound. Hernia: No hernia is present. Musculoskeletal:      Cervical back: No rigidity or tenderness. Right lower le+ Edema present. Left lower le+ Edema present. Lymphadenopathy:      Cervical: No cervical adenopathy. Skin:     General: Skin is warm. Coloration: Skin is not jaundiced or pale. Neurological:      Mental Status: She is alert and oriented to person, place, and time. Mental status is at baseline. Motor: No weakness. Gait: Gait normal.   Psychiatric:         Mood and Affect: Mood normal.         Behavior: Behavior normal.         Thought Content:  Thought content normal.         Judgment: Judgment normal.       Jalyn Napier MD

## 2023-10-30 NOTE — ASSESSMENT & PLAN NOTE
Lab Results   Component Value Date    ALT 15 10/27/2023    AST 16 10/27/2023    ALKPHOS 59 10/27/2023    BILITOT 1.3 (H) 11/02/2015

## 2023-10-30 NOTE — ASSESSMENT & PLAN NOTE
Chronic allergies somewhat worse for least a year recommend saline nasal spray.   Remains on Xyzal as well as the needed nasal spray with

## 2023-10-30 NOTE — ASSESSMENT & PLAN NOTE
Lab Results   Component Value Date    LDLCALC 119 (H) 07/11/2022     Lab Results   Component Value Date    ALT 15 10/27/2023    ALT 21 11/02/2015     Lab Results   Component Value Date    CHOLESTEROL 212 (H) 07/11/2022    CHOLESTEROL 216 (H) 10/12/2021    CHOLESTEROL 235 (H) 02/26/2021     Lab Results   Component Value Date    HDL 59 07/11/2022    HDL 55 10/12/2021    HDL 71 02/26/2021     Lab Results   Component Value Date    TRIG 170 (H) 07/11/2022    TRIG 203 (H) 10/12/2021    TRIG 117 02/26/2021     Lab Results   Component Value Date    NONHDLC 153 07/11/2022    [address] 161 10/12/2021    [address] 164 02/26/2021     Continue diet for the low cholesterol due lipid profile next year in February

## 2023-10-30 NOTE — ASSESSMENT & PLAN NOTE
GERD symptom-free tolerating omeprazole without side effect. IBS stable chronic baseline 3-5 bowel movements.

## 2023-12-04 ENCOUNTER — OFFICE VISIT (OUTPATIENT)
Dept: INTERNAL MEDICINE CLINIC | Facility: CLINIC | Age: 83
End: 2023-12-04
Payer: MEDICARE

## 2023-12-04 VITALS
HEIGHT: 61 IN | BODY MASS INDEX: 21.14 KG/M2 | TEMPERATURE: 97.7 F | SYSTOLIC BLOOD PRESSURE: 130 MMHG | DIASTOLIC BLOOD PRESSURE: 76 MMHG | WEIGHT: 112 LBS

## 2023-12-04 DIAGNOSIS — I73.00 RAYNAUD'S DISEASE WITHOUT GANGRENE: ICD-10-CM

## 2023-12-04 DIAGNOSIS — I10 ESSENTIAL HYPERTENSION: ICD-10-CM

## 2023-12-04 DIAGNOSIS — N18.32 STAGE 3B CHRONIC KIDNEY DISEASE (HCC): ICD-10-CM

## 2023-12-04 DIAGNOSIS — J20.9 ACUTE INFECTIVE TRACHEOBRONCHITIS: Primary | ICD-10-CM

## 2023-12-04 PROCEDURE — 99213 OFFICE O/P EST LOW 20 MIN: CPT | Performed by: INTERNAL MEDICINE

## 2023-12-04 RX ORDER — METHYLPREDNISOLONE 4 MG/1
TABLET ORAL
Qty: 21 EACH | Refills: 0 | Status: SHIPPED | OUTPATIENT
Start: 2023-12-04

## 2023-12-04 RX ORDER — CODEINE PHOSPHATE/GUAIFENESIN 10-100MG/5
10 LIQUID (ML) ORAL 3 TIMES DAILY PRN
Qty: 180 ML | Refills: 0 | Status: SHIPPED | OUTPATIENT
Start: 2023-12-04

## 2023-12-04 RX ORDER — AZITHROMYCIN 250 MG/1
TABLET, FILM COATED ORAL
Qty: 6 TABLET | Refills: 0 | Status: SHIPPED | OUTPATIENT
Start: 2023-12-04 | End: 2023-12-09

## 2023-12-04 NOTE — ASSESSMENT & PLAN NOTE
Patient came in complaining of initially sore throat about 10 days ago followed by coughing with scanty expectoration yellowish in color now more so dry persistent intractable coughing with nasal congestion and a postnasal drip  Denies any high fever nausea vomiting chest pain or difficulty breathing  Will treat with Z-Jacob to use as directed  Medrol Dosepak to use as directed  Cough medicine with codeine along with continue treatment for allergic rhinitis and Tylenol as needed for headache and aches and pains

## 2023-12-04 NOTE — ASSESSMENT & PLAN NOTE
Lab Results   Component Value Date    EGFR 70 10/27/2023    EGFR 56 08/04/2023    EGFR 53 12/09/2022    CREATININE 0.77 10/27/2023    CREATININE 0.93 08/04/2023    CREATININE 0.97 12/09/2022   Stage III chronic kidney disease now has resolved BMI improved 70 creatinine stable at 0.7 avoid nephrotoxic drug

## 2023-12-04 NOTE — PROGRESS NOTES
Dr. Patel Murillo Office Visit Note  23     Raoul Handley 78 y.o. female MRN: [id number]  : 1940    Assessment:     1. Acute infective tracheobronchitis  Assessment & Plan:  Patient came in complaining of initially sore throat about 10 days ago followed by coughing with scanty expectoration yellowish in color now more so dry persistent intractable coughing with nasal congestion and a postnasal drip  Denies any high fever nausea vomiting chest pain or difficulty breathing  Will treat with Z-Jacob to use as directed  Medrol Dosepak to use as directed  Cough medicine with codeine along with continue treatment for allergic rhinitis and Tylenol as needed for headache and aches and pains    Orders:  -     azithromycin (Zithromax) 250 mg tablet; Take 2 tablets (500 mg total) by mouth daily for 1 day, THEN 1 tablet (250 mg total) daily for 4 days. -     methylPREDNISolone 4 MG tablet therapy pack; Use as directed on package  -     guaifenesin-codeine (GUAIFENESIN AC) 100-10 MG/5ML liquid; Take 10 mL by mouth 3 (three) times a day as needed for cough    2. Essential hypertension  Assessment & Plan:  Blood pressure controlled continue low-salt diet Benicar      3. Stage 3b chronic kidney disease [facility])  Assessment & Plan:  Lab Results   Component Value Date    EGFR 70 10/27/2023    EGFR 56 2023    EGFR 53 2022    CREATININE 0.77 10/27/2023    CREATININE 0.93 2023    CREATININE 0.97 2022   Stage III chronic kidney disease now has resolved BMI improved 70 creatinine stable at 0.7 avoid nephrotoxic drug      4. Raynaud's disease without gangrene  Assessment & Plan:  Not on any definite treatment advised to use close if needed start nifedipine XL            Discussion Summary and Plan: Today's care plan and medications were reviewed with patient in detail and all their questions answered to their satisfaction.     Chief Complaint   Patient presents with   • Cough     Head cold Subjective:  Patient came in complaining of initially sore throat about 10 days ago followed by coughing with scanty expectoration yellowish in color now more so dry persistent intractable coughing with nasal congestion and a postnasal drip        The following portions of the patient's history were reviewed and updated as appropriate: allergies, current medications, past family history, past medical history, past social history, past surgical history and problem list.    Review of Systems   Constitutional:  Negative for activity change, appetite change, chills, diaphoresis, fatigue, fever and unexpected weight change. HENT:  Positive for congestion and rhinorrhea. Negative for dental problem, drooling, ear discharge, ear pain, facial swelling, hearing loss, mouth sores, nosebleeds, postnasal drip, sinus pressure, sneezing, sore throat, tinnitus, trouble swallowing and voice change. Eyes:  Negative for photophobia, pain, discharge, redness, itching and visual disturbance. Respiratory:  Positive for cough. Negative for apnea, choking, chest tightness, shortness of breath, wheezing and stridor. Cardiovascular:  Negative for chest pain, palpitations and leg swelling. Gastrointestinal:  Negative for abdominal distention, abdominal pain, anal bleeding, blood in stool, constipation, diarrhea, nausea, rectal pain and vomiting. Endocrine: Negative for cold intolerance, heat intolerance, polydipsia, polyphagia and polyuria. Genitourinary:  Negative for decreased urine volume, difficulty urinating, dysuria, enuresis, flank pain, frequency, genital sores, hematuria and urgency. Musculoskeletal:  Negative for arthralgias, back pain, gait problem, joint swelling, myalgias, neck pain and neck stiffness. Skin:  Negative for color change, pallor, rash and wound. Allergic/Immunologic: Negative. Negative for environmental allergies, food allergies and immunocompromised state.    Neurological:  Negative for dizziness, tremors, seizures, syncope, facial asymmetry, speech difficulty, weakness, light-headedness, numbness and headaches. Psychiatric/Behavioral:  Negative for agitation, behavioral problems, confusion, decreased concentration, dysphoric mood, hallucinations, self-injury, sleep disturbance and suicidal ideas. The patient is not nervous/anxious and is not hyperactive. Historical Information   Patient Active Problem List   Diagnosis   • Baker cyst, right   • Gastroesophageal reflux disease without esophagitis   • Glucose intolerance (impaired glucose tolerance)   • Restless legs   • Hypercholesteremia   • Mitral regurgitation   • Migraine   • Essential hypertension   • Varicose vein of leg   • Edema of extremities   • Rhinitis, allergic   • Aspirin intolerance   • Persistent lymphocytosis   • Hypokalemia   • Irritable bowel syndrome   • Abnormal echocardiogram   • Vitamin D deficiency   • Diverticulosis   • History of syncope   • Hammer toe of left foot   • Raynaud's disease   • Stage 3b chronic kidney disease (HCC)   • Cervical radiculopathy   • Left arm pain   • Rupture of left distal biceps tendon   • Primary osteoarthritis involving multiple joints   • HL (hearing loss)   • Lumbar radiculopathy   • Abnormal LFTs   • Primary osteoarthritis of right knee   • 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  Noted brain bleed w/swelling   • HL (hearing loss)    • Migraine    • Raynaud's disease     both hands     Past Surgical History:   Procedure Laterality Date   • AXILLARY SURGERY Left     fatty cyst removed, benign   • CATARACT EXTRACTION Bilateral    • CATARACT EXTRACTION W/ INTRAOCULAR LENS IMPLANT Right 11/10/2016    Procedure: EXTRACTION EXTRACAPSULAR CATARACT PHACO INTRAOCULAR LENS (IOL); Surgeon: Tobin Chilel MD;  Location: [facility] MAIN OR;  Service:    • COSMETIC SURGERY      Eye lift   • DILATION AND CURETTAGE OF UTERUS     • MYRINGOTOMY W/ TUBES  2011    both ears-one tube out on the left   • MYRINGOTOMY W/ TUBES     • NH SURGICAL ARTHROSCOPY SHOULDER W/ROTATOR CUFF RPR Right 2017    Procedure: ARTHROSCOPY SHOULDER WITH ROTATOR CUFF REPAIR, BICEPS TENODESIS, AND SUBACROMIAL DECOMPRESSION;  Surgeon: Darcy Rosario MD;  Location: [facility] MAIN OR;  Service: Orthopedics   • NH XCAPSL CTRC RMVL INSJ IO LENS PROSTH W/O ECP Left 10/20/2016    Procedure: EXTRACTION EXTRACAPSULAR CATARACT PHACO INTRAOCULAR LENS (IOL); Surgeon: Tobin Chilel MD;  Location: [facility] MAIN OR;  Service: Ophthalmology   • SKIN BIOPSY      mole on chest   • SKIN BIOPSY      under breast, benign     Social History     Substance and Sexual Activity   Alcohol Use Yes    Comment: socially     Social History     Substance and Sexual Activity   Drug Use No     Social History     Tobacco Use   Smoking Status Former   • Packs/day: 1.50   • Years: 30.00   • Total pack years: 45.00   • Types: Cigarettes   • Quit date:    • Years since quittin.9   Smokeless Tobacco Never     Family History   Problem Relation Age of Onset   • Heart disease Mother         exp age 59 MI   • Stroke Father    • Macular degeneration Sister    • Macular degeneration Brother    • Diabetes Brother    • Cancer Brother         kidney-nephrectomy   • Kidney disease Brother         cancer     Health Maintenance Due   Topic   • Pneumococcal Vaccine: 65+ Years (1 - PCV)   • COVID-19 Vaccine (5 - Pfizer series)   • Influenza Vaccine (1)      Meds/Allergies       Current Outpatient Medications:   •  azithromycin (Zithromax) 250 mg tablet, Take 2 tablets (500 mg total) by mouth daily for 1 day, THEN 1 tablet (250 mg total) daily for 4 days. , Disp: 6 tablet, Rfl: 0  •  guaifenesin-codeine (GUAIFENESIN AC) 100-10 MG/5ML liquid, Take 10 mL by mouth 3 (three) times a day as needed for cough, Disp: 180 mL, Rfl: 0  •  methylPREDNISolone 4 MG tablet therapy pack, Use as directed on package, Disp: 21 each, Rfl: 0  •  acetaminophen (TYLENOL) 500 mg tablet, Take 1,000 mg by mouth every 6 (six) hours as needed for mild pain, Disp: , Rfl:   •  ALPHAGAN P 0.1 %, , Disp: , Rfl: 0  •  ascorbic acid (VITAMIN C) 500 mg tablet, Take 500 mg by mouth every morning, Disp: , Rfl:   •  Azelastine HCl 137 MCG/SPRAY SOLN, instill 1 spray into each nostril twice a day, Disp: 30 mL, Rfl: 6  •  Biotin 5 MG CAPS, Take by mouth every morning, Disp: , Rfl:   •  Cyanocobalamin (VITAMIN B 12 PO), Take by mouth every morning, Disp: , Rfl:   •  furosemide (LASIX) 40 mg tablet, Take 1 tablet (40 mg total) by mouth every morning, Disp: 90 tablet, Rfl: 1  •  ipratropium (ATROVENT) 0.06 % nasal spray, instill 2 sprays into each nostril four times a day, Disp: 15 mL, Rfl: 11  •  levocetirizine (XYZAL) 5 MG tablet, Take 1 tablet (5 mg total) by mouth every evening, Disp: 90 tablet, Rfl: 1  •  Lutein 40 MG CAPS, Take by mouth every morning, Disp: , Rfl:   •  Magnesium 250 MG TABS, Take by mouth every morning, Disp: , Rfl:   •  olmesartan (BENICAR) 40 mg tablet, Take 1 tablet (40 mg total) by mouth daily, Disp: 90 tablet, Rfl: 1  •  omeprazole (PriLOSEC) 20 mg delayed release capsule, Take 1 capsule (20 mg total) by mouth daily, Disp: 90 capsule, Rfl: 1  •  Potassium Chloride ER 20 MEQ TBCR, Take 1 tablet (20 mEq total) by mouth daily, Disp: 90 tablet, Rfl: 1  •  rOPINIRole (REQUIP) 0.5 mg tablet, Take 1 tablet (0.5 mg total) by mouth daily at bedtime, Disp: 90 tablet, Rfl: 1  •  SUMAtriptan (IMITREX) 50 mg tablet, Take 1 tablet (50 mg total) by mouth once as needed for migraine, Disp: 9 tablet, Rfl: 1  •  timolol (TIMOPTIC) 0.5 % ophthalmic solution, instill 1 drop into right eye twice a day, Disp: , Rfl:   •  vitamin A 7500 UNIT capsule, Take 7,500 Units by mouth every morning , Disp: , Rfl:   •  vitamin E, tocopherol, 400 units capsule, Take 400 Units by mouth every morning Last dose 4/26/17, Disp: , Rfl:   •  zinc gluconate 50 mg tablet, Take 50 mg by mouth every morning, Disp: , Rfl:       Objective:    Vitals:   /76   Temp 97.7 °F (36.5 °C)   Ht 5' 1" (1.549 m)   Wt 50.8 kg (112 lb)   BMI 21.16 kg/m²   Body mass index is 21.16 kg/m². Vitals:    12/04/23 1246   Weight: 50.8 kg (112 lb)       Physical Exam  Constitutional:       General: She is not in acute distress. Appearance: She is well-developed. She is not ill-appearing, toxic-appearing or diaphoretic. HENT:      Head: Normocephalic and atraumatic. Right Ear: External ear normal.      Left Ear: External ear normal.      Nose: Congestion present. Mouth/Throat:      Pharynx: No oropharyngeal exudate. Eyes:      General: Lids are normal. Lids are everted, no foreign bodies appreciated. No scleral icterus. Right eye: No discharge. Left eye: No discharge. Conjunctiva/sclera: Conjunctivae normal.      Pupils: Pupils are equal, round, and reactive to light. Neck:      Thyroid: No thyromegaly. Vascular: Normal carotid pulses. No carotid bruit, hepatojugular reflux or JVD. Trachea: No tracheal tenderness or tracheal deviation. Cardiovascular:      Rate and Rhythm: Normal rate and regular rhythm. Pulses: Normal pulses. Heart sounds: Normal heart sounds. No murmur heard. No friction rub. No gallop. Pulmonary:      Effort: Pulmonary effort is normal. No respiratory distress. Breath sounds: No stridor. Rhonchi present. No wheezing or rales. Chest:      Chest wall: No tenderness. Abdominal:      General: Bowel sounds are normal. There is no distension. Palpations: Abdomen is soft. There is no mass. Tenderness: There is no abdominal tenderness. There is no guarding or rebound. Musculoskeletal:         General: No tenderness or deformity.  Normal range of motion. Cervical back: Normal range of motion and neck supple. No edema, erythema or rigidity. No spinous process tenderness or muscular tenderness. Normal range of motion. Lymphadenopathy:      Head:      Right side of head: No submental, submandibular, tonsillar, preauricular or posterior auricular adenopathy. Left side of head: No submental, submandibular, tonsillar, preauricular, posterior auricular or occipital adenopathy. Cervical: No cervical adenopathy. Right cervical: No superficial, deep or posterior cervical adenopathy. Left cervical: No superficial, deep or posterior cervical adenopathy. Upper Body:      Right upper body: No pectoral adenopathy. Left upper body: No pectoral adenopathy. Skin:     General: Skin is warm and dry. Coloration: Skin is not pale. Findings: No erythema or rash. Neurological:      General: No focal deficit present. Mental Status: She is alert and oriented to person, place, and time. Cranial Nerves: No cranial nerve deficit. Sensory: No sensory deficit. Motor: No tremor, abnormal muscle tone or seizure activity. Coordination: Coordination normal.      Gait: Gait normal.      Deep Tendon Reflexes: Reflexes are normal and symmetric. Reflexes normal.   Psychiatric:         Behavior: Behavior normal.         Thought Content:  Thought content normal.         Judgment: Judgment normal.         Lab Review   Appointment on 10/27/2023   Component Date Value Ref Range Status   • Sodium 10/27/2023 142  135 - 147 mmol/L Final   • Potassium 10/27/2023 4.6  3.5 - 5.3 mmol/L Final   • Chloride 10/27/2023 108  96 - 108 mmol/L Final   • CO2 10/27/2023 27  21 - 32 mmol/L Final   • ANION GAP 10/27/2023 7  mmol/L Final   • BUN 10/27/2023 22  5 - 25 mg/dL Final   • Creatinine 10/27/2023 0.77  0.60 - 1.30 mg/dL Final    Standardized to IDMS reference method   • Glucose, Fasting 10/27/2023 108 (H)  65 - 99 mg/dL Final   • Calcium 10/27/2023 9.4  8.4 - 10.2 mg/dL Final   • AST 10/27/2023 16  13 - 39 U/L Final   • ALT 10/27/2023 15  7 - 52 U/L Final    Specimen collection should occur prior to Sulfasalazine administration due to the potential for falsely depressed results. • Alkaline Phosphatase 10/27/2023 59  34 - 104 U/L Final   • Total Protein 10/27/2023 6.1 (L)  6.4 - 8.4 g/dL Final   • Albumin 10/27/2023 3.7  3.5 - 5.0 g/dL Final   • Total Bilirubin 10/27/2023 1.10 (H)  0.20 - 1.00 mg/dL Final    Use of this assay is not recommended for patients undergoing treatment with eltrombopag due to the potential for falsely elevated results. N-acetyl-p-benzoquinone imine (metabolite of Acetaminophen) will generate erroneously low results in samples for patients that have taken an overdose of Acetaminophen. • eGFR 10/27/2023 70  ml/min/1.73sq m Final         Patient Instructions   Acute Bronchitis   WHAT YOU NEED TO KNOW:   Acute bronchitis is swelling and irritation in your lungs. It is usually caused by a virus and most often happens in the winter. Bronchitis may also be caused by bacteria or by a chemical irritant, such as smoke. DISCHARGE INSTRUCTIONS:   Return to the emergency department if:   You cough up blood. Your lips or fingernails turn blue. You feel like you are not getting enough air when you breathe. Call your doctor if:   Your symptoms do not go away or get worse, even after treatment. Your cough does not get better within 4 weeks. You have questions or concerns about your condition or care. Medicines: You may need any of the following:  Cough suppressants  decrease your urge to cough. Decongestants  help loosen mucus in your lungs and make it easier to cough up. This can help you breathe easier. Inhalers  may be given. Your healthcare provider may give you one or more inhalers to help you breathe easier and cough less. An inhaler gives you medicine to open your airways.  Ask your healthcare provider to show you how to use your inhaler correctly. Antiviral medicine  treats infections caused by a virus. Antibiotics  may be given if your bronchitis is caused by bacteria or if you have lung condition. Acetaminophen  decreases pain and fever. It is available without a doctor's order. Ask how much to take and how often to take it. Follow directions. Read the labels of all other medicines you are using to see if they also contain acetaminophen, or ask your doctor or pharmacist. Acetaminophen can cause liver damage if not taken correctly. NSAIDs  help decrease swelling and pain or fever. This medicine is available with or without a doctor's order. NSAIDs can cause stomach bleeding or kidney problems in certain people. If you take blood thinner medicine, always ask your healthcare provider if NSAIDs are safe for you. Always read the medicine label and follow directions. Self-care:   Drink liquids as directed. You may need to drink more liquids than usual to stay hydrated. Ask how much liquid to drink each day and which liquids are best for you. Use a cool mist humidifier. This increases air moisture in your home. This may make it easier for you to breathe and help decrease your cough. Get more rest.  Rest helps your body to heal. Slowly start to do more each day. Rest when you feel it is needed. Prevent acute bronchitis:       Ask about vaccines you may need. Get a flu vaccine each year as soon as recommended, usually in September or October. Ask your healthcare provider if you should also get a pneumonia or COVID-19 vaccine. Your healthcare provider can tell you if you should also get other vaccines, and when to get them. Prevent the spread of germs. You can decrease your risk for acute bronchitis and other illnesses by doing the following:     Wash your hands often with soap and water. Carry germ-killing hand lotion or gel with you.  You can use the lotion or gel to clean your hands when soap and water are not available. Do not touch your eyes, nose, or mouth unless you have washed your hands first.    Always cover your mouth when you cough to prevent the spread of germs. It is best to cough into a tissue or your shirt sleeve instead of into your hand. Ask those around you to cover their mouths when they cough. Try to avoid people who have a cold or the flu. If you are sick, stay away from others as much as possible. Avoid irritants in the air. Avoid chemicals, fumes, and dust. Wear a face mask if you must work around dust or fumes. Stay inside on days when air pollution levels are high. If you have allergies, stay inside when pollen counts are high. Do not use aerosol products, such as spray-on deodorant, bug spray, and hair spray. Do not smoke or be around others who are smoking. Nicotine and other chemicals in cigarettes and cigars can cause lung damage. Ask your healthcare provider for information if you currently smoke and need help to quit. E-cigarettes or smokeless tobacco still contain nicotine. Talk to your healthcare provider before you use these products. Follow up with your doctor as directed:  Write down questions you have so you will remember to ask them during your follow-up visits. © Copyright [employer] 2023 Information is for End User's use only and may not be sold, redistributed or otherwise used for commercial purposes. The above information is an  only. It is not intended as medical advice for individual conditions or treatments. Talk to your doctor, nurse or pharmacist before following any medical regimen to see if it is safe and effective for you. Samson Kat MD        "This note has been constructed using a voice recognition system. Therefore there may be syntax, spelling, and/or grammatical errors.  Please call if you have any questions. "

## 2023-12-04 NOTE — PATIENT INSTRUCTIONS
Acute Bronchitis   WHAT YOU NEED TO KNOW:   Acute bronchitis is swelling and irritation in your lungs. It is usually caused by a virus and most often happens in the winter. Bronchitis may also be caused by bacteria or by a chemical irritant, such as smoke. DISCHARGE INSTRUCTIONS:   Return to the emergency department if:   You cough up blood. Your lips or fingernails turn blue. You feel like you are not getting enough air when you breathe. Call your doctor if:   Your symptoms do not go away or get worse, even after treatment. Your cough does not get better within 4 weeks. You have questions or concerns about your condition or care. Medicines: You may need any of the following:  Cough suppressants  decrease your urge to cough. Decongestants  help loosen mucus in your lungs and make it easier to cough up. This can help you breathe easier. Inhalers  may be given. Your healthcare provider may give you one or more inhalers to help you breathe easier and cough less. An inhaler gives you medicine to open your airways. Ask your healthcare provider to show you how to use your inhaler correctly. Antiviral medicine  treats infections caused by a virus. Antibiotics  may be given if your bronchitis is caused by bacteria or if you have lung condition. Acetaminophen  decreases pain and fever. It is available without a doctor's order. Ask how much to take and how often to take it. Follow directions. Read the labels of all other medicines you are using to see if they also contain acetaminophen, or ask your doctor or pharmacist. Acetaminophen can cause liver damage if not taken correctly. NSAIDs  help decrease swelling and pain or fever. This medicine is available with or without a doctor's order. NSAIDs can cause stomach bleeding or kidney problems in certain people. If you take blood thinner medicine, always ask your healthcare provider if NSAIDs are safe for you.  Always read the medicine label and follow directions. Self-care:   Drink liquids as directed. You may need to drink more liquids than usual to stay hydrated. Ask how much liquid to drink each day and which liquids are best for you. Use a cool mist humidifier. This increases air moisture in your home. This may make it easier for you to breathe and help decrease your cough. Get more rest.  Rest helps your body to heal. Slowly start to do more each day. Rest when you feel it is needed. Prevent acute bronchitis:       Ask about vaccines you may need. Get a flu vaccine each year as soon as recommended, usually in September or October. Ask your healthcare provider if you should also get a pneumonia or COVID-19 vaccine. Your healthcare provider can tell you if you should also get other vaccines, and when to get them. Prevent the spread of germs. You can decrease your risk for acute bronchitis and other illnesses by doing the following:     Wash your hands often with soap and water. Carry germ-killing hand lotion or gel with you. You can use the lotion or gel to clean your hands when soap and water are not available. Do not touch your eyes, nose, or mouth unless you have washed your hands first.    Always cover your mouth when you cough to prevent the spread of germs. It is best to cough into a tissue or your shirt sleeve instead of into your hand. Ask those around you to cover their mouths when they cough. Try to avoid people who have a cold or the flu. If you are sick, stay away from others as much as possible. Avoid irritants in the air. Avoid chemicals, fumes, and dust. Wear a face mask if you must work around dust or fumes. Stay inside on days when air pollution levels are high. If you have allergies, stay inside when pollen counts are high. Do not use aerosol products, such as spray-on deodorant, bug spray, and hair spray. Do not smoke or be around others who are smoking.   Nicotine and other chemicals in cigarettes and cigars can cause lung damage. Ask your healthcare provider for information if you currently smoke and need help to quit. E-cigarettes or smokeless tobacco still contain nicotine. Talk to your healthcare provider before you use these products. Follow up with your doctor as directed:  Write down questions you have so you will remember to ask them during your follow-up visits. © Copyright [employer] 2023 Information is for End User's use only and may not be sold, redistributed or otherwise used for commercial purposes. The above information is an  only. It is not intended as medical advice for individual conditions or treatments. Talk to your doctor, nurse or pharmacist before following any medical regimen to see if it is safe and effective for you.

## 2024-01-03 NOTE — PROGRESS NOTES
Consultation - Cardiology Office  [facility].    Belinda Dolan 83 y.o. female MRN: [id number]  : 1940  Unit/Bed#:  Encounter: [id number]      ASSESSMENT:  Valvular heart disease:    TTE, 2019  EF 65%, mild LVH, G2 DD  Mild to moderate LAE  Mild MR, mild to moderate AR and TR, mild FL, RSVP 45 mmHg    Mixed hyperlipidemia  10/27/2023: , , HDL 59, normal AST and ALT    Essential hypertension  BP is well-controlled    Lower extremity edema  History of varicose veins    CKD    Raynaud's disease without gangrene    Arthritis of knees    RECOMMENDATIONS:  Echocardiogram  Encouraged ambulation and exercise        Thank you for your consultation.  If you have any question please call me at [phone]      Primary Care Physician Requesting Consult: Moe Elena MD      Reason for Consult / Principal Problem: Valvular heart disease        HPI :     Belinda Dolan is a 83 y.o. year old female who was referred by primary care doctor for assessment of valvular heart disease, regurgitation  Patient denies any chest pain, dyspnea or syncope.  Previous echocardiogram in 2019 showed mild to moderate aortic and tricuspid regurgitation and mild mitral and pulmonic regurgitation with mild pulmonary hypertension.  Till 5 years ago patient used to be a runner which she cannot continue doing because of arthritis of her knees      Review of Systems   Musculoskeletal:  Positive for arthralgias.   Neurological:  Positive for headaches.   All other systems reviewed and are negative.      Historical Information   Past Medical History:   Diagnosis Date    Baker's cyst of knee 10/21/2016    right- burst on its own    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HCC) 1992    hx of-fell when ice skating. Noted brain bleed w/swelling    HL (hearing loss)     Migraine     Raynaud's disease     both hands     Past Surgical History:   Procedure Laterality Date    AXILLARY SURGERY Left     fatty cyst removed, benign     CATARACT EXTRACTION Bilateral     CATARACT EXTRACTION W/ INTRAOCULAR LENS IMPLANT Right 11/10/2016    Procedure: EXTRACTION EXTRACAPSULAR CATARACT PHACO INTRAOCULAR LENS (IOL);  Surgeon: Citlaly Villavicencio MD;  Location: [facility] MAIN OR;  Service:     COSMETIC SURGERY      Eye lift    DILATION AND CURETTAGE OF UTERUS      MYRINGOTOMY W/ TUBES  2011    both ears-one tube out on the left    MYRINGOTOMY W/ TUBES      IL SURGICAL ARTHROSCOPY SHOULDER W/ROTATOR CUFF RPR Right 2017    Procedure: ARTHROSCOPY SHOULDER WITH ROTATOR CUFF REPAIR, BICEPS TENODESIS, AND SUBACROMIAL DECOMPRESSION;  Surgeon: Cedrick Nielson MD;  Location: [facility] MAIN OR;  Service: Orthopedics    IL XCAPSL CTRC RMVL INSJ IO LENS PROSTH W/O ECP Left 10/20/2016    Procedure: EXTRACTION EXTRACAPSULAR CATARACT PHACO INTRAOCULAR LENS (IOL);  Surgeon: Citlaly Villavicencio MD;  Location: [facility] MAIN OR;  Service: Ophthalmology    SKIN BIOPSY      mole on chest    SKIN BIOPSY      under breast, benign     Social History     Substance and Sexual Activity   Alcohol Use Yes    Comment: socially     Social History     Substance and Sexual Activity   Drug Use No     Social History     Tobacco Use   Smoking Status Former    Current packs/day: 0.00    Average packs/day: 1.5 packs/day for 30.0 years (45.0 ttl pk-yrs)    Types: Cigarettes    Start date:     Quit date:     Years since quittin.0   Smokeless Tobacco Never     Family History:   Family History   Problem Relation Age of Onset    Heart disease Mother         exp age 64 MI    Stroke Father     Macular degeneration Sister     Macular degeneration Brother     Diabetes Brother     Cancer Brother         kidney-nephrectomy    Kidney disease Brother         cancer       Meds/Allergies     Allergies   Allergen Reactions    Lactose - Food Allergy GI Intolerance    Latex Itching     Elastic,adhesive tape    Dilantin [Phenytoin Sodium Extended] Rash    Other Rash     Adhesive tape, environmental     Penicillins Rash       Current Outpatient Medications:     acetaminophen (TYLENOL) 500 mg tablet, Take 1,000 mg by mouth every 6 (six) hours as needed for mild pain, Disp: , Rfl:     ALPHAGAN P 0.1 %, , Disp: , Rfl: 0    ascorbic acid (VITAMIN C) 500 mg tablet, Take 500 mg by mouth every morning, Disp: , Rfl:     Azelastine HCl 137 MCG/SPRAY SOLN, instill 1 spray into each nostril twice a day, Disp: 30 mL, Rfl: 6    Biotin 5 MG CAPS, Take by mouth every morning, Disp: , Rfl:     Cyanocobalamin (VITAMIN B 12 PO), Take by mouth every morning, Disp: , Rfl:     furosemide (LASIX) 40 mg tablet, Take 1 tablet (40 mg total) by mouth every morning, Disp: 90 tablet, Rfl: 1    ipratropium (ATROVENT) 0.06 % nasal spray, instill 2 sprays into each nostril four times a day, Disp: 15 mL, Rfl: 11    levocetirizine (XYZAL) 5 MG tablet, Take 1 tablet (5 mg total) by mouth every evening, Disp: 90 tablet, Rfl: 1    Lutein 40 MG CAPS, Take by mouth every morning, Disp: , Rfl:     Magnesium 250 MG TABS, Take by mouth every morning, Disp: , Rfl:     olmesartan (BENICAR) 40 mg tablet, Take 1 tablet (40 mg total) by mouth daily, Disp: 90 tablet, Rfl: 1    omeprazole (PriLOSEC) 20 mg delayed release capsule, Take 1 capsule (20 mg total) by mouth daily, Disp: 90 capsule, Rfl: 1    Potassium Chloride ER 20 MEQ TBCR, Take 1 tablet (20 mEq total) by mouth daily, Disp: 90 tablet, Rfl: 1    rOPINIRole (REQUIP) 0.5 mg tablet, Take 1 tablet (0.5 mg total) by mouth daily at bedtime, Disp: 90 tablet, Rfl: 1    SUMAtriptan (IMITREX) 50 mg tablet, Take 1 tablet (50 mg total) by mouth once as needed for migraine, Disp: 9 tablet, Rfl: 1    timolol (TIMOPTIC) 0.5 % ophthalmic solution, instill 1 drop into right eye twice a day, Disp: , Rfl:     vitamin A 7500 UNIT capsule, Take 7,500 Units by mouth every morning  , Disp: , Rfl:     vitamin E, tocopherol, 400 units capsule, Take 400 Units by mouth every morning Last dose 4/26/17, Disp: , Rfl:     zinc  "gluconate 50 mg tablet, Take 50 mg by mouth every morning, Disp: , Rfl:     guaifenesin-codeine (GUAIFENESIN AC) 100-10 MG/5ML liquid, Take 10 mL by mouth 3 (three) times a day as needed for cough, Disp: 180 mL, Rfl: 0    methylPREDNISolone 4 MG tablet therapy pack, Use as directed on package, Disp: 21 each, Rfl: 0    Vitals: Blood pressure 112/60, pulse 68, height 5' 1\" (1.549 m), weight 50.8 kg (112 lb).    Body mass index is 21.16 kg/m².  Vitals:    24 0931   Weight: 50.8 kg (112 lb)     BP Readings from Last 3 Encounters:   24 112/60   23 130/76   10/30/23 118/74       Physical Exam  PHYSICAL EXAMINATION:  Neurologic:  Alert & oriented x 3, no new focal deficits, Not in any acute distress,  Constitutional:  Well developed, well nourished, non-toxic appearance   Eyes:  Pupil equal and reacting to light, conjunctiva normal, No JVP, No LNP   HENT:  Atraumatic, oropharynx moist, Neck- normal range of motion, no tenderness,  Neck supple   Respiratory:  Bilateral air entry, mostly clear to auscultation  Cardiovascular: S1-S2 regular with a I/VI systolic murmur   GI:  Soft, nondistended, normal bowel sounds, nontender, no hepatosplenomegaly appreciated.  Musculoskeletal: Tenderness of knees  Skin:  Well hydrated, no rash   Lymphatic:  No lymphadenopathy noted   Extremities:  No edema and distal pulses are present    Diagnostic Studies Review Cardio:      EKG: Normal sinus rhythm, heart rate 68/min    Cardiac testing:   Results for orders placed during the hospital encounter of 19    Echo complete with contrast if indicated    Narrative  [address] [id number] [phone]    Transthoracic Echocardiogram  2D, M-mode, Doppler, and Color Doppler    Study date:  25-Mar-2019    Patient: CLAUDETTE BRADLEY  MR number: [id number]  Account number: [id number]  : 1940  Age: 78 years  Gender: Female  Status: Outpatient  Location: Echo " lab  Height: 60 in  Weight: 109.8 lb  BP: 180/ 78 mmHg    Indications: SOB    Diagnoses: R06.02 - Shortness of breath    Sonographer:  FELIPE Gutierrez  Primary Physician:  Moe Elena MD  Referring Physician:  Moe Elena MD  Group:  [facility]  Interpreting Physician:  Nara Collins MD    SUMMARY    LEFT VENTRICLE:  Systolic function was normal. Ejection fraction was estimated in the range of 60 % to 65 % to be 65 %.  There were no regional wall motion abnormalities.  Wall thickness was at the upper limits of normal.  There was mild concentric hypertrophy.  Features were consistent with a pseudonormal left ventricular filling pattern, with concomitant abnormal relaxation and increased filling pressure (grade 2 diastolic dysfunction).    LEFT ATRIUM:  The atrium was mildly to moderately dilated.    MITRAL VALVE:  There was mild regurgitation.    AORTIC VALVE:  There was at max mild to moderate regurgitation.    TRICUSPID VALVE:  There was mild to moderate regurgitation.  Estimated peak PA pressure was 45 mmHg.    PULMONIC VALVE:  There was mild regurgitation.    HISTORY: PRIOR HISTORY: Brain injury, Arthritis, shingles, HTN, Raynaud's Disease, Former smoker.    PROCEDURE: The procedure was performed in the echo lab. This was a routine study. The transthoracic approach was used. The study included complete 2D imaging, M-mode, complete spectral Doppler, and color Doppler. The heart rate was 52 bpm,  at the start of the study. Images were obtained from the parasternal, apical, subcostal, and suprasternal notch acoustic windows. Image quality was adequate.    LEFT VENTRICLE: Size was normal. Systolic function was normal. Ejection fraction was estimated in the range of 60 % to 65 % to be 65 %. There were no regional wall motion abnormalities. Wall thickness was at the upper limits of normal.  There was mild concentric hypertrophy. DOPPLER: Features were consistent with a pseudonormal  left ventricular filling pattern, with concomitant abnormal relaxation and increased filling pressure (grade 2 diastolic dysfunction).    RIGHT VENTRICLE: The size was normal. Systolic function was normal.    LEFT ATRIUM: The atrium was mildly to moderately dilated.    RIGHT ATRIUM: Size was normal.    MITRAL VALVE: There was normal leaflet separation. DOPPLER: The transmitral velocity was within the normal range. There was no evidence for stenosis. There was mild regurgitation.    AORTIC VALVE: The valve was trileaflet. Leaflets exhibited mildly increased thickness and normal cuspal separation. DOPPLER: Transaortic velocity was within the normal range. There was no evidence for stenosis. There was at max mild to  moderate regurgitation.    TRICUSPID VALVE: DOPPLER: There was mild to moderate regurgitation. Estimated peak PA pressure was 45 mmHg.    PULMONIC VALVE: DOPPLER: There was mild regurgitation.    PERICARDIUM: There was no thickening or calcification. There was no pericardial effusion.    AORTA: The root exhibited normal size.    SYSTEMIC VEINS: IVC: The inferior vena cava was normal in size. Respirophasic changes were normal.    SYSTEM MEASUREMENT TABLES    2D mode  AoR Diam 2D: 3.4 cm  LA Diam (2D): 3.4 cm  LA/Ao (2D): 1  FS (2D Teich): 32.1 %  IVSd (2D): 1.14 cm  LVDEV: 60.8 cm³  LVESV: 23.7 cm³  LVIDd(2D): 3.77 cm  LVISd (2D): 2.56 cm  LVOT Area 2D: 3.14 cm squared  LVPWd (2D): 1.13 cm  SV (Teich): 37.1 cm³    Apical four chamber  LVEF A4C: 54 %    Unspecified Scan Mode  MELISSA Cont Eq (Peak Rosalino): 2.44 cm squared  Dec. [place]; Regurgitant Flow: 2120 mm/s2  Max P mm[Hg]  V Max: 3870 mm/s  Vmax: 3660 mm/s  LVOT (VTI): 21.4 cm  LVOT Diam.: 2 cm  LVOT Vmax: 925 mm/s  LVOT Vmax; Mean: 925 mm/s  Peak Grad.; Mean: 3 mm[Hg]  SV (LVOT): 67 cm³  VTI;Mean: 2 mm[Hg]  MV Peak A Rosalino: 740 mm/s  MV Peak E Rosalino. Mean: 913 mm/s  MVA (PHT): 5 cm squared  PHT: 44 ms  Max P mm[Hg]  V Max: 3190 mm/s  Vmax: 2760 mm/s  RA  "Area: 16.5 cm squared  RA Volume: 37.7 cm³  TAPSE: 1.6 cm    [employer] Accredited Echocardiography Laboratory    Prepared and electronically signed by    Nara Collins MD  Signed 26-Mar-2019 08:26:34          Imaging:  Chest X-Ray:   No Chest XR results available for this patient.    CT-scan of the chest:     No CTA results available for this patient.  Lab Review   Lab Results   Component Value Date    WBC 6.64 08/04/2023    HGB 12.6 08/04/2023    HCT 37.6 08/04/2023    MCV 97 08/04/2023    RDW 13.3 08/04/2023     08/04/2023     BMP:  Lab Results   Component Value Date    SODIUM 142 10/27/2023    K 4.6 10/27/2023     10/27/2023    CO2 27 10/27/2023    ANIONGAP 12.2 11/02/2015    BUN 22 10/27/2023    CREATININE 0.77 10/27/2023    GLUC 110 12/09/2022    GLUF 108 (H) 10/27/2023    CALCIUM 9.4 10/27/2023    CORRECTEDCA 10.3 (H) 10/12/2021    EGFR 70 10/27/2023     LFT:  Lab Results   Component Value Date    AST 16 10/27/2023    ALT 15 10/27/2023    ALKPHOS 59 10/27/2023    TP 6.1 (L) 10/27/2023    ALB 3.7 10/27/2023      Lab Results   Component Value Date    XKP2YWKIVLHD 0.509 10/22/2016     No components found for: \"TSH3\"  No results found for: \"HGBA1C\"  Lipid Profile:   Lab Results   Component Value Date    CHOLESTEROL 212 (H) 07/11/2022    HDL 59 07/11/2022    LDLCALC 119 (H) 07/11/2022    TRIG 170 (H) 07/11/2022     Lab Results   Component Value Date    CHOLESTEROL 212 (H) 07/11/2022    CHOLESTEROL 216 (H) 10/12/2021     No results found for: \"CKTOTAL\", \"CKMB\", \"CKMBINDEX\", \"TROPONINI\"  No results found for: \"NTBNP\"   No results found for this or any previous visit (from the past 672 hour(s)).        Dr. Andre Clay MD, [facility]      \"This note has been constructed using a voice recognition system.Therefore there may be syntax, spelling, and/or grammatical errors. Please call if you have any questions. \"  "

## 2024-01-04 ENCOUNTER — CONSULT (OUTPATIENT)
Dept: CARDIOLOGY CLINIC | Facility: CLINIC | Age: 84
End: 2024-01-04
Payer: MEDICARE

## 2024-01-04 VITALS
BODY MASS INDEX: 21.14 KG/M2 | HEIGHT: 61 IN | SYSTOLIC BLOOD PRESSURE: 112 MMHG | HEART RATE: 68 BPM | DIASTOLIC BLOOD PRESSURE: 60 MMHG | WEIGHT: 112 LBS

## 2024-01-04 DIAGNOSIS — N18.32 STAGE 3B CHRONIC KIDNEY DISEASE (HCC): ICD-10-CM

## 2024-01-04 DIAGNOSIS — I35.1 NONRHEUMATIC AORTIC VALVE INSUFFICIENCY: ICD-10-CM

## 2024-01-04 DIAGNOSIS — I10 ESSENTIAL HYPERTENSION: ICD-10-CM

## 2024-01-04 DIAGNOSIS — E78.00 HYPERCHOLESTEREMIA: ICD-10-CM

## 2024-01-04 DIAGNOSIS — R60.0 EDEMA OF EXTREMITIES: ICD-10-CM

## 2024-01-04 DIAGNOSIS — R93.1 ABNORMAL ECHOCARDIOGRAM: Primary | ICD-10-CM

## 2024-01-04 PROCEDURE — 99213 OFFICE O/P EST LOW 20 MIN: CPT | Performed by: INTERNAL MEDICINE

## 2024-01-04 PROCEDURE — 93000 ELECTROCARDIOGRAM COMPLETE: CPT | Performed by: INTERNAL MEDICINE

## 2024-01-17 ENCOUNTER — TELEPHONE (OUTPATIENT)
Dept: CARDIOLOGY CLINIC | Facility: CLINIC | Age: 84
End: 2024-01-17

## 2024-01-17 ENCOUNTER — HOSPITAL ENCOUNTER (OUTPATIENT)
Dept: NON INVASIVE DIAGNOSTICS | Facility: HOSPITAL | Age: 84
Discharge: HOME | End: 2024-01-17
Attending: INTERNAL MEDICINE
Payer: MEDICARE

## 2024-01-17 VITALS
BODY MASS INDEX: 21.14 KG/M2 | SYSTOLIC BLOOD PRESSURE: 176 MMHG | HEART RATE: 66 BPM | HEIGHT: 61 IN | DIASTOLIC BLOOD PRESSURE: 74 MMHG | WEIGHT: 112 LBS

## 2024-01-17 DIAGNOSIS — I35.1 NONRHEUMATIC AORTIC VALVE INSUFFICIENCY: ICD-10-CM

## 2024-01-17 LAB
AORTIC ROOT: 3 CM
APICAL FOUR CHAMBER EJECTION FRACTION: 66 %
AV LVOT MEAN GRADIENT: 2 MMHG
AV LVOT PEAK GRADIENT: 4 MMHG
AV REGURGITATION PRESSURE HALF TIME: 746 MS
BSA FOR ECHO PROCEDURE: 1.48 M2
DOP CALC LVOT PEAK VEL VTI: 25.55 CM
DOP CALC LVOT PEAK VEL: 1.05 M/S
E WAVE DECELERATION TIME: 233 MS
E/A RATIO: 0.7
FRACTIONAL SHORTENING: 44 (ref 28–44)
INTERVENTRICULAR SEPTUM IN DIASTOLE (PARASTERNAL SHORT AXIS VIEW): 1.1 CM
INTERVENTRICULAR SEPTUM: 1.1 CM (ref 0.6–1.1)
LAAS-AP2: 19.6 CM2
LAAS-AP4: 13.9 CM2
LEFT ATRIUM SIZE: 3.5 CM
LEFT ATRIUM VOLUME (MOD BIPLANE): 45 ML
LEFT ATRIUM VOLUME INDEX (MOD BIPLANE): 30.4 ML/M2
LEFT INTERNAL DIMENSION IN SYSTOLE: 2 CM (ref 2.1–4)
LEFT VENTRICULAR INTERNAL DIMENSION IN DIASTOLE: 3.6 CM (ref 3.5–6)
LEFT VENTRICULAR POSTERIOR WALL IN END DIASTOLE: 1 CM
LEFT VENTRICULAR STROKE VOLUME: 41 ML
LVSV (TEICH): 41 ML
MV E'TISSUE VEL-LAT: 7 CM/S
MV E'TISSUE VEL-SEP: 6 CM/S
MV PEAK A VEL: 0.86 M/S
MV PEAK E VEL: 60 CM/S
RIGHT ATRIUM AREA SYSTOLE A4C: 11.3 CM2
RIGHT VENTRICLE ID DIMENSION: 3 CM
SL CV AV DECELERATION TIME RETROGRADE: 2571 MS
SL CV AV PEAK GRADIENT RETROGRADE: 43 MMHG
SL CV LEFT ATRIUM LENGTH A2C: 5.1 CM
SL CV LV EF: 65
SL CV PED ECHO LEFT VENTRICLE DIASTOLIC VOLUME (MOD BIPLANE) 2D: 53 ML
SL CV PED ECHO LEFT VENTRICLE SYSTOLIC VOLUME (MOD BIPLANE) 2D: 12 ML
TR MAX PG: 31 MMHG
TR PEAK VELOCITY: 2.8 M/S
TRICUSPID ANNULAR PLANE SYSTOLIC EXCURSION: 1.8 CM
TRICUSPID VALVE PEAK REGURGITATION VELOCITY: 2.76 M/S

## 2024-01-17 PROCEDURE — 93306 TTE W/DOPPLER COMPLETE: CPT

## 2024-01-17 PROCEDURE — 93306 TTE W/DOPPLER COMPLETE: CPT | Performed by: INTERNAL MEDICINE

## 2024-01-17 NOTE — TELEPHONE ENCOUNTER
----- Message from Andre Clay MD sent at 1/17/2024  4:17 PM EST -----  Please call and inform the patient that the Echocardiogram showed normal pumping function of the heart.    No significant change in leakiness of the valves.  In fact it appears less leaky than before.  Will discuss further at next scheduled office visit

## 2024-01-30 ENCOUNTER — APPOINTMENT (OUTPATIENT)
Dept: LAB | Facility: CLINIC | Age: 84
End: 2024-01-30
Payer: MEDICARE

## 2024-01-30 DIAGNOSIS — E87.6 HYPOPOTASSEMIA: ICD-10-CM

## 2024-01-30 DIAGNOSIS — I10 ESSENTIAL HYPERTENSION: ICD-10-CM

## 2024-01-30 DIAGNOSIS — R60.0 EDEMA OF EXTREMITIES: ICD-10-CM

## 2024-01-30 DIAGNOSIS — E78.00 HYPERCHOLESTEREMIA: ICD-10-CM

## 2024-01-30 DIAGNOSIS — N18.32 STAGE 3B CHRONIC KIDNEY DISEASE (HCC): ICD-10-CM

## 2024-01-30 DIAGNOSIS — G25.81 RESTLESS LEGS: ICD-10-CM

## 2024-01-30 DIAGNOSIS — E21.3 HYPERPARATHYROIDISM (HCC): ICD-10-CM

## 2024-01-30 LAB
ALBUMIN SERPL BCP-MCNC: 3.9 G/DL (ref 3.5–5)
ALP SERPL-CCNC: 69 U/L (ref 34–104)
ALT SERPL W P-5'-P-CCNC: 12 U/L (ref 7–52)
ANION GAP SERPL CALCULATED.3IONS-SCNC: 7 MMOL/L
AST SERPL W P-5'-P-CCNC: 15 U/L (ref 13–39)
BASOPHILS # BLD AUTO: 0.06 THOUSANDS/ÂΜL (ref 0–0.1)
BASOPHILS NFR BLD AUTO: 1 % (ref 0–1)
BILIRUB SERPL-MCNC: 0.85 MG/DL (ref 0.2–1)
BUN SERPL-MCNC: 22 MG/DL (ref 5–25)
CALCIUM SERPL-MCNC: 9.7 MG/DL (ref 8.4–10.2)
CHLORIDE SERPL-SCNC: 105 MMOL/L (ref 96–108)
CHOLEST SERPL-MCNC: 216 MG/DL
CO2 SERPL-SCNC: 27 MMOL/L (ref 21–32)
CREAT SERPL-MCNC: 0.97 MG/DL (ref 0.6–1.3)
EOSINOPHIL # BLD AUTO: 0.15 THOUSAND/ÂΜL (ref 0–0.61)
EOSINOPHIL NFR BLD AUTO: 2 % (ref 0–6)
ERYTHROCYTE [DISTWIDTH] IN BLOOD BY AUTOMATED COUNT: 12.7 % (ref 11.6–15.1)
GFR SERPL CREATININE-BSD FRML MDRD: 53 ML/MIN/1.73SQ M
GLUCOSE P FAST SERPL-MCNC: 98 MG/DL (ref 65–99)
HCT VFR BLD AUTO: 40.2 % (ref 34.8–46.1)
HDLC SERPL-MCNC: 51 MG/DL
HGB BLD-MCNC: 13.4 G/DL (ref 11.5–15.4)
IMM GRANULOCYTES # BLD AUTO: 0.02 THOUSAND/UL (ref 0–0.2)
IMM GRANULOCYTES NFR BLD AUTO: 0 % (ref 0–2)
LDLC SERPL CALC-MCNC: 146 MG/DL (ref 0–100)
LYMPHOCYTES # BLD AUTO: 2.95 THOUSANDS/ÂΜL (ref 0.6–4.47)
LYMPHOCYTES NFR BLD AUTO: 43 % (ref 14–44)
MCH RBC QN AUTO: 31.6 PG (ref 26.8–34.3)
MCHC RBC AUTO-ENTMCNC: 33.3 G/DL (ref 31.4–37.4)
MCV RBC AUTO: 95 FL (ref 82–98)
MONOCYTES # BLD AUTO: 0.54 THOUSAND/ÂΜL (ref 0.17–1.22)
MONOCYTES NFR BLD AUTO: 8 % (ref 4–12)
NEUTROPHILS # BLD AUTO: 3.1 THOUSANDS/ÂΜL (ref 1.85–7.62)
NEUTS SEG NFR BLD AUTO: 46 % (ref 43–75)
NONHDLC SERPL-MCNC: 165 MG/DL
NRBC BLD AUTO-RTO: 0 /100 WBCS
PLATELET # BLD AUTO: 200 THOUSANDS/UL (ref 149–390)
PMV BLD AUTO: 11.7 FL (ref 8.9–12.7)
POTASSIUM SERPL-SCNC: 4.3 MMOL/L (ref 3.5–5.3)
PROT SERPL-MCNC: 6.5 G/DL (ref 6.4–8.4)
PTH-INTACT SERPL-MCNC: 116.7 PG/ML (ref 12–88)
RBC # BLD AUTO: 4.24 MILLION/UL (ref 3.81–5.12)
SODIUM SERPL-SCNC: 139 MMOL/L (ref 135–147)
TRIGL SERPL-MCNC: 93 MG/DL
WBC # BLD AUTO: 6.82 THOUSAND/UL (ref 4.31–10.16)

## 2024-01-30 PROCEDURE — 80053 COMPREHEN METABOLIC PANEL: CPT

## 2024-01-30 PROCEDURE — 80061 LIPID PANEL: CPT

## 2024-01-30 PROCEDURE — 83970 ASSAY OF PARATHORMONE: CPT

## 2024-01-30 PROCEDURE — 85025 COMPLETE CBC W/AUTO DIFF WBC: CPT

## 2024-01-30 PROCEDURE — 82306 VITAMIN D 25 HYDROXY: CPT

## 2024-01-30 PROCEDURE — 36415 COLL VENOUS BLD VENIPUNCTURE: CPT

## 2024-02-02 PROBLEM — J20.9 ACUTE INFECTIVE TRACHEOBRONCHITIS: Status: RESOLVED | Noted: 2023-12-04 | Resolved: 2024-02-02

## 2024-02-05 ENCOUNTER — OFFICE VISIT (OUTPATIENT)
Dept: INTERNAL MEDICINE CLINIC | Facility: CLINIC | Age: 84
End: 2024-02-05
Payer: MEDICARE

## 2024-02-05 VITALS
BODY MASS INDEX: 20.77 KG/M2 | TEMPERATURE: 98 F | DIASTOLIC BLOOD PRESSURE: 78 MMHG | SYSTOLIC BLOOD PRESSURE: 120 MMHG | OXYGEN SATURATION: 96 % | HEIGHT: 61 IN | WEIGHT: 110 LBS | HEART RATE: 78 BPM

## 2024-02-05 DIAGNOSIS — N18.32 STAGE 3B CHRONIC KIDNEY DISEASE (HCC): Primary | ICD-10-CM

## 2024-02-05 DIAGNOSIS — G25.81 RESTLESS LEG SYNDROME: ICD-10-CM

## 2024-02-05 DIAGNOSIS — K21.9 GASTROESOPHAGEAL REFLUX DISEASE WITHOUT ESOPHAGITIS: ICD-10-CM

## 2024-02-05 DIAGNOSIS — E78.2 MIXED HYPERLIPIDEMIA: ICD-10-CM

## 2024-02-05 DIAGNOSIS — R79.89 ABNORMAL LFTS: ICD-10-CM

## 2024-02-05 DIAGNOSIS — E21.3 HYPERPARATHYROIDISM (HCC): ICD-10-CM

## 2024-02-05 LAB
25(OH)D2 SERPL-MCNC: <1 NG/ML
25(OH)D3 SERPL-MCNC: 54 NG/ML
25(OH)D3+25(OH)D2 SERPL-MCNC: 54 NG/ML

## 2024-02-05 PROCEDURE — 99214 OFFICE O/P EST MOD 30 MIN: CPT | Performed by: INTERNAL MEDICINE

## 2024-02-05 RX ORDER — PRAMIPEXOLE DIHYDROCHLORIDE 1 MG/1
1 TABLET ORAL 3 TIMES DAILY
Qty: 90 TABLET | Refills: 1 | Status: SHIPPED | OUTPATIENT
Start: 2024-02-05

## 2024-02-05 RX ORDER — ATORVASTATIN CALCIUM 20 MG/1
20 TABLET, FILM COATED ORAL DAILY
Qty: 90 TABLET | Refills: 2 | Status: SHIPPED | OUTPATIENT
Start: 2024-02-05

## 2024-02-05 NOTE — ASSESSMENT & PLAN NOTE
Lab Results   Component Value Date    .7 (H) 01/30/2024    CALCIUM 9.7 01/30/2024    PHOS 2.9 12/09/2022   Reviewed    Asymptomatic    Awaiting to be evaluated by endocrinology doubt any further intervention is needed

## 2024-02-05 NOTE — ASSESSMENT & PLAN NOTE
Lab Results   Component Value Date    CHOLESTEROL 216 (H) 01/30/2024    CHOLESTEROL 212 (H) 07/11/2022    CHOLESTEROL 216 (H) 10/12/2021     Lab Results   Component Value Date    HDL 51 01/30/2024    HDL 59 07/11/2022    HDL 55 10/12/2021     Lab Results   Component Value Date    TRIG 93 01/30/2024    TRIG 170 (H) 07/11/2022    TRIG 203 (H) 10/12/2021     Lab Results   Component Value Date    NONHDLC 165 01/30/2024    NONHDLC 153 07/11/2022    NONHDLC 161 10/12/2021      Lab Results   Component Value Date    LDLCALC 146 (H) 01/30/2024   Reviewed    LDL uncontrolled    Start Lipitor 20 mg daily explained the side effects at length

## 2024-02-05 NOTE — ASSESSMENT & PLAN NOTE
Lab Results   Component Value Date     11/02/2015    SODIUM 139 01/30/2024    K 4.3 01/30/2024     01/30/2024    CO2 27 01/30/2024    ANIONGAP 12.2 11/02/2015    AGAP 7 01/30/2024    BUN 22 01/30/2024    CREATININE 0.97 01/30/2024    GLUC 110 12/09/2022    GLUF 98 01/30/2024    CALCIUM 9.7 01/30/2024    AST 15 01/30/2024    ALT 12 01/30/2024    ALKPHOS 69 01/30/2024    PROT 7.2 11/02/2015    TP 6.5 01/30/2024    BILITOT 1.3 (H) 11/02/2015    TBILI 0.85 01/30/2024    EGFR 53 01/30/2024   Reviewed abnormal LFT resolved bilirubin high possibly due to [place] will monitor closely

## 2024-02-05 NOTE — ASSESSMENT & PLAN NOTE
Symptoms controlled    Agree continue management as follows    Instructed with the diet    Prilosec 40 mg daily no side effects

## 2024-02-05 NOTE — PATIENT INSTRUCTIONS
Migraine Headache   AMBULATORY CARE:   A migraine headache  is a severe headache. The pain can be so severe that it interferes with your daily activities. A migraine can last a few hours up to several days. The exact cause of migraines is not known. A family history of migraines increases your risk. Your risk is also higher if you are a woman or take medicines such as estrogen or a vasodilator.  Common warning signs include the following:  Warning signs usually start 15 to 60 minutes before the headache:  Visual changes (auras), such as blurred vision, temporary blind or bright spots, lines, or hallucinations    Unusual tiredness or frequent yawning    Tingling in an arm or leg    Signs and symptoms of a migraine headache:  A migraine headache usually begins as a dull ache around the eye or temple. The pain may get worse with movement. You may also have the following:  Pain in your head that may increase to the point that you cannot do everyday activities    Pain on one or both sides of your head    Throbbing, pulsing, or pounding pain in your head    Nausea and vomiting    Sensitivity to light, noise, or smells    Call your local emergency number (911 in the ) or have someone call if:   You feel like you are going to faint, you become confused, or you have a seizure.      Seek care immediately if:   You have a headache that seems different or much worse than your usual migraine headache.    You have a severe headache with a fever or a stiff neck.    You have new problems with speech, vision, balance, or movement.    Call your doctor or neurologist if:   You have a fever.    Your migraines interfere with your daily activities.    Your medicines or treatments stop working.    You have questions about your condition or care.    Treatment:  Migraines cannot be cured. The goal of treatment is to reduce your symptoms. Take medicine as soon as you feel a migraine begin, or as directed. The following may be used to  manage migraines:  Medicines  may be given to prevent or treat migraines. Take medicine to prevent migraines as soon as you feel a migraine begin, or as directed. Your healthcare provider may recommend any of the following:    Migraine medicines  are used to help prevent or stop a migraine.    NSAIDs  help decrease swelling and pain or fever. This medicine is available with or without a doctor's order. NSAIDs can cause stomach bleeding or kidney problems in certain people. If you take blood thinner medicine, always ask your healthcare provider if NSAIDs are safe for you. Always read the medicine label and follow directions.    Acetaminophen  decreases pain and fever. It is available without a doctor's order. Ask how much to take and how often to take it. Follow directions. Read the labels of all other medicines you are using to see if they also contain acetaminophen, or ask your doctor or pharmacist. Acetaminophen can cause liver damage if not taken correctly.    Prescription pain medicine  may be given. Ask your healthcare provider how to take this medicine safely. Some prescription pain medicines contain acetaminophen. Do not take other medicines that contain acetaminophen without talking to your healthcare provider. Too much acetaminophen may cause liver damage. Prescription pain medicine may cause constipation. Ask your healthcare provider how to prevent or treat constipation.     Antinausea medicine  may be given to calm your stomach and to help prevent vomiting. This medicine can also help relieve pain.    Cognitive behavior therapy (CBT)  can help you learn ways to manage and prevent migraines. A therapist can teach you to relax and to reduce stress. You may learn ways to create healthy nutrition, activity, and sleep habits to prevent migraines. The therapist can also help you manage conditions that can affect migraines, such as anxiety or depression.    Common triggers for a migraine include the following:    Stress, eye strain, oversleeping, or not getting enough sleep    Hormone changes in women from birth control pills, pregnancy, menopause, or during a monthly period    Skipping meals, going too long without eating, or not drinking enough liquids    Certain foods or drinks such as chocolate, hard cheese, alcohol, or drinks that contain caffeine    Foods that contain gluten, nitrates, MSG, or artificial sweeteners    Sunlight, bright or flashing lights, loud noises, smoke, or strong smells    Heat, humidity, or changes in the weather    Manage your symptoms:   Rest in a dark, quiet room.  This will help decrease your pain. Sleep may also help relieve the pain.    Apply ice to decrease pain.  Use an ice pack, or put crushed ice in a plastic bag. Cover the ice pack with a towel and place it on your head where it hurts for 15 to 20 minutes every hour.    Apply heat to decrease pain and muscle spasms.  Use a small towel dampened with warm water or a heating pad, or sit in a warm bath. Apply heat on the area for 20 to 30 minutes every 2 hours. You may alternate heat and ice.    Keep a migraine record.  Write down when your migraines start and stop. Include your symptoms and what you were doing when a migraine began. Record what you ate or drank for 24 hours before the migraine started. Keep track of what you did to treat your migraine and if it worked.    Prevent another migraine headache:   Prevent a medicine overuse headache.  Take pain medicines only as long as directed. A medicine may be limited to a certain amount each month. Your healthcare provider can help you create a plan so you get a safe amount each month.    Do not smoke.  Nicotine and other chemicals in cigarettes and cigars can trigger a migraine and also cause lung damage. Ask your healthcare provider for information if you currently smoke and need help to quit. E-cigarettes or smokeless tobacco still contain nicotine. Talk to your healthcare provider  before you use these products.    Do not drink alcohol.  Alcohol can trigger a migraine. It can also interfere with the medicines used to treat your migraine.    Be physically active.  Physical activity, such as exercise, may help prevent migraines. Talk to your healthcare provider about the best activity plan for you. Try to get at least 30 minutes of physical activity on most days.         Manage stress.  Stress may trigger a migraine. Learn new ways to relax, such as deep breathing.    Follow a sleep schedule.  Go to bed and get up at the same time each day.    Eat a variety of healthy foods.  Healthy foods include fruit, vegetables, whole-grain breads, low-fat dairy products, beans, lean meats, and fish. Do not have foods or drinks that trigger your migraines.         Drink more liquids to prevent dehydration.  Your healthcare provider can tell you how much liquid to drink each day and which liquids are best for you.    Follow up with your doctor or neurologist as directed:  Bring your migraine record with you. Write down your questions so you remember to ask them during your visits.  © Copyright [employer] 2023 Information is for End User's use only and may not be sold, redistributed or otherwise used for commercial purposes.  The above information is an  only. It is not intended as medical advice for individual conditions or treatments. Talk to your doctor, nurse or pharmacist before following any medical regimen to see if it is safe and effective for you.

## 2024-02-05 NOTE — PROGRESS NOTES
Dr. Carpio's Office Visit Note  24     Belinda Dolan 83 y.o. female MRN: [id number]  : 1940    Assessment:     1. Stage 3b chronic kidney disease (HCC)  Assessment & Plan:  Lab Results   Component Value Date    EGFR 53 2024    EGFR 70 10/27/2023    EGFR 56 2023    CREATININE 0.97 2024    CREATININE 0.77 10/27/2023    CREATININE 0.93 2023   GFR 53 creatinine 0.97 stable at baseline avoid nephrotoxic above reviewed  GFR is baseline  Creat is baseline.   Recommend periodic blood test for monitoring of BUN and Creat  Avoid Non Steroidal Antiinflammatory such as ibuprofen, aleve etc.  Potassium, HCO3 and calcium are wnl and will require periodic blood test.  Bone and Mineral disease monitoring as appropriate.  All patient with GFR less than 30( CKD 4 or 5 or 6) advised to follow with nephrologist.         2. Hyperparathyroidism (HCC)  Assessment & Plan:  Lab Results   Component Value Date    .7 (H) 2024    CALCIUM 9.7 2024    PHOS 2.9 2022   Reviewed    Asymptomatic    Awaiting to be evaluated by endocrinology doubt any further intervention is needed      3. Mixed hyperlipidemia  Assessment & Plan:  Lab Results   Component Value Date    CHOLESTEROL 216 (H) 2024    CHOLESTEROL 212 (H) 2022    CHOLESTEROL 216 (H) 10/12/2021     Lab Results   Component Value Date    HDL 51 2024    HDL 59 2022    HDL 55 10/12/2021     Lab Results   Component Value Date    TRIG 93 2024    TRIG 170 (H) 2022    TRIG 203 (H) 10/12/2021     Lab Results   Component Value Date    NONHDLC 165 2024    NONHDLC 153 2022    NONHDLC 161 10/12/2021      Lab Results   Component Value Date    LDLCALC 146 (H) 2024   Reviewed    LDL uncontrolled    Start Lipitor 20 mg daily explained the side effects at length    Orders:  -     atorvastatin (LIPITOR) 20 mg tablet; Take 1 tablet (20 mg total) by mouth daily    4. Abnormal LFTs  Assessment &  Plan:  Lab Results   Component Value Date     11/02/2015    SODIUM 139 01/30/2024    K 4.3 01/30/2024     01/30/2024    CO2 27 01/30/2024    ANIONGAP 12.2 11/02/2015    AGAP 7 01/30/2024    BUN 22 01/30/2024    CREATININE 0.97 01/30/2024    GLUC 110 12/09/2022    GLUF 98 01/30/2024    CALCIUM 9.7 01/30/2024    AST 15 01/30/2024    ALT 12 01/30/2024    ALKPHOS 69 01/30/2024    PROT 7.2 11/02/2015    TP 6.5 01/30/2024    BILITOT 1.3 (H) 11/02/2015    TBILI 0.85 01/30/2024    EGFR 53 01/30/2024   Reviewed abnormal LFT resolved bilirubin high possibly due to Gilbert will monitor closely      5. Gastroesophageal reflux disease without esophagitis  Assessment & Plan:  Symptoms controlled    Agree continue management as follows    Instructed with the diet    Prilosec 40 mg daily no side effects      6. Restless leg syndrome  Assessment & Plan:  Complaining of restless leg had used Mirapex in the past was helping now cannot sleep at nighttime    Start Mirapex 1 mg at bedtime explained the side effects if needed cut down the dosage to 2.5 at bedtime    Orders:  -     pramipexole (MIRAPEX) 1 mg tablet; Take 1 tablet (1 mg total) by mouth 3 (three) times a day          Discussion Summary and Plan:  Today's care plan and medications were reviewed with patient in detail and all their questions answered to their satisfaction.    No chief complaint on file.     Subjective:  Came in follow-up chronic medical condition listed under visit diagnosis denies any chest pain difficulty breathing seen by cardiology echocardiogram done no further intervention needed also awaiting to be seen by endocrinology for hyperparathyroidism asymptomatic complains of restless legs, so not able to sleep at nighttime for now no difficulty breathing no chest pain        The following portions of the patient's history were reviewed and updated as appropriate: allergies, current medications, past family history, past medical history, past  social history, past surgical history and problem list.    Review of Systems   Constitutional:  Negative for activity change, appetite change, chills, diaphoresis, fatigue, fever and unexpected weight change.   HENT:  Negative for congestion, dental problem, drooling, ear discharge, ear pain, facial swelling, hearing loss, mouth sores, nosebleeds, postnasal drip, rhinorrhea, sinus pressure, sneezing, sore throat, tinnitus, trouble swallowing and voice change.    Eyes:  Negative for photophobia, pain, discharge, redness, itching and visual disturbance.   Respiratory:  Negative for apnea, cough, choking, chest tightness, shortness of breath, wheezing and stridor.    Cardiovascular:  Negative for chest pain, palpitations and leg swelling.   Gastrointestinal:  Negative for abdominal distention, abdominal pain, anal bleeding, blood in stool, constipation, diarrhea, nausea, rectal pain and vomiting.   Endocrine: Negative for cold intolerance, heat intolerance, polydipsia, polyphagia and polyuria.   Genitourinary:  Negative for decreased urine volume, difficulty urinating, dysuria, enuresis, flank pain, frequency, genital sores, hematuria and urgency.   Musculoskeletal:  Positive for arthralgias and back pain. Negative for joint swelling, myalgias, neck pain and neck stiffness.   Skin:  Negative for color change, pallor, rash and wound.   Allergic/Immunologic: Negative.  Negative for environmental allergies, food allergies and immunocompromised state.   Neurological:  Negative for dizziness, tremors, seizures, syncope, facial asymmetry, speech difficulty, weakness, light-headedness, numbness and headaches.   Psychiatric/Behavioral:  Negative for agitation, behavioral problems, confusion, decreased concentration, dysphoric mood, hallucinations, self-injury, sleep disturbance and suicidal ideas. The patient is not nervous/anxious and is not hyperactive.          Historical Information   Patient Active Problem List    Diagnosis   • Baker cyst, right   • Gastroesophageal reflux disease without esophagitis   • Glucose intolerance (impaired glucose tolerance)   • Restless leg syndrome   • Hypercholesteremia   • Mitral regurgitation   • Migraine   • Essential hypertension   • Varicose vein of leg   • Edema of extremities   • Rhinitis, allergic   • Aspirin intolerance   • Persistent lymphocytosis   • Hypokalemia   • Irritable bowel syndrome   • Abnormal echocardiogram   • Vitamin D deficiency   • Diverticulosis   • History of syncope   • Hammer toe of left foot   • Raynaud's disease   • Stage 3b chronic kidney disease (HCC)   • Cervical radiculopathy   • Left arm pain   • Rupture of left distal biceps tendon   • Primary osteoarthritis involving multiple joints   • HL (hearing loss)   • Lumbar radiculopathy   • Abnormal LFTs   • Primary osteoarthritis of right knee   • 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   • Hyperparathyroidism ([facility])   • Mixed hyperlipidemia     Past Medical History:   Diagnosis Date   • Baker's cyst of knee 10/21/2016    right- burst on its own   • 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[facility]) 1992    hx of-fell when ice skating. Noted brain bleed w/swelling   • HL (hearing loss)    • Migraine    • Raynaud's disease     both hands     Past Surgical History:   Procedure Laterality Date   • AXILLARY SURGERY Left     fatty cyst removed, benign   • CATARACT EXTRACTION Bilateral    • CATARACT EXTRACTION W/ INTRAOCULAR LENS IMPLANT Right 11/10/2016    Procedure: EXTRACTION EXTRACAPSULAR CATARACT PHACO INTRAOCULAR LENS (IOL);  Surgeon: Citlaly Villavicencio MD;  Location: [facility] MAIN OR;  Service:    • COSMETIC SURGERY      Eye lift   • DILATION AND CURETTAGE OF UTERUS     • MYRINGOTOMY W/ TUBES  2011    both ears-one tube out on the left   • MYRINGOTOMY W/ TUBES     • [place] SURGICAL ARTHROSCOPY SHOULDER W/ROTATOR CUFF RPR Right 5/4/2017    Procedure: ARTHROSCOPY SHOULDER WITH ROTATOR CUFF REPAIR, BICEPS TENODESIS, AND SUBACROMIAL DECOMPRESSION;   Surgeon: Cedrick Nielson MD;  Location: [facility] MAIN OR;  Service: Orthopedics   • AR XCAPSL CTRC RMVL INSJ IO LENS PROSTH W/O ECP Left 10/20/2016    Procedure: EXTRACTION EXTRACAPSULAR CATARACT PHACO INTRAOCULAR LENS (IOL);  Surgeon: Citlaly Villavicencio MD;  Location: [facility] MAIN OR;  Service: Ophthalmology   • SKIN BIOPSY      mole on chest   • SKIN BIOPSY      under breast, benign     Social History     Substance and Sexual Activity   Alcohol Use Yes    Comment: socially     Social History     Substance and Sexual Activity   Drug Use No     Social History     Tobacco Use   Smoking Status Former   • Current packs/day: 0.00   • Average packs/day: 1.5 packs/day for 30.0 years (45.0 ttl pk-yrs)   • Types: Cigarettes   • Start date:    • Quit date:    • Years since quittin.1   Smokeless Tobacco Never     Family History   Problem Relation Age of Onset   • Heart disease Mother         exp age 64 MI   • Stroke Father    • Macular degeneration Sister    • Macular degeneration Brother    • Diabetes Brother    • Cancer Brother         kidney-nephrectomy   • Kidney disease Brother         cancer     Health Maintenance Due   Topic   • Pneumococcal Vaccine: 65+ Years (1 - PCV)   • Zoster Vaccine (1 of 2)   • Influenza Vaccine (1)   • COVID-19 Vaccine ( season)   • Fall Risk    • Medicare Annual Wellness Visit (AWV)       Meds/Allergies       Current Outpatient Medications:   •  acetaminophen (TYLENOL) 500 mg tablet, Take 1,000 mg by mouth every 6 (six) hours as needed for mild pain, Disp: , Rfl:   •  ALPHAGAN P 0.1 %, , Disp: , Rfl: 0  •  ascorbic acid (VITAMIN C) 500 mg tablet, Take 500 mg by mouth every morning, Disp: , Rfl:   •  atorvastatin (LIPITOR) 20 mg tablet, Take 1 tablet (20 mg total) by mouth daily, Disp: 90 tablet, Rfl: 2  •  Azelastine HCl 137 MCG/SPRAY SOLN, instill 1 spray into each nostril twice a day, Disp: 30 mL, Rfl: 6  •  Biotin 5 MG CAPS, Take by mouth every morning, Disp: , Rfl:   •   "Cyanocobalamin (VITAMIN B 12 PO), Take by mouth every morning, Disp: , Rfl:   •  furosemide (LASIX) 40 mg tablet, Take 1 tablet (40 mg total) by mouth every morning, Disp: 90 tablet, Rfl: 1  •  ipratropium (ATROVENT) 0.06 % nasal spray, instill 2 sprays into each nostril four times a day, Disp: 15 mL, Rfl: 11  •  levocetirizine (XYZAL) 5 MG tablet, Take 1 tablet (5 mg total) by mouth every evening, Disp: 90 tablet, Rfl: 1  •  Lutein 40 MG CAPS, Take by mouth every morning, Disp: , Rfl:   •  Magnesium 250 MG TABS, Take by mouth every morning, Disp: , Rfl:   •  olmesartan (BENICAR) 40 mg tablet, Take 1 tablet (40 mg total) by mouth daily, Disp: 90 tablet, Rfl: 1  •  omeprazole (PriLOSEC) 20 mg delayed release capsule, Take 1 capsule (20 mg total) by mouth daily, Disp: 90 capsule, Rfl: 1  •  Potassium Chloride ER 20 MEQ TBCR, Take 1 tablet (20 mEq total) by mouth daily, Disp: 90 tablet, Rfl: 1  •  pramipexole (MIRAPEX) 1 mg tablet, Take 1 tablet (1 mg total) by mouth 3 (three) times a day, Disp: 90 tablet, Rfl: 1  •  SUMAtriptan (IMITREX) 50 mg tablet, Take 1 tablet (50 mg total) by mouth once as needed for migraine, Disp: 9 tablet, Rfl: 1  •  timolol (TIMOPTIC) 0.5 % ophthalmic solution, instill 1 drop into right eye twice a day, Disp: , Rfl:   •  vitamin A 7500 UNIT capsule, Take 7,500 Units by mouth every morning  , Disp: , Rfl:   •  vitamin E, tocopherol, 400 units capsule, Take 400 Units by mouth every morning Last dose 4/26/17, Disp: , Rfl:   •  zinc gluconate 50 mg tablet, Take 50 mg by mouth every morning, Disp: , Rfl:       Objective:    Vitals:   /78   Pulse 78   Temp 98 °F (36.7 °C)   Ht 5' 1\" (1.549 m)   Wt 49.9 kg (110 lb)   SpO2 96%   BMI 20.78 kg/m²   Body mass index is 20.78 kg/m².  Vitals:    02/05/24 1034   Weight: 49.9 kg (110 lb)       Physical Exam  Vitals and nursing note reviewed.   Constitutional:       General: She is not in acute distress.     Appearance: She is well-developed. She " is not ill-appearing, toxic-appearing or diaphoretic.   HENT:      Head: Normocephalic and atraumatic.      Right Ear: External ear normal.      Left Ear: External ear normal.      Mouth/Throat:      Pharynx: No oropharyngeal exudate.   Eyes:      General: Lids are normal. Lids are everted, no foreign bodies appreciated. No scleral icterus.        Right eye: No discharge.         Left eye: No discharge.      Conjunctiva/sclera: Conjunctivae normal.      Pupils: Pupils are equal, round, and reactive to light.   Neck:      Thyroid: No thyromegaly.      Vascular: Normal carotid pulses. No carotid bruit, hepatojugular reflux or JVD.      Trachea: No tracheal tenderness or tracheal deviation.   Cardiovascular:      Rate and Rhythm: Normal rate and regular rhythm.      Pulses: Normal pulses.      Heart sounds: Murmur heard.      No friction rub. No gallop.   Pulmonary:      Effort: Pulmonary effort is normal. No respiratory distress.      Breath sounds: No stridor. No wheezing or rales.   Chest:      Chest wall: No tenderness.   Abdominal:      General: Bowel sounds are normal. There is no distension.      Palpations: Abdomen is soft. There is no mass.      Tenderness: There is no abdominal tenderness. There is no guarding or rebound.   Musculoskeletal:         General: No tenderness or deformity. Normal range of motion.      Cervical back: Normal range of motion and neck supple. No edema, erythema or rigidity. No spinous process tenderness or muscular tenderness. Normal range of motion.   Lymphadenopathy:      Head:      Right side of head: No submental, submandibular, tonsillar, preauricular or posterior auricular adenopathy.      Left side of head: No submental, submandibular, tonsillar, preauricular, posterior auricular or occipital adenopathy.      Cervical: No cervical adenopathy.      Right cervical: No superficial, deep or posterior cervical adenopathy.     Left cervical: No superficial, deep or posterior cervical  adenopathy.      Upper Body:      Right upper body: No pectoral adenopathy.      Left upper body: No pectoral adenopathy.   Skin:     General: Skin is warm and dry.      Coloration: Skin is not pale.      Findings: No erythema or rash.   Neurological:      General: No focal deficit present.      Mental Status: She is alert and oriented to person, place, and time.      Cranial Nerves: No cranial nerve deficit.      Sensory: No sensory deficit.      Motor: No tremor, abnormal muscle tone or seizure activity.      Coordination: Coordination normal.      Gait: Gait normal.      Deep Tendon Reflexes: Reflexes are normal and symmetric. Reflexes normal.   Psychiatric:         Behavior: Behavior normal.         Thought Content: Thought content normal.         Judgment: Judgment normal.         Lab Review   Appointment on 01/30/2024   Component Date Value Ref Range Status   • PTH 01/30/2024 116.7 (H)  12.0 - 88.0 pg/mL Final   • Sodium 01/30/2024 139  135 - 147 mmol/L Final   • Potassium 01/30/2024 4.3  3.5 - 5.3 mmol/L Final   • Chloride 01/30/2024 105  96 - 108 mmol/L Final   • CO2 01/30/2024 27  21 - 32 mmol/L Final   • ANION GAP 01/30/2024 7  mmol/L Final   • BUN 01/30/2024 22  5 - 25 mg/dL Final   • Creatinine 01/30/2024 0.97  0.60 - 1.30 mg/dL Final    Standardized to IDMS reference method   • Glucose, Fasting 01/30/2024 98  65 - 99 mg/dL Final   • Calcium 01/30/2024 9.7  8.4 - 10.2 mg/dL Final   • AST 01/30/2024 15  13 - 39 U/L Final   • ALT 01/30/2024 12  7 - 52 U/L Final    Specimen collection should occur prior to Sulfasalazine administration due to the potential for falsely depressed results.    • Alkaline Phosphatase 01/30/2024 69  34 - 104 U/L Final   • Total Protein 01/30/2024 6.5  6.4 - 8.4 g/dL Final   • Albumin 01/30/2024 3.9  3.5 - 5.0 g/dL Final   • Total Bilirubin 01/30/2024 0.85  0.20 - 1.00 mg/dL Final    Use of this assay is not recommended for patients undergoing treatment with eltrombopag due to the  potential for falsely elevated results.  N-acetyl-p-benzoquinone imine (metabolite of Acetaminophen) will generate erroneously low results in samples for patients that have taken an overdose of Acetaminophen.   • eGFR 01/30/2024 53  ml/min/1.73sq m Final   • WBC 01/30/2024 6.82  4.31 - 10.16 Thousand/uL Final   • RBC 01/30/2024 4.24  3.81 - 5.12 Million/uL Final   • Hemoglobin 01/30/2024 13.4  11.5 - 15.4 g/dL Final   • Hematocrit 01/30/2024 40.2  34.8 - 46.1 % Final   • MCV 01/30/2024 95  82 - 98 fL Final   • MCH 01/30/2024 31.6  26.8 - 34.3 pg Final   • MCHC 01/30/2024 33.3  31.4 - 37.4 g/dL Final   • RDW 01/30/2024 12.7  11.6 - 15.1 % Final   • MPV 01/30/2024 11.7  8.9 - 12.7 fL Final   • Platelets 01/30/2024 200  149 - 390 Thousands/uL Final   • nRBC 01/30/2024 0  /100 WBCs Final   • Neutrophils Relative 01/30/2024 46  43 - 75 % Final   • Immat GRANS % 01/30/2024 0  0 - 2 % Final   • Lymphocytes Relative 01/30/2024 43  14 - 44 % Final   • Monocytes Relative 01/30/2024 8  4 - 12 % Final   • Eosinophils Relative 01/30/2024 2  0 - 6 % Final   • Basophils Relative 01/30/2024 1  0 - 1 % Final   • Neutrophils Absolute 01/30/2024 3.10  1.85 - 7.62 Thousands/µL Final   • Immature Grans Absolute 01/30/2024 0.02  0.00 - 0.20 Thousand/uL Final   • Lymphocytes Absolute 01/30/2024 2.95  0.60 - 4.47 Thousands/µL Final   • Monocytes Absolute 01/30/2024 0.54  0.17 - 1.22 Thousand/µL Final   • Eosinophils Absolute 01/30/2024 0.15  0.00 - 0.61 Thousand/µL Final   • Basophils Absolute 01/30/2024 0.06  0.00 - 0.10 Thousands/µL Final   • Cholesterol 01/30/2024 216 (H)  See Comment mg/dL Final    Cholesterol:         Pediatric <18 Years        Desirable          <170 mg/dL      Borderline High    170-199 mg/dL      High               >=200 mg/dL        Adult >=18 Years            Desirable         <200 mg/dL      Borderline High   200-239 mg/dL      High              >239 mg/dL     • Triglycerides 01/30/2024 93  See Comment mg/dL  Final    Triglyceride:     0-9Y            <75mg/dL     10Y-17Y         <90 mg/dL       >=18Y     Normal          <150 mg/dL     Borderline High 150-199 mg/dL     High            200-499 mg/dL        Very High       >499 mg/dL    Specimen collection should occur prior to Metamizole administration due to the potential for falsely depressed results.   • HDL, Direct 01/30/2024 51  >=50 mg/dL Final   • LDL Calculated 01/30/2024 146 (H)  0 - 100 mg/dL Final    LDL Cholesterol:     Optimal           <100 mg/dl     Near Optimal      100-129 mg/dl     Above Optimal       Borderline High 130-159 mg/dl       High            160-189 mg/dl       Very High       >189 mg/dl         This screening LDL is a calculated result.   It does not have the accuracy of the Direct Measured LDL in the monitoring of patients with hyperlipidemia and/or statin therapy.   Direct Measure LDL (YKV450) must be ordered separately in these patients.   • Non-HDL-Chol (CHOL-HDL) 01/30/2024 165  mg/dl Final   Hospital Outpatient Visit on 01/17/2024   Component Date Value Ref Range Status   • BSA 01/17/2024 1.48  m2 Final   • A4C EF 01/17/2024 66  % Final   • LVIDd 01/17/2024 3.60  cm Final   • LVIDS 01/17/2024 2.00  cm Final   • IVSd 01/17/2024 1.10  cm Final   • LVPWd 01/17/2024 1.00  cm Final   • LVOT peak VTI 01/17/2024 25.55  cm Final   • FS 01/17/2024 44  28 - 44 Final   • MV E' Tissue Velocity Septal 01/17/2024 6  cm/s Final   • MV E' Tissue Velocity Lateral 01/17/2024 7  cm/s Final   • LA Volume Index (BP) 01/17/2024 30.4  mL/m2 Final   • E/A ratio 01/17/2024 0.70   Final   • E wave deceleration time 01/17/2024 233  ms Final   • MV Peak E Rosalino 01/17/2024 60  cm/s Final   • MV Peak A Rosalino 01/17/2024 0.86  m/s Final   • AV LVOT peak gradient 01/17/2024 4  mmHg Final   • LVOT peak rosalino 01/17/2024 1.05  m/s Final   • RVID d 01/17/2024 3.0  cm Final   • Tricuspid annular plane systolic e* 01/17/2024 1.80  cm Final   • LA size 01/17/2024 3.5  cm Final   •  LA length (A2C) 01/17/2024 5.10  cm Final   • LA volume (BP) 01/17/2024 45  mL Final   • RAA A4C 01/17/2024 11.3  cm2 Final   • LVOT mn grad 01/17/2024 2.0  mmHg Final   • AV Deceleration Time 01/17/2024 2,571  ms Final   • AV regurgitation pressure 1/2 time 01/17/2024 746  ms Final   • TR Peak Rosalino 01/17/2024 2.8  m/s Final   • Triscuspid Valve Regurgitation Pea* 01/17/2024 31.0  mmHg Final   • Ao root 01/17/2024 3.00  cm Final   • AV peak gradient 01/17/2024 43  mmHg Final   • Tricuspid valve peak regurgitation* 01/17/2024 2.76  m/s Final   • Left ventricular stroke volume (2D) 01/17/2024 41.00  mL Final   • IVS 01/17/2024 1.1  cm Final   • LEFT VENTRICLE SYSTOLIC VOLUME (MO* 01/17/2024 12  mL Final   • LV DIASTOLIC VOLUME (MOD BIPLANE) * 01/17/2024 53  mL Final   • Left Atrium Area-systolic Four Jacki* 01/17/2024 13.9  cm2 Final   • Left Atrium Area-systolic Apical T* 01/17/2024 19.6  cm2 Final   • LVSV, 2D 01/17/2024 41  mL Final   • LV EF 01/17/2024 65   Final         Patient Instructions   Migraine Headache   AMBULATORY CARE:   A migraine headache  is a severe headache. The pain can be so severe that it interferes with your daily activities. A migraine can last a few hours up to several days. The exact cause of migraines is not known. A family history of migraines increases your risk. Your risk is also higher if you are a woman or take medicines such as estrogen or a vasodilator.  Common warning signs include the following:  Warning signs usually start 15 to 60 minutes before the headache:  Visual changes (auras), such as blurred vision, temporary blind or bright spots, lines, or hallucinations    Unusual tiredness or frequent yawning    Tingling in an arm or leg    Signs and symptoms of a migraine headache:  A migraine headache usually begins as a dull ache around the eye or temple. The pain may get worse with movement. You may also have the following:  Pain in your head that may increase to the point that you  cannot do everyday activities    Pain on one or both sides of your head    Throbbing, pulsing, or pounding pain in your head    Nausea and vomiting    Sensitivity to light, noise, or smells    Call your local emergency number (911 in the [place]) or have someone call if:   You feel like you are going to faint, you become confused, or you have a seizure.      Seek care immediately if:   You have a headache that seems different or much worse than your usual migraine headache.    You have a severe headache with a fever or a stiff neck.    You have new problems with speech, vision, balance, or movement.    Call your doctor or neurologist if:   You have a fever.    Your migraines interfere with your daily activities.    Your medicines or treatments stop working.    You have questions about your condition or care.    Treatment:  Migraines cannot be cured. The goal of treatment is to reduce your symptoms. Take medicine as soon as you feel a migraine begin, or as directed. The following may be used to manage migraines:  Medicines  may be given to prevent or treat migraines. Take medicine to prevent migraines as soon as you feel a migraine begin, or as directed. Your healthcare provider may recommend any of the following:    Migraine medicines  are used to help prevent or stop a migraine.    NSAIDs  help decrease swelling and pain or fever. This medicine is available with or without a doctor's order. NSAIDs can cause stomach bleeding or kidney problems in certain people. If you take blood thinner medicine, always ask your healthcare provider if NSAIDs are safe for you. Always read the medicine label and follow directions.    Acetaminophen  decreases pain and fever. It is available without a doctor's order. Ask how much to take and how often to take it. Follow directions. Read the labels of all other medicines you are using to see if they also contain acetaminophen, or ask your doctor or pharmacist. Acetaminophen can cause liver  damage if not taken correctly.    Prescription pain medicine  may be given. Ask your healthcare provider how to take this medicine safely. Some prescription pain medicines contain acetaminophen. Do not take other medicines that contain acetaminophen without talking to your healthcare provider. Too much acetaminophen may cause liver damage. Prescription pain medicine may cause constipation. Ask your healthcare provider how to prevent or treat constipation.     Antinausea medicine  may be given to calm your stomach and to help prevent vomiting. This medicine can also help relieve pain.    Cognitive behavior therapy (CBT)  can help you learn ways to manage and prevent migraines. A therapist can teach you to relax and to reduce stress. You may learn ways to create healthy nutrition, activity, and sleep habits to prevent migraines. The therapist can also help you manage conditions that can affect migraines, such as anxiety or depression.    Common triggers for a migraine include the following:   Stress, eye strain, oversleeping, or not getting enough sleep    Hormone changes in women from birth control pills, pregnancy, menopause, or during a monthly period    Skipping meals, going too long without eating, or not drinking enough liquids    Certain foods or drinks such as chocolate, hard cheese, alcohol, or drinks that contain caffeine    Foods that contain gluten, nitrates, MSG, or artificial sweeteners    Sunlight, bright or flashing lights, loud noises, smoke, or strong smells    Heat, humidity, or changes in the weather    Manage your symptoms:   Rest in a dark, quiet room.  This will help decrease your pain. Sleep may also help relieve the pain.    Apply ice to decrease pain.  Use an ice pack, or put crushed ice in a plastic bag. Cover the ice pack with a towel and place it on your head where it hurts for 15 to 20 minutes every hour.    Apply heat to decrease pain and muscle spasms.  Use a small towel dampened with  warm water or a heating pad, or sit in a warm bath. Apply heat on the area for 20 to 30 minutes every 2 hours. You may alternate heat and ice.    Keep a migraine record.  Write down when your migraines start and stop. Include your symptoms and what you were doing when a migraine began. Record what you ate or drank for 24 hours before the migraine started. Keep track of what you did to treat your migraine and if it worked.    Prevent another migraine headache:   Prevent a medicine overuse headache.  Take pain medicines only as long as directed. A medicine may be limited to a certain amount each month. Your healthcare provider can help you create a plan so you get a safe amount each month.    Do not smoke.  Nicotine and other chemicals in cigarettes and cigars can trigger a migraine and also cause lung damage. Ask your healthcare provider for information if you currently smoke and need help to quit. E-cigarettes or smokeless tobacco still contain nicotine. Talk to your healthcare provider before you use these products.    Do not drink alcohol.  Alcohol can trigger a migraine. It can also interfere with the medicines used to treat your migraine.    Be physically active.  Physical activity, such as exercise, may help prevent migraines. Talk to your healthcare provider about the best activity plan for you. Try to get at least 30 minutes of physical activity on most days.         Manage stress.  Stress may trigger a migraine. Learn new ways to relax, such as deep breathing.    Follow a sleep schedule.  Go to bed and get up at the same time each day.    Eat a variety of healthy foods.  Healthy foods include fruit, vegetables, whole-grain breads, low-fat dairy products, beans, lean meats, and fish. Do not have foods or drinks that trigger your migraines.         Drink more liquids to prevent dehydration.  Your healthcare provider can tell you how much liquid to drink each day and which liquids are best for you.    Follow up  "with your doctor or neurologist as directed:  Bring your migraine record with you. Write down your questions so you remember to ask them during your visits.  © Copyright [employer] 2023 Information is for End User's use only and may not be sold, redistributed or otherwise used for commercial purposes.  The above information is an  only. It is not intended as medical advice for individual conditions or treatments. Talk to your doctor, nurse or pharmacist before following any medical regimen to see if it is safe and effective for you.       Maida Carpio MD        \"This note has been constructed using a voice recognition system.Therefore there may be syntax, spelling, and/or grammatical errors. Please call if you have any questions. \"  "

## 2024-02-05 NOTE — ASSESSMENT & PLAN NOTE
Complaining of restless leg had used Mirapex in the past was helping now cannot sleep at nighttime    Start Mirapex 1 mg at bedtime explained the side effects if needed cut down the dosage to 2.5 at bedtime

## 2024-02-05 NOTE — ASSESSMENT & PLAN NOTE
Lab Results   Component Value Date    EGFR 53 01/30/2024    EGFR 70 10/27/2023    EGFR 56 08/04/2023    CREATININE 0.97 01/30/2024    CREATININE 0.77 10/27/2023    CREATININE 0.93 08/04/2023   GFR 53 creatinine 0.97 stable at baseline avoid nephrotoxic above reviewed  GFR is baseline  Creat is baseline.   Recommend periodic blood test for monitoring of BUN and Creat  Avoid Non Steroidal Antiinflammatory such as ibuprofen, aleve etc.  Potassium, HCO3 and calcium are wnl and will require periodic blood test.  Bone and Mineral disease monitoring as appropriate.  All patient with GFR less than 30( CKD 4 or 5 or 6) advised to follow with nephrologist.

## 2024-02-09 ENCOUNTER — TELEPHONE (OUTPATIENT)
Dept: SURGICAL ONCOLOGY | Facility: CLINIC | Age: 84
End: 2024-02-09

## 2024-02-26 ENCOUNTER — OFFICE VISIT (OUTPATIENT)
Dept: SURGICAL ONCOLOGY | Facility: CLINIC | Age: 84
End: 2024-02-26
Payer: MEDICARE

## 2024-02-26 VITALS
DIASTOLIC BLOOD PRESSURE: 60 MMHG | TEMPERATURE: 97.8 F | SYSTOLIC BLOOD PRESSURE: 110 MMHG | WEIGHT: 109 LBS | HEART RATE: 81 BPM | HEIGHT: 61 IN | OXYGEN SATURATION: 97 % | BODY MASS INDEX: 20.58 KG/M2

## 2024-02-26 DIAGNOSIS — E21.3 HYPERPARATHYROIDISM (HCC): Primary | ICD-10-CM

## 2024-02-26 PROCEDURE — 99214 OFFICE O/P EST MOD 30 MIN: CPT | Performed by: SURGERY

## 2024-02-26 NOTE — PROGRESS NOTES
Surgical Oncology Follow Up       [facility] [place]  [address] [facility] [address]  [phone]    Belinda Dolan  1940  [id number]  [facility] [place]  [address] [facility] [address]  [phone]    Chief Complaint   Patient presents with    Follow-up       Assessment/Plan:    No problem-specific Assessment & Plan notes found for this encounter.       Diagnoses and all orders for this visit:    Hyperparathyroidism (HCC)  -     PTH, intact; Future  -     Calcium; Future  -     Vitamin D 1,25 dihydroxy; Future      Advance Care Planning/Advance Directives:  Discussed disease status, cancer treatment plans and/or cancer treatment goals with the patient.     Oncology History    No history exists.       History of Present Illness: Patient is a 83-year-old woman here for follow-up visit status post  -Interval History: No new issues since her last visit.  She had blood work done in anticipation of today's visit.    Review of Systems:  Review of Systems   Constitutional:  Positive for fatigue.   HENT: Negative.     Eyes: Negative.    Respiratory: Negative.     Cardiovascular: Negative.    Gastrointestinal: Negative.    Endocrine: Negative.    Genitourinary: Negative.    Musculoskeletal:  Positive for arthralgias.   Skin: Negative.    Allergic/Immunologic: Negative.    Neurological: Negative.    Hematological: Negative.    Psychiatric/Behavioral: Negative.         Patient Active Problem List   Diagnosis    Baker cyst, right    Gastroesophageal reflux disease without esophagitis    Glucose intolerance (impaired glucose tolerance)    Restless leg syndrome    Hypercholesteremia    Mitral regurgitation    Migraine    Essential hypertension    Varicose vein of leg    Edema of extremities    Rhinitis, allergic    Aspirin intolerance    Persistent lymphocytosis    Hypokalemia     Irritable bowel syndrome    Abnormal echocardiogram    Vitamin D deficiency    Diverticulosis    History of syncope    Hammer toe of left foot    Raynaud's disease    Stage 3b chronic kidney disease (HCC)    Cervical radiculopathy    Left arm pain    Rupture of left distal biceps tendon    Primary osteoarthritis involving multiple joints    HL (hearing loss)    Lumbar radiculopathy    Abnormal LFTs    Primary osteoarthritis of right knee    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 Noted brain bleed w/swelling    HL (hearing loss)     Migraine     Raynaud's disease     both hands     Past Surgical History:   Procedure Laterality Date    AXILLARY SURGERY Left     fatty cyst removed, benign    CATARACT EXTRACTION Bilateral     CATARACT EXTRACTION W/ INTRAOCULAR LENS IMPLANT Right 11/10/2016    Procedure: EXTRACTION EXTRACAPSULAR CATARACT PHACO INTRAOCULAR LENS (IOL);  Surgeon: Citlaly Villavicencio MD;  Location: [facility] MAIN OR;  Service:     COSMETIC SURGERY      Eye lift    DILATION AND CURETTAGE OF UTERUS      MYRINGOTOMY W/ TUBES  2011    both ears-one tube out on the left    MYRINGOTOMY W/ TUBES      DE SURGICAL ARTHROSCOPY SHOULDER W/ROTATOR CUFF RPR Right 5/4/2017    Procedure: ARTHROSCOPY SHOULDER WITH ROTATOR CUFF REPAIR, BICEPS TENODESIS, AND SUBACROMIAL DECOMPRESSION;  Surgeon: Cedrick Nielson MD;  Location: [facility] MAIN OR;  Service: Orthopedics    DE XCAPSL CTRC RMVL INSJ IO LENS PROSTH W/O ECP Left 10/20/2016    Procedure: EXTRACTION EXTRACAPSULAR CATARACT PHACO INTRAOCULAR LENS (IOL);  Surgeon: Citlaly Villavicencio MD;  Location: [facility] MAIN OR;  Service: Ophthalmology    SKIN BIOPSY      mole on chest    SKIN BIOPSY      under breast, benign     Family History   Problem Relation Age of Onset     Heart disease Mother         exp age 64 MI    Stroke Father     Macular degeneration Sister     Macular degeneration Brother     Diabetes Brother     Cancer Brother         kidney-nephrectomy    Kidney disease Brother         cancer     Social History     Socioeconomic History    Marital status:      Spouse name: Not on file    Number of children: Not on file    Years of education: Not on file    Highest education level: Not on file   Occupational History    Not on file   Tobacco Use    Smoking status: Former     Current packs/day: 0.00     Average packs/day: 1.5 packs/day for 30.0 years (45.0 ttl pk-yrs)     Types: Cigarettes     Start date:      Quit date:      Years since quittin.1    Smokeless tobacco: Never   Vaping Use    Vaping status: Never Used   Substance and Sexual Activity    Alcohol use: Yes     Comment: socially    Drug use: No    Sexual activity: Not on file   Other Topics Concern    Not on file   Social History Narrative    Not on file     Social Determinants of Health     Financial Resource Strain: Low Risk  (2023)    Overall Financial Resource Strain (CARDIA)     Difficulty of Paying Living Expenses: Not hard at all   Food Insecurity: Not on file   Transportation Needs: No Transportation Needs (2023)    PRAPARE - Transportation     Lack of Transportation (Medical): No     Lack of Transportation (Non-Medical): No   Physical Activity: Not on file   Stress: Not on file   Social Connections: Not on file   Intimate Partner Violence: Not on file   Housing Stability: Not on file       Current Outpatient Medications:     acetaminophen (TYLENOL) 500 mg tablet, Take 1,000 mg by mouth every 6 (six) hours as needed for mild pain, Disp: , Rfl:     ALPHAGAN P 0.1 %, , Disp: , Rfl: 0    ascorbic acid (VITAMIN C) 500 mg tablet, Take 500 mg by mouth every morning, Disp: , Rfl:     atorvastatin (LIPITOR) 20 mg tablet, Take 1 tablet (20 mg total) by mouth daily, Disp: 90 tablet, Rfl:  2    Azelastine HCl 137 MCG/SPRAY SOLN, instill 1 spray into each nostril twice a day, Disp: 30 mL, Rfl: 6    Biotin 5 MG CAPS, Take by mouth every morning, Disp: , Rfl:     Cyanocobalamin (VITAMIN B 12 PO), Take by mouth every morning, Disp: , Rfl:     furosemide (LASIX) 40 mg tablet, Take 1 tablet (40 mg total) by mouth every morning, Disp: 90 tablet, Rfl: 1    ipratropium (ATROVENT) 0.06 % nasal spray, instill 2 sprays into each nostril four times a day, Disp: 15 mL, Rfl: 11    levocetirizine (XYZAL) 5 MG tablet, Take 1 tablet (5 mg total) by mouth every evening, Disp: 90 tablet, Rfl: 1    Lutein 40 MG CAPS, Take by mouth every morning, Disp: , Rfl:     Magnesium 250 MG TABS, Take by mouth every morning, Disp: , Rfl:     olmesartan (BENICAR) 40 mg tablet, Take 1 tablet (40 mg total) by mouth daily, Disp: 90 tablet, Rfl: 1    omeprazole (PriLOSEC) 20 mg delayed release capsule, Take 1 capsule (20 mg total) by mouth daily, Disp: 90 capsule, Rfl: 1    Potassium Chloride ER 20 MEQ TBCR, Take 1 tablet (20 mEq total) by mouth daily, Disp: 90 tablet, Rfl: 1    pramipexole (MIRAPEX) 1 mg tablet, Take 1 tablet (1 mg total) by mouth 3 (three) times a day, Disp: 90 tablet, Rfl: 1    SUMAtriptan (IMITREX) 50 mg tablet, Take 1 tablet (50 mg total) by mouth once as needed for migraine, Disp: 9 tablet, Rfl: 1    timolol (TIMOPTIC) 0.5 % ophthalmic solution, instill 1 drop into right eye twice a day, Disp: , Rfl:     vitamin A 7500 UNIT capsule, Take 7,500 Units by mouth every morning  , Disp: , Rfl:     vitamin E, tocopherol, 400 units capsule, Take 400 Units by mouth every morning Last dose 4/26/17, Disp: , Rfl:     zinc gluconate 50 mg tablet, Take 50 mg by mouth every morning, Disp: , Rfl:   Allergies   Allergen Reactions    Lactose - Food Allergy GI Intolerance    Latex Itching     Elastic,adhesive tape    Dilantin [Phenytoin Sodium Extended] Rash    Other Rash     Adhesive tape, environmental    Penicillins Rash      Vitals:    02/26/24 0916   BP: 110/60   Pulse: 81   Temp: 97.8 °F (36.6 °C)   SpO2: 97%       Physical Exam  Vitals reviewed.   Constitutional:       Appearance: Normal appearance.   HENT:      Head: Normocephalic and atraumatic.      Right Ear: External ear normal.      Left Ear: External ear normal.      Nose: Nose normal.   Eyes:      Extraocular Movements: Extraocular movements intact.      Pupils: Pupils are equal, round, and reactive to light.   Cardiovascular:      Rate and Rhythm: Regular rhythm.      Pulses: Normal pulses.      Heart sounds: Normal heart sounds.   Pulmonary:      Breath sounds: Normal breath sounds.   Abdominal:      General: Abdomen is flat.      Palpations: Abdomen is soft.   Musculoskeletal:         General: Normal range of motion.      Cervical back: Normal range of motion and neck supple.   Skin:     General: Skin is warm and dry.   Neurological:      General: No focal deficit present.      Mental Status: She is alert and oriented to person, place, and time.   Psychiatric:         Mood and Affect: Mood normal.         Behavior: Behavior normal.           Results:  Labs:  Lab Results   Component Value Date    .7 (H) 01/30/2024    CALCIUM 9.7 01/30/2024    PHOS 2.9 12/09/2022       Imaging  CT  NECK  WITHOUT AND WITH CONTRAST FROM JANELL TO SKULL BASE     INDICATION: Hyperparathyroidism. Negative sestamibi.     COMPARISON:  None.     TECHNIQUE:This examination, like all CT scans performed in the [facility], was performed utilizing techniques to minimize radiation dose exposure, including the use of iterative reconstruction and automated exposure control.     Both noncontrast, contrast, and post contrast delayed images were performed according to standard parathyroid protocol (4D technique) Coronal and sagittal reconstructions were performed. 3D reconstructions were performed on an independent workstation,   and are supplied for review.     85 mL of iohexol  (OMNIPAQUE) was injected intravenously without immediate consequence.     Radiation dose: 1207.11 mGy-cm      FINDINGS:     SERIAL NONCONTRAST, POST CONTRAST AND DELAYS: There is no mass lesion demonstrated which meets the enhancement pattern suspicious for parathyroid adenoma.     VASCULAR STRUCTURES:  Diffuse atherosclerosis is present.  There is calcific plaque involving both carotid bulbs, more extensive on the left.  There is no stenosis which is significant by Nascet criteria.     VISUALIZED PARANASAL SINUSES:  Normal.     NASAL CAVITY AND NASOPHARYNX:  Normal.     SUPRAHYOID NECK:  Normal oropharynx, oral cavity, parapharyngeal and retropharyngeal spaces.     INFRAHYOID NECK:  Normal oropharynx, oral cavity, parapharyngeal and retropharyngeal spaces.     THYROID GLAND:  Diffusely heterogeneous thyroid.  Likely, nodules are present but ill-defined.  A carotid ultrasound is recommended for further evaluation.     LYMPH NODES:  No pathologic or enlarged adenopathy.     MEDIASTINUM:  Unremarkable.     BONY STRUCTURES  Diffuse osteopenia is present with severe degenerative changes in the mid cervical spine.     LUNG APICES:  Unremarkable.  Calcification is noted in the apical pleural plaques.     IMPRESSION:     1.  No lesion identified which meets the CT enhancement criteria suspicious for parathyroid adenoma.  2.  Heterogeneous thyroid.  Probable ill-defined nodules.  Recommend thyroid ultrasound for further evaluation.  3.  Diffuse osteopenia with severe degenerative changes of the mid cervical spine.                                Workstation performed: [id number]     No results found.  I reviewed the above laboratory and imaging data.    Discussion/Summary: Primary hyperparathyroidism, no obvious target on CAT scan.  We discussed management including foregoing exploration.  She is not too keen on surgery right now.  Will therefore plan on follow-up in 6 months to see how she is coming along with repeat  blood work at that time.  All questions answered and copies of reports given to her for her records.

## 2024-04-06 DIAGNOSIS — G25.81 RESTLESS LEG SYNDROME: ICD-10-CM

## 2024-04-08 RX ORDER — PRAMIPEXOLE DIHYDROCHLORIDE 1 MG/1
1 TABLET ORAL 3 TIMES DAILY
Qty: 90 TABLET | Refills: 1 | Status: SHIPPED | OUTPATIENT
Start: 2024-04-08

## 2024-04-22 DIAGNOSIS — I10 ESSENTIAL HYPERTENSION: ICD-10-CM

## 2024-04-22 DIAGNOSIS — R60.0 EDEMA OF EXTREMITIES: ICD-10-CM

## 2024-04-22 RX ORDER — FUROSEMIDE 40 MG/1
40 TABLET ORAL EVERY MORNING
Qty: 90 TABLET | Refills: 1 | Status: SHIPPED | OUTPATIENT
Start: 2024-04-22

## 2024-04-22 RX ORDER — OLMESARTAN MEDOXOMIL 40 MG/1
40 TABLET ORAL DAILY
Qty: 90 TABLET | Refills: 1 | Status: SHIPPED | OUTPATIENT
Start: 2024-04-22

## 2024-05-06 ENCOUNTER — OFFICE VISIT (OUTPATIENT)
Dept: INTERNAL MEDICINE CLINIC | Facility: CLINIC | Age: 84
End: 2024-05-06
Payer: MEDICARE

## 2024-05-06 VITALS
SYSTOLIC BLOOD PRESSURE: 124 MMHG | BODY MASS INDEX: 21.14 KG/M2 | TEMPERATURE: 97.2 F | RESPIRATION RATE: 15 BRPM | HEIGHT: 61 IN | DIASTOLIC BLOOD PRESSURE: 62 MMHG | HEART RATE: 62 BPM | WEIGHT: 112 LBS

## 2024-05-06 DIAGNOSIS — R21 GENERALIZED MACULOPAPULAR RASH: ICD-10-CM

## 2024-05-06 DIAGNOSIS — E21.3 HYPERPARATHYROIDISM (HCC): Primary | ICD-10-CM

## 2024-05-06 PROCEDURE — 99213 OFFICE O/P EST LOW 20 MIN: CPT | Performed by: INTERNAL MEDICINE

## 2024-05-06 PROCEDURE — G2211 COMPLEX E/M VISIT ADD ON: HCPCS | Performed by: INTERNAL MEDICINE

## 2024-05-06 NOTE — ASSESSMENT & PLAN NOTE
Recurrent almost 2 years now maculopapular rash extremity trunk with some itching    To be seen by dermatology

## 2024-05-06 NOTE — ASSESSMENT & PLAN NOTE
Seen by surgeon to consider parathyroidectomy for now continue surveillance and repeat blood work as follows    Awaiting repeat vitamin D calcium and PTH level asymptomatic last lab reviewed

## 2024-05-06 NOTE — PATIENT INSTRUCTIONS
Medicare Preventive Visit Patient Instructions  Thank you for completing your Welcome to Medicare Visit or Medicare Annual Wellness Visit today. Your next wellness visit will be due in one year (5/7/2025).  The screening/preventive services that you may require over the next 5-10 years are detailed below. Some tests may not apply to you based off risk factors and/or age. Screening tests ordered at today's visit but not completed yet may show as past due. Also, please note that scanned in results may not display below.  Preventive Screenings:  Service Recommendations Previous Testing/Comments   Colorectal Cancer Screening  * Colonoscopy    * Fecal Occult Blood Test (FOBT)/Fecal Immunochemical Test (FIT)  * Fecal DNA/Cologuard Test  * Flexible Sigmoidoscopy Age: 45-75 years old   Colonoscopy: every 10 years (may be performed more frequently if at higher risk)  OR  FOBT/FIT: every 1 year  OR  Cologuard: every 3 years  OR  Sigmoidoscopy: every 5 years  Screening may be recommended earlier than age 45 if at higher risk for colorectal cancer. Also, an individualized decision between you and your healthcare provider will decide whether screening between the ages of 76-85 would be appropriate. Colonoscopy: Not on file  FOBT/FIT: Not on file  Cologuard: Not on file  Sigmoidoscopy: Not on file    Screening Not Indicated     Breast Cancer Screening Age: 40+ years old  Frequency: every 1-2 years  Not required if history of left and right mastectomy Mammogram: Not on file    Risks and Benefits Discussed  Patient Declines   Cervical Cancer Screening Between the ages of 21-29, pap smear recommended once every 3 years.   Between the ages of 30-65, can perform pap smear with HPV co-testing every 5 years.   Recommendations may differ for women with a history of total hysterectomy, cervical cancer, or abnormal pap smears in past. Pap Smear: Not on file    Screening Not Indicated   Hepatitis C Screening Once for adults born between  1945 and 1965  More frequently in patients at high risk for Hepatitis C Hep C Antibody: Not on file    Risks and Benefits Discussed  Patient Declines   Diabetes Screening 1-2 times per year if you're at risk for diabetes or have pre-diabetes Fasting glucose: 98 mg/dL (1/30/2024)  A1C: No results in last 5 years (No results in last 5 years)  Screening Current   Cholesterol Screening Once every 5 years if you don't have a lipid disorder. May order more often based on risk factors. Lipid panel: 01/30/2024    Screening Not Indicated  History Lipid Disorder     Other Preventive Screenings Covered by Medicare:  Abdominal Aortic Aneurysm (AAA) Screening: covered once if your at risk. You're considered to be at risk if you have a family history of AAA.  Lung Cancer Screening: covers low dose CT scan once per year if you meet all of the following conditions: (1) Age 55-77; (2) No signs or symptoms of lung cancer; (3) Current smoker or have quit smoking within the last 15 years; (4) You have a tobacco smoking history of at least 20 pack years (packs per day multiplied by number of years you smoked); (5) You get a written order from a healthcare provider.  Glaucoma Screening: covered annually if you're considered high risk: (1) You have diabetes OR (2) Family history of glaucoma OR (3)  aged 50 and older OR (4)  American aged 65 and older  Osteoporosis Screening: covered every 2 years if you meet one of the following conditions: (1) You're estrogen deficient and at risk for osteoporosis based off medical history and other findings; (2) Have a vertebral abnormality; (3) On glucocorticoid therapy for more than 3 months; (4) Have primary hyperparathyroidism; (5) On osteoporosis medications and need to assess response to drug therapy.   Last bone density test (DXA Scan): 09/28/2022.  HIV Screening: covered annually if you're between the age of 15-65. Also covered annually if you are younger than 15 and  older than 65 with risk factors for HIV infection. For pregnant patients, it is covered up to 3 times per pregnancy.    Immunizations:  Immunization Recommendations   Influenza Vaccine Annual influenza vaccination during flu season is recommended for all persons aged >= 6 months who do not have contraindications   Pneumococcal Vaccine   * Pneumococcal conjugate vaccine = PCV13 (Prevnar 13), PCV15 (Vaxneuvance), PCV20 (Prevnar 20)  * Pneumococcal polysaccharide vaccine = PPSV23 (Pneumovax) Adults 19-65 yo with certain risk factors or if 65+ yo  If never received any pneumonia vaccine: recommend Prevnar 20 (PCV20)  Give PCV20 if previously received 1 dose of PCV13 or PPSV23   Hepatitis B Vaccine 3 dose series if at intermediate or high risk (ex: diabetes, end stage renal disease, liver disease)   Respiratory syncytial virus (RSV) Vaccine - COVERED BY MEDICARE PART D  * RSVPreF3 (Arexvy) CDC recommends that adults 60 years of age and older may receive a single dose of RSV vaccine using shared clinical decision-making (SCDM)   Tetanus (Td) Vaccine - COST NOT COVERED BY MEDICARE PART B Following completion of primary series, a booster dose should be given every 10 years to maintain immunity against tetanus. Td may also be given as tetanus wound prophylaxis.   Tdap Vaccine - COST NOT COVERED BY MEDICARE PART B Recommended at least once for all adults. For pregnant patients, recommended with each pregnancy.   Shingles Vaccine (Shingrix) - COST NOT COVERED BY MEDICARE PART B  2 shot series recommended in those 19 years and older who have or will have weakened immune systems or those 50 years and older     Health Maintenance Due:  There are no preventive care reminders to display for this patient.  Immunizations Due:      Topic Date Due   • Pneumococcal Vaccine: 65+ Years (1 of 2 - PCV) Never done   • COVID-19 Vaccine (5 - 2023-24 season) 09/01/2023     Advance Directives   What are advance directives?  Advance directives  are legal documents that state your wishes and plans for medical care. These plans are made ahead of time in case you lose your ability to make decisions for yourself. Advance directives can apply to any medical decision, such as the treatments you want, and if you want to donate organs.   What are the types of advance directives?  There are many types of advance directives, and each state has rules about how to use them. You may choose a combination of any of the following:  Living will:  This is a written record of the treatment you want. You can also choose which treatments you do not want, which to limit, and which to stop at a certain time. This includes surgery, medicine, IV fluid, and tube feedings.   Durable power of  for healthcare (DPAHC):  This is a written record that states who you want to make healthcare choices for you when you are unable to make them for yourself. This person, called a proxy, is usually a family member or a friend. You may choose more than 1 proxy.  Do not resuscitate (DNR) order:  A DNR order is used in case your heart stops beating or you stop breathing. It is a request not to have certain forms of treatment, such as CPR. A DNR order may be included in other types of advance directives.  Medical directive:  This covers the care that you want if you are in a coma, near death, or unable to make decisions for yourself. You can list the treatments you want for each condition. Treatment may include pain medicine, surgery, blood transfusions, dialysis, IV or tube feedings, and a ventilator (breathing machine).  Values history:  This document has questions about your views, beliefs, and how you feel and think about life. This information can help others choose the care that you would choose.  Why are advance directives important?  An advance directive helps you control your care. Although spoken wishes may be used, it is better to have your wishes written down. Spoken wishes can  be misunderstood, or not followed. Treatments may be given even if you do not want them. An advance directive may make it easier for your family to make difficult choices about your care.       © Copyright [employer] 2018 Information is for End User's use only and may not be sold, redistributed or otherwise used for commercial purposes. All illustrations and images included in CareNotes® are the copyrighted property of [employer] or [employer]

## 2024-05-06 NOTE — PROGRESS NOTES
Dr. Carpio's Office Visit Note  24     Belinda Dolan 83 y.o. female MRN: [id number]  : 1940    Assessment:     1. Hyperparathyroidism (HCC)  Assessment & Plan:  Seen by surgeon to consider parathyroidectomy for now continue surveillance and repeat blood work as follows    Awaiting repeat vitamin D calcium and PTH level asymptomatic last lab reviewed      2. Generalized maculopapular rash  Assessment & Plan:  Recurrent almost 2 years now maculopapular rash extremity trunk with some itching    To be seen by dermatology    Orders:  -     Ambulatory Referral to Dermatology; Future          Discussion Summary and Plan:  Today's care plan and medications were reviewed with patient in detail and all their questions answered to their satisfaction.    Chief Complaint   Patient presents with   • Follow-up     Patches of dry skin on legs - referral dermatology (ongoing for couple of years and progressively getting worse)    Swelling in ankles has been bothersome for past month      Subjective:  Came in follow-up chronic medical condition was seen by surgeon awaiting repeat PTH vitamin D calcium level    Maculopapular generalized extremity trunk to be seen by the dermatologist        The following portions of the patient's history were reviewed and updated as appropriate: allergies, current medications, past family history, past medical history, past social history, past surgical history and problem list.    Review of Systems   Constitutional:  Negative for activity change, appetite change, chills, diaphoresis, fatigue, fever and unexpected weight change.   HENT:  Negative for congestion, dental problem, drooling, ear discharge, ear pain, facial swelling, hearing loss, mouth sores, nosebleeds, postnasal drip, rhinorrhea, sinus pressure, sneezing, sore throat, tinnitus, trouble swallowing and voice change.    Eyes:  Negative for photophobia, pain, discharge, redness, itching and visual disturbance.   Respiratory:   Negative for apnea, cough, choking, chest tightness, shortness of breath, wheezing and stridor.    Cardiovascular:  Negative for chest pain, palpitations and leg swelling.   Gastrointestinal:  Negative for abdominal distention, abdominal pain, anal bleeding, blood in stool, constipation, diarrhea, nausea, rectal pain and vomiting.   Endocrine: Negative for cold intolerance, heat intolerance, polydipsia, polyphagia and polyuria.   Genitourinary:  Negative for decreased urine volume, difficulty urinating, dysuria, enuresis, flank pain, frequency, genital sores, hematuria and urgency.   Musculoskeletal:  Positive for arthralgias and back pain. Negative for joint swelling, myalgias, neck pain and neck stiffness.   Skin:  Positive for rash. Negative for color change, pallor and wound.   Allergic/Immunologic: Negative.  Negative for environmental allergies, food allergies and immunocompromised state.   Neurological:  Negative for dizziness, tremors, seizures, syncope, facial asymmetry, speech difficulty, weakness, light-headedness, numbness and headaches.   Psychiatric/Behavioral:  Negative for agitation, behavioral problems, confusion, decreased concentration, dysphoric mood, hallucinations, self-injury, sleep disturbance and suicidal ideas. The patient is not nervous/anxious and is not hyperactive.          Historical Information   Patient Active Problem List   Diagnosis   • Baker cyst, right   • Gastroesophageal reflux disease without esophagitis   • Glucose intolerance (impaired glucose tolerance)   • Restless leg syndrome   • Hypercholesteremia   • Mitral regurgitation   • Migraine   • Essential hypertension   • Varicose vein of leg   • Edema of extremities   • Rhinitis, allergic   • Aspirin intolerance   • Persistent lymphocytosis   • Hypokalemia   • Irritable bowel syndrome   • Abnormal echocardiogram   • Vitamin D deficiency   • Diverticulosis   • History of syncope   • Hammer toe of left foot   • Raynaud's  disease   • Stage 3b chronic kidney disease (HCC)   • Cervical radiculopathy   • Left arm pain   • Rupture of left distal biceps tendon   • Primary osteoarthritis involving multiple joints   • HL (hearing loss)   • Lumbar radiculopathy   • Abnormal LFTs   • Primary osteoarthritis of right knee   • 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   • Hyperparathyroidism (HCC)   • Mixed hyperlipidemia   • Generalized maculopapular rash     Past Medical History:   Diagnosis Date   • Baker's cyst of knee 10/21/2016    right- burst on its own   • 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[facility]) 1992    hx of-fell when ice skating. Noted brain bleed w/swelling   • HL (hearing loss)    • Migraine    • Raynaud's disease     both hands     Past Surgical History:   Procedure Laterality Date   • AXILLARY SURGERY Left     fatty cyst removed, benign   • CATARACT EXTRACTION Bilateral    • CATARACT EXTRACTION W/ INTRAOCULAR LENS IMPLANT Right 11/10/2016    Procedure: EXTRACTION EXTRACAPSULAR CATARACT PHACO INTRAOCULAR LENS (IOL);  Surgeon: Citlaly Villavicencio MD;  Location: [facility] MAIN OR;  Service:    • COSMETIC SURGERY      Eye lift   • DILATION AND CURETTAGE OF UTERUS     • MYRINGOTOMY W/ TUBES  2011    both ears-one tube out on the left   • MYRINGOTOMY W/ TUBES     • AR SURGICAL ARTHROSCOPY SHOULDER W/ROTATOR CUFF RPR Right 5/4/2017    Procedure: ARTHROSCOPY SHOULDER WITH ROTATOR CUFF REPAIR, BICEPS TENODESIS, AND SUBACROMIAL DECOMPRESSION;  Surgeon: Cedrick Nielson MD;  Location: [facility] MAIN OR;  Service: Orthopedics   • AR XCAPSL CTRC RMVL INSJ IO LENS PROSTH W/O ECP Left 10/20/2016    Procedure: EXTRACTION EXTRACAPSULAR CATARACT PHACO INTRAOCULAR LENS (IOL);  Surgeon: Citlaly Villavicencio MD;  Location: [facility] MAIN OR;  Service: Ophthalmology   • SKIN BIOPSY      mole on chest   • SKIN BIOPSY      under breast, benign     Social History     Substance and Sexual Activity   Alcohol Use Yes    Comment: socially     Social History     Substance and Sexual Activity    Drug Use No     Social History     Tobacco Use   Smoking Status Former   • Current packs/day: 0.00   • Average packs/day: 1.5 packs/day for 30.0 years (45.0 ttl pk-yrs)   • Types: Cigarettes   • Start date:    • Quit date:    • Years since quittin.3   Smokeless Tobacco Never     Family History   Problem Relation Age of Onset   • Heart disease Mother         exp age 64 MI   • Stroke Father    • Macular degeneration Sister    • Macular degeneration Brother    • Diabetes Brother    • Cancer Brother         kidney-nephrectomy   • Kidney disease Brother         cancer     Health Maintenance Due   Topic   • Pneumococcal Vaccine: 65+ Years (1 of 2 - PCV)   • Zoster Vaccine (1 of 2)   • COVID-19 Vaccine ( season)   • Fall Risk    • Urinary Incontinence Screening    • Medicare Annual Wellness Visit (AWV)       Meds/Allergies       Current Outpatient Medications:   •  acetaminophen (TYLENOL) 500 mg tablet, Take 1,000 mg by mouth every 6 (six) hours as needed for mild pain, Disp: , Rfl:   •  ALPHAGAN P 0.1 %, , Disp: , Rfl: 0  •  ascorbic acid (VITAMIN C) 500 mg tablet, Take 500 mg by mouth every morning, Disp: , Rfl:   •  atorvastatin (LIPITOR) 20 mg tablet, Take 1 tablet (20 mg total) by mouth daily, Disp: 90 tablet, Rfl: 2  •  Azelastine HCl 137 MCG/SPRAY SOLN, instill 1 spray into each nostril twice a day, Disp: 30 mL, Rfl: 6  •  Biotin 5 MG CAPS, Take by mouth every morning, Disp: , Rfl:   •  Cyanocobalamin (VITAMIN B 12 PO), Take by mouth every morning, Disp: , Rfl:   •  furosemide (LASIX) 40 mg tablet, take 1 tablet by mouth every morning, Disp: 90 tablet, Rfl: 1  •  ipratropium (ATROVENT) 0.06 % nasal spray, instill 2 sprays into each nostril four times a day, Disp: 15 mL, Rfl: 11  •  levocetirizine (XYZAL) 5 MG tablet, Take 1 tablet (5 mg total) by mouth every evening, Disp: 90 tablet, Rfl: 1  •  Lutein 40 MG CAPS, Take by mouth every morning, Disp: , Rfl:   •  Magnesium 250 MG TABS, Take by  "mouth every morning, Disp: , Rfl:   •  olmesartan (BENICAR) 40 mg tablet, take 1 tablet by mouth once daily, Disp: 90 tablet, Rfl: 1  •  omeprazole (PriLOSEC) 20 mg delayed release capsule, Take 1 capsule (20 mg total) by mouth daily, Disp: 90 capsule, Rfl: 1  •  Potassium Chloride ER 20 MEQ TBCR, Take 1 tablet (20 mEq total) by mouth daily, Disp: 90 tablet, Rfl: 1  •  pramipexole (MIRAPEX) 1 mg tablet, take 1 tablet by mouth three times a day, Disp: 90 tablet, Rfl: 1  •  SUMAtriptan (IMITREX) 50 mg tablet, Take 1 tablet (50 mg total) by mouth once as needed for migraine, Disp: 9 tablet, Rfl: 1  •  timolol (TIMOPTIC) 0.5 % ophthalmic solution, instill 1 drop into right eye twice a day, Disp: , Rfl:   •  vitamin A 7500 UNIT capsule, Take 7,500 Units by mouth every morning  , Disp: , Rfl:   •  vitamin E, tocopherol, 400 units capsule, Take 400 Units by mouth every morning Last dose 4/26/17, Disp: , Rfl:   •  zinc gluconate 50 mg tablet, Take 50 mg by mouth every morning, Disp: , Rfl:       Objective:    Vitals:   /62   Pulse 62   Temp (!) 97.2 °F (36.2 °C)   Resp 15   Ht 5' 1\" (1.549 m)   Wt 50.8 kg (112 lb)   BMI 21.16 kg/m²   Body mass index is 21.16 kg/m².  Vitals:    05/06/24 0909   Weight: 50.8 kg (112 lb)       Physical Exam  Vitals and nursing note reviewed.   Constitutional:       General: She is not in acute distress.     Appearance: She is well-developed. She is not ill-appearing, toxic-appearing or diaphoretic.   HENT:      Head: Normocephalic and atraumatic.      Right Ear: External ear normal.      Left Ear: External ear normal.      Mouth/Throat:      Pharynx: No oropharyngeal exudate.   Eyes:      General: Lids are normal. Lids are everted, no foreign bodies appreciated. No scleral icterus.        Right eye: No discharge.         Left eye: No discharge.      Conjunctiva/sclera: Conjunctivae normal.      Pupils: Pupils are equal, round, and reactive to light.   Neck:      Thyroid: No " thyromegaly.      Vascular: Normal carotid pulses. No carotid bruit, hepatojugular reflux or JVD.      Trachea: No tracheal tenderness or tracheal deviation.   Cardiovascular:      Rate and Rhythm: Normal rate and regular rhythm.      Pulses: Normal pulses.      Heart sounds: Murmur heard.      No friction rub. No gallop.   Pulmonary:      Effort: Pulmonary effort is normal. No respiratory distress.      Breath sounds: No stridor. No wheezing or rales.   Chest:      Chest wall: No tenderness.   Abdominal:      General: Bowel sounds are normal. There is no distension.      Palpations: Abdomen is soft. There is no mass.      Tenderness: There is no abdominal tenderness. There is no guarding or rebound.   Musculoskeletal:         General: No tenderness or deformity. Normal range of motion.      Cervical back: Normal range of motion and neck supple. No edema, erythema or rigidity. No spinous process tenderness or muscular tenderness. Normal range of motion.   Lymphadenopathy:      Head:      Right side of head: No submental, submandibular, tonsillar, preauricular or posterior auricular adenopathy.      Left side of head: No submental, submandibular, tonsillar, preauricular, posterior auricular or occipital adenopathy.      Cervical: No cervical adenopathy.      Right cervical: No superficial, deep or posterior cervical adenopathy.     Left cervical: No superficial, deep or posterior cervical adenopathy.      Upper Body:      Right upper body: No pectoral adenopathy.      Left upper body: No pectoral adenopathy.   Skin:     General: Skin is warm and dry.      Coloration: Skin is not pale.      Findings: Rash present. No erythema.   Neurological:      General: No focal deficit present.      Mental Status: She is alert and oriented to person, place, and time.      Cranial Nerves: No cranial nerve deficit.      Sensory: No sensory deficit.      Motor: No tremor, abnormal muscle tone or seizure activity.      Coordination:  Coordination normal.      Gait: Gait normal.      Deep Tendon Reflexes: Reflexes are normal and symmetric. Reflexes normal.   Psychiatric:         Behavior: Behavior normal.         Thought Content: Thought content normal.         Judgment: Judgment normal.         Lab Review   No visits with results within 2 Month(s) from this visit.   Latest known visit with results is:   Appointment on 01/30/2024   Component Date Value Ref Range Status   • PTH 01/30/2024 116.7 (H)  12.0 - 88.0 pg/mL Final   • 25-HYDROXY VIT D 01/30/2024 54  ng/mL Final    Reference Range:  All Ages: Target levels 30 - 100   • 25-Hydroxy D2 01/30/2024 <1.0  ng/mL Final    This test was developed and its performance characteristics  determined by [facility]. It has not been cleared or approved  by the Food and Drug Administration.   • 25-HYDROXY VIT D3 01/30/2024 54  ng/mL Final    This test was developed and its performance characteristics  determined by [facility]. It has not been cleared or approved  by the Food and Drug Administration.   • Sodium 01/30/2024 139  135 - 147 mmol/L Final   • Potassium 01/30/2024 4.3  3.5 - 5.3 mmol/L Final   • Chloride 01/30/2024 105  96 - 108 mmol/L Final   • CO2 01/30/2024 27  21 - 32 mmol/L Final   • ANION GAP 01/30/2024 7  mmol/L Final   • BUN 01/30/2024 22  5 - 25 mg/dL Final   • Creatinine 01/30/2024 0.97  0.60 - 1.30 mg/dL Final    Standardized to IDMS reference method   • Glucose, Fasting 01/30/2024 98  65 - 99 mg/dL Final   • Calcium 01/30/2024 9.7  8.4 - 10.2 mg/dL Final   • AST 01/30/2024 15  13 - 39 U/L Final   • ALT 01/30/2024 12  7 - 52 U/L Final    Specimen collection should occur prior to Sulfasalazine administration due to the potential for falsely depressed results.    • Alkaline Phosphatase 01/30/2024 69  34 - 104 U/L Final   • Total Protein 01/30/2024 6.5  6.4 - 8.4 g/dL Final   • Albumin 01/30/2024 3.9  3.5 - 5.0 g/dL Final   • Total Bilirubin 01/30/2024 0.85  0.20 - 1.00 mg/dL Final    Use of this  assay is not recommended for patients undergoing treatment with eltrombopag due to the potential for falsely elevated results.  N-acetyl-p-benzoquinone imine (metabolite of Acetaminophen) will generate erroneously low results in samples for patients that have taken an overdose of Acetaminophen.   • eGFR 01/30/2024 53  ml/min/1.73sq m Final   • WBC 01/30/2024 6.82  4.31 - 10.16 Thousand/uL Final   • RBC 01/30/2024 4.24  3.81 - 5.12 Million/uL Final   • Hemoglobin 01/30/2024 13.4  11.5 - 15.4 g/dL Final   • Hematocrit 01/30/2024 40.2  34.8 - 46.1 % Final   • MCV 01/30/2024 95  82 - 98 fL Final   • MCH 01/30/2024 31.6  26.8 - 34.3 pg Final   • MCHC 01/30/2024 33.3  31.4 - 37.4 g/dL Final   • RDW 01/30/2024 12.7  11.6 - 15.1 % Final   • MPV 01/30/2024 11.7  8.9 - 12.7 fL Final   • Platelets 01/30/2024 200  149 - 390 Thousands/uL Final   • nRBC 01/30/2024 0  /100 WBCs Final   • Segmented % 01/30/2024 46  43 - 75 % Final   • Immature Grans % 01/30/2024 0  0 - 2 % Final   • Lymphocytes % 01/30/2024 43  14 - 44 % Final   • Monocytes % 01/30/2024 8  4 - 12 % Final   • Eosinophils Relative 01/30/2024 2  0 - 6 % Final   • Basophils Relative 01/30/2024 1  0 - 1 % Final   • Absolute Neutrophils 01/30/2024 3.10  1.85 - 7.62 Thousands/µL Final   • Absolute Immature Grans 01/30/2024 0.02  0.00 - 0.20 Thousand/uL Final   • Absolute Lymphocytes 01/30/2024 2.95  0.60 - 4.47 Thousands/µL Final   • Absolute Monocytes 01/30/2024 0.54  0.17 - 1.22 Thousand/µL Final   • Eosinophils Absolute 01/30/2024 0.15  0.00 - 0.61 Thousand/µL Final   • Basophils Absolute 01/30/2024 0.06  0.00 - 0.10 Thousands/µL Final   • Cholesterol 01/30/2024 216 (H)  See Comment mg/dL Final    Cholesterol:         Pediatric <18 Years        Desirable          <170 mg/dL      Borderline High    170-199 mg/dL      High               >=200 mg/dL        Adult >=18 Years            Desirable         <200 mg/dL      Borderline High   200-239 mg/dL      High               >239 mg/dL     • Triglycerides 01/30/2024 93  See Comment mg/dL Final    Triglyceride:     0-9Y            <75mg/dL     10Y-17Y         <90 mg/dL       >=18Y     Normal          <150 mg/dL     Borderline High 150-199 mg/dL     High            200-499 mg/dL        Very High       >499 mg/dL    Specimen collection should occur prior to Metamizole administration due to the potential for falsely depressed results.   • HDL, Direct 01/30/2024 51  >=50 mg/dL Final   • LDL Calculated 01/30/2024 146 (H)  0 - 100 mg/dL Final    LDL Cholesterol:     Optimal           <100 mg/dl     Near Optimal      100-129 mg/dl     Above Optimal       Borderline High 130-159 mg/dl       High            160-189 mg/dl       Very High       >189 mg/dl         This screening LDL is a calculated result.   It does not have the accuracy of the Direct Measured LDL in the monitoring of patients with hyperlipidemia and/or statin therapy.   Direct Measure LDL (BFC996) must be ordered separately in these patients.   • Non-HDL-Chol (CHOL-HDL) 01/30/2024 165  mg/dl Final         Patient Instructions   Medicare Preventive Visit Patient Instructions  Thank you for completing your Welcome to Medicare Visit or Medicare Annual Wellness Visit today. Your next wellness visit will be due in one year (5/7/2025).  The screening/preventive services that you may require over the next 5-10 years are detailed below. Some tests may not apply to you based off risk factors and/or age. Screening tests ordered at today's visit but not completed yet may show as past due. Also, please note that scanned in results may not display below.  Preventive Screenings:  Service Recommendations Previous Testing/Comments   Colorectal Cancer Screening  * Colonoscopy    * Fecal Occult Blood Test (FOBT)/Fecal Immunochemical Test (FIT)  * Fecal DNA/Cologuard Test  * Flexible Sigmoidoscopy Age: 45-75 years old   Colonoscopy: every 10 years (may be performed more frequently if at higher risk)   OR  FOBT/FIT: every 1 year  OR  Cologuard: every 3 years  OR  Sigmoidoscopy: every 5 years  Screening may be recommended earlier than age 45 if at higher risk for colorectal cancer. Also, an individualized decision between you and your healthcare provider will decide whether screening between the ages of 76-85 would be appropriate. Colonoscopy: Not on file  FOBT/FIT: Not on file  Cologuard: Not on file  Sigmoidoscopy: Not on file    Screening Not Indicated     Breast Cancer Screening Age: 40+ years old  Frequency: every 1-2 years  Not required if history of left and right mastectomy Mammogram: Not on file    Risks and Benefits Discussed  Patient Declines   Cervical Cancer Screening Between the ages of 21-29, pap smear recommended once every 3 years.   Between the ages of 30-65, can perform pap smear with HPV co-testing every 5 years.   Recommendations may differ for women with a history of total hysterectomy, cervical cancer, or abnormal pap smears in past. Pap Smear: Not on file    Screening Not Indicated   Hepatitis C Screening Once for adults born between 1945 and 1965  More frequently in patients at high risk for Hepatitis C Hep C Antibody: Not on file    Risks and Benefits Discussed  Patient Declines   Diabetes Screening 1-2 times per year if you're at risk for diabetes or have pre-diabetes Fasting glucose: 98 mg/dL (1/30/2024)  A1C: No results in last 5 years (No results in last 5 years)  Screening Current   Cholesterol Screening Once every 5 years if you don't have a lipid disorder. May order more often based on risk factors. Lipid panel: 01/30/2024    Screening Not Indicated  History Lipid Disorder     Other Preventive Screenings Covered by Medicare:  Abdominal Aortic Aneurysm (AAA) Screening: covered once if your at risk. You're considered to be at risk if you have a family history of AAA.  Lung Cancer Screening: covers low dose CT scan once per year if you meet all of the following conditions: (1) Age  55-77; (2) No signs or symptoms of lung cancer; (3) Current smoker or have quit smoking within the last 15 years; (4) You have a tobacco smoking history of at least 20 pack years (packs per day multiplied by number of years you smoked); (5) You get a written order from a healthcare provider.  Glaucoma Screening: covered annually if you're considered high risk: (1) You have diabetes OR (2) Family history of glaucoma OR (3)  aged 50 and older OR (4)  American aged 65 and older  Osteoporosis Screening: covered every 2 years if you meet one of the following conditions: (1) You're estrogen deficient and at risk for osteoporosis based off medical history and other findings; (2) Have a vertebral abnormality; (3) On glucocorticoid therapy for more than 3 months; (4) Have primary hyperparathyroidism; (5) On osteoporosis medications and need to assess response to drug therapy.   Last bone density test (DXA Scan): 09/28/2022.  HIV Screening: covered annually if you're between the age of 15-65. Also covered annually if you are younger than 15 and older than 65 with risk factors for HIV infection. For pregnant patients, it is covered up to 3 times per pregnancy.    Immunizations:  Immunization Recommendations   Influenza Vaccine Annual influenza vaccination during flu season is recommended for all persons aged >= 6 months who do not have contraindications   Pneumococcal Vaccine   * Pneumococcal conjugate vaccine = PCV13 (Prevnar 13), PCV15 (Vaxneuvance), PCV20 (Prevnar 20)  * Pneumococcal polysaccharide vaccine = PPSV23 (Pneumovax) Adults 19-65 yo with certain risk factors or if 65+ yo  If never received any pneumonia vaccine: recommend Prevnar 20 (PCV20)  Give PCV20 if previously received 1 dose of PCV13 or PPSV23   Hepatitis B Vaccine 3 dose series if at intermediate or high risk (ex: diabetes, end stage renal disease, liver disease)   Respiratory syncytial virus (RSV) Vaccine - COVERED BY MEDICARE PART  D  * RSVPreF3 (Arexvy) CDC recommends that adults 60 years of age and older may receive a single dose of RSV vaccine using shared clinical decision-making (SCDM)   Tetanus (Td) Vaccine - COST NOT COVERED BY MEDICARE PART B Following completion of primary series, a booster dose should be given every 10 years to maintain immunity against tetanus. Td may also be given as tetanus wound prophylaxis.   Tdap Vaccine - COST NOT COVERED BY MEDICARE PART B Recommended at least once for all adults. For pregnant patients, recommended with each pregnancy.   Shingles Vaccine (Shingrix) - COST NOT COVERED BY MEDICARE PART B  2 shot series recommended in those 19 years and older who have or will have weakened immune systems or those 50 years and older     Health Maintenance Due:  There are no preventive care reminders to display for this patient.  Immunizations Due:      Topic Date Due   • Pneumococcal Vaccine: 65+ Years (1 of 2 - PCV) Never done   • COVID-19 Vaccine (5 - 2023-24 season) 09/01/2023     Advance Directives   What are advance directives?  Advance directives are legal documents that state your wishes and plans for medical care. These plans are made ahead of time in case you lose your ability to make decisions for yourself. Advance directives can apply to any medical decision, such as the treatments you want, and if you want to donate organs.   What are the types of advance directives?  There are many types of advance directives, and each state has rules about how to use them. You may choose a combination of any of the following:  Living will:  This is a written record of the treatment you want. You can also choose which treatments you do not want, which to limit, and which to stop at a certain time. This includes surgery, medicine, IV fluid, and tube feedings.   Durable power of  for healthcare (DPAHC):  This is a written record that states who you want to make healthcare choices for you when you are unable to  "make them for yourself. This person, called a proxy, is usually a family member or a friend. You may choose more than 1 proxy.  Do not resuscitate (DNR) order:  A DNR order is used in case your heart stops beating or you stop breathing. It is a request not to have certain forms of treatment, such as CPR. A DNR order may be included in other types of advance directives.  Medical directive:  This covers the care that you want if you are in a coma, near death, or unable to make decisions for yourself. You can list the treatments you want for each condition. Treatment may include pain medicine, surgery, blood transfusions, dialysis, IV or tube feedings, and a ventilator (breathing machine).  Values history:  This document has questions about your views, beliefs, and how you feel and think about life. This information can help others choose the care that you would choose.  Why are advance directives important?  An advance directive helps you control your care. Although spoken wishes may be used, it is better to have your wishes written down. Spoken wishes can be misunderstood, or not followed. Treatments may be given even if you do not want them. An advance directive may make it easier for your family to make difficult choices about your care.       © Copyright [employer] 2018 Information is for End User's use only and may not be sold, redistributed or otherwise used for commercial purposes. All illustrations and images included in CareNotes® are the copyrighted property of [employer] or [employer]          Maida Carpio MD        \"This note has been constructed using a voice recognition system.Therefore there may be syntax, spelling, and/or grammatical errors. Please call if you have any questions. \"  "

## 2024-05-06 NOTE — PROGRESS NOTES
Assessment and Plan:     Problem List Items Addressed This Visit    None       Preventive health issues were discussed with patient, and age appropriate screening tests were ordered as noted in patient's After Visit Summary.  Personalized health advice and appropriate referrals for health education or preventive services given if needed, as noted in patient's After Visit Summary.     History of Present Illness:     Patient presents for a Medicare Wellness Visit    HPI   Patient Care Team:  Maida Carpio MD as PCP - General (Internal Medicine)  Eric Barrios MD (Oncology)  Owen Ye MD (Otolaryngology)     Review of Systems:     Review of Systems     Problem List:     Patient Active Problem List   Diagnosis    Baker cyst, right    Gastroesophageal reflux disease without esophagitis    Glucose intolerance (impaired glucose tolerance)    Restless leg syndrome    Hypercholesteremia    Mitral regurgitation    Migraine    Essential hypertension    Varicose vein of leg    Edema of extremities    Rhinitis, allergic    Aspirin intolerance    Persistent lymphocytosis    Hypokalemia    Irritable bowel syndrome    Abnormal echocardiogram    Vitamin D deficiency    Diverticulosis    History of syncope    Hammer toe of left foot    Raynaud's disease    Stage 3b chronic kidney disease (HCC)    Cervical radiculopathy    Left arm pain    Rupture of left distal biceps tendon    Primary osteoarthritis involving multiple joints    HL (hearing loss)    Lumbar radiculopathy    Abnormal LFTs    Primary osteoarthritis of right knee    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 Noted brain bleed w/swelling    HL (hearing loss)     Migraine     Raynaud's disease      both hands     Past Surgical History:   Procedure Laterality Date    AXILLARY SURGERY Left     fatty cyst removed, benign    CATARACT EXTRACTION Bilateral     CATARACT EXTRACTION W/ INTRAOCULAR LENS IMPLANT Right 11/10/2016    Procedure: EXTRACTION EXTRACAPSULAR CATARACT PHACO INTRAOCULAR LENS (IOL);  Surgeon: Citlaly Villavicencio MD;  Location: [facility] MAIN OR;  Service:     COSMETIC SURGERY      Eye lift    DILATION AND CURETTAGE OF UTERUS      MYRINGOTOMY W/ TUBES  2011    both ears-one tube out on the left    MYRINGOTOMY W/ TUBES      PA SURGICAL ARTHROSCOPY SHOULDER W/ROTATOR CUFF RPR Right 2017    Procedure: ARTHROSCOPY SHOULDER WITH ROTATOR CUFF REPAIR, BICEPS TENODESIS, AND SUBACROMIAL DECOMPRESSION;  Surgeon: Cedrick Nielson MD;  Location: [facility] MAIN OR;  Service: Orthopedics    PA XCAPSL CTRC RMVL INSJ IO LENS PROSTH W/O ECP Left 10/20/2016    Procedure: EXTRACTION EXTRACAPSULAR CATARACT PHACO INTRAOCULAR LENS (IOL);  Surgeon: Citlaly Villavicencio MD;  Location: [facility] MAIN OR;  Service: Ophthalmology    SKIN BIOPSY      mole on chest    SKIN BIOPSY      under breast, benign      Family History:     Family History   Problem Relation Age of Onset    Heart disease Mother         exp age 64 MI    Stroke Father     Macular degeneration Sister     Macular degeneration Brother     Diabetes Brother     Cancer Brother         kidney-nephrectomy    Kidney disease Brother         cancer      Social History:     Social History     Socioeconomic History    Marital status:      Spouse name: Not on file    Number of children: Not on file    Years of education: Not on file    Highest education level: Not on file   Occupational History    Not on file   Tobacco Use    Smoking status: Former     Current packs/day: 0.00     Average packs/day: 1.5 packs/day for 30.0 years (45.0 ttl pk-yrs)     Types: Cigarettes     Start date:      Quit date:      Years since quittin.3    Smokeless tobacco: Never   Vaping Use     Vaping status: Never Used   Substance and Sexual Activity    Alcohol use: Yes     Comment: socially    Drug use: No    Sexual activity: Not on file   Other Topics Concern    Not on file   Social History Narrative    Not on file     Social Determinants of Health     Financial Resource Strain: Low Risk  (4/24/2023)    Overall Financial Resource Strain (CARDIA)     Difficulty of Paying Living Expenses: Not hard at all   Food Insecurity: Not on file   Transportation Needs: No Transportation Needs (4/24/2023)    PRAPARE - Transportation     Lack of Transportation (Medical): No     Lack of Transportation (Non-Medical): No   Physical Activity: Not on file   Stress: Not on file   Social Connections: Not on file   Intimate Partner Violence: Not on file   Housing Stability: Not on file      Medications and Allergies:     Current Outpatient Medications   Medication Sig Dispense Refill    acetaminophen (TYLENOL) 500 mg tablet Take 1,000 mg by mouth every 6 (six) hours as needed for mild pain      ALPHAGAN P 0.1 %   0    ascorbic acid (VITAMIN C) 500 mg tablet Take 500 mg by mouth every morning      atorvastatin (LIPITOR) 20 mg tablet Take 1 tablet (20 mg total) by mouth daily 90 tablet 2    Azelastine HCl 137 MCG/SPRAY SOLN instill 1 spray into each nostril twice a day 30 mL 6    Biotin 5 MG CAPS Take by mouth every morning      Cyanocobalamin (VITAMIN B 12 PO) Take by mouth every morning      furosemide (LASIX) 40 mg tablet take 1 tablet by mouth every morning 90 tablet 1    ipratropium (ATROVENT) 0.06 % nasal spray instill 2 sprays into each nostril four times a day 15 mL 11    levocetirizine (XYZAL) 5 MG tablet Take 1 tablet (5 mg total) by mouth every evening 90 tablet 1    Lutein 40 MG CAPS Take by mouth every morning      Magnesium 250 MG TABS Take by mouth every morning      olmesartan (BENICAR) 40 mg tablet take 1 tablet by mouth once daily 90 tablet 1    omeprazole (PriLOSEC) 20 mg delayed release capsule Take 1  capsule (20 mg total) by mouth daily 90 capsule 1    Potassium Chloride ER 20 MEQ TBCR Take 1 tablet (20 mEq total) by mouth daily 90 tablet 1    pramipexole (MIRAPEX) 1 mg tablet take 1 tablet by mouth three times a day 90 tablet 1    SUMAtriptan (IMITREX) 50 mg tablet Take 1 tablet (50 mg total) by mouth once as needed for migraine 9 tablet 1    timolol (TIMOPTIC) 0.5 % ophthalmic solution instill 1 drop into right eye twice a day      vitamin A 7500 UNIT capsule Take 7,500 Units by mouth every morning        vitamin E, tocopherol, 400 units capsule Take 400 Units by mouth every morning Last dose 4/26/17      zinc gluconate 50 mg tablet Take 50 mg by mouth every morning       No current facility-administered medications for this visit.     Allergies   Allergen Reactions    Lactose - Food Allergy GI Intolerance    Latex Itching     Elastic,adhesive tape    Dilantin [Phenytoin Sodium Extended] Rash    Other Rash     Adhesive tape, environmental    Penicillins Rash      Immunizations:     Immunization History   Administered Date(s) Administered    COVID-19 MODERNA VACC 0.25 ML IM BOOSTER 12/10/2021    COVID-19 MODERNA VACC 0.5 ML IM 12/10/2021    COVID-19 PFIZER VACCINE 0.3 ML IM 01/21/2021, 02/11/2021    Influenza, high dose seasonal 0.7 mL 12/01/2020, 10/18/2021    Influenza, injectable, quadrivalent, preservative free 0.5 mL 10/31/2019      Health Maintenance:     There are no preventive care reminders to display for this patient.      Topic Date Due    Pneumococcal Vaccine: 65+ Years (1 of 2 - PCV) Never done    COVID-19 Vaccine (5 - 2023-24 season) 09/01/2023      Medicare Screening Tests and Risk Assessments:     Belinda is here for her Subsequent Wellness visit. Last Medicare Wellness visit information reviewed, patient interviewed and updates made to the record today.      Health Risk Assessment:   Patient rates overall health as good. Patient feels that their physical health rating is same. Patient is  satisfied with their life. Eyesight was rated as slightly worse. Hearing was rated as same. Patient feels that their emotional and mental health rating is same. Patients states they are never, rarely angry. Patient states they are sometimes unusually tired/fatigued. Pain experienced in the last 7 days has been none. Patient states that she has experienced no weight loss or gain in last 6 months. Gets tired tired at the end of the day. Hearing is ok. Anger not a problem. Weight stays stable.    Fall Risk Screening:   In the past year, patient has experienced: no history of falling in past year      Urinary Incontinence Screening:   Patient has not leaked urine accidently in the last six months. No issues.    Home Safety:  Patient does not have trouble with stairs inside or outside of their home. Patient has working smoke alarms and has working carbon monoxide detector. Home safety hazards include: none. No home hazrads. No issues with stairs. Runs stairs for cardio. Pt feels safe in her home. Lives with .    Nutrition:   Current diet is Regular. Balanced. Eats fruits and veggies and proteins.    Medications:   Patient is currently taking over-the-counter supplements. OTC medications include: see medication list. Patient is able to manage medications. Has no issues with meds. Does not take opiods.    Activities of Daily Living (ADLs)/Instrumental Activities of Daily Living (IADLs):   Walk and transfer into and out of bed and chair?: Yes  Dress and groom yourself?: Yes    Bathe or shower yourself?: Yes    Feed yourself? Yes  Do your laundry/housekeeping?: Yes  Manage your money, pay your bills and track your expenses?: Yes  Make your own meals?: Yes    Do your own shopping?: Yes    ADL comments: She still works full time. Drives and is independent.    Previous Hospitalizations:   Any hospitalizations or ED visits within the last 12 months?: No      Hospitalization Comments: Chronic Conditions have been  stable.    Advance Care Planning:   Living will: No    Durable POA for healthcare: No    Advanced directive: No    Advanced directive counseling given: Yes    Five wishes given: No    Patient declined ACP directive: No    End of Life Decisions reviewed with patient: Yes    Provider agrees with end of life decisions: Yes      Comments: Will send pt a POLST. She will review and bring to next visit.    Cognitive Screening:   Provider or family/friend/caregiver concerned regarding cognition?: No    PREVENTIVE SCREENINGS      Cardiovascular Screening:    General: Screening Not Indicated and History Lipid Disorder      Diabetes Screening:     General: Screening Current      Colorectal Cancer Screening:     General: Screening Not Indicated      Breast Cancer Screening:     General: Risks and Benefits Discussed and Patient Declines      Cervical Cancer Screening:    General: Screening Not Indicated      Osteoporosis Screening:    General: Screening Not Indicated and History Osteoporosis      Abdominal Aortic Aneurysm (AAA) Screening:        General: Screening Not Indicated and Risks and Benefits Discussed      Lung Cancer Screening:     General: Screening Not Indicated      Hepatitis C Screening:    General: Risks and Benefits Discussed and Patient Declines    Hep C Screening Accepted: No       Preventive Screening Comments: UTD with flu and Covid. Rec Prevnar. Sees eye doctor regularly. No longer wants CRC or mammogram.    Screening, Brief Intervention, and Referral to Treatment (SBIRT)    Screening  Typical number of drinks in a day: 1  Typical number of drinks in a week: 5  Interpretation: Low risk drinking behavior.    Single Item Drug Screening:  How often have you used an illegal drug (including marijuana) or a prescription medication for non-medical reasons in the past year? never    Single Item Drug Screen Score: 0  Interpretation: Negative screen for possible drug use disorder    Brief Intervention  Healthy alcohol  "use/limits discussed.     Other Counseling Topics:   Skin self-exam.     No results found.     Physical Exam:     Ht 5' 1\" (1.549 m)   BMI 20.60 kg/m²     Physical Exam     Maida Carpio MD  "

## 2024-05-28 ENCOUNTER — OFFICE VISIT (OUTPATIENT)
Dept: INTERNAL MEDICINE CLINIC | Facility: CLINIC | Age: 84
End: 2024-05-28
Payer: MEDICARE

## 2024-05-28 VITALS
HEIGHT: 61 IN | BODY MASS INDEX: 20.96 KG/M2 | SYSTOLIC BLOOD PRESSURE: 130 MMHG | HEART RATE: 64 BPM | TEMPERATURE: 98.1 F | DIASTOLIC BLOOD PRESSURE: 72 MMHG | RESPIRATION RATE: 17 BRPM | WEIGHT: 111 LBS

## 2024-05-28 DIAGNOSIS — J20.9 ACUTE INFECTIVE TRACHEOBRONCHITIS: Primary | ICD-10-CM

## 2024-05-28 DIAGNOSIS — I10 ESSENTIAL HYPERTENSION: ICD-10-CM

## 2024-05-28 DIAGNOSIS — R05.9 COUGH, UNSPECIFIED TYPE: ICD-10-CM

## 2024-05-28 LAB
SARS-COV-2 AG UPPER RESP QL IA: NEGATIVE
VALID CONTROL: NORMAL

## 2024-05-28 PROCEDURE — 87811 SARS-COV-2 COVID19 W/OPTIC: CPT | Performed by: INTERNAL MEDICINE

## 2024-05-28 PROCEDURE — G2211 COMPLEX E/M VISIT ADD ON: HCPCS | Performed by: INTERNAL MEDICINE

## 2024-05-28 PROCEDURE — 99213 OFFICE O/P EST LOW 20 MIN: CPT | Performed by: INTERNAL MEDICINE

## 2024-05-28 RX ORDER — METHYLPREDNISOLONE 4 MG/1
TABLET ORAL
Qty: 21 EACH | Refills: 0 | Status: SHIPPED | OUTPATIENT
Start: 2024-05-28

## 2024-05-28 RX ORDER — AZITHROMYCIN 250 MG/1
TABLET, FILM COATED ORAL
Qty: 6 TABLET | Refills: 0 | Status: SHIPPED | OUTPATIENT
Start: 2024-05-28 | End: 2024-06-02

## 2024-05-28 RX ORDER — TRIAMCINOLONE ACETONIDE 1 MG/G
CREAM TOPICAL
COMMUNITY
Start: 2024-05-16

## 2024-05-28 RX ORDER — CODEINE PHOSPHATE/GUAIFENESIN 10-100MG/5
10 LIQUID (ML) ORAL 4 TIMES DAILY PRN
Qty: 180 ML | Refills: 0 | Status: SHIPPED | OUTPATIENT
Start: 2024-05-28

## 2024-05-28 NOTE — ASSESSMENT & PLAN NOTE
Patient asymptomatic    Blood pressure very well-controlled    Agree and continue management medication as follows    Blood pressure controlled continue low-salt diet Benicar

## 2024-05-28 NOTE — PROGRESS NOTES
Dr. Carpio's Office Visit Note  24     Belinda Dolan 83 y.o. female MRN: [id number]  : 1940    Assessment:     1. Acute infective tracheobronchitis  Assessment & Plan:  Patient evaluated for coughing scanty yellow sputum for 5 days symptoms started a week ago with a scratchy sore throat with some sore throat nasal congestion followed by coughing about 5 days ago now for last 3 days been having some nasal congestion denies any high fever no difficulty breathing or chest pain but intractable coughing    COVID tested COVID-19 negative    Will treat as follows    Zithromax to use as directed    Medrol Dosepak to use as directed    Continue Atrovent nasal spray exam is    Cough medicine with codeine instructed as to use  Orders:  -     POCT Rapid Covid Ag  -     azithromycin (Zithromax) 250 mg tablet; Take 2 tablets (500 mg total) by mouth daily for 1 day, THEN 1 tablet (250 mg total) daily for 4 days.  -     methylPREDNISolone 4 MG tablet therapy pack; Use as directed on package  -     guaifenesin-codeine (GUAIFENESIN AC) 100-10 MG/5ML liquid; Take 10 mL by mouth 4 (four) times a day as needed for cough  2. Cough, unspecified type  -     POCT Rapid Covid Ag  3. Essential hypertension  Assessment & Plan:  Patient asymptomatic    Blood pressure very well-controlled    Agree and continue management medication as follows    Blood pressure controlled continue low-salt diet Benicar        Discussion Summary and Plan:  Today's care plan and medications were reviewed with patient in detail and all their questions answered to their satisfaction.    Chief Complaint   Patient presents with   • URI     productive cough with clear phlegm, congestion, headache, runny nose, symptoms began 1 week ago    Wheezing and clicking in chest       Subjective:  Patient evaluated for coughing scanty yellow sputum for 5 days symptoms started a week ago with a scratchy sore throat with some sore throat nasal congestion followed by  coughing about 5 days ago now for last 3 days been having some nasal congestion denies any high fever no difficulty breathing or chest pain but persistent intractable coughing    Denies any nausea vomiting diarrhea    URI   Associated symptoms include coughing and a rash. Pertinent negatives include no abdominal pain, chest pain, congestion, diarrhea, dysuria, ear pain, headaches, nausea, neck pain, rhinorrhea, sneezing, sore throat, vomiting or wheezing.       The following portions of the patient's history were reviewed and updated as appropriate: allergies, current medications, past family history, past medical history, past social history, past surgical history and problem list.    Review of Systems   Constitutional:  Positive for activity change. Negative for appetite change, chills, diaphoresis, fatigue, fever and unexpected weight change.   HENT:  Negative for congestion, dental problem, drooling, ear discharge, ear pain, facial swelling, hearing loss, mouth sores, nosebleeds, postnasal drip, rhinorrhea, sinus pressure, sneezing, sore throat, tinnitus, trouble swallowing and voice change.    Eyes:  Negative for photophobia, pain, discharge, redness, itching and visual disturbance.   Respiratory:  Positive for cough. Negative for apnea, choking, chest tightness, shortness of breath, wheezing and stridor.    Cardiovascular:  Negative for chest pain, palpitations and leg swelling.   Gastrointestinal:  Negative for abdominal distention, abdominal pain, anal bleeding, blood in stool, constipation, diarrhea, nausea, rectal pain and vomiting.   Endocrine: Negative for cold intolerance, heat intolerance, polydipsia, polyphagia and polyuria.   Genitourinary:  Negative for decreased urine volume, difficulty urinating, dysuria, enuresis, flank pain, frequency, genital sores, hematuria and urgency.   Musculoskeletal:  Positive for arthralgias and back pain. Negative for joint swelling, myalgias, neck pain and neck  stiffness.   Skin:  Positive for rash. Negative for color change, pallor and wound.   Allergic/Immunologic: Negative.  Negative for environmental allergies, food allergies and immunocompromised state.   Neurological:  Negative for dizziness, tremors, seizures, syncope, facial asymmetry, speech difficulty, weakness, light-headedness, numbness and headaches.   Psychiatric/Behavioral:  Negative for agitation, behavioral problems, confusion, decreased concentration, dysphoric mood, hallucinations, self-injury, sleep disturbance and suicidal ideas. The patient is not nervous/anxious and is not hyperactive.          Historical Information   Patient Active Problem List   Diagnosis   • Baker cyst, right   • Gastroesophageal reflux disease without esophagitis   • Glucose intolerance (impaired glucose tolerance)   • Restless leg syndrome   • Hypercholesteremia   • Mitral regurgitation   • Migraine   • Essential hypertension   • Varicose vein of leg   • Edema of extremities   • Rhinitis, allergic   • Aspirin intolerance   • Persistent lymphocytosis   • Hypokalemia   • Irritable bowel syndrome   • Abnormal echocardiogram   • Vitamin D deficiency   • Diverticulosis   • History of syncope   • Hammer toe of left foot   • Raynaud's disease   • Stage 3b chronic kidney disease (HCC)   • Cervical radiculopathy   • Left arm pain   • Rupture of left distal biceps tendon   • Primary osteoarthritis involving multiple joints   • HL (hearing loss)   • Lumbar radiculopathy   • Abnormal LFTs   • Primary osteoarthritis of right knee   • 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 Noted brain bleed w/swelling   • HL (hearing loss)    • Migraine    •  Raynaud's disease     both hands     Past Surgical History:   Procedure Laterality Date   • AXILLARY SURGERY Left     fatty cyst removed, benign   • CATARACT EXTRACTION Bilateral    • CATARACT EXTRACTION W/ INTRAOCULAR LENS IMPLANT Right 11/10/2016    Procedure: EXTRACTION EXTRACAPSULAR CATARACT PHACO INTRAOCULAR LENS (IOL);  Surgeon: Citlaly Villavicencio MD;  Location: [facility] MAIN OR;  Service:    • COSMETIC SURGERY      Eye lift   • DILATION AND CURETTAGE OF UTERUS     • MYRINGOTOMY W/ TUBES  2011    both ears-one tube out on the left   • MYRINGOTOMY W/ TUBES     • HI SURGICAL ARTHROSCOPY SHOULDER W/ROTATOR CUFF RPR Right 2017    Procedure: ARTHROSCOPY SHOULDER WITH ROTATOR CUFF REPAIR, BICEPS TENODESIS, AND SUBACROMIAL DECOMPRESSION;  Surgeon: Cedrick Nielson MD;  Location: [facility] MAIN OR;  Service: Orthopedics   • HI XCAPSL CTRC RMVL INSJ IO LENS PROSTH W/O ECP Left 10/20/2016    Procedure: EXTRACTION EXTRACAPSULAR CATARACT PHACO INTRAOCULAR LENS (IOL);  Surgeon: Citlaly Villavicencio MD;  Location: [facility] MAIN OR;  Service: Ophthalmology   • SKIN BIOPSY      mole on chest   • SKIN BIOPSY      under breast, benign     Social History     Substance and Sexual Activity   Alcohol Use Yes    Comment: socially     Social History     Substance and Sexual Activity   Drug Use No     Social History     Tobacco Use   Smoking Status Former   • Current packs/day: 0.00   • Average packs/day: 1.5 packs/day for 30.0 years (45.0 ttl pk-yrs)   • Types: Cigarettes   • Start date:    • Quit date:    • Years since quittin.4   Smokeless Tobacco Never     Family History   Problem Relation Age of Onset   • Heart disease Mother         exp age 64 MI   • Stroke Father    • Macular degeneration Sister    • Macular degeneration Brother    • Diabetes Brother    • Cancer Brother         kidney-nephrectomy   • Kidney disease Brother         cancer     Health Maintenance Due   Topic   • Pneumococcal Vaccine: 65+ Years (1 of 2 - PCV)   •  Zoster Vaccine (1 of 2)   • RSV Vaccine Age 60+ Years (1 - 1-dose 60+ series)   • COVID-19 Vaccine (5 - 2023-24 season)   • Fall Risk    • Urinary Incontinence Screening    • Medicare Annual Wellness Visit (AWV)       Meds/Allergies       Current Outpatient Medications:   •  acetaminophen (TYLENOL) 500 mg tablet, Take 1,000 mg by mouth every 6 (six) hours as needed for mild pain, Disp: , Rfl:   •  ALPHAGAN P 0.1 %, , Disp: , Rfl: 0  •  ascorbic acid (VITAMIN C) 500 mg tablet, Take 500 mg by mouth every morning, Disp: , Rfl:   •  atorvastatin (LIPITOR) 20 mg tablet, Take 1 tablet (20 mg total) by mouth daily, Disp: 90 tablet, Rfl: 2  •  Azelastine HCl 137 MCG/SPRAY SOLN, INSTILL 1 SPRAY INTO EACH NOSTRIL TWICE A DAY, Disp: 30 mL, Rfl: 6  •  azithromycin (Zithromax) 250 mg tablet, Take 2 tablets (500 mg total) by mouth daily for 1 day, THEN 1 tablet (250 mg total) daily for 4 days., Disp: 6 tablet, Rfl: 0  •  Biotin 5 MG CAPS, Take by mouth every morning, Disp: , Rfl:   •  Cyanocobalamin (VITAMIN B 12 PO), Take by mouth every morning, Disp: , Rfl:   •  furosemide (LASIX) 40 mg tablet, take 1 tablet by mouth every morning, Disp: 90 tablet, Rfl: 1  •  guaifenesin-codeine (GUAIFENESIN AC) 100-10 MG/5ML liquid, Take 10 mL by mouth 4 (four) times a day as needed for cough, Disp: 180 mL, Rfl: 0  •  ipratropium (ATROVENT) 0.06 % nasal spray, instill 2 sprays into each nostril four times a day, Disp: 15 mL, Rfl: 11  •  levocetirizine (XYZAL) 5 MG tablet, Take 1 tablet (5 mg total) by mouth every evening, Disp: 90 tablet, Rfl: 1  •  Lutein 40 MG CAPS, Take by mouth every morning, Disp: , Rfl:   •  Magnesium 250 MG TABS, Take by mouth every morning, Disp: , Rfl:   •  methylPREDNISolone 4 MG tablet therapy pack, Use as directed on package, Disp: 21 each, Rfl: 0  •  olmesartan (BENICAR) 40 mg tablet, take 1 tablet by mouth once daily, Disp: 90 tablet, Rfl: 1  •  omeprazole (PriLOSEC) 20 mg delayed release capsule, Take 1 capsule  "(20 mg total) by mouth daily, Disp: 90 capsule, Rfl: 1  •  Potassium Chloride ER 20 MEQ TBCR, Take 1 tablet (20 mEq total) by mouth daily, Disp: 90 tablet, Rfl: 1  •  pramipexole (MIRAPEX) 1 mg tablet, take 1 tablet by mouth three times a day, Disp: 90 tablet, Rfl: 1  •  SUMAtriptan (IMITREX) 50 mg tablet, Take 1 tablet (50 mg total) by mouth once as needed for migraine, Disp: 9 tablet, Rfl: 1  •  timolol (TIMOPTIC) 0.5 % ophthalmic solution, instill 1 drop into right eye twice a day, Disp: , Rfl:   •  triamcinolone (KENALOG) 0.1 % cream, APPLY TO ARMS, LEGS AND BACK twice a day, Disp: , Rfl:   •  vitamin A 7500 UNIT capsule, Take 7,500 Units by mouth every morning  , Disp: , Rfl:   •  vitamin E, tocopherol, 400 units capsule, Take 400 Units by mouth every morning Last dose 4/26/17, Disp: , Rfl:   •  zinc gluconate 50 mg tablet, Take 50 mg by mouth every morning, Disp: , Rfl:       Objective:    Vitals:   /72   Pulse 64   Temp 98.1 °F (36.7 °C)   Resp 17   Ht 5' 1\" (1.549 m)   Wt 50.3 kg (111 lb)   BMI 20.97 kg/m²   Body mass index is 20.97 kg/m².  Vitals:    05/28/24 0955   Weight: 50.3 kg (111 lb)       Physical Exam  Vitals and nursing note reviewed.   Constitutional:       General: She is not in acute distress.     Appearance: She is well-developed. She is not ill-appearing, toxic-appearing or diaphoretic.   HENT:      Head: Normocephalic and atraumatic.      Right Ear: External ear normal.      Left Ear: External ear normal.      Nose: Congestion present.      Mouth/Throat:      Pharynx: No oropharyngeal exudate.   Eyes:      General: Lids are normal. Lids are everted, no foreign bodies appreciated. No scleral icterus.        Right eye: No discharge.         Left eye: No discharge.      Conjunctiva/sclera: Conjunctivae normal.      Pupils: Pupils are equal, round, and reactive to light.   Neck:      Thyroid: No thyromegaly.      Vascular: Normal carotid pulses. No carotid bruit, hepatojugular reflux " or JVD.      Trachea: No tracheal tenderness or tracheal deviation.   Cardiovascular:      Rate and Rhythm: Normal rate and regular rhythm.      Pulses: Normal pulses.      Heart sounds: Murmur heard.      No friction rub. No gallop.   Pulmonary:      Effort: Pulmonary effort is normal. No respiratory distress.      Breath sounds: No stridor. Rhonchi present. No wheezing or rales.   Chest:      Chest wall: No tenderness.   Abdominal:      General: Bowel sounds are normal. There is no distension.      Palpations: Abdomen is soft. There is no mass.      Tenderness: There is no abdominal tenderness. There is no guarding or rebound.   Musculoskeletal:         General: No tenderness or deformity. Normal range of motion.      Cervical back: Normal range of motion and neck supple. No edema, erythema or rigidity. No spinous process tenderness or muscular tenderness. Normal range of motion.   Lymphadenopathy:      Head:      Right side of head: No submental, submandibular, tonsillar, preauricular or posterior auricular adenopathy.      Left side of head: No submental, submandibular, tonsillar, preauricular, posterior auricular or occipital adenopathy.      Cervical: No cervical adenopathy.      Right cervical: No superficial, deep or posterior cervical adenopathy.     Left cervical: No superficial, deep or posterior cervical adenopathy.      Upper Body:      Right upper body: No pectoral adenopathy.      Left upper body: No pectoral adenopathy.   Skin:     General: Skin is warm and dry.      Coloration: Skin is not pale.      Findings: Rash present. No erythema.   Neurological:      General: No focal deficit present.      Mental Status: She is alert and oriented to person, place, and time.      Cranial Nerves: No cranial nerve deficit.      Sensory: No sensory deficit.      Motor: No tremor, abnormal muscle tone or seizure activity.      Coordination: Coordination normal.      Gait: Gait normal.      Deep Tendon Reflexes:  Reflexes are normal and symmetric. Reflexes normal.   Psychiatric:         Behavior: Behavior normal.         Thought Content: Thought content normal.         Judgment: Judgment normal.         Lab Review   Office Visit on 05/28/2024   Component Date Value Ref Range Status   • POCT SARS-CoV-2 Ag 05/28/2024 Negative  Negative Final   • VALID CONTROL 05/28/2024 Valid   Final         Patient Instructions   Acute Bronchitis   WHAT YOU NEED TO KNOW:   Acute bronchitis is swelling and irritation in your lungs. It is usually caused by a virus and most often happens in the winter. Bronchitis may also be caused by bacteria or by a chemical irritant, such as smoke.  DISCHARGE INSTRUCTIONS:   Return to the emergency department if:   You cough up blood.    Your lips or fingernails turn blue.    You feel like you are not getting enough air when you breathe.    Call your doctor if:   Your symptoms do not go away or get worse, even after treatment.    Your cough does not get better within 4 weeks.    You have questions or concerns about your condition or care.    Medicines:  You may need any of the following:  Cough suppressants  decrease your urge to cough.    Decongestants  help loosen mucus in your lungs and make it easier to cough up. This can help you breathe easier.    Inhalers  may be given. Your healthcare provider may give you one or more inhalers to help you breathe easier and cough less. An inhaler gives you medicine to open your airways. Ask your healthcare provider to show you how to use your inhaler correctly.         Antiviral medicine  treats infections caused by a virus.    Antibiotics  may be given if your bronchitis is caused by bacteria or if you have lung condition.    Acetaminophen  decreases pain and fever. It is available without a doctor's order. Ask how much to take and how often to take it. Follow directions. Read the labels of all other medicines you are using to see if they also contain acetaminophen,  or ask your doctor or pharmacist. Acetaminophen can cause liver damage if not taken correctly.    NSAIDs  help decrease swelling and pain or fever. This medicine is available with or without a doctor's order. NSAIDs can cause stomach bleeding or kidney problems in certain people. If you take blood thinner medicine, always ask your healthcare provider if NSAIDs are safe for you. Always read the medicine label and follow directions.    Self-care:   Drink liquids as directed.  You may need to drink more liquids than usual to stay hydrated. Ask how much liquid to drink each day and which liquids are best for you.    Use a cool mist humidifier.  This increases air moisture in your home. This may make it easier for you to breathe and help decrease your cough.     Get more rest.  Rest helps your body to heal. Slowly start to do more each day. Rest when you feel it is needed.    Prevent acute bronchitis:       Ask about vaccines you may need.  Get a flu vaccine each year as soon as recommended, usually in September or October. Ask your healthcare provider if you should also get a pneumonia or COVID-19 vaccine. Your healthcare provider can tell you if you should also get other vaccines, and when to get them.    Prevent the spread of germs.  You can decrease your risk for acute bronchitis and other illnesses by doing the following:     Wash your hands often with soap and water. Carry germ-killing hand lotion or gel with you. You can use the lotion or gel to clean your hands when soap and water are not available.         Do not touch your eyes, nose, or mouth unless you have washed your hands first.    Always cover your mouth when you cough to prevent the spread of germs. It is best to cough into a tissue or your shirt sleeve instead of into your hand. Ask those around you to cover their mouths when they cough.    Try to avoid people who have a cold or the flu. If you are sick, stay away from others as much as  "possible.    Avoid irritants in the air.  Avoid chemicals, fumes, and dust. Wear a face mask if you must work around dust or fumes. Stay inside on days when air pollution levels are high. If you have allergies, stay inside when pollen counts are high. Do not use aerosol products, such as spray-on deodorant, bug spray, and hair spray.    Do not smoke or be around others who are smoking.  Nicotine and other chemicals in cigarettes and cigars can cause lung damage. Ask your healthcare provider for information if you currently smoke and need help to quit. E-cigarettes or smokeless tobacco still contain nicotine. Talk to your healthcare provider before you use these products.  Follow up with your doctor as directed:  Write down questions you have so you will remember to ask them during your follow-up visits.  © Copyright [employer] 2023 Information is for End User's use only and may not be sold, redistributed or otherwise used for commercial purposes.  The above information is an  only. It is not intended as medical advice for individual conditions or treatments. Talk to your doctor, nurse or pharmacist before following any medical regimen to see if it is safe and effective for you.       Maida Carpio MD        \"This note has been constructed using a voice recognition system.Therefore there may be syntax, spelling, and/or grammatical errors. Please call if you have any questions. \"  "

## 2024-05-28 NOTE — ASSESSMENT & PLAN NOTE
Patient evaluated for coughing scanty yellow sputum for 5 days symptoms started a week ago with a scratchy sore throat with some sore throat nasal congestion followed by coughing about 5 days ago now for last 3 days been having some nasal congestion denies any high fever no difficulty breathing or chest pain but intractable coughing    COVID tested COVID-19 negative    Will treat as follows    Zithromax to use as directed    Medrol Dosepak to use as directed    Continue Atrovent nasal spray exam is    Cough medicine with codeine instructed as to use

## 2024-06-27 PROBLEM — J20.9 ACUTE INFECTIVE TRACHEOBRONCHITIS: Status: RESOLVED | Noted: 2024-05-28 | Resolved: 2024-06-27

## 2024-08-01 DIAGNOSIS — K21.9 GASTROESOPHAGEAL REFLUX DISEASE WITHOUT ESOPHAGITIS: ICD-10-CM

## 2024-08-01 DIAGNOSIS — E87.6 HYPOPOTASSEMIA: ICD-10-CM

## 2024-08-01 NOTE — TELEPHONE ENCOUNTER
[facility] called in requesting a refill on :    Reason for call:   [x] Refill   [] Prior Auth  [] Other:     Office:   [x] PCP/Provider -   [] Specialty/Provider -     Medication:     omeprazole (PriLOSEC) 20 mg delayed release capsule   Take 1 capsule (20 mg total) by mouth daily,   #90 capsule    Potassium Chloride ER 20 MEQ TBCR   Take 1 tablet (20 mEq total) by mouth daily   #90 tablet    Pharmacy: [id number] [address] [facility] [phone]     Does the patient have enough for 3 days?   [x] Yes   [] No - Send as HP to POD

## 2024-08-02 RX ORDER — POTASSIUM CHLORIDE 1500 MG/1
1 TABLET, EXTENDED RELEASE ORAL DAILY
Qty: 90 TABLET | Refills: 1 | Status: SHIPPED | OUTPATIENT
Start: 2024-08-02

## 2024-08-02 RX ORDER — OMEPRAZOLE 20 MG/1
20 CAPSULE, DELAYED RELEASE ORAL DAILY
Qty: 90 CAPSULE | Refills: 1 | Status: SHIPPED | OUTPATIENT
Start: 2024-08-02

## 2024-08-13 DIAGNOSIS — E21.3 HYPERPARATHYROIDISM (HCC): ICD-10-CM

## 2024-08-13 DIAGNOSIS — I10 ESSENTIAL HYPERTENSION: ICD-10-CM

## 2024-08-13 DIAGNOSIS — E78.2 MIXED HYPERLIPIDEMIA: ICD-10-CM

## 2024-08-13 DIAGNOSIS — E78.00 HYPERCHOLESTEREMIA: ICD-10-CM

## 2024-08-13 DIAGNOSIS — Z13.0 SCREENING FOR DEFICIENCY ANEMIA: Primary | ICD-10-CM

## 2024-08-13 DIAGNOSIS — D72.820 PERSISTENT LYMPHOCYTOSIS: ICD-10-CM

## 2024-08-15 ENCOUNTER — APPOINTMENT (OUTPATIENT)
Dept: LAB | Facility: CLINIC | Age: 84
End: 2024-08-15
Payer: MEDICARE

## 2024-08-15 DIAGNOSIS — E21.3 HYPERPARATHYROIDISM (HCC): ICD-10-CM

## 2024-08-15 DIAGNOSIS — E78.2 MIXED HYPERLIPIDEMIA: ICD-10-CM

## 2024-08-15 DIAGNOSIS — E78.00 HYPERCHOLESTEREMIA: ICD-10-CM

## 2024-08-15 DIAGNOSIS — Z13.0 SCREENING FOR DEFICIENCY ANEMIA: ICD-10-CM

## 2024-08-15 DIAGNOSIS — D72.820 PERSISTENT LYMPHOCYTOSIS: ICD-10-CM

## 2024-08-15 DIAGNOSIS — I10 ESSENTIAL HYPERTENSION: ICD-10-CM

## 2024-08-15 LAB
25(OH)D3 SERPL-MCNC: 74.2 NG/ML (ref 30–100)
ALBUMIN SERPL BCG-MCNC: 3.9 G/DL (ref 3.5–5)
ALP SERPL-CCNC: 74 U/L (ref 34–104)
ALT SERPL W P-5'-P-CCNC: 14 U/L (ref 7–52)
ANION GAP SERPL CALCULATED.3IONS-SCNC: 9 MMOL/L (ref 4–13)
AST SERPL W P-5'-P-CCNC: 15 U/L (ref 13–39)
BACTERIA UR QL AUTO: NORMAL /HPF
BASOPHILS # BLD AUTO: 0.05 THOUSANDS/ÂΜL (ref 0–0.1)
BASOPHILS NFR BLD AUTO: 1 % (ref 0–1)
BILIRUB SERPL-MCNC: 0.61 MG/DL (ref 0.2–1)
BILIRUB UR QL STRIP: NEGATIVE
BUN SERPL-MCNC: 29 MG/DL (ref 5–25)
CALCIUM SERPL-MCNC: 9.8 MG/DL (ref 8.4–10.2)
CHLORIDE SERPL-SCNC: 103 MMOL/L (ref 96–108)
CHOLEST SERPL-MCNC: 208 MG/DL
CLARITY UR: CLEAR
CO2 SERPL-SCNC: 23 MMOL/L (ref 21–32)
COLOR UR: NORMAL
CREAT SERPL-MCNC: 1.06 MG/DL (ref 0.6–1.3)
EOSINOPHIL # BLD AUTO: 0.19 THOUSAND/ÂΜL (ref 0–0.61)
EOSINOPHIL NFR BLD AUTO: 3 % (ref 0–6)
ERYTHROCYTE [DISTWIDTH] IN BLOOD BY AUTOMATED COUNT: 13.5 % (ref 11.6–15.1)
GFR SERPL CREATININE-BSD FRML MDRD: 47 ML/MIN/1.73SQ M
GLUCOSE P FAST SERPL-MCNC: 95 MG/DL (ref 65–99)
GLUCOSE UR STRIP-MCNC: NEGATIVE MG/DL
HCT VFR BLD AUTO: 36.6 % (ref 34.8–46.1)
HDLC SERPL-MCNC: 51 MG/DL
HGB BLD-MCNC: 11.9 G/DL (ref 11.5–15.4)
HGB UR QL STRIP.AUTO: NEGATIVE
IMM GRANULOCYTES # BLD AUTO: 0.01 THOUSAND/UL (ref 0–0.2)
IMM GRANULOCYTES NFR BLD AUTO: 0 % (ref 0–2)
KETONES UR STRIP-MCNC: NEGATIVE MG/DL
LDLC SERPL CALC-MCNC: 131 MG/DL (ref 0–100)
LEUKOCYTE ESTERASE UR QL STRIP: NEGATIVE
LYMPHOCYTES # BLD AUTO: 2.29 THOUSANDS/ÂΜL (ref 0.6–4.47)
LYMPHOCYTES NFR BLD AUTO: 41 % (ref 14–44)
MCH RBC QN AUTO: 31.2 PG (ref 26.8–34.3)
MCHC RBC AUTO-ENTMCNC: 32.5 G/DL (ref 31.4–37.4)
MCV RBC AUTO: 96 FL (ref 82–98)
MONOCYTES # BLD AUTO: 0.69 THOUSAND/ÂΜL (ref 0.17–1.22)
MONOCYTES NFR BLD AUTO: 12 % (ref 4–12)
NEUTROPHILS # BLD AUTO: 2.34 THOUSANDS/ÂΜL (ref 1.85–7.62)
NEUTS SEG NFR BLD AUTO: 43 % (ref 43–75)
NITRITE UR QL STRIP: NEGATIVE
NON-SQ EPI CELLS URNS QL MICRO: NORMAL /HPF
NRBC BLD AUTO-RTO: 0 /100 WBCS
PH UR STRIP.AUTO: 6.5 [PH]
PHOSPHATE SERPL-MCNC: 3.9 MG/DL (ref 2.3–4.1)
PLATELET # BLD AUTO: 190 THOUSANDS/UL (ref 149–390)
PMV BLD AUTO: 11.5 FL (ref 8.9–12.7)
POTASSIUM SERPL-SCNC: 4.4 MMOL/L (ref 3.5–5.3)
PROT SERPL-MCNC: 6.6 G/DL (ref 6.4–8.4)
PROT UR STRIP-MCNC: NEGATIVE MG/DL
PTH-INTACT SERPL-MCNC: 102.3 PG/ML (ref 12–88)
RBC # BLD AUTO: 3.82 MILLION/UL (ref 3.81–5.12)
RBC #/AREA URNS AUTO: NORMAL /HPF
SODIUM SERPL-SCNC: 135 MMOL/L (ref 135–147)
SP GR UR STRIP.AUTO: 1.01 (ref 1–1.03)
TRIGL SERPL-MCNC: 130 MG/DL
UROBILINOGEN UR STRIP-ACNC: <2 MG/DL
WBC # BLD AUTO: 5.57 THOUSAND/UL (ref 4.31–10.16)
WBC #/AREA URNS AUTO: NORMAL /HPF

## 2024-08-15 PROCEDURE — 81001 URINALYSIS AUTO W/SCOPE: CPT

## 2024-08-15 PROCEDURE — 80053 COMPREHEN METABOLIC PANEL: CPT

## 2024-08-15 PROCEDURE — 84100 ASSAY OF PHOSPHORUS: CPT

## 2024-08-15 PROCEDURE — 82652 VIT D 1 25-DIHYDROXY: CPT

## 2024-08-15 PROCEDURE — 82306 VITAMIN D 25 HYDROXY: CPT

## 2024-08-15 PROCEDURE — 83970 ASSAY OF PARATHORMONE: CPT

## 2024-08-15 PROCEDURE — 85025 COMPLETE CBC W/AUTO DIFF WBC: CPT

## 2024-08-15 PROCEDURE — 80061 LIPID PANEL: CPT

## 2024-08-15 PROCEDURE — 36415 COLL VENOUS BLD VENIPUNCTURE: CPT

## 2024-08-16 LAB — 1,25(OH)2D SERPL-MCNC: 51.7 PG/ML (ref 5–200)

## 2024-08-19 ENCOUNTER — OFFICE VISIT (OUTPATIENT)
Dept: INTERNAL MEDICINE CLINIC | Facility: CLINIC | Age: 84
End: 2024-08-19
Payer: MEDICARE

## 2024-08-19 VITALS
SYSTOLIC BLOOD PRESSURE: 106 MMHG | RESPIRATION RATE: 15 BRPM | WEIGHT: 109 LBS | HEART RATE: 58 BPM | TEMPERATURE: 98.3 F | HEIGHT: 61 IN | DIASTOLIC BLOOD PRESSURE: 54 MMHG | BODY MASS INDEX: 20.58 KG/M2

## 2024-08-19 DIAGNOSIS — J30.89 ALLERGIC RHINITIS DUE TO DUST: ICD-10-CM

## 2024-08-19 DIAGNOSIS — E78.2 MIXED HYPERLIPIDEMIA: ICD-10-CM

## 2024-08-19 DIAGNOSIS — E21.3 HYPERPARATHYROIDISM (HCC): ICD-10-CM

## 2024-08-19 DIAGNOSIS — H35.30 MACULAR DEGENERATION OF BOTH EYES, UNSPECIFIED TYPE: ICD-10-CM

## 2024-08-19 DIAGNOSIS — I10 ESSENTIAL HYPERTENSION: Primary | ICD-10-CM

## 2024-08-19 DIAGNOSIS — R60.0 EDEMA OF EXTREMITIES: ICD-10-CM

## 2024-08-19 DIAGNOSIS — H40.9 GLAUCOMA OF RIGHT EYE, UNSPECIFIED GLAUCOMA TYPE: ICD-10-CM

## 2024-08-19 DIAGNOSIS — K21.9 GASTROESOPHAGEAL REFLUX DISEASE WITHOUT ESOPHAGITIS: ICD-10-CM

## 2024-08-19 DIAGNOSIS — Z00.00 ENCOUNTER FOR SUBSEQUENT ANNUAL WELLNESS VISIT (AWV) IN MEDICARE PATIENT: ICD-10-CM

## 2024-08-19 DIAGNOSIS — E87.6 HYPOPOTASSEMIA: ICD-10-CM

## 2024-08-19 DIAGNOSIS — J30.1 SEASONAL ALLERGIC RHINITIS DUE TO POLLEN: ICD-10-CM

## 2024-08-19 PROCEDURE — 99214 OFFICE O/P EST MOD 30 MIN: CPT | Performed by: INTERNAL MEDICINE

## 2024-08-19 PROCEDURE — G0439 PPPS, SUBSEQ VISIT: HCPCS | Performed by: INTERNAL MEDICINE

## 2024-08-19 RX ORDER — LEVOCETIRIZINE DIHYDROCHLORIDE 5 MG/1
5 TABLET, FILM COATED ORAL EVERY EVENING
Qty: 90 TABLET | Refills: 1 | Status: SHIPPED | OUTPATIENT
Start: 2024-08-19

## 2024-08-19 RX ORDER — FUROSEMIDE 40 MG
40 TABLET ORAL EVERY MORNING
Qty: 90 TABLET | Refills: 1 | Status: SHIPPED | OUTPATIENT
Start: 2024-08-19

## 2024-08-19 RX ORDER — OLMESARTAN MEDOXOMIL 40 MG/1
40 TABLET ORAL DAILY
Qty: 90 TABLET | Refills: 1 | Status: SHIPPED | OUTPATIENT
Start: 2024-08-19 | End: 2024-08-30 | Stop reason: SDUPTHER

## 2024-08-19 RX ORDER — BRIMONIDINE TARTRATE 1 MG/ML
1 SOLUTION/ DROPS OPHTHALMIC 2 TIMES DAILY
Qty: 15 ML | Refills: 1 | Status: SHIPPED | OUTPATIENT
Start: 2024-08-19

## 2024-08-19 RX ORDER — TIMOLOL MALEATE 5 MG/ML
1 SOLUTION/ DROPS OPHTHALMIC 2 TIMES DAILY
Qty: 15 ML | Refills: 1 | Status: SHIPPED | OUTPATIENT
Start: 2024-08-19

## 2024-08-19 RX ORDER — POTASSIUM CHLORIDE 1500 MG/1
1 TABLET, EXTENDED RELEASE ORAL DAILY
Qty: 90 TABLET | Refills: 1 | Status: SHIPPED | OUTPATIENT
Start: 2024-08-19

## 2024-08-19 RX ORDER — IPRATROPIUM BROMIDE 42 UG/1
2 SPRAY, METERED NASAL 4 TIMES DAILY
Qty: 15 ML | Refills: 11 | Status: SHIPPED | OUTPATIENT
Start: 2024-08-19

## 2024-08-19 NOTE — PATIENT INSTRUCTIONS
Medicare Preventive Visit Patient Instructions  Thank you for completing your Welcome to Medicare Visit or Medicare Annual Wellness Visit today. Your next wellness visit will be due in one year (8/20/2025).  The screening/preventive services that you may require over the next 5-10 years are detailed below. Some tests may not apply to you based off risk factors and/or age. Screening tests ordered at today's visit but not completed yet may show as past due. Also, please note that scanned in results may not display below.  Preventive Screenings:  Service Recommendations Previous Testing/Comments   Colorectal Cancer Screening  * Colonoscopy    * Fecal Occult Blood Test (FOBT)/Fecal Immunochemical Test (FIT)  * Fecal DNA/Cologuard Test  * Flexible Sigmoidoscopy Age: 45-75 years old   Colonoscopy: every 10 years (may be performed more frequently if at higher risk)  OR  FOBT/FIT: every 1 year  OR  Cologuard: every 3 years  OR  Sigmoidoscopy: every 5 years  Screening may be recommended earlier than age 45 if at higher risk for colorectal cancer. Also, an individualized decision between you and your healthcare provider will decide whether screening between the ages of 76-85 would be appropriate. Colonoscopy: Not on file  FOBT/FIT: Not on file  Cologuard: Not on file  Sigmoidoscopy: Not on file          Breast Cancer Screening Age: 40+ years old  Frequency: every 1-2 years  Not required if history of left and right mastectomy Mammogram: Not on file        Cervical Cancer Screening Between the ages of 21-29, pap smear recommended once every 3 years.   Between the ages of 30-65, can perform pap smear with HPV co-testing every 5 years.   Recommendations may differ for women with a history of total hysterectomy, cervical cancer, or abnormal pap smears in past. Pap Smear: Not on file        Hepatitis C Screening Once for adults born between 1945 and 1965  More frequently in patients at high risk for Hepatitis C Hep C Antibody: Not  on file        Diabetes Screening 1-2 times per year if you're at risk for diabetes or have pre-diabetes Fasting glucose: 95 mg/dL (8/15/2024)  A1C: No results in last 5 years (No results in last 5 years)      Cholesterol Screening Once every 5 years if you don't have a lipid disorder. May order more often based on risk factors. Lipid panel: 08/15/2024          Other Preventive Screenings Covered by Medicare:  Abdominal Aortic Aneurysm (AAA) Screening: covered once if your at risk. You're considered to be at risk if you have a family history of AAA.  Lung Cancer Screening: covers low dose CT scan once per year if you meet all of the following conditions: (1) Age 55-77; (2) No signs or symptoms of lung cancer; (3) Current smoker or have quit smoking within the last 15 years; (4) You have a tobacco smoking history of at least 20 pack years (packs per day multiplied by number of years you smoked); (5) You get a written order from a healthcare provider.  Glaucoma Screening: covered annually if you're considered high risk: (1) You have diabetes OR (2) Family history of glaucoma OR (3)  aged 50 and older OR (4)  American aged 65 and older  Osteoporosis Screening: covered every 2 years if you meet one of the following conditions: (1) You're estrogen deficient and at risk for osteoporosis based off medical history and other findings; (2) Have a vertebral abnormality; (3) On glucocorticoid therapy for more than 3 months; (4) Have primary hyperparathyroidism; (5) On osteoporosis medications and need to assess response to drug therapy.   Last bone density test (DXA Scan): 09/28/2022.  HIV Screening: covered annually if you're between the age of 15-65. Also covered annually if you are younger than 15 and older than 65 with risk factors for HIV infection. For pregnant patients, it is covered up to 3 times per pregnancy.    Immunizations:  Immunization Recommendations   Influenza Vaccine Annual influenza  vaccination during flu season is recommended for all persons aged >= 6 months who do not have contraindications   Pneumococcal Vaccine   * Pneumococcal conjugate vaccine = PCV13 (Prevnar 13), PCV15 (Vaxneuvance), PCV20 (Prevnar 20)  * Pneumococcal polysaccharide vaccine = PPSV23 (Pneumovax) Adults 19-63 yo with certain risk factors or if 65+ yo  If never received any pneumonia vaccine: recommend Prevnar 20 (PCV20)  Give PCV20 if previously received 1 dose of PCV13 or PPSV23   Hepatitis B Vaccine 3 dose series if at intermediate or high risk (ex: diabetes, end stage renal disease, liver disease)   Respiratory syncytial virus (RSV) Vaccine - COVERED BY MEDICARE PART D  * RSVPreF3 (Arexvy) CDC recommends that adults 60 years of age and older may receive a single dose of RSV vaccine using shared clinical decision-making (SCDM)   Tetanus (Td) Vaccine - COST NOT COVERED BY MEDICARE PART B Following completion of primary series, a booster dose should be given every 10 years to maintain immunity against tetanus. Td may also be given as tetanus wound prophylaxis.   Tdap Vaccine - COST NOT COVERED BY MEDICARE PART B Recommended at least once for all adults. For pregnant patients, recommended with each pregnancy.   Shingles Vaccine (Shingrix) - COST NOT COVERED BY MEDICARE PART B  2 shot series recommended in those 19 years and older who have or will have weakened immune systems or those 50 years and older     Health Maintenance Due:  There are no preventive care reminders to display for this patient.  Immunizations Due:      Topic Date Due   • Pneumococcal Vaccine: 65+ Years (1 of 2 - PCV) Never done   • COVID-19 Vaccine (5 - 2023-24 season) 09/01/2023   • Influenza Vaccine (1) 09/01/2024     Advance Directives   What are advance directives?  Advance directives are legal documents that state your wishes and plans for medical care. These plans are made ahead of time in case you lose your ability to make decisions for yourself.  Advance directives can apply to any medical decision, such as the treatments you want, and if you want to donate organs.   What are the types of advance directives?  There are many types of advance directives, and each state has rules about how to use them. You may choose a combination of any of the following:  Living will:  This is a written record of the treatment you want. You can also choose which treatments you do not want, which to limit, and which to stop at a certain time. This includes surgery, medicine, IV fluid, and tube feedings.   Durable power of  for healthcare (DPAHC):  This is a written record that states who you want to make healthcare choices for you when you are unable to make them for yourself. This person, called a proxy, is usually a family member or a friend. You may choose more than 1 proxy.  Do not resuscitate (DNR) order:  A DNR order is used in case your heart stops beating or you stop breathing. It is a request not to have certain forms of treatment, such as CPR. A DNR order may be included in other types of advance directives.  Medical directive:  This covers the care that you want if you are in a coma, near death, or unable to make decisions for yourself. You can list the treatments you want for each condition. Treatment may include pain medicine, surgery, blood transfusions, dialysis, IV or tube feedings, and a ventilator (breathing machine).  Values history:  This document has questions about your views, beliefs, and how you feel and think about life. This information can help others choose the care that you would choose.  Why are advance directives important?  An advance directive helps you control your care. Although spoken wishes may be used, it is better to have your wishes written down. Spoken wishes can be misunderstood, or not followed. Treatments may be given even if you do not want them. An advance directive may make it easier for your family to make difficult  choices about your care.       © Copyright [employer] 2018 Information is for End User's use only and may not be sold, redistributed or otherwise used for commercial purposes. All illustrations and images included in CareNotes® are the copyrighted property of [employer] Inc[employer] or [employer]

## 2024-08-19 NOTE — ASSESSMENT & PLAN NOTE
Lab Results   Component Value Date    LDLCALC 131 (H) 08/15/2024      Lab Results   Component Value Date    ALT 14 08/15/2024    ALT 21 11/02/2015   Above reviewed patient does not take Lipitor on regular basis    No side effects    Encouraged to take Lipitor 20 mg daily

## 2024-08-19 NOTE — ASSESSMENT & PLAN NOTE
Lab Results   Component Value Date    SODIUM 135 08/15/2024    K 4.4 08/15/2024     08/15/2024    CO2 23 08/15/2024    BUN 29 (H) 08/15/2024    CREATININE 1.06 08/15/2024    GLUC 110 12/09/2022    CALCIUM 9.8 08/15/2024   Above reviewed potassium normal continue oral KCl

## 2024-08-19 NOTE — ASSESSMENT & PLAN NOTE
Ref Range & Units 8/15/24  8:53 AM 1/30/24  8:30 AM 8/4/23  8:52 AM 12/9/22  9:50 AM 1/21/22  9:18 AM   PTH  12.0 - 88.0 pg/mL 102.3 High        Above reviewed patient asymptomatic    Phosphorus calcium vitamin D normal    Awaiting to be seen by endocrinology no further intervention needed

## 2024-08-19 NOTE — ASSESSMENT & PLAN NOTE
Blood pressure very well-controlled asymptomatic    Agree and continue management medication as follow    Benicar 40 mg daily    Lasix 40 mg daily low-salt diet very well-controlled

## 2024-08-19 NOTE — ASSESSMENT & PLAN NOTE
Euvolemic CHF compensated    Awaiting to be seen by cardiology    Continue Lasix 40 mg daily much improved

## 2024-08-19 NOTE — ASSESSMENT & PLAN NOTE
Symptoms controlled    Agree continue management as follows    Instructed with the diet    Prilosec 40 mg daily no side effects    Above reviewed symptoms controlled

## 2024-08-19 NOTE — ASSESSMENT & PLAN NOTE
Intermittent persistent symptoms although much improved    Agree and continue manage medication as follows    Xyzal 5 mg daily    Flonase 1 puff each nostril twice a day    Agree continue Atrovent followed by ENT

## 2024-08-19 NOTE — PROGRESS NOTES
Ambulatory Visit  Name: Belinda Dolan      : 1940      MRN: [id number]  Encounter Provider: Maida Carpio MD  Encounter Date: 2024   Encounter department: [facility] INTERNAL MEDICINE    Assessment & Plan   1. Essential hypertension  Assessment & Plan:  Blood pressure very well-controlled asymptomatic    Agree and continue management medication as follow    Benicar 40 mg daily    Lasix 40 mg daily low-salt diet very well-controlled  Orders:  -     olmesartan (BENICAR) 40 mg tablet; Take 1 tablet (40 mg total) by mouth daily  2. Edema of extremities  Assessment & Plan:  Euvolemic CHF compensated    Awaiting to be seen by cardiology    Continue Lasix 40 mg daily much improved  Orders:  -     furosemide (LASIX) 40 mg tablet; Take 1 tablet (40 mg total) by mouth every morning  3. Allergic rhinitis due to dust  Assessment & Plan:  Intermittent persistent symptoms although much improved    Agree and continue manage medication as follows    Xyzal 5 mg daily    Flonase 1 puff each nostril twice a day    Agree continue Atrovent followed by ENT  Orders:  -     ipratropium (ATROVENT) 0.06 % nasal spray; 2 sprays into each nostril 4 (four) times a day  4. Seasonal allergic rhinitis due to pollen  Assessment & Plan:  Intermittent persistent symptoms although much improved    Agree and continue manage medication as follows    Xyzal 5 mg daily    Flonase 1 puff each nostril twice a day    Agree continue Atrovent followed by ENT  Orders:  -     levocetirizine (XYZAL) 5 MG tablet; Take 1 tablet (5 mg total) by mouth every evening  5. Gastroesophageal reflux disease without esophagitis  Assessment & Plan:  Symptoms controlled    Agree continue management as follows    Instructed with the diet    Prilosec 40 mg daily no side effects    Above reviewed symptoms controlled  Orders:  -     omeprazole (PriLOSEC) 20 mg delayed release capsule; Take 1 capsule (20 mg total) by mouth daily  6.  Hypopotassemia  Assessment & Plan:  Lab Results   Component Value Date    SODIUM 135 08/15/2024    K 4.4 08/15/2024     08/15/2024    CO2 23 08/15/2024    BUN 29 (H) 08/15/2024    CREATININE 1.06 08/15/2024    GLUC 110 12/09/2022    CALCIUM 9.8 08/15/2024   Above reviewed potassium normal continue oral KCl  Orders:  -     Potassium Chloride ER 20 MEQ TBCR; Take 1 tablet (20 mEq total) by mouth daily  7. Glaucoma of right eye, unspecified glaucoma type  -     timolol (TIMOPTIC) 0.5 % ophthalmic solution; Administer 1 drop to the right eye 2 (two) times a day  -     Alphagan P 0.1 %; Administer 1 drop to both eyes 2 (two) times a day  8. Macular degeneration of both eyes, unspecified type  -     timolol (TIMOPTIC) 0.5 % ophthalmic solution; Administer 1 drop to the right eye 2 (two) times a day  -     Alphagan P 0.1 %; Administer 1 drop to both eyes 2 (two) times a day  9. Hyperparathyroidism (HCC)  Assessment & Plan:  Ref Range & Units 8/15/24  8:53 AM 1/30/24  8:30 AM 8/4/23  8:52 AM 12/9/22  9:50 AM 1/21/22  9:18 AM   PTH  12.0 - 88.0 pg/mL 102.3 High        Above reviewed patient asymptomatic    Phosphorus calcium vitamin D normal    Awaiting to be seen by endocrinology no further intervention needed  10. Mixed hyperlipidemia  Assessment & Plan:  Lab Results   Component Value Date    LDLCALC 131 (H) 08/15/2024      Lab Results   Component Value Date    ALT 14 08/15/2024    ALT 21 11/02/2015   Above reviewed patient does not take Lipitor on regular basis    No side effects    Encouraged to take Lipitor 20 mg daily  11. Encounter for subsequent annual wellness visit (AWV) in Medicare patient       Preventive health issues were discussed with patient, and age appropriate screening tests were ordered as noted in patient's After Visit Summary. Personalized health advice and appropriate referrals for health education or preventive services given if needed, as noted in patient's After Visit Summary.    History of  Present Illness     Came in follow-up chronic medical condition listed visit diagnosis persistent nasal congestion in spite of the treatment awaiting to be seen by ENT all the labs reviewed LDL high due to the noncompliance with taking Lipitor denies any chest pain difficulty breathing awaiting to be seen by cardiology radiogram leg edema much improved with Lasix    Complete annual wellness exam done for counseling screening follow-up recommendations see attached encounter     Patient Care Team:  Maida Carpio MD as PCP - General (Internal Medicine)  Eric Barrios MD (Oncology)  Owen Ye MD (Otolaryngology)  DARLIN Good as Nurse Practitioner (Surgical Oncology)    Review of Systems  Medical History Reviewed by provider this encounter:  Tobacco  Allergies  Meds  Problems  Med Hx  Surg Hx  Fam Hx       Annual Wellness Visit Questionnaire   Belinda is here for her Subsequent Wellness visit. Last Medicare Wellness visit information reviewed, patient interviewed and updates made to the record today.      Health Risk Assessment:   Patient rates overall health as good. Patient feels that their physical health rating is same. Patient is satisfied with their life. Eyesight was rated as same. Hearing was rated as same. Patient feels that their emotional and mental health rating is same. Patients states they are never, rarely angry. Patient states they are never, rarely unusually tired/fatigued. Pain experienced in the last 7 days has been none. Patient states that she has experienced no weight loss or gain in last 6 months. Gets tired at the end of the day. Hearing is ok. Anger not a problem. Weight stays stable.    Depression Screening:   PHQ-2 Score: 2      Fall Risk Screening:   In the past year, patient has experienced: no history of falling in past year      Urinary Incontinence Screening:   Patient has not leaked urine accidently in the last six months. No issues.    Home  Safety:  Patient does not have trouble with stairs inside or outside of their home. Patient has working smoke alarms and has working carbon monoxide detector. Home safety hazards include: none. No home hazrads. No issues with stairs. Runs stairs for cardio. Pt feels safe in her home.    Nutrition:   Current diet is Regular. Balanced. Eats fruits and veggies and proteins.    Medications:   Patient is currently taking over-the-counter supplements. OTC medications include: see medication list. Patient is able to manage medications. Has no issues with meds. Does not take opiods.    Activities of Daily Living (ADLs)/Instrumental Activities of Daily Living (IADLs):   Walk and transfer into and out of bed and chair?: Yes  Dress and groom yourself?: Yes    Bathe or shower yourself?: Yes    Feed yourself? Yes  Do your laundry/housekeeping?: Yes  Manage your money, pay your bills and track your expenses?: Yes  Make your own meals?: Yes    Do your own shopping?: Yes    ADL comments: She still works full time. Drives and is independent.    Previous Hospitalizations:   Any hospitalizations or ED visits within the last 12 months?: No      Hospitalization Comments: Chronic Conditions have been stable.    Advance Care Planning:   Living will: No    Durable POA for healthcare: No    Advanced directive: No    Advanced directive counseling given: Yes    Five wishes given: No    Patient declined ACP directive: Yes    End of Life Decisions reviewed with patient: Yes    Provider agrees with end of life decisions: Yes      Cognitive Screening:   Provider or family/friend/caregiver concerned regarding cognition?: No    PREVENTIVE SCREENINGS      Cardiovascular Screening:    General: Screening Not Indicated and History Lipid Disorder      Diabetes Screening:     General: Screening Current      Colorectal Cancer Screening:     General: Screening Not Indicated      Breast Cancer Screening:     General: Risks and Benefits Discussed and Patient  Declines      Cervical Cancer Screening:    General: Screening Not Indicated      Osteoporosis Screening:    General: Screening Not Indicated and History Osteoporosis      Abdominal Aortic Aneurysm (AAA) Screening:        General: Screening Not Indicated and Risks and Benefits Discussed      Lung Cancer Screening:     General: Screening Not Indicated      Hepatitis C Screening:    General: Risks and Benefits Discussed and Patient Declines    Hep C Screening Accepted: No       Preventive Screening Comments: UTD with flu and Covid. Rec Prevnar. Sees eye doctor regularly. No longer wants CRC or mammogram.    Screening, Brief Intervention, and Referral to Treatment (SBIRT)    Screening  Typical number of drinks in a day: 0  Typical number of drinks in a week: 0  Interpretation: Low risk drinking behavior.    AUDIT-C Screenin) How often did you have a drink containing alcohol in the past year? 4 or more times a week  2) How many drinks did you have on a typical day when you were drinking in the past year? 1 to 2  3) How often did you have 6 or more drinks on one occasion in the past year? never    AUDIT-C Score: 4  Interpretation: Score 3-12 (female): POSITIVE screen for alcohol misuse    AUDIT Screenin) How often during the last year have you found that you were not able to stop drinking once you had started? 0 - never  5) How often during the last year have you failed to do what was normally expected from you because of drinking? 0 - never  6) How often during the last year have you needed a first drink in the morning to get yourself going after a heavy drinking session? 0 - never  7) How often during the last year have you had a feeling of guilt or remorse after drinking? 0 - never  8) How often during the last year have you been unable to remember what happened the night before because you had been drinking? 0 - never  9) Have you or someone else been injured as a result of your drinking? 0 - no  10) Has a  "relative or friend or a doctor or another health worker been concerned about your drinking or suggested you cut down? 0 - no    AUDIT Score: 4  Interpretation: Low risk alcohol consumption    Single Item Drug Screening:  How often have you used an illegal drug (including marijuana) or a prescription medication for non-medical reasons in the past year? never    Single Item Drug Screen Score: 0  Interpretation: Negative screen for possible drug use disorder    Brief Intervention  Healthy alcohol use/limits discussed.     Other Counseling Topics:   Skin self-exam and sunscreen.     Social Determinants of Health     Financial Resource Strain: Low Risk  (4/24/2023)    Overall Financial Resource Strain (CARDIA)    • Difficulty of Paying Living Expenses: Not hard at all   Food Insecurity: No Food Insecurity (8/19/2024)    Hunger Vital Sign    • Worried About Running Out of Food in the Last Year: Never true    • Ran Out of Food in the Last Year: Never true   Transportation Needs: No Transportation Needs (8/19/2024)    PRAPARE - Transportation    • Lack of Transportation (Medical): No    • Lack of Transportation (Non-Medical): No   Housing Stability: Low Risk  (8/19/2024)    Housing Stability Vital Sign    • Unable to Pay for Housing in the Last Year: No    • Number of Times Moved in the Last Year: 0    • Homeless in the Last Year: No   Utilities: Not At Risk (8/19/2024)    [facility] Utilities    • Threatened with loss of utilities: No     No results found.    Objective     /54   Pulse 58   Temp 98.3 °F (36.8 °C)   Resp 15   Ht 5' 1\" (1.549 m)   Wt 49.4 kg (109 lb)   BMI 20.60 kg/m²     Physical Exam      "

## 2024-08-27 ENCOUNTER — OFFICE VISIT (OUTPATIENT)
Dept: SURGICAL ONCOLOGY | Facility: CLINIC | Age: 84
End: 2024-08-27
Payer: MEDICARE

## 2024-08-27 VITALS
TEMPERATURE: 97.5 F | BODY MASS INDEX: 20.96 KG/M2 | OXYGEN SATURATION: 98 % | HEIGHT: 61 IN | HEART RATE: 37 BPM | SYSTOLIC BLOOD PRESSURE: 104 MMHG | DIASTOLIC BLOOD PRESSURE: 80 MMHG | WEIGHT: 111 LBS

## 2024-08-27 DIAGNOSIS — E21.3 HYPERPARATHYROIDISM (HCC): Primary | ICD-10-CM

## 2024-08-27 PROCEDURE — 99213 OFFICE O/P EST LOW 20 MIN: CPT

## 2024-08-27 PROCEDURE — G2211 COMPLEX E/M VISIT ADD ON: HCPCS

## 2024-08-27 NOTE — LETTER
August 27, 2024     Eric Barrios MD  [address] [address]    Patient: Belinda Dolan   YOB: 1940   Date of Visit: 8/27/2024       Dear Dr. Barrios:    Thank you for referring Belinda Dolan to me for evaluation. Below are my notes for this consultation.    If you have questions, please do not hesitate to call me. I look forward to following your patient along with you.         Sincerely,        DARLIN Good        CC: No Recipients    DARLIN Good  8/27/2024  9:32 AM  Sign when Signing Visit               Surgical Oncology Follow Up       [facility] SURG[facility] ONCOL[facility] [address] [address]  [phone]    Belinda Dloan  1940  [id number]  [facility] ONCOL[facility] [address] [address]  [phone]    1. Hyperparathyroidism (HCC)  Assessment & Plan:  PTH levels remain mildly elevated; calcium and vitamin D in normal range.  Patient has been having issues with significant hypotension recently, has an upcoming appointment with her cardiologist.  Probably best to defer any surgical discussion at this point.  I will have her return in 6 months with repeat bloodwork.  If she is ready to consider surgery and is medically fit, we can arrange repeat imaging at that time to potentially identify a target.    Orders:  -     PTH, intact; Future; Expected date: 02/01/2025  -     Calcium; Future; Expected date: 02/01/2025  -     Vitamin D 25 hydroxy; Future; Expected date: 02/01/2025         Chief Complaint   Patient presents with   • Follow-up       Return in about 6 months (around 2/27/2025) for Office Visit with Dr Barrios, Labs - See Treatment Plan.        History of Present Illness: This is an 85 y/o female who returns to the office today in follow-up for hyperparathyroidism.  She reports she is still in  relatively good health and feels well overall, but has been struggling with severe low blood pressure lately, often with the systolic reading being under 100.  She has completed bloodwork in preparation for this visit, but is still unsure if she wants to move forward with surgery, especially since imaging thus far has not identified a probably target.       Review of Systems   Constitutional:  Negative for activity change, appetite change, fatigue and unexpected weight change.   HENT: Negative.  Negative for trouble swallowing.    Respiratory: Negative.     Cardiovascular: Negative.    Gastrointestinal: Negative.    Musculoskeletal:  Positive for arthralgias.   Skin: Negative.  Negative for color change.   Hematological: Negative.  Negative for adenopathy.   Psychiatric/Behavioral: Negative.               Patient Active Problem List   Diagnosis   • Baker cyst, right   • Gastroesophageal reflux disease without esophagitis   • Glucose intolerance (impaired glucose tolerance)   • Restless leg syndrome   • Hypercholesteremia   • Mitral regurgitation   • Migraine   • Essential hypertension   • Varicose vein of leg   • Edema of extremities   • Allergic rhinitis due to dust   • Aspirin intolerance   • Persistent lymphocytosis   • Hypopotassemia   • Irritable bowel syndrome   • Abnormal echocardiogram   • Vitamin D deficiency   • Diverticulosis   • History of syncope   • Hammer toe of left foot   • Raynaud's disease   • Stage 3b chronic kidney disease (HCC)   • Encounter for subsequent annual wellness visit (AWV) in Medicare patient   • Cervical radiculopathy   • Left arm pain   • Rupture of left distal biceps tendon   • Primary osteoarthritis involving multiple joints   • HL (hearing loss)   • Lumbar radiculopathy   • Abnormal LFTs   • Primary osteoarthritis of right knee   • 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   • Hyperparathyroidism (HCC)   • Mixed hyperlipidemia   • Generalized maculopapular rash     Past  Medical History:   Diagnosis Date   • Baker's cyst of knee 10/21/2016    right- burst on its own   • 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HCC) 1992    hx of-fell when ice skating. Noted brain bleed w/swelling   • HL (hearing loss)    • Migraine    • Raynaud's disease     both hands     Past Surgical History:   Procedure Laterality Date   • AXILLARY SURGERY Left     fatty cyst removed, benign   • CATARACT EXTRACTION Bilateral    • CATARACT EXTRACTION W/ INTRAOCULAR LENS IMPLANT Right 11/10/2016    Procedure: EXTRACTION EXTRACAPSULAR CATARACT PHACO INTRAOCULAR LENS (IOL);  Surgeon: Citlaly Villavicencio MD;  Location: [facility] MAIN OR;  Service:    • COSMETIC SURGERY      Eye lift   • DILATION AND CURETTAGE OF UTERUS     • MYRINGOTOMY W/ TUBES  2011    both ears-one tube out on the left   • MYRINGOTOMY W/ TUBES     • PA SURGICAL ARTHROSCOPY SHOULDER W/ROTATOR CUFF RPR Right 5/4/2017    Procedure: ARTHROSCOPY SHOULDER WITH ROTATOR CUFF REPAIR, BICEPS TENODESIS, AND SUBACROMIAL DECOMPRESSION;  Surgeon: Cedrick Nielson MD;  Location: [facility] MAIN OR;  Service: Orthopedics   • PA XCAPSL CTRC RMVL INSJ IO LENS PROSTH W/O ECP Left 10/20/2016    Procedure: EXTRACTION EXTRACAPSULAR CATARACT PHACO INTRAOCULAR LENS (IOL);  Surgeon: Citlaly Villavicencio MD;  Location: [facility] MAIN OR;  Service: Ophthalmology   • SKIN BIOPSY      mole on chest   • SKIN BIOPSY      under breast, benign     Family History   Problem Relation Age of Onset   • Heart disease Mother         exp age 64 MI   • Stroke Father    • Macular degeneration Sister    • Macular degeneration Brother    • Diabetes Brother    • Cancer Brother         kidney-nephrectomy   • Kidney disease Brother         cancer     Social History     Socioeconomic History   • Marital status:      Spouse name: Not on file   • Number of children: Not on file   • Years of education: Not on file   • Highest education level: Not on file   Occupational History   • Not on file   Tobacco Use   • Smoking status: Former      Current packs/day: 0.00     Average packs/day: 1.5 packs/day for 30.0 years (45.0 ttl pk-yrs)     Types: Cigarettes     Start date:      Quit date:      Years since quittin.6   • Smokeless tobacco: Never   Vaping Use   • Vaping status: Never Used   Substance and Sexual Activity   • Alcohol use: Yes     Comment: socially   • Drug use: No   • Sexual activity: Not on file   Other Topics Concern   • Not on file   Social History Narrative   • Not on file     Social Determinants of Health     Financial Resource Strain: Low Risk  (2023)    Overall Financial Resource Strain (CARDIA)    • Difficulty of Paying Living Expenses: Not hard at all   Food Insecurity: No Food Insecurity (2024)    Hunger Vital Sign    • Worried About Running Out of Food in the Last Year: Never true    • Ran Out of Food in the Last Year: Never true   Transportation Needs: No Transportation Needs (2024)    PRAPARE - Transportation    • Lack of Transportation (Medical): No    • Lack of Transportation (Non-Medical): No   Physical Activity: Not on file   Stress: Not on file   Social Connections: Not on file   Intimate Partner Violence: Not on file   Housing Stability: Low Risk  (2024)    Housing Stability Vital Sign    • Unable to Pay for Housing in the Last Year: No    • Number of Times Moved in the Last Year: 0    • Homeless in the Last Year: No       Current Outpatient Medications:   •  acetaminophen (TYLENOL) 500 mg tablet, Take 1,000 mg by mouth every 6 (six) hours as needed for mild pain, Disp: , Rfl:   •  Alphagan P 0.1 %, Administer 1 drop to both eyes 2 (two) times a day, Disp: 15 mL, Rfl: 1  •  ascorbic acid (VITAMIN C) 500 mg tablet, Take 500 mg by mouth every morning, Disp: , Rfl:   •  atorvastatin (LIPITOR) 20 mg tablet, Take 1 tablet (20 mg total) by mouth daily, Disp: 90 tablet, Rfl: 2  •  Azelastine HCl 137 MCG/SPRAY SOLN, INSTILL 1 SPRAY INTO EACH NOSTRIL TWICE A DAY, Disp: 30 mL, Rfl: 6  •  Biotin 5 MG  CAPS, Take by mouth every morning, Disp: , Rfl:   •  Cyanocobalamin (VITAMIN B 12 PO), Take by mouth every morning, Disp: , Rfl:   •  furosemide (LASIX) 40 mg tablet, Take 1 tablet (40 mg total) by mouth every morning, Disp: 90 tablet, Rfl: 1  •  ipratropium (ATROVENT) 0.06 % nasal spray, 2 sprays into each nostril 4 (four) times a day, Disp: 15 mL, Rfl: 11  •  levocetirizine (XYZAL) 5 MG tablet, Take 1 tablet (5 mg total) by mouth every evening, Disp: 90 tablet, Rfl: 1  •  Lutein 40 MG CAPS, Take by mouth every morning, Disp: , Rfl:   •  Magnesium 250 MG TABS, Take by mouth every morning, Disp: , Rfl:   •  olmesartan (BENICAR) 40 mg tablet, Take 1 tablet (40 mg total) by mouth daily, Disp: 90 tablet, Rfl: 1  •  omeprazole (PriLOSEC) 20 mg delayed release capsule, Take 1 capsule (20 mg total) by mouth daily, Disp: 90 capsule, Rfl: 1  •  Potassium Chloride ER 20 MEQ TBCR, Take 1 tablet (20 mEq total) by mouth daily, Disp: 90 tablet, Rfl: 1  •  pramipexole (MIRAPEX) 1 mg tablet, take 1 tablet by mouth three times a day, Disp: 90 tablet, Rfl: 1  •  SUMAtriptan (IMITREX) 50 mg tablet, Take 1 tablet (50 mg total) by mouth once as needed for migraine, Disp: 9 tablet, Rfl: 1  •  timolol (TIMOPTIC) 0.5 % ophthalmic solution, Administer 1 drop to the right eye 2 (two) times a day, Disp: 15 mL, Rfl: 1  •  triamcinolone (KENALOG) 0.1 % cream, APPLY TO ARMS, LEGS AND BACK twice a day, Disp: , Rfl:   •  vitamin A 7500 UNIT capsule, Take 7,500 Units by mouth every morning  , Disp: , Rfl:   •  vitamin E, tocopherol, 400 units capsule, Take 400 Units by mouth every morning Last dose 4/26/17, Disp: , Rfl:   •  zinc gluconate 50 mg tablet, Take 50 mg by mouth every morning, Disp: , Rfl:   Allergies   Allergen Reactions   • Lactose - Food Allergy GI Intolerance   • Latex Itching     Elastic,adhesive tape   • Dilantin [Phenytoin Sodium Extended] Rash   • Other Rash     Adhesive tape, environmental   • Penicillins Rash     Vitals:     08/27/24 0857   BP: 104/80   Pulse: (!) 37   Temp: 97.5 °F (36.4 °C)   SpO2: 98%       Physical Exam  Vitals reviewed.   Constitutional:       General: She is not in acute distress.     Appearance: Normal appearance. She is normal weight. She is not ill-appearing or toxic-appearing.   HENT:      Head: Normocephalic and atraumatic.      Nose: Nose normal.      Mouth/Throat:      Mouth: Mucous membranes are moist.   Eyes:      General: No scleral icterus.  Cardiovascular:      Rate and Rhythm: Normal rate.   Pulmonary:      Effort: Pulmonary effort is normal.   Musculoskeletal:         General: Normal range of motion.      Cervical back: Normal range of motion and neck supple.   Skin:     General: Skin is warm and dry.   Neurological:      General: No focal deficit present.      Mental Status: She is alert and oriented to person, place, and time.   Psychiatric:         Mood and Affect: Mood normal.         Behavior: Behavior normal.         Thought Content: Thought content normal.         Judgment: Judgment normal.               Labs:  Collected  08/15/2024   Vitamin D 1,25 dihydroxy [[id number]]   Blood from Arm, Right   Component Value Units   Vitamin D 1, 25 Dihydroxy 51.7  pg/mL   08/15/2024 059071/15/2024 2200  PTH, intact [[id number]]   (Abnormal)   Blood from Arm, Right   Component Value Units   .3 High  pg/mL   08/15/2024 690263/15/2024 2209  Vitamin D 25 hydroxy [[id number]]   Blood from Arm, Right   Component Value Units   Vit D, 25-Hydroxy 74.2  ng/mL   08/15/2024 154523/15/2024 2203  Comprehensive metabolic panel [[id number]]    (Abnormal)   Blood from Arm, Right   Component Value Units   Sodium 135 mmol/L   Potassium 4.4 mmol/L   Chloride 103 mmol/L   CO2 23 mmol/L   ANION GAP 9 mmol/L   BUN 29 High  mg/dL   Creatinine 1.06  mg/dL   Glucose, Fasting 95 mg/dL   Calcium 9.8 mg/dL   AST 15 U/L   ALT 14  U/L   Alkaline Phosphatase 74 U/L   Total Protein 6.6 g/dL   Albumin 3.9 g/dL   Total Bilirubin 0.61   mg/dL   eGFR 47 ml/min/1.73sq m       I personally reviewed and interpreted the above laboratory data.

## 2024-08-27 NOTE — PROGRESS NOTES
Surgical Oncology Follow Up       [facility] [place]  [address] [address]  [phone]    Belinda Dolan  1940  [id number]  [facility] [place]  [address] [address]  [phone]    1. Hyperparathyroidism (HCC)  Assessment & Plan:  PTH levels remain mildly elevated; calcium and vitamin D in normal range.  Patient has been having issues with significant hypotension recently, has an upcoming appointment with her cardiologist.  Probably best to defer any surgical discussion at this point.  I will have her return in 6 months with repeat bloodwork.  If she is ready to consider surgery and is medically fit, we can arrange repeat imaging at that time to potentially identify a target.    Orders:  -     PTH, intact; Future; Expected date: 02/01/2025  -     Calcium; Future; Expected date: 02/01/2025  -     Vitamin D 25 hydroxy; Future; Expected date: 02/01/2025         Chief Complaint   Patient presents with    Follow-up       Return in about 6 months (around 2/27/2025) for Office Visit with Leonard Perdomo - See Treatment Plan.        History of Present Illness: This is an 83 y/o female who returns to the office today in follow-up for hyperparathyroidism.  She reports she is still in relatively good health and feels well overall, but has been struggling with severe low blood pressure lately, often with the systolic reading being under 100.  She has completed bloodwork in preparation for this visit, but is still unsure if she wants to move forward with surgery, especially since imaging thus far has not identified a probably target.       Review of Systems   Constitutional:  Negative for activity change, appetite change, fatigue and unexpected weight change.   HENT: Negative.  Negative for trouble swallowing.    Respiratory: Negative.      Cardiovascular: Negative.    Gastrointestinal: Negative.    Musculoskeletal:  Positive for arthralgias.   Skin: Negative.  Negative for color change.   Hematological: Negative.  Negative for adenopathy.   Psychiatric/Behavioral: Negative.               Patient Active Problem List   Diagnosis    Baker cyst, right    Gastroesophageal reflux disease without esophagitis    Glucose intolerance (impaired glucose tolerance)    Restless leg syndrome    Hypercholesteremia    Mitral regurgitation    Migraine    Essential hypertension    Varicose vein of leg    Edema of extremities    Allergic rhinitis due to dust    Aspirin intolerance    Persistent lymphocytosis    Hypopotassemia    Irritable bowel syndrome    Abnormal echocardiogram    Vitamin D deficiency    Diverticulosis    History of syncope    Hammer toe of left foot    Raynaud's disease    Stage 3b chronic kidney disease (HCC)    Encounter for subsequent annual wellness visit (AWV) in Medicare patient    Cervical radiculopathy    Left arm pain    Rupture of left distal biceps tendon    Primary osteoarthritis involving multiple joints    HL (hearing loss)    Lumbar radiculopathy    Abnormal LFTs    Primary osteoarthritis of right knee    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 Noted brain bleed w/swelling    HL (hearing loss)     Migraine     Raynaud's disease     both hands     Past Surgical History:   Procedure Laterality Date    AXILLARY SURGERY Left     fatty cyst removed, benign    CATARACT EXTRACTION Bilateral     CATARACT EXTRACTION W/ INTRAOCULAR LENS IMPLANT Right 11/10/2016    Procedure: EXTRACTION EXTRACAPSULAR CATARACT PHACO INTRAOCULAR LENS (IOL);  Surgeon: Citlaly Villavicencio MD;  Location: [facility]  OR;  Service:     COSMETIC SURGERY      Eye lift    DILATION AND CURETTAGE OF UTERUS      MYRINGOTOMY W/ TUBES      both ears-one tube out on the left    MYRINGOTOMY W/ TUBES      SC SURGICAL ARTHROSCOPY SHOULDER W/ROTATOR CUFF RPR Right 2017    Procedure: ARTHROSCOPY SHOULDER WITH ROTATOR CUFF REPAIR, BICEPS TENODESIS, AND SUBACROMIAL DECOMPRESSION;  Surgeon: Cedrick Nielson MD;  Location: [facility] MAIN OR;  Service: Orthopedics    SC XCAPSL CTRC RMVL INSJ IO LENS PROSTH W/O ECP Left 10/20/2016    Procedure: EXTRACTION EXTRACAPSULAR CATARACT PHACO INTRAOCULAR LENS (IOL);  Surgeon: Citlaly Villavicencio MD;  Location: [facility] MAIN OR;  Service: Ophthalmology    SKIN BIOPSY      mole on chest    SKIN BIOPSY      under breast, benign     Family History   Problem Relation Age of Onset    Heart disease Mother         exp age 64 MI    Stroke Father     Macular degeneration Sister     Macular degeneration Brother     Diabetes Brother     Cancer Brother         kidney-nephrectomy    Kidney disease Brother         cancer     Social History     Socioeconomic History    Marital status:      Spouse name: Not on file    Number of children: Not on file    Years of education: Not on file    Highest education level: Not on file   Occupational History    Not on file   Tobacco Use    Smoking status: Former     Current packs/day: 0.00     Average packs/day: 1.5 packs/day for 30.0 years (45.0 ttl pk-yrs)     Types: Cigarettes     Start date:      Quit date:      Years since quittin.6    Smokeless tobacco: Never   Vaping Use    Vaping status: Never Used   Substance and Sexual Activity    Alcohol use: Yes     Comment: socially    Drug use: No    Sexual activity: Not on file   Other Topics Concern    Not on file   Social History Narrative    Not on file     Social Determinants of Health     Financial Resource Strain: Low Risk  (2023)    Overall Financial Resource Strain ([facility])     Difficulty of Paying Living  Expenses: Not hard at all   Food Insecurity: No Food Insecurity (8/19/2024)    Hunger Vital Sign     Worried About Running Out of Food in the Last Year: Never true     Ran Out of Food in the Last Year: Never true   Transportation Needs: No Transportation Needs (8/19/2024)    PRAPARE - Transportation     Lack of Transportation (Medical): No     Lack of Transportation (Non-Medical): No   Physical Activity: Not on file   Stress: Not on file   Social Connections: Not on file   Intimate Partner Violence: Not on file   Housing Stability: Low Risk  (8/19/2024)    Housing Stability Vital Sign     Unable to Pay for Housing in the Last Year: No     Number of Times Moved in the Last Year: 0     Homeless in the Last Year: No       Current Outpatient Medications:     acetaminophen (TYLENOL) 500 mg tablet, Take 1,000 mg by mouth every 6 (six) hours as needed for mild pain, Disp: , Rfl:     Alphagan P 0.1 %, Administer 1 drop to both eyes 2 (two) times a day, Disp: 15 mL, Rfl: 1    ascorbic acid (VITAMIN C) 500 mg tablet, Take 500 mg by mouth every morning, Disp: , Rfl:     atorvastatin (LIPITOR) 20 mg tablet, Take 1 tablet (20 mg total) by mouth daily, Disp: 90 tablet, Rfl: 2    Azelastine HCl 137 MCG/SPRAY SOLN, INSTILL 1 SPRAY INTO EACH NOSTRIL TWICE A DAY, Disp: 30 mL, Rfl: 6    Biotin 5 MG CAPS, Take by mouth every morning, Disp: , Rfl:     Cyanocobalamin (VITAMIN B 12 PO), Take by mouth every morning, Disp: , Rfl:     furosemide (LASIX) 40 mg tablet, Take 1 tablet (40 mg total) by mouth every morning, Disp: 90 tablet, Rfl: 1    ipratropium (ATROVENT) 0.06 % nasal spray, 2 sprays into each nostril 4 (four) times a day, Disp: 15 mL, Rfl: 11    levocetirizine (XYZAL) 5 MG tablet, Take 1 tablet (5 mg total) by mouth every evening, Disp: 90 tablet, Rfl: 1    Lutein 40 MG CAPS, Take by mouth every morning, Disp: , Rfl:     Magnesium 250 MG TABS, Take by mouth every morning, Disp: , Rfl:     olmesartan (BENICAR) 40 mg tablet, Take  1 tablet (40 mg total) by mouth daily, Disp: 90 tablet, Rfl: 1    omeprazole (PriLOSEC) 20 mg delayed release capsule, Take 1 capsule (20 mg total) by mouth daily, Disp: 90 capsule, Rfl: 1    Potassium Chloride ER 20 MEQ TBCR, Take 1 tablet (20 mEq total) by mouth daily, Disp: 90 tablet, Rfl: 1    pramipexole (MIRAPEX) 1 mg tablet, take 1 tablet by mouth three times a day, Disp: 90 tablet, Rfl: 1    SUMAtriptan (IMITREX) 50 mg tablet, Take 1 tablet (50 mg total) by mouth once as needed for migraine, Disp: 9 tablet, Rfl: 1    timolol (TIMOPTIC) 0.5 % ophthalmic solution, Administer 1 drop to the right eye 2 (two) times a day, Disp: 15 mL, Rfl: 1    triamcinolone (KENALOG) 0.1 % cream, APPLY TO ARMS, LEGS AND BACK twice a day, Disp: , Rfl:     vitamin A 7500 UNIT capsule, Take 7,500 Units by mouth every morning  , Disp: , Rfl:     vitamin E, tocopherol, 400 units capsule, Take 400 Units by mouth every morning Last dose 4/26/17, Disp: , Rfl:     zinc gluconate 50 mg tablet, Take 50 mg by mouth every morning, Disp: , Rfl:   Allergies   Allergen Reactions    Lactose - Food Allergy GI Intolerance    Latex Itching     Elastic,adhesive tape    Dilantin [Phenytoin Sodium Extended] Rash    Other Rash     Adhesive tape, environmental    Penicillins Rash     Vitals:    08/27/24 0857   BP: 104/80   Pulse: (!) 37   Temp: 97.5 °F (36.4 °C)   SpO2: 98%       Physical Exam  Vitals reviewed.   Constitutional:       General: She is not in acute distress.     Appearance: Normal appearance. She is normal weight. She is not ill-appearing or toxic-appearing.   HENT:      Head: Normocephalic and atraumatic.      Nose: Nose normal.      Mouth/Throat:      Mouth: Mucous membranes are moist.   Eyes:      General: No scleral icterus.  Cardiovascular:      Rate and Rhythm: Normal rate.   Pulmonary:      Effort: Pulmonary effort is normal.   Musculoskeletal:         General: Normal range of motion.      Cervical back: Normal range of motion and  neck supple.   Skin:     General: Skin is warm and dry.   Neurological:      General: No focal deficit present.      Mental Status: She is alert and oriented to person, place, and time.   Psychiatric:         Mood and Affect: Mood normal.         Behavior: Behavior normal.         Thought Content: Thought content normal.         Judgment: Judgment normal.               Labs:  Collected  08/15/2024   Vitamin D 1,25 dihydroxy [[id number]]   Blood from Arm, Right   Component Value Units   Vitamin D 1, 25 Dihydroxy 51.7  pg/mL   08/15/2024 526327/15/2024 2200  PTH, intact [[id number]]   (Abnormal)   Blood from Arm, Right   Component Value Units   .3 High  pg/mL   08/15/2024 331146/15/2024 2209  Vitamin D 25 hydroxy [[id number]]   Blood from Arm, Right   Component Value Units   Vit D, 25-Hydroxy 74.2  ng/mL   08/15/2024 568897/15/2024 2203  Comprehensive metabolic panel [[id number]]    (Abnormal)   Blood from Arm, Right   Component Value Units   Sodium 135 mmol/L   Potassium 4.4 mmol/L   Chloride 103 mmol/L   CO2 23 mmol/L   ANION GAP 9 mmol/L   BUN 29 High  mg/dL   Creatinine 1.06  mg/dL   Glucose, Fasting 95 mg/dL   Calcium 9.8 mg/dL   AST 15 U/L   ALT 14  U/L   Alkaline Phosphatase 74 U/L   Total Protein 6.6 g/dL   Albumin 3.9 g/dL   Total Bilirubin 0.61  mg/dL   eGFR 47 ml/min/1.73sq m       I personally reviewed and interpreted the above laboratory data.

## 2024-08-27 NOTE — ASSESSMENT & PLAN NOTE
PTH levels remain mildly elevated; calcium and vitamin D in normal range.  Patient has been having issues with significant hypotension recently, has an upcoming appointment with her cardiologist.  Probably best to defer any surgical discussion at this point.  I will have her return in 6 months with repeat bloodwork.  If she is ready to consider surgery and is medically fit, we can arrange repeat imaging at that time to potentially identify a target.

## 2024-08-29 NOTE — PROGRESS NOTES
Progress Note - Cardiology Office  [facility]    Belinda Dolan 84 y.o. female MRN: [id number]  : 1940  Encounter: [id number]      ASSESSMENT:  Hyperparathyroidism   Possible consideration for parathyroid surgery and perioperative cardiac risk assessment    Mild valvular heart disease:    TTE, 2024:  EF 65%, G1 DD  Mild MR, AR and TR, RSVP 35-38 mmHg  Trivial pericardial effusion  Compared to prior echo, there is less MR, AR and less PA pressure and left atrial size     TTE, 2019  EF 65%, mild LVH, G2 DD  Mild to moderate LAE  Mild MR, mild to moderate AR and TR, mild HI, RSVP 45 mmHg     Mixed hyperlipidemia  10/27/2023: , , HDL 59, normal AST and ALT  8/15/2024: , , HDL 51  Patient had stopped taking atorvastatin  I am going to resume at 10 mg daily    Essential hypertension  BP is well-controlled at 110/70 with heart rate of 55/min  Patient has been having being episodes of lower blood pressure with some dizziness.  Therefore I am going to decrease her olmesartan to 20 mg daily     Lower extremity edema  History of varicose veins     CKD     Raynaud's disease without gangrene     Arthritis of knees     RECOMMENDATIONS:  Decrease olmesartan to 20 mg daily  Resume atorvastatin at 10 mg daily  Compression stockings to be worn regularly during the day and off at night.  Start with 15-20 mm compression and graduated to 15-20 mm compression as tolerated  If the leg swelling is less at next OV, will consider decreasing her dose of Lasix.    Patient has intermediate/acceptable perioperative cardiac risk for parathyroid surgery  There is no cardiac contraindication to the surgery         Please call [phone] if any questions.    HPI :     Belinda Dolan is a 84 y.o. year old female who came for follow up.  She has hyperparathyroidism and will possibly be having exploratory surgery in her neck.  She has mild valvular heart disease with normal LV  systolic function and ejection fraction.  Her EKG does not show any evidence of ischemia.  She denies any chest pain or increased dyspnea     REVIEW OF SYSTEMS:  Review of Systems   Cardiovascular:  Positive for leg swelling.   Musculoskeletal:  Positive for arthralgias.   All other systems reviewed and are negative.        Historical Information   Past Medical History:   Diagnosis Date    Baker's cyst of knee 10/21/2016    right- burst on its own    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HCC) 1992    hx of-fell when ice skating. Noted brain bleed w/swelling    HL (hearing loss)     Migraine     Raynaud's disease     both hands     Past Surgical History:   Procedure Laterality Date    AXILLARY SURGERY Left     fatty cyst removed, benign    CATARACT EXTRACTION Bilateral     CATARACT EXTRACTION W/ INTRAOCULAR LENS IMPLANT Right 11/10/2016    Procedure: EXTRACTION EXTRACAPSULAR CATARACT PHACO INTRAOCULAR LENS (IOL);  Surgeon: Citlaly Villavicencio MD;  Location: [facility] MAIN OR;  Service:     COSMETIC SURGERY      Eye lift    DILATION AND CURETTAGE OF UTERUS      MYRINGOTOMY W/ TUBES  2011    both ears-one tube out on the left    MYRINGOTOMY W/ TUBES      MN SURGICAL ARTHROSCOPY SHOULDER W/ROTATOR CUFF RPR Right 5/4/2017    Procedure: ARTHROSCOPY SHOULDER WITH ROTATOR CUFF REPAIR, BICEPS TENODESIS, AND SUBACROMIAL DECOMPRESSION;  Surgeon: Cedrick Nielson MD;  Location: [facility] MAIN OR;  Service: Orthopedics    MN XCAPSL CTRC RMVL INSJ IO LENS PROSTH W/O ECP Left 10/20/2016    Procedure: EXTRACTION EXTRACAPSULAR CATARACT PHACO INTRAOCULAR LENS (IOL);  Surgeon: Citlaly Villavicencio MD;  Location: [facility] MAIN OR;  Service: Ophthalmology    SKIN BIOPSY      mole on chest    SKIN BIOPSY      under breast, benign     Social History     Substance and Sexual Activity   Alcohol Use Yes    Comment: socially     Social History     Substance and Sexual Activity   Drug Use No     Social History     Tobacco Use   Smoking Status Former    Current packs/day: 0.00     Average packs/day: 1.5 packs/day for 30.0 years (45.0 ttl pk-yrs)    Types: Cigarettes    Start date:     Quit date:     Years since quittin.6   Smokeless Tobacco Never     Family History:   Family History   Problem Relation Age of Onset    Heart disease Mother         exp age 64 MI    Stroke Father     Macular degeneration Sister     Macular degeneration Brother     Diabetes Brother     Cancer Brother         kidney-nephrectomy    Kidney disease Brother         cancer       Meds/Allergies     Allergies   Allergen Reactions    Lactose - Food Allergy GI Intolerance    Latex Itching     Elastic,adhesive tape    Dilantin [Phenytoin Sodium Extended] Rash    Other Rash     Adhesive tape, environmental    Penicillins Rash       Current Outpatient Medications:     acetaminophen (TYLENOL) 500 mg tablet, Take 1,000 mg by mouth every 6 (six) hours as needed for mild pain, Disp: , Rfl:     Alphagan P 0.1 %, Administer 1 drop to both eyes 2 (two) times a day, Disp: 15 mL, Rfl: 1    ascorbic acid (VITAMIN C) 500 mg tablet, Take 500 mg by mouth every morning, Disp: , Rfl:     atorvastatin (LIPITOR) 10 mg tablet, Take 1 tablet (10 mg total) by mouth daily, Disp: 90 tablet, Rfl: 2    Azelastine HCl 137 MCG/SPRAY SOLN, INSTILL 1 SPRAY INTO EACH NOSTRIL TWICE A DAY, Disp: 30 mL, Rfl: 6    Biotin 5 MG CAPS, Take by mouth every morning, Disp: , Rfl:     Cyanocobalamin (VITAMIN B 12 PO), Take by mouth every morning, Disp: , Rfl:     furosemide (LASIX) 40 mg tablet, Take 1 tablet (40 mg total) by mouth every morning, Disp: 90 tablet, Rfl: 1    ipratropium (ATROVENT) 0.06 % nasal spray, 2 sprays into each nostril 4 (four) times a day, Disp: 15 mL, Rfl: 11    levocetirizine (XYZAL) 5 MG tablet, Take 1 tablet (5 mg total) by mouth every evening, Disp: 90 tablet, Rfl: 1    Lutein 40 MG CAPS, Take by mouth every morning, Disp: , Rfl:     Magnesium 250 MG TABS, Take by mouth every morning, Disp: , Rfl:     olmesartan (BENICAR) 20  "mg tablet, Take 1 tablet (20 mg total) by mouth daily, Disp: 90 tablet, Rfl: 2    omeprazole (PriLOSEC) 20 mg delayed release capsule, Take 1 capsule (20 mg total) by mouth daily, Disp: 90 capsule, Rfl: 1    Potassium Chloride ER 20 MEQ TBCR, Take 1 tablet (20 mEq total) by mouth daily, Disp: 90 tablet, Rfl: 1    pramipexole (MIRAPEX) 1 mg tablet, take 1 tablet by mouth three times a day, Disp: 90 tablet, Rfl: 1    SUMAtriptan (IMITREX) 50 mg tablet, Take 1 tablet (50 mg total) by mouth once as needed for migraine, Disp: 9 tablet, Rfl: 1    timolol (TIMOPTIC) 0.5 % ophthalmic solution, Administer 1 drop to the right eye 2 (two) times a day, Disp: 15 mL, Rfl: 1    triamcinolone (KENALOG) 0.1 % cream, APPLY TO ARMS, LEGS AND BACK twice a day, Disp: , Rfl:     vitamin A 7500 UNIT capsule, Take 7,500 Units by mouth every morning  , Disp: , Rfl:     vitamin E, tocopherol, 400 units capsule, Take 400 Units by mouth every morning Last dose 4/26/17, Disp: , Rfl:     zinc gluconate 50 mg tablet, Take 50 mg by mouth every morning, Disp: , Rfl:     Vitals: Blood pressure 110/70, pulse 55, height 5' 1\" (1.549 m), weight 49.9 kg (110 lb).    Body mass index is 20.78 kg/m².  Vitals:    08/30/24 1116   Weight: 49.9 kg (110 lb)     BP Readings from Last 3 Encounters:   08/30/24 110/70   08/27/24 104/80   08/19/24 106/54       Physical Exam:  Physical Exam    Neurologic:  Alert & oriented x 3, no new focal deficits, Not in any acute distress,  Constitutional:  Well developed, well nourished, non-toxic appearance   Eyes:  Pupil equal and reacting to light, conjunctiva normal,   HENT:  Atraumatic, oropharynx moist, Neck- normal range of motion, no tenderness,  Neck supple, No JVP, No LNP   Respiratory:  Bilateral air entry, mostly clear to auscultation  Cardiovascular: S1-S2 regular with a I/VI systolic murmur   GI:  Soft, nondistended, normal bowel sounds, nontender, no hepatosplenomegaly appreciated.  Musculoskeletal:  No " tenderness, no deformities.   Skin:  Well hydrated, no rash   Lymphatic:  No lymphadenopathy noted   Extremities: Mild lower extremity edema        Diagnostic Studies Review Cardio:      EKG: Sinus bradycardia, heart rate 55/min, minimal voltage criteria for LVH, may be normal variant.    Cardiac testing:   Results for orders placed during the hospital encounter of 19    Echo complete with contrast if indicated    Narrative  [address] [address]  [phone]    Transthoracic Echocardiogram  2D, M-mode, Doppler, and Color Doppler    Study date:  25-Mar-2019    Patient: CLAUDETTE BRADLEY  MR number: [id number]  Account number: [id number]  : 1940  Age: 78 years  Gender: Female  Status: Outpatient  Location: Echo lab  Height: 60 in  Weight: 109.8 lb  BP: 180/ 78 mmHg    Indications: SOB    Diagnoses: R06.02 - Shortness of breath    Sonographer:  FELIPE Gutierrez  Primary Physician:  Moe Elena MD  Referring Physician:  Moe Elena MD  Group:  [facility]  Interpreting Physician:  Nara Collins MD    SUMMARY    LEFT VENTRICLE:  Systolic function was normal. Ejection fraction was estimated in the range of 60 % to 65 % to be 65 %.  There were no regional wall motion abnormalities.  Wall thickness was at the upper limits of normal.  There was mild concentric hypertrophy.  Features were consistent with a pseudonormal left ventricular filling pattern, with concomitant abnormal relaxation and increased filling pressure (grade 2 diastolic dysfunction).    LEFT ATRIUM:  The atrium was mildly to moderately dilated.    MITRAL VALVE:  There was mild regurgitation.    AORTIC VALVE:  There was at max mild to moderate regurgitation.    TRICUSPID VALVE:  There was mild to moderate regurgitation.  Estimated peak PA pressure was 45 mmHg.    PULMONIC VALVE:  There was mild regurgitation.    HISTORY: PRIOR HISTORY: Brain injury,  Arthritis, shingles, HTN, Raynaud's Disease, Former smoker.    PROCEDURE: The procedure was performed in the echo lab. This was a routine study. The transthoracic approach was used. The study included complete 2D imaging, M-mode, complete spectral Doppler, and color Doppler. The heart rate was 52 bpm,  at the start of the study. Images were obtained from the parasternal, apical, subcostal, and suprasternal notch acoustic windows. Image quality was adequate.    LEFT VENTRICLE: Size was normal. Systolic function was normal. Ejection fraction was estimated in the range of 60 % to 65 % to be 65 %. There were no regional wall motion abnormalities. Wall thickness was at the upper limits of normal.  There was mild concentric hypertrophy. DOPPLER: Features were consistent with a pseudonormal left ventricular filling pattern, with concomitant abnormal relaxation and increased filling pressure (grade 2 diastolic dysfunction).    RIGHT VENTRICLE: The size was normal. Systolic function was normal.    LEFT ATRIUM: The atrium was mildly to moderately dilated.    RIGHT ATRIUM: Size was normal.    MITRAL VALVE: There was normal leaflet separation. DOPPLER: The transmitral velocity was within the normal range. There was no evidence for stenosis. There was mild regurgitation.    AORTIC VALVE: The valve was trileaflet. Leaflets exhibited mildly increased thickness and normal cuspal separation. DOPPLER: Transaortic velocity was within the normal range. There was no evidence for stenosis. There was at max mild to  moderate regurgitation.    TRICUSPID VALVE: DOPPLER: There was mild to moderate regurgitation. Estimated peak PA pressure was 45 mmHg.    PULMONIC VALVE: DOPPLER: There was mild regurgitation.    PERICARDIUM: There was no thickening or calcification. There was no pericardial effusion.    AORTA: The root exhibited normal size.    SYSTEMIC VEINS: IVC: The inferior vena cava was normal in size. Respirophasic changes were  normal.    SYSTEM MEASUREMENT TABLES    2D mode  AoR Diam 2D: 3.4 cm  LA Diam (2D): 3.4 cm  LA/Ao (2D): 1  FS (2D Teich): 32.1 %  IVSd (2D): 1.14 cm  LVDEV: 60.8 cm³  LVESV: 23.7 cm³  LVIDd(2D): 3.77 cm  LVISd (2D): 2.56 cm  LVOT Area 2D: 3.14 cm squared  LVPWd (2D): 1.13 cm  SV (Teich): 37.1 cm³    Apical four chamber  LVEF A4C: 54 %    Unspecified Scan Mode  MELISSA Cont Eq (Peak Rosalino): 2.44 cm squared  Dec. [place]; Regurgitant Flow: 2120 mm/s2  Max P mm[Hg]  V Max: 3870 mm/s  Vmax: 3660 mm/s  LVOT (VTI): 21.4 cm  LVOT Diam.: 2 cm  LVOT Vmax: 925 mm/s  LVOT Vmax; Mean: 925 mm/s  Peak Grad.; Mean: 3 mm[Hg]  SV (LVOT): 67 cm³  VTI;Mean: 2 mm[Hg]  MV Peak A Rosalino: 740 mm/s  MV Peak E Rosalino. Mean: 913 mm/s  MVA (PHT): 5 cm squared  PHT: 44 ms  Max P mm[Hg]  V Max: 3190 mm/s  Vmax: 2760 mm/s  RA Area: 16.5 cm squared  RA Volume: 37.7 cm³  TAPSE: 1.6 cm    [facility]    Prepared and electronically signed by    Nara Collins MD  Signed 26-Mar-2019 08:26:34    Results for orders placed during the hospital encounter of 24    Echo complete w/ contrast if indicated    Interpretation Summary    Left Ventricle: Left ventricular cavity size is normal. Wall thickness is mildly increased. The left ventricular ejection fraction is 65% by visual estimation. Systolic function is normal. Wall motion is normal. Diastolic function is mildly abnormal, consistent with grade I (abnormal) relaxation.    Left Atrium: The atrium is upper normal in size (32 mL/m2).    Aortic Valve: There is mild regurgitation.  Patient is around 646 and 746 ms.    Mitral Valve: There is mild regurgitation.    Tricuspid Valve: There is mild regurgitation.  Pulmonary artery pressure around 35 to 38 mmHg.    Pericardium: There is a trivial pericardial effusion along the right atrial free wall.    As compared to previous study report of 2019, EF remained the same.  Less MR, less AI and lower PA  "pressure is noted.  Left atrial size is also now upper normal.      Imaging:  Chest X-Ray:   No Chest XR results available for this patient.    CT-scan of the chest:     No CTA results available for this patient.  Lab Review   Lab Results   Component Value Date    WBC 5.57 08/15/2024    HGB 11.9 08/15/2024    HCT 36.6 08/15/2024    MCV 96 08/15/2024    RDW 13.5 08/15/2024     08/15/2024     BMP:  Lab Results   Component Value Date    SODIUM 135 08/15/2024    K 4.4 08/15/2024     08/15/2024    CO2 23 08/15/2024    ANIONGAP 12.2 11/02/2015    BUN 29 (H) 08/15/2024    CREATININE 1.06 08/15/2024    GLUC 110 12/09/2022    GLUF 95 08/15/2024    CALCIUM 9.8 08/15/2024    CORRECTEDCA 10.3 (H) 10/12/2021    EGFR 47 08/15/2024     LFT:  Lab Results   Component Value Date    AST 15 08/15/2024    ALT 14 08/15/2024    ALKPHOS 74 08/15/2024    TP 6.6 08/15/2024    ALB 3.9 08/15/2024      No components found for: \"TSH3\"  Lab Results   Component Value Date    NDO4YHUSOCQC 0.509 10/22/2016     No results found for: \"HGBA1C\"  Lipid Profile:   Lab Results   Component Value Date    CHOLESTEROL 208 (H) 08/15/2024    HDL 51 08/15/2024    LDLCALC 131 (H) 08/15/2024    TRIG 130 08/15/2024     Lab Results   Component Value Date    CHOLESTEROL 208 (H) 08/15/2024    CHOLESTEROL 216 (H) 01/30/2024     No results found for: \"CKTOTAL\", \"CKMB\", \"CKMBINDEX\", \"TROPONINI\"  No results found for: \"NTBNP\"   Recent Results (from the past 672 hour(s))   CBC and differential    Collection Time: 08/15/24  8:53 AM   Result Value Ref Range    WBC 5.57 4.31 - 10.16 Thousand/uL    RBC 3.82 3.81 - 5.12 Million/uL    Hemoglobin 11.9 11.5 - 15.4 g/dL    Hematocrit 36.6 34.8 - 46.1 %    MCV 96 82 - 98 fL    MCH 31.2 26.8 - 34.3 pg    MCHC 32.5 31.4 - 37.4 g/dL    RDW 13.5 11.6 - 15.1 %    MPV 11.5 8.9 - 12.7 fL    Platelets 190 149 - 390 Thousands/uL    nRBC 0 /100 WBCs    Segmented % 43 43 - 75 %    Immature Grans % 0 0 - 2 %    Lymphocytes % 41 14 - " 44 %    Monocytes % 12 4 - 12 %    Eosinophils Relative 3 0 - 6 %    Basophils Relative 1 0 - 1 %    Absolute Neutrophils 2.34 1.85 - 7.62 Thousands/µL    Absolute Immature Grans 0.01 0.00 - 0.20 Thousand/uL    Absolute Lymphocytes 2.29 0.60 - 4.47 Thousands/µL    Absolute Monocytes 0.69 0.17 - 1.22 Thousand/µL    Eosinophils Absolute 0.19 0.00 - 0.61 Thousand/µL    Basophils Absolute 0.05 0.00 - 0.10 Thousands/µL   Comprehensive metabolic panel    Collection Time: 08/15/24  8:53 AM   Result Value Ref Range    Sodium 135 135 - 147 mmol/L    Potassium 4.4 3.5 - 5.3 mmol/L    Chloride 103 96 - 108 mmol/L    CO2 23 21 - 32 mmol/L    ANION GAP 9 4 - 13 mmol/L    BUN 29 (H) 5 - 25 mg/dL    Creatinine 1.06 0.60 - 1.30 mg/dL    Glucose, Fasting 95 65 - 99 mg/dL    Calcium 9.8 8.4 - 10.2 mg/dL    AST 15 13 - 39 U/L    ALT 14 7 - 52 U/L    Alkaline Phosphatase 74 34 - 104 U/L    Total Protein 6.6 6.4 - 8.4 g/dL    Albumin 3.9 3.5 - 5.0 g/dL    Total Bilirubin 0.61 0.20 - 1.00 mg/dL    eGFR 47 ml/min/1.73sq m   Lipid Panel with Direct LDL reflex    Collection Time: 08/15/24  8:53 AM   Result Value Ref Range    Cholesterol 208 (H) See Comment mg/dL    Triglycerides 130 See Comment mg/dL    HDL, Direct 51 >=50 mg/dL    LDL Calculated 131 (H) 0 - 100 mg/dL   Urinalysis with microscopic    Collection Time: 08/15/24  8:53 AM   Result Value Ref Range    Color, UA Light Yellow     Clarity, UA Clear     Specific Gravity, UA 1.012 1.003 - 1.030    pH, UA 6.5 4.5, 5.0, 5.5, 6.0, 6.5, 7.0, 7.5, 8.0    Leukocytes, UA Negative Negative    Nitrite, UA Negative Negative    Protein, UA Negative Negative mg/dl    Glucose, UA Negative Negative mg/dl    Ketones, UA Negative Negative mg/dl    Urobilinogen, UA <2.0 <2.0 mg/dl mg/dl    Bilirubin, UA Negative Negative    Occult Blood, UA Negative Negative    RBC, UA 1-2 None Seen, 1-2 /hpf    WBC, UA 1-2 None Seen, 1-2 /hpf    Epithelial Cells Occasional None Seen, Occasional /hpf    Bacteria, UA  "None Seen None Seen, Occasional /hpf   Vitamin D 25 hydroxy    Collection Time: 08/15/24  8:53 AM   Result Value Ref Range    Vit D, 25-Hydroxy 74.2 30.0 - 100.0 ng/mL   PTH, intact    Collection Time: 08/15/24  8:53 AM   Result Value Ref Range    .3 (H) 12.0 - 88.0 pg/mL   Phosphorus    Collection Time: 08/15/24  8:53 AM   Result Value Ref Range    Phosphorus 3.9 2.3 - 4.1 mg/dL   Vitamin D 1,25 dihydroxy    Collection Time: 08/15/24  8:53 AM   Result Value Ref Range    Vitamin D 1, 25 Dihydroxy 51.7 5.0 - 200.0 pg/mL             Dr. Andre Clay MD, FACC      \"This note has been constructed using a voice recognition system.Therefore there may be syntax, spelling, and/or grammatical errors. Please call if you have any questions. \"  "

## 2024-08-30 ENCOUNTER — OFFICE VISIT (OUTPATIENT)
Dept: CARDIOLOGY CLINIC | Facility: CLINIC | Age: 84
End: 2024-08-30
Payer: MEDICARE

## 2024-08-30 VITALS
DIASTOLIC BLOOD PRESSURE: 70 MMHG | HEART RATE: 55 BPM | BODY MASS INDEX: 20.77 KG/M2 | HEIGHT: 61 IN | WEIGHT: 110 LBS | SYSTOLIC BLOOD PRESSURE: 110 MMHG

## 2024-08-30 DIAGNOSIS — E78.2 MIXED HYPERLIPIDEMIA: ICD-10-CM

## 2024-08-30 DIAGNOSIS — I34.0 MITRAL VALVE INSUFFICIENCY, UNSPECIFIED ETIOLOGY: Primary | ICD-10-CM

## 2024-08-30 DIAGNOSIS — I10 ESSENTIAL HYPERTENSION: ICD-10-CM

## 2024-08-30 PROCEDURE — 99214 OFFICE O/P EST MOD 30 MIN: CPT | Performed by: INTERNAL MEDICINE

## 2024-08-30 PROCEDURE — 93000 ELECTROCARDIOGRAM COMPLETE: CPT | Performed by: INTERNAL MEDICINE

## 2024-08-30 RX ORDER — ATORVASTATIN CALCIUM 20 MG/1
10 TABLET, FILM COATED ORAL DAILY
Qty: 90 TABLET | Refills: 2 | Status: SHIPPED | OUTPATIENT
Start: 2024-08-30 | End: 2024-08-30 | Stop reason: SDUPTHER

## 2024-08-30 RX ORDER — OLMESARTAN MEDOXOMIL 20 MG/1
20 TABLET ORAL DAILY
Qty: 90 TABLET | Refills: 2 | Status: SHIPPED | OUTPATIENT
Start: 2024-08-30

## 2024-08-30 RX ORDER — ATORVASTATIN CALCIUM 10 MG/1
10 TABLET, FILM COATED ORAL DAILY
Qty: 90 TABLET | Refills: 2 | Status: SHIPPED | OUTPATIENT
Start: 2024-08-30

## 2024-09-18 PROBLEM — Z00.00 ENCOUNTER FOR SUBSEQUENT ANNUAL WELLNESS VISIT (AWV) IN MEDICARE PATIENT: Status: RESOLVED | Noted: 2021-03-11 | Resolved: 2024-09-18

## 2024-11-25 ENCOUNTER — OFFICE VISIT (OUTPATIENT)
Dept: INTERNAL MEDICINE CLINIC | Facility: CLINIC | Age: 84
End: 2024-11-25
Payer: MEDICARE

## 2024-11-25 ENCOUNTER — HOSPITAL ENCOUNTER (OUTPATIENT)
Dept: RADIOLOGY | Facility: HOSPITAL | Age: 84
Discharge: HOME | End: 2024-11-25
Payer: MEDICARE

## 2024-11-25 VITALS
TEMPERATURE: 98.6 F | HEIGHT: 61 IN | DIASTOLIC BLOOD PRESSURE: 72 MMHG | SYSTOLIC BLOOD PRESSURE: 122 MMHG | BODY MASS INDEX: 20.96 KG/M2 | HEART RATE: 46 BPM | OXYGEN SATURATION: 97 % | RESPIRATION RATE: 16 BRPM | WEIGHT: 111 LBS

## 2024-11-25 DIAGNOSIS — R60.0 EDEMA OF EXTREMITIES: ICD-10-CM

## 2024-11-25 DIAGNOSIS — H40.9 GLAUCOMA OF RIGHT EYE, UNSPECIFIED GLAUCOMA TYPE: ICD-10-CM

## 2024-11-25 DIAGNOSIS — E87.6 HYPOPOTASSEMIA: ICD-10-CM

## 2024-11-25 DIAGNOSIS — Z23 ENCOUNTER FOR IMMUNIZATION: ICD-10-CM

## 2024-11-25 DIAGNOSIS — G43.809 OTHER MIGRAINE WITHOUT STATUS MIGRAINOSUS, NOT INTRACTABLE: ICD-10-CM

## 2024-11-25 DIAGNOSIS — R79.89 ABNORMAL LFTS: ICD-10-CM
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
## 2024-11-25 DIAGNOSIS — J30.1 SEASONAL ALLERGIC RHINITIS DUE TO POLLEN: ICD-10-CM

## 2024-11-25 DIAGNOSIS — E78.00 HYPERCHOLESTEREMIA: ICD-10-CM

## 2024-11-25 DIAGNOSIS — M54.50 ACUTE BILATERAL LOW BACK PAIN WITHOUT SCIATICA: ICD-10-CM
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
## 2024-11-25 DIAGNOSIS — I10 ESSENTIAL HYPERTENSION: ICD-10-CM

## 2024-11-25 DIAGNOSIS — H35.30 MACULAR DEGENERATION OF BOTH EYES, UNSPECIFIED TYPE: ICD-10-CM

## 2024-11-25 PROCEDURE — 90662 IIV NO PRSV INCREASED AG IM: CPT

## 2024-11-25 PROCEDURE — 99214 OFFICE O/P EST MOD 30 MIN: CPT | Performed by: INTERNAL MEDICINE

## 2024-11-25 PROCEDURE — G0008 ADMIN INFLUENZA VIRUS VAC: HCPCS

## 2024-11-25 PROCEDURE — 72100 X-RAY EXAM L-S SPINE 2/3 VWS: CPT

## 2024-11-25 RX ORDER — POTASSIUM CHLORIDE 1500 MG/1
1 TABLET, EXTENDED RELEASE ORAL DAILY
Qty: 90 TABLET | Refills: 1 | Status: SHIPPED | OUTPATIENT
Start: 2024-11-25

## 2024-11-25 RX ORDER — FUROSEMIDE 20 MG/1
20 TABLET ORAL 2 TIMES DAILY
Qty: 90 TABLET | Refills: 1 | Status: SHIPPED | OUTPATIENT
Start: 2024-11-25

## 2024-11-25 RX ORDER — BRIMONIDINE TARTRATE 1 MG/ML
1 SOLUTION/ DROPS OPHTHALMIC 2 TIMES DAILY
Qty: 15 ML | Refills: 1 | Status: SHIPPED | OUTPATIENT
Start: 2024-11-25

## 2024-11-25 RX ORDER — TIMOLOL MALEATE 5 MG/ML
1 SOLUTION/ DROPS OPHTHALMIC 2 TIMES DAILY
Qty: 15 ML | Refills: 1 | Status: SHIPPED | OUTPATIENT
Start: 2024-11-25

## 2024-11-25 RX ORDER — IPRATROPIUM BROMIDE 42 UG/1
2 SPRAY, METERED NASAL 4 TIMES DAILY
Qty: 15 ML | Refills: 11 | Status: SHIPPED | OUTPATIENT
Start: 2024-11-25

## 2024-11-25 RX ORDER — FUROSEMIDE 40 MG/1
40 TABLET ORAL EVERY MORNING
Qty: 90 TABLET | Refills: 1 | Status: CANCELLED | OUTPATIENT
Start: 2024-11-25

## 2024-11-25 RX ORDER — LEVOCETIRIZINE DIHYDROCHLORIDE 5 MG/1
5 TABLET, FILM COATED ORAL EVERY EVENING
Qty: 90 TABLET | Refills: 1 | Status: SHIPPED | OUTPATIENT
Start: 2024-11-25

## 2024-11-25 RX ORDER — SUMATRIPTAN 50 MG/1
50 TABLET, FILM COATED ORAL ONCE AS NEEDED
Qty: 9 TABLET | Refills: 1 | Status: SHIPPED | OUTPATIENT
Start: 2024-11-25

## 2024-11-25 NOTE — ASSESSMENT & PLAN NOTE
Persistent nasal congestion and drip agree and continue manage medication as follow  Xyzal 5 mg daily    Atrovent 1 puff each nostril twice a day

## 2024-11-25 NOTE — PROGRESS NOTES
Dr. Carpoi's Office Visit Note  24     Belinda Dolan 84 y.o. female MRN: [id number]  : 1940    Assessment:     1. Fall, initial encounter  Assessment & Plan:  Patient claims fell mid-September at home when she was trying to stand up quickly and since then was feeling dizzy lightheaded been monitoring blood pressure at home blood pressure readings lower side so suspected orthostatic hypotension seen by cardiology who recommended to cut the doses Benicar 20 mg still occasionally feeling dizzy lightheaded so the Lasix was cut down to 20 mg patient claims she was unconscious at not feeling dizzy lightheaded for last 3 weeks    Explained fall precaution    Complains of back pain patient claims fell straight down on the lower back since then complaining of lower back pain with patient been taking Tylenol 3 4 times a day and using CBD oil which seems to be helping patient function carry out day-to-day activities    Will get x-ray lumbosacral spine done recommended physical therapy patient refused advised lidocaine patch 4%  Orders:  -     XR spine lumbar 2 or 3 views injury; Future; Expected date: 2024  2. Edema of extremities  Assessment & Plan:  Patient's edema improved patient been monitoring blood pressure at home on the lower side and been feeling dizzy especially when she stands up and claims he passed out possibly due to low blood pressure in the month of September    Benicar was cut down to 20 mg daily    Will cut down Lasix 20 mg daily with low-salt diet fluid restriction seen by cardiology  Orders:  -     furosemide (LASIX) 20 mg tablet; Take 1 tablet (20 mg total) by mouth 2 (two) times a day  -     Comprehensive metabolic panel; Future  3. Seasonal allergic rhinitis due to pollen  Assessment & Plan:  Persistent nasal congestion and drip agree and continue manage medication as follow  Xyzal 5 mg daily    Atrovent 1 puff each nostril twice a day  Orders:  -     levocetirizine (XYZAL) 5 MG  tablet; Take 1 tablet (5 mg total) by mouth every evening  -     ipratropium (ATROVENT) 0.06 % nasal spray; 2 sprays into each nostril 4 (four) times a day  4. Hypopotassemia  Assessment & Plan:  Lab Results   Component Value Date    SODIUM 135 08/15/2024    K 4.4 08/15/2024     08/15/2024    CO2 23 08/15/2024    BUN 29 (H) 08/15/2024    CREATININE 1.06 08/15/2024    GLUC 110 12/09/2022    CALCIUM 9.8 08/15/2024   Previous BMP reviewed potassium normal    Patient is on Lasix so we will supplement with KCl 20 mill equivalent daily    Check BMP before next visit  Orders:  -     Potassium Chloride ER 20 MEQ TBCR; Take 1 tablet (20 mEq total) by mouth daily  -     furosemide (LASIX) 20 mg tablet; Take 1 tablet (20 mg total) by mouth 2 (two) times a day  -     Comprehensive metabolic panel; Future  5. Glaucoma of right eye, unspecified glaucoma type  -     timolol (TIMOPTIC) 0.5 % ophthalmic solution; Administer 1 drop to the right eye 2 (two) times a day  -     Alphagan P 0.1 %; Administer 1 drop to both eyes 2 (two) times a day  6. Macular degeneration of both eyes, unspecified type  -     timolol (TIMOPTIC) 0.5 % ophthalmic solution; Administer 1 drop to the right eye 2 (two) times a day  -     Alphagan P 0.1 %; Administer 1 drop to both eyes 2 (two) times a day  7. Other migraine without status migrainosus, not intractable  -     SUMAtriptan (IMITREX) 50 mg tablet; Take 1 tablet (50 mg total) by mouth once as needed for migraine  8. Encounter for immunization  -     influenza vaccine, high-dose, PF 0.5 mL (Fluzone High Dose)  9. Acute bilateral low back pain without sciatica  Assessment & Plan:  Patient fell in late September cyst since then been having lower back pain nonradiating been using Tylenol 3-4 times a day which seems to be helping but persistent pain patient did not seek any treatment did not go to the emergency room or did not see any physician history of hyperparathyroidism with osteoporosis no  x-rays were done    Recommended physical therapy patient refused    X-ray lumbosacral spine    To use lidocaine 4% patch for 12 hours affected areas lower back  Orders:  -     XR spine lumbar 2 or 3 views injury; Future; Expected date: 11/25/2024  10. Abnormal LFTs  Assessment & Plan:  Previous LFT reviewed bilirubin at baseline elevated due to Gilbert's syndrome    Check LFT before next visit  Orders:  -     Comprehensive metabolic panel; Future  11. Essential hypertension  Assessment & Plan:  Patient blood pressure in the lower side monitoring blood pressure at home systolic about 110 and diastolic about 60 and feels dizzy intermittently when stand up seen by cardiology    Benicar was cut down to 20 mg daily    Will cut down Lasix 20 mg daily with low-salt diet fluid restriction Daily blood pressure monitoring    Check urine analysis and BMP before next visit  Orders:  -     Comprehensive metabolic panel; Future  -     Urinalysis with microscopic; Future  12. Hypercholesteremia  Assessment & Plan:  Last LDL reviewed 119    On Lipitor 10 mg daily    Awaiting repeat lipid profile LFT if needed will increase the doses after the results before next visit  Orders:  -     Comprehensive metabolic panel; Future  -     Lipid Panel with Direct LDL reflex; Future; Expected date: 02/08/2025        Discussion Summary and Plan:  Today's care plan and medications were reviewed with patient in detail and all their questions answered to their satisfaction.    Chief Complaint   Patient presents with    Follow-up     Had a fall back in September (had very low BP in August, BP med was cut in half) - fell off toilet - has been having lower back pain and hip pain - wasn't able to lay down in the beginning but has since gotten a little better to lay down - taking tylenol OTC      Subjective:  Came in follow-up chronic medical condition listed visit diagnosis seen by surgeon for hyperparathyroidism seen by cardiology adjustment made  because of the persistent hypotension for now dizziness much improved complaining of back pain since fall mid-September at the time patient did not seek any attention from any physician did not go to the emergency room persistent pain room nonradiating mainly lower back for detail refer to assessment plan visit diagnosis awaiting labs to be done before next visit        The following portions of the patient's history were reviewed and updated as appropriate: allergies, current medications, past family history, past medical history, past social history, past surgical history and problem list.    Review of Systems   Constitutional:  Negative for activity change, appetite change, chills, diaphoresis, fatigue, fever and unexpected weight change.   HENT:  Negative for congestion, dental problem, drooling, ear discharge, ear pain, facial swelling, hearing loss, mouth sores, nosebleeds, postnasal drip, rhinorrhea, sinus pressure, sneezing, sore throat, tinnitus, trouble swallowing and voice change.    Eyes:  Negative for photophobia, pain, discharge, redness, itching and visual disturbance.   Respiratory:  Negative for apnea, cough, choking, chest tightness, shortness of breath, wheezing and stridor.    Cardiovascular:  Negative for chest pain, palpitations and leg swelling.   Gastrointestinal:  Negative for abdominal distention, abdominal pain, anal bleeding, blood in stool, constipation, diarrhea, nausea, rectal pain and vomiting.   Endocrine: Negative for cold intolerance, heat intolerance, polydipsia, polyphagia and polyuria.   Genitourinary:  Negative for decreased urine volume, difficulty urinating, dysuria, enuresis, flank pain, frequency, genital sores, hematuria and urgency.   Musculoskeletal:  Positive for arthralgias, back pain, gait problem and myalgias. Negative for joint swelling, neck pain and neck stiffness.   Skin:  Negative for color change, pallor, rash and wound.   Allergic/Immunologic: Negative.   Negative for environmental allergies, food allergies and immunocompromised state.   Neurological:  Negative for dizziness, tremors, seizures, syncope, facial asymmetry, speech difficulty, weakness, light-headedness, numbness and headaches.   Psychiatric/Behavioral:  Negative for agitation, behavioral problems, confusion, decreased concentration, dysphoric mood, hallucinations, self-injury, sleep disturbance and suicidal ideas. The patient is not nervous/anxious and is not hyperactive.          Historical Information   Patient Active Problem List   Diagnosis    Baker cyst, right    Gastroesophageal reflux disease without esophagitis    Glucose intolerance (impaired glucose tolerance)    Restless leg syndrome    Hypercholesteremia    Mitral regurgitation    Migraine    Essential hypertension    Varicose vein of leg    Edema of extremities    Allergic rhinitis due to dust    Aspirin intolerance    Persistent lymphocytosis    Hypopotassemia    Irritable bowel syndrome    Abnormal echocardiogram    Vitamin D deficiency    Diverticulosis    History of syncope    Hammer toe of left foot    Raynaud's disease    Stage 3b chronic kidney disease (HCC)    Cervical radiculopathy    Left arm pain    Rupture of left distal biceps tendon    Primary osteoarthritis involving multiple joints    HL (hearing loss)    Lumbar radiculopathy    Abnormal LFTs    Primary osteoarthritis of right knee    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    Hyperparathyroidism (HCC)    Mixed hyperlipidemia    Generalized maculopapular rash    Fall    Acute bilateral low back pain without sciatica    Seasonal allergic rhinitis due to pollen     Past Medical History:   Diagnosis Date    Baker's cyst of knee 10/21/2016    right- burst on its own    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HCC) 1992    hx of-fell when ice skating. Noted brain bleed w/swelling    HL (hearing loss)     Migraine     Raynaud's disease     both hands     Past Surgical History:   Procedure Laterality  Date    AXILLARY SURGERY Left     fatty cyst removed, benign    CATARACT EXTRACTION Bilateral     CATARACT EXTRACTION W/ INTRAOCULAR LENS IMPLANT Right 11/10/2016    Procedure: EXTRACTION EXTRACAPSULAR CATARACT PHACO INTRAOCULAR LENS (IOL);  Surgeon: Citlaly Villavicencio MD;  Location: [facility] MAIN OR;  Service:     COSMETIC SURGERY      Eye lift    DILATION AND CURETTAGE OF UTERUS      MYRINGOTOMY W/ TUBES  2011    both ears-one tube out on the left    MYRINGOTOMY W/ TUBES      WI SURGICAL ARTHROSCOPY SHOULDER W/ROTATOR CUFF RPR Right 2017    Procedure: ARTHROSCOPY SHOULDER WITH ROTATOR CUFF REPAIR, BICEPS TENODESIS, AND SUBACROMIAL DECOMPRESSION;  Surgeon: Cedrick Nielson MD;  Location: [facility] MAIN OR;  Service: Orthopedics    WI XCAPSL CTRC RMVL INSJ IO LENS PROSTH W/O ECP Left 10/20/2016    Procedure: EXTRACTION EXTRACAPSULAR CATARACT PHACO INTRAOCULAR LENS (IOL);  Surgeon: Citlaly Villavicencio MD;  Location: [facility] MAIN OR;  Service: Ophthalmology    SKIN BIOPSY      mole on chest    SKIN BIOPSY      under breast, benign     Social History     Substance and Sexual Activity   Alcohol Use Yes    Comment: socially     Social History     Substance and Sexual Activity   Drug Use No     Social History     Tobacco Use   Smoking Status Former    Current packs/day: 0.00    Average packs/day: 1.5 packs/day for 30.0 years (45.0 ttl pk-yrs)    Types: Cigarettes    Start date:     Quit date:     Years since quittin.9   Smokeless Tobacco Never     Family History   Problem Relation Age of Onset    Heart disease Mother         exp age 64 MI    Stroke Father     Macular degeneration Sister     Macular degeneration Brother     Diabetes Brother     Cancer Brother         kidney-nephrectomy    Kidney disease Brother         cancer     Health Maintenance Due   Topic    Pneumococcal Vaccine: 65+ Years (1 of 2 - PCV)    Zoster Vaccine (1 of 2)    RSV Vaccine Age 60+ Years (1 - 1-dose 75+ series)    COVID-19 Vaccine (  season)      Meds/Allergies       Current Outpatient Medications:     acetaminophen (TYLENOL) 500 mg tablet, Take 1,000 mg by mouth every 6 (six) hours as needed for mild pain, Disp: , Rfl:     Alphagan P 0.1 %, Administer 1 drop to both eyes 2 (two) times a day, Disp: 15 mL, Rfl: 1    ascorbic acid (VITAMIN C) 500 mg tablet, Take 500 mg by mouth every morning, Disp: , Rfl:     atorvastatin (LIPITOR) 10 mg tablet, Take 1 tablet (10 mg total) by mouth daily, Disp: 90 tablet, Rfl: 2    Azelastine HCl 137 MCG/SPRAY SOLN, instill 1 spray into each nostril twice a day, Disp: 90 mL, Rfl: 3    Biotin 5 MG CAPS, Take by mouth every morning, Disp: , Rfl:     Cyanocobalamin (VITAMIN B 12 PO), Take by mouth every morning, Disp: , Rfl:     furosemide (LASIX) 20 mg tablet, Take 1 tablet (20 mg total) by mouth 2 (two) times a day, Disp: 90 tablet, Rfl: 1    furosemide (LASIX) 40 mg tablet, Take 1 tablet (40 mg total) by mouth every morning, Disp: 90 tablet, Rfl: 1    ipratropium (ATROVENT) 0.06 % nasal spray, 2 sprays into each nostril 4 (four) times a day, Disp: 15 mL, Rfl: 11    levocetirizine (XYZAL) 5 MG tablet, Take 1 tablet (5 mg total) by mouth every evening, Disp: 90 tablet, Rfl: 1    Lutein 40 MG CAPS, Take by mouth every morning, Disp: , Rfl:     Magnesium 250 MG TABS, Take by mouth every morning, Disp: , Rfl:     olmesartan (BENICAR) 20 mg tablet, Take 1 tablet (20 mg total) by mouth daily, Disp: 90 tablet, Rfl: 2    omeprazole (PriLOSEC) 20 mg delayed release capsule, Take 1 capsule (20 mg total) by mouth daily, Disp: 90 capsule, Rfl: 1    Potassium Chloride ER 20 MEQ TBCR, Take 1 tablet (20 mEq total) by mouth daily, Disp: 90 tablet, Rfl: 1    SUMAtriptan (IMITREX) 50 mg tablet, Take 1 tablet (50 mg total) by mouth once as needed for migraine, Disp: 9 tablet, Rfl: 1    timolol (TIMOPTIC) 0.5 % ophthalmic solution, Administer 1 drop to the right eye 2 (two) times a day, Disp: 15 mL, Rfl: 1    triamcinolone (KENALOG)  "0.1 % cream, APPLY TO ARMS, LEGS AND BACK twice a day, Disp: , Rfl:     vitamin A 7500 UNIT capsule, Take 7,500 Units by mouth every morning  , Disp: , Rfl:     vitamin E, tocopherol, 400 units capsule, Take 400 Units by mouth every morning Last dose 4/26/17, Disp: , Rfl:     zinc gluconate 50 mg tablet, Take 50 mg by mouth every morning, Disp: , Rfl:       Objective:    Vitals:   /72   Pulse (!) 46   Temp 98.6 °F (37 °C)   Resp 16   Ht 5' 1\" (1.549 m)   Wt 50.3 kg (111 lb)   SpO2 97%   BMI 20.97 kg/m²   Body mass index is 20.97 kg/m².  Vitals:    11/25/24 1104   Weight: 50.3 kg (111 lb)       Physical Exam  Vitals and nursing note reviewed.   Constitutional:       General: She is not in acute distress.     Appearance: She is well-developed. She is not ill-appearing, toxic-appearing or diaphoretic.   HENT:      Head: Normocephalic and atraumatic.      Right Ear: External ear normal.      Left Ear: External ear normal.      Mouth/Throat:      Pharynx: No oropharyngeal exudate.   Eyes:      General: Lids are normal. Lids are everted, no foreign bodies appreciated. No scleral icterus.        Right eye: No discharge.         Left eye: No discharge.      Conjunctiva/sclera: Conjunctivae normal.      Pupils: Pupils are equal, round, and reactive to light.   Neck:      Thyroid: No thyromegaly.      Vascular: Normal carotid pulses. No carotid bruit, hepatojugular reflux or JVD.      Trachea: No tracheal tenderness or tracheal deviation.   Cardiovascular:      Rate and Rhythm: Normal rate and regular rhythm.      Pulses: Normal pulses.      Heart sounds: Murmur heard.      No friction rub. No gallop.   Pulmonary:      Effort: Pulmonary effort is normal. No respiratory distress.      Breath sounds: No stridor. No wheezing or rales.   Chest:      Chest wall: No tenderness.   Abdominal:      General: Bowel sounds are normal. There is no distension.      Palpations: Abdomen is soft. There is no mass.      " Tenderness: There is no abdominal tenderness. There is no guarding or rebound.   Musculoskeletal:         General: No tenderness or deformity. Normal range of motion.      Cervical back: Normal range of motion and neck supple. No edema, erythema or rigidity. No spinous process tenderness or muscular tenderness. Normal range of motion.   Lymphadenopathy:      Head:      Right side of head: No submental, submandibular, tonsillar, preauricular or posterior auricular adenopathy.      Left side of head: No submental, submandibular, tonsillar, preauricular, posterior auricular or occipital adenopathy.      Cervical: No cervical adenopathy.      Right cervical: No superficial, deep or posterior cervical adenopathy.     Left cervical: No superficial, deep or posterior cervical adenopathy.      Upper Body:      Right upper body: No pectoral adenopathy.      Left upper body: No pectoral adenopathy.   Skin:     General: Skin is warm and dry.      Coloration: Skin is not pale.      Findings: Rash present. No erythema.   Neurological:      General: No focal deficit present.      Mental Status: She is alert and oriented to person, place, and time.      Cranial Nerves: No cranial nerve deficit.      Sensory: No sensory deficit.      Motor: No tremor, abnormal muscle tone or seizure activity.      Coordination: Coordination normal.      Gait: Gait normal.      Deep Tendon Reflexes: Reflexes are normal and symmetric. Reflexes normal.   Psychiatric:         Behavior: Behavior normal.         Thought Content: Thought content normal.         Judgment: Judgment normal.         Lab Review   No visits with results within 2 Month(s) from this visit.   Latest known visit with results is:   Appointment on 08/15/2024   Component Date Value Ref Range Status    WBC 08/15/2024 5.57  4.31 - 10.16 Thousand/uL Final    RBC 08/15/2024 3.82  3.81 - 5.12 Million/uL Final    Hemoglobin 08/15/2024 11.9  11.5 - 15.4 g/dL Final    Hematocrit 08/15/2024  36.6  34.8 - 46.1 % Final    MCV 08/15/2024 96  82 - 98 fL Final    MCH 08/15/2024 31.2  26.8 - 34.3 pg Final    MCHC 08/15/2024 32.5  31.4 - 37.4 g/dL Final    RDW 08/15/2024 13.5  11.6 - 15.1 % Final    MPV 08/15/2024 11.5  8.9 - 12.7 fL Final    Platelets 08/15/2024 190  149 - 390 Thousands/uL Final    nRBC 08/15/2024 0  /100 WBCs Final    Segmented % 08/15/2024 43  43 - 75 % Final    Immature Grans % 08/15/2024 0  0 - 2 % Final    Lymphocytes % 08/15/2024 41  14 - 44 % Final    Monocytes % 08/15/2024 12  4 - 12 % Final    Eosinophils Relative 08/15/2024 3  0 - 6 % Final    Basophils Relative 08/15/2024 1  0 - 1 % Final    Absolute Neutrophils 08/15/2024 2.34  1.85 - 7.62 Thousands/µL Final    Absolute Immature Grans 08/15/2024 0.01  0.00 - 0.20 Thousand/uL Final    Absolute Lymphocytes 08/15/2024 2.29  0.60 - 4.47 Thousands/µL Final    Absolute Monocytes 08/15/2024 0.69  0.17 - 1.22 Thousand/µL Final    Eosinophils Absolute 08/15/2024 0.19  0.00 - 0.61 Thousand/µL Final    Basophils Absolute 08/15/2024 0.05  0.00 - 0.10 Thousands/µL Final    Sodium 08/15/2024 135  135 - 147 mmol/L Final    Potassium 08/15/2024 4.4  3.5 - 5.3 mmol/L Final    Chloride 08/15/2024 103  96 - 108 mmol/L Final    CO2 08/15/2024 23  21 - 32 mmol/L Final    ANION GAP 08/15/2024 9  4 - 13 mmol/L Final    BUN 08/15/2024 29 (H)  5 - 25 mg/dL Final    Creatinine 08/15/2024 1.06  0.60 - 1.30 mg/dL Final    Standardized to IDMS reference method    Glucose, Fasting 08/15/2024 95  65 - 99 mg/dL Final    Calcium 08/15/2024 9.8  8.4 - 10.2 mg/dL Final    AST 08/15/2024 15  13 - 39 U/L Final    ALT 08/15/2024 14  7 - 52 U/L Final    Specimen collection should occur prior to Sulfasalazine administration due to the potential for falsely depressed results.     Alkaline Phosphatase 08/15/2024 74  34 - 104 U/L Final    Total Protein 08/15/2024 6.6  6.4 - 8.4 g/dL Final    Albumin 08/15/2024 3.9  3.5 - 5.0 g/dL Final    Total Bilirubin 08/15/2024 0.61   0.20 - 1.00 mg/dL Final    Use of this assay is not recommended for patients undergoing treatment with eltrombopag due to the potential for falsely elevated results.  N-acetyl-p-benzoquinone imine (metabolite of Acetaminophen) will generate erroneously low results in samples for patients that have taken an overdose of Acetaminophen.    eGFR 08/15/2024 47  ml/min/1.73sq m Final    Cholesterol 08/15/2024 208 (H)  See Comment mg/dL Final    Cholesterol:         Pediatric <18 Years        Desirable          <170 mg/dL      Borderline High    170-199 mg/dL      High               >=200 mg/dL        Adult >=18 Years            Desirable         <200 mg/dL      Borderline High   200-239 mg/dL      High              >239 mg/dL      Triglycerides 08/15/2024 130  See Comment mg/dL Final    Triglyceride:     0-9Y            <75mg/dL     10Y-17Y         <90 mg/dL       >=18Y     Normal          <150 mg/dL     Borderline High 150-199 mg/dL     High            200-499 mg/dL        Very High       >499 mg/dL    Specimen collection should occur prior to Metamizole administration due to the potential for falsely depressed results.    HDL, Direct 08/15/2024 51  >=50 mg/dL Final    LDL Calculated 08/15/2024 131 (H)  0 - 100 mg/dL Final    LDL Cholesterol:     Optimal           <100 mg/dl     Near Optimal      100-129 mg/dl     Above Optimal       Borderline High 130-159 mg/dl       High            160-189 mg/dl       Very High       >189 mg/dl         This screening LDL is a calculated result.   It does not have the accuracy of the Direct Measured LDL in the monitoring of patients with hyperlipidemia and/or statin therapy.   Direct Measure LDL (TLQ610) must be ordered separately in these patients.    Color, UA 08/15/2024 Light Yellow   Final    Clarity, UA 08/15/2024 Clear   Final    Specific Gravity, UA 08/15/2024 1.012  1.003 - 1.030 Final    pH, UA 08/15/2024 6.5  4.5, 5.0, 5.5, 6.0, 6.5, 7.0, 7.5, 8.0 Final    Leukocytes, UA  "08/15/2024 Negative  Negative Final    Nitrite, UA 08/15/2024 Negative  Negative Final    Protein, UA 08/15/2024 Negative  Negative mg/dl Final    Glucose, UA 08/15/2024 Negative  Negative mg/dl Final    Ketones, UA 08/15/2024 Negative  Negative mg/dl Final    Urobilinogen, UA 08/15/2024 <2.0  <2.0 mg/dl mg/dl Final    Bilirubin, UA 08/15/2024 Negative  Negative Final    Occult Blood, UA 08/15/2024 Negative  Negative Final    RBC, UA 08/15/2024 1-2  None Seen, 1-2 /hpf Final    WBC, UA 08/15/2024 1-2  None Seen, 1-2 /hpf Final    Epithelial Cells 08/15/2024 Occasional  None Seen, Occasional /hpf Final    Bacteria, UA 08/15/2024 None Seen  None Seen, Occasional /hpf Final    Vit D, 25-Hydroxy 08/15/2024 74.2  30.0 - 100.0 ng/mL Final    Vitamin D guidelines established by Clinical Guidelines Subcommittee  of the Endocrine Society Task Force, 2011    Deficiency <20ng/ml   Insufficiency 20-30ng/ml   Sufficient  ng/ml     PTH 08/15/2024 102.3 (H)  12.0 - 88.0 pg/mL Final    Phosphorus 08/15/2024 3.9  2.3 - 4.1 mg/dL Final    Vitamin D 1, 25 Dihydroxy 08/15/2024 51.7  5.0 - 200.0 pg/mL Final    Assay results should be utilized in conjunction with other clinical and laboratory data to assist the clinician in making individual patient management decisions. This assay has been shown to cross-react with Zemplar (paricalcitol) and active form of Vitamin D.         Patient Instructions   Pt is symptom free for this problem.  This diagnosis or problem is stable/well controlled  Patient is advised to continue same meds as outlined in medicine list  Pt is advised to continue to follow with specialist if they are following for this problme  Pt should get blood test or other testing as per specialist ( or myself) prior to your next visit.        Maida Carpio MD        \"This note has been constructed using a voice recognition system.Therefore there may be syntax, spelling, and/or grammatical errors. Please call if you " "have any questions. \"  "

## 2024-11-25 NOTE — ASSESSMENT & PLAN NOTE
Previous LFT reviewed bilirubin at baseline elevated due to Gilbert's syndrome    Check LFT before next visit

## 2024-11-25 NOTE — ASSESSMENT & PLAN NOTE
Patient claims fell mid-September at home when she was trying to stand up quickly and since then was feeling dizzy lightheaded been monitoring blood pressure at home blood pressure readings lower side so suspected orthostatic hypotension seen by cardiology who recommended to cut the doses Benicar 20 mg still occasionally feeling dizzy lightheaded so the Lasix was cut down to 20 mg patient claims she was unconscious at not feeling dizzy lightheaded for last 3 weeks    Explained fall precaution    Complains of back pain patient claims fell straight down on the lower back since then complaining of lower back pain with patient been taking Tylenol 3 4 times a day and using CBD oil which seems to be helping patient function carry out day-to-day activities    Will get x-ray lumbosacral spine done recommended physical therapy patient refused advised lidocaine patch 4%

## 2024-11-25 NOTE — ASSESSMENT & PLAN NOTE
Lab Results   Component Value Date    SODIUM 135 08/15/2024    K 4.4 08/15/2024     08/15/2024    CO2 23 08/15/2024    BUN 29 (H) 08/15/2024    CREATININE 1.06 08/15/2024    GLUC 110 12/09/2022    CALCIUM 9.8 08/15/2024   Previous BMP reviewed potassium normal    Patient is on Lasix so we will supplement with KCl 20 mill equivalent daily    Check BMP before next visit

## 2024-11-25 NOTE — ASSESSMENT & PLAN NOTE
Last LDL reviewed 119    On Lipitor 10 mg daily    Awaiting repeat lipid profile LFT if needed will increase the doses after the results before next visit

## 2024-11-25 NOTE — ASSESSMENT & PLAN NOTE
Patient's edema improved patient been monitoring blood pressure at home on the lower side and been feeling dizzy especially when she stands up and claims he passed out possibly due to low blood pressure in the month of September    Benicar was cut down to 20 mg daily    Will cut down Lasix 20 mg daily with low-salt diet fluid restriction seen by cardiology

## 2024-11-25 NOTE — ASSESSMENT & PLAN NOTE
Patient fell in late September cyst since then been having lower back pain nonradiating been using Tylenol 3-4 times a day which seems to be helping but persistent pain patient did not seek any treatment did not go to the emergency room or did not see any physician history of hyperparathyroidism with osteoporosis no x-rays were done    Recommended physical therapy patient refused    X-ray lumbosacral spine    To use lidocaine 4% patch for 12 hours affected areas lower back

## 2024-11-25 NOTE — ASSESSMENT & PLAN NOTE
Patient blood pressure in the lower side monitoring blood pressure at home systolic about 110 and diastolic about 60 and feels dizzy intermittently when stand up seen by cardiology    Benicar was cut down to 20 mg daily    Will cut down Lasix 20 mg daily with low-salt diet fluid restriction Daily blood pressure monitoring    Check urine analysis and BMP before next visit

## 2024-11-26 DIAGNOSIS — M54.50 ACUTE BILATERAL LOW BACK PAIN WITHOUT SCIATICA: ICD-10-CM
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
## 2024-11-26 DIAGNOSIS — M54.16 LUMBAR RADICULOPATHY: Primary | ICD-10-CM

## 2024-11-26 NOTE — PROGRESS NOTES
History of osteoporosis fell for detail refer to my previous progress note complete intractable back pain refused physical therapy all the x-ray results reviewed with the patient suspected fracture T12 lumbar spondylosis patient advised to be seen by spine pain management patient agreed referral in place

## 2024-12-12 ENCOUNTER — OFFICE VISIT (OUTPATIENT)
Dept: NEUROSURGERY | Facility: CLINIC | Age: 84
End: 2024-12-12
Payer: MEDICARE

## 2024-12-12 VITALS
RESPIRATION RATE: 16 BRPM | WEIGHT: 111 LBS | OXYGEN SATURATION: 99 % | SYSTOLIC BLOOD PRESSURE: 142 MMHG | HEART RATE: 60 BPM | BODY MASS INDEX: 20.96 KG/M2 | DIASTOLIC BLOOD PRESSURE: 68 MMHG | TEMPERATURE: 98.7 F | HEIGHT: 61 IN

## 2024-12-12 DIAGNOSIS — S22.080A T12 COMPRESSION FRACTURE (HCC): Primary | ICD-10-CM

## 2024-12-12 PROCEDURE — 99203 OFFICE O/P NEW LOW 30 MIN: CPT | Performed by: PHYSICIAN ASSISTANT

## 2024-12-12 NOTE — LETTER
2024     Maida Carpio MD  [address]  [address]  [facility] [address]    Patient: Belinda Dolan   YOB: 1940   Date of Visit: 2024       Dear Dr. Carpio:    Thank you for referring Belinda Dolan to me for evaluation. Below are my notes for this consultation.    If you have questions, please do not hesitate to call me. I look forward to following your patient along with you.         Sincerely,        Marcio Garcia PA-C        CC: No Recipients    Marcio Garcia PA-C  2024  7:31 PM  Sign when Signing Visit  Name: Belinda Dolan      : 1940      MRN: [id number]  Encounter Provider: Marcio Garcia PA-C  Encounter Date: 2024   Encounter department: [facility] NEUROSURGICAL [facility]  :  Assessment & Plan    Patient is 84 years old pleasant woman with past medical history of metabolic mentation, hypertension, Raynaud's disease, GERD, hyperparathyroidism, irritable bowel syndrome, chronic back pain, chronic kidney disease, restless leg syndrome, hypercholesterolemia, syncopal episodes and fall about 2 months ago and had T12 superior endplate compression deformity.    Patient is referred by her PCP for evaluation of lower back pain in the setting of traumatic T12 compression deformity. Patient reported she passed out while she was on toilet seat about a month ago, and since then she had localized TL back pain that improved over time. She estimated the pain 3/10 today, but she rather continuous to experience right leg RLS symptoms. No weakness, numbness, or paresthesia in the Lower Extremities.    Exam-A&OX3. Estela, strength 5/5 bilaterally, sensation to LT intact bilaterally. DTR 2+ no clonus bilaterally, Non tender on palpation of posterior TL spine. Gait stable.          Imagings:      XR of Lumbar spine done on 2024 shows fracture deformity at T12, a finding of unknown chronicity.    Plan:  Patient unsure why she was referred to NSX service.  "She says she is feeling better, pain in the back improved. Offered her a brace if she would try, but she declined   I also discussed with the patient if one time repeat Lumbar spine XR ordered to evaluate the stability of T12 compression, but patient is not interested.  She says she prefers to call office if her symptoms gets worse.  Advised fall precautions, avoid strenuous activities, excessive bending or twisting her back.  Call with questions or concerns, otherwise, follow up on PRN basis.        History of Present Illness  See discussion above    ROS personally reviewed and updated as follows:  Review of Systems   Eyes:         MD  Glaucoma, right eye   Respiratory:  Negative for chest tightness and shortness of breath.    Gastrointestinal:         IBS   Genitourinary:         Stress incontinence, prior to fracture   Musculoskeletal:  Positive for back pain (constant low back pain, radiates intermittently to right hip and posterior leg). Negative for gait problem.   Neurological:  Negative for weakness and numbness.        Restlessness, right leg    I have personally reviewed the MA's review of systems and made changes as necessary.         Objective    /68 (BP Location: Left arm, Patient Position: Sitting, Cuff Size: Standard)   Pulse 60   Temp 98.7 °F (37.1 °C) (Temporal)   Resp 16   Ht 5' 1\" (1.549 m)   Wt 50.3 kg (111 lb)   SpO2 99%   BMI 20.97 kg/m²     Physical Exam  Constitutional:       Appearance: Normal appearance.   HENT:      Head: Normocephalic and atraumatic.   Eyes:      General: Lids are normal.      Extraocular Movements: Extraocular movements intact.      Pupils: Pupils are equal, round, and reactive to light.   Pulmonary:      Effort: Pulmonary effort is normal.   Musculoskeletal:         General: Normal range of motion.      Cervical back: Normal range of motion.   Neurological:      General: No focal deficit present.      Mental Status: She is oriented to person, place, and " time.      Motor: Motor strength is normal.     Deep Tendon Reflexes:      Reflex Scores:       Tricep reflexes are 2+ on the right side and 2+ on the left side.       Bicep reflexes are 2+ on the right side and 2+ on the left side.       Brachioradialis reflexes are 2+ on the right side and 2+ on the left side.       Patellar reflexes are 2+ on the right side and 2+ on the left side.       Achilles reflexes are 2+ on the right side and 2+ on the left side.  Psychiatric:         Speech: Speech normal.   Neurological Exam  Mental Status  Awake, alert and oriented to person, place and time. Oriented to person, place, time and situation. Oriented to person, place, and time. Recent and remote memory are intact. Speech is normal. Language is fluent with no aphasia. Attention and concentration are normal.    Cranial Nerves  CN II: Visual acuity is normal. Visual fields full to confrontation.  CN III, IV, VI: Extraocular movements intact bilaterally. Normal lids and orbits bilaterally. Pupils equal round and reactive to light bilaterally.  CN V: Facial sensation is normal.  CN VII: Full and symmetric facial movement.  CN VIII: Hearing is normal.  CN IX, X: Palate elevates symmetrically. Normal gag reflex.  CN XI: Shoulder shrug strength is normal.  CN XII: Tongue midline without atrophy or fasciculations.    Motor  Normal muscle bulk throughout. No fasciculations present. Normal muscle tone. No abnormal involuntary movements. Strength is 5/5 throughout all four extremities.    Sensory  Light touch is normal in upper and lower extremities.     Reflexes                                            Right                      Left  Brachioradialis                    2+                         2+  Biceps                                 2+                         2+  Triceps                                2+                         2+  Finger flex                           2+                         2+  Hamstring                             2+                         2+  Patellar                                2+                         2+  Achilles                                2+                         2+    Coordination  Right: Finger-to-nose normal.    Radiology Results Review: I personally reviewed the following image studies in PACS and associated radiology reports: xray(s). My interpretation of the radiology images/reports is: Lumbar spine XR was reviewed with the patient and findings as described in the assessment section above.    Administrative Statements  I have spent a total time of 30 minutes in caring for this patient on the day of the visit/encounter including Diagnostic results, Prognosis, Risks and benefits of tx options, Instructions for management, Patient and family education, Importance of tx compliance, Risk factor reductions, Impressions, Documenting in the medical record, Reviewing / ordering tests, medicine, procedures  , and Obtaining or reviewing history  .

## 2024-12-12 NOTE — PROGRESS NOTES
Name: Belinda Dolan      : 1940      MRN: [id number]  Encounter Provider: Marcio Garcia PA-C  Encounter Date: 2024   Encounter department: [facility] NEUROSURGICAL [facility]  :  Assessment & Plan    Patient is 84 years old pleasant woman with past medical history of metabolic mentation, hypertension, Raynaud's disease, GERD, hyperparathyroidism, irritable bowel syndrome, chronic back pain, chronic kidney disease, restless leg syndrome, hypercholesterolemia, syncopal episodes and fall about 2 months ago and had T12 superior endplate compression deformity.    Patient is referred by her PCP for evaluation of lower back pain in the setting of traumatic T12 compression deformity. Patient reported she passed out while she was on toilet seat about a month ago, and since then she had localized TL back pain that improved over time. She estimated the pain 3/10 today, but she rather continuous to experience right leg RLS symptoms. No weakness, numbness, or paresthesia in the Lower Extremities.    Exam-A&OX3. Estela, strength 5/5 bilaterally, sensation to LT intact bilaterally. DTR 2+ no clonus bilaterally, Non tender on palpation of posterior TL spine. Gait stable.          Imagings:      XR of Lumbar spine done on 2024 shows fracture deformity at T12, a finding of unknown chronicity.    Plan:  Patient unsure why she was referred to NSX service. She says she is feeling better, pain in the back improved. Offered her a brace if she would try, but she declined   I also discussed with the patient if one time repeat Lumbar spine XR ordered to evaluate the stability of T12 compression, but patient is not interested.  She says she prefers to call office if her symptoms gets worse.  Advised fall precautions, avoid strenuous activities, excessive bending or twisting her back.  Call with questions or concerns, otherwise, follow up on PRN basis.        History of Present Illness   See discussion above    ROS  "personally reviewed and updated as follows:  Review of Systems   Eyes:         MD  Glaucoma, right eye   Respiratory:  Negative for chest tightness and shortness of breath.    Gastrointestinal:         IBS   Genitourinary:         Stress incontinence, prior to fracture   Musculoskeletal:  Positive for back pain (constant low back pain, radiates intermittently to right hip and posterior leg). Negative for gait problem.   Neurological:  Negative for weakness and numbness.        Restlessness, right leg    I have personally reviewed the MA's review of systems and made changes as necessary.         Objective     /68 (BP Location: Left arm, Patient Position: Sitting, Cuff Size: Standard)   Pulse 60   Temp 98.7 °F (37.1 °C) (Temporal)   Resp 16   Ht 5' 1\" (1.549 m)   Wt 50.3 kg (111 lb)   SpO2 99%   BMI 20.97 kg/m²     Physical Exam  Constitutional:       Appearance: Normal appearance.   HENT:      Head: Normocephalic and atraumatic.   Eyes:      General: Lids are normal.      Extraocular Movements: Extraocular movements intact.      Pupils: Pupils are equal, round, and reactive to light.   Pulmonary:      Effort: Pulmonary effort is normal.   Musculoskeletal:         General: Normal range of motion.      Cervical back: Normal range of motion.   Neurological:      General: No focal deficit present.      Mental Status: She is oriented to person, place, and time.      Motor: Motor strength is normal.     Deep Tendon Reflexes:      Reflex Scores:       Tricep reflexes are 2+ on the right side and 2+ on the left side.       Bicep reflexes are 2+ on the right side and 2+ on the left side.       Brachioradialis reflexes are 2+ on the right side and 2+ on the left side.       Patellar reflexes are 2+ on the right side and 2+ on the left side.       Achilles reflexes are 2+ on the right side and 2+ on the left side.  Psychiatric:         Speech: Speech normal.   Neurological Exam  Mental Status  Awake, alert and " oriented to person, place and time. Oriented to person, place, time and situation. Oriented to person, place, and time. Recent and remote memory are intact. Speech is normal. Language is fluent with no aphasia. Attention and concentration are normal.    Cranial Nerves  CN II: Visual acuity is normal. Visual fields full to confrontation.  CN III, IV, VI: Extraocular movements intact bilaterally. Normal lids and orbits bilaterally. Pupils equal round and reactive to light bilaterally.  CN V: Facial sensation is normal.  CN VII: Full and symmetric facial movement.  CN VIII: Hearing is normal.  CN IX, X: Palate elevates symmetrically. Normal gag reflex.  CN XI: Shoulder shrug strength is normal.  CN XII: Tongue midline without atrophy or fasciculations.    Motor  Normal muscle bulk throughout. No fasciculations present. Normal muscle tone. No abnormal involuntary movements. Strength is 5/5 throughout all four extremities.    Sensory  Light touch is normal in upper and lower extremities.     Reflexes                                            Right                      Left  Brachioradialis                    2+                         2+  Biceps                                 2+                         2+  Triceps                                2+                         2+  Finger flex                           2+                         2+  Hamstring                            2+                         2+  Patellar                                2+                         2+  Achilles                                2+                         2+    Coordination  Right: Finger-to-nose normal.    Radiology Results Review: I personally reviewed the following image studies in PACS and associated radiology reports: xray(s). My interpretation of the radiology images/reports is: Lumbar spine XR was reviewed with the patient and findings as described in the assessment section above.    Administrative Statements   I have  spent a total time of 30 minutes in caring for this patient on the day of the visit/encounter including Diagnostic results, Prognosis, Risks and benefits of tx options, Instructions for management, Patient and family education, Importance of tx compliance, Risk factor reductions, Impressions, Documenting in the medical record, Reviewing / ordering tests, medicine, procedures  , and Obtaining or reviewing history  .

## 2025-01-02 DIAGNOSIS — E87.6 HYPOPOTASSEMIA: ICD-10-CM

## 2025-01-02 DIAGNOSIS — R60.0 EDEMA OF EXTREMITIES: ICD-10-CM

## 2025-01-03 RX ORDER — FUROSEMIDE 20 MG/1
20 TABLET ORAL 2 TIMES DAILY
Qty: 180 TABLET | Refills: 1 | Status: SHIPPED | OUTPATIENT
Start: 2025-01-03

## 2025-01-06 ENCOUNTER — TELEPHONE (OUTPATIENT)
Age: 85
End: 2025-01-06

## 2025-01-06 NOTE — TELEPHONE ENCOUNTER
Pt has continuing back pain and would like to discuss brace again , per notes from Marcio 12/12 pt can come back PRN.  Appt made 1/16/25 with Marcio per pt request

## 2025-01-16 ENCOUNTER — OFFICE VISIT (OUTPATIENT)
Dept: NEUROSURGERY | Facility: CLINIC | Age: 85
End: 2025-01-16
Payer: MEDICARE

## 2025-01-16 VITALS
BODY MASS INDEX: 20.96 KG/M2 | TEMPERATURE: 97.6 F | HEIGHT: 61 IN | OXYGEN SATURATION: 97 % | RESPIRATION RATE: 16 BRPM | WEIGHT: 111 LBS | HEART RATE: 58 BPM | DIASTOLIC BLOOD PRESSURE: 68 MMHG | SYSTOLIC BLOOD PRESSURE: 128 MMHG

## 2025-01-16 DIAGNOSIS — S22.089A CLOSED T12 FRACTURE (HCC): Primary | ICD-10-CM

## 2025-01-16 PROCEDURE — 99213 OFFICE O/P EST LOW 20 MIN: CPT | Performed by: PHYSICIAN ASSISTANT

## 2025-01-16 NOTE — LETTER
2025     Maida Carpio MD  [address]  [address]  [facility] [address]    Patient: Belinda Dolan   YOB: 1940   Date of Visit: 2025       Dear Dr. Carpio:    Thank you for referring Belinda Dolan to me for evaluation. Below are my notes for this consultation.    If you have questions, please do not hesitate to call me. I look forward to following your patient along with you.         Sincerely,        Marcio Garcia PA-C        CC: No Recipients    Marcio Garcia PA-C  2025  8:56 PM  Incomplete  Name: Belinda Dolan      : 1940      MRN: [id number]  Encounter Provider: Marcio Garcia PA-C  Encounter Date: 2025   Encounter department: [facility]  :  Assessment & Plan  Closed T12 fracture (HCC)  Patient is 84 years old pleasant woman with past medical history of metabolic mentation, hypertension, Raynaud's disease, GERD, hyperparathyroidism, irritable bowel syndrome, chronic back pain, chronic kidney disease, restless leg syndrome, hypercholesterolemia, syncopal episodes and fall about 3 months ago and had T12 superior endplate compression deformity.    Patient is here today complaining of ongoing back pain. She was not wearing brace. She says her back pain increased, denies any radicular symptoms, numbness, paresthesia, weakness in the Lower extremities.    Patient denies any gait issues, Bowel or bladder dysfunction, or saddle anaesthesia.    Today patient was provided with TLSO brace, but she is not interested. She says it is too heavy and bulky.    Plan:   I would recommend Upright Thoracolumbar spine x-rays  Advised to wear  abdominal binder PRN.  F/U after images results  Questions and concerns were answered to patient's satisfaction. Patient verbalized understandings and agreed with the plan.  Orders:  •  X-ray thoracolumbar spine 2 views; Future  •  Ambulatory referral to Spine & Pain Management; Future  •  Ambulatory  "Referral to Physical Therapy; Future      History of Present Illness  HPI  Review of Systems   Musculoskeletal:  Positive for back pain (lbp across the back worse on right into right hip and leg, worse at night). Negative for gait problem and myalgias.   Neurological:  Negative for weakness and numbness.   Hematological:  Does not bruise/bleed easily.   All other systems reviewed and are negative.   I have personally reviewed the MA's review of systems and made changes as necessary.     Objective  Resp 16   Ht 5' 1\" (1.549 m)   Wt 50.3 kg (111 lb)   BMI 20.97 kg/m²     Physical Exam  Neurological Exam    No recent images    Administrative Statements  I have spent a total time of 30 minutes in caring for this patient on the day of the visit/encounter including Prognosis, Risks and benefits of tx options, Instructions for management, Patient and family education, Importance of tx compliance, Risk factor reductions, Impressions, Documenting in the medical record, Reviewing / ordering tests, medicine, procedures  , and Obtaining or reviewing history  .     Marcio Garcia PA-C  2025  8:45 PM  Sign when Signing Visit  Name: Belinda Dolan      : 1940      MRN: [id number]  Encounter Provider: Marcio Garcia PA-C  Encounter Date: 2025   Encounter department: [facility]  :  Assessment & Plan        History of Present Illness  HPI  Review of Systems   Musculoskeletal:  Positive for back pain (lbp across the back worse on right into right hip and leg, worse at night). Negative for gait problem and myalgias.   Neurological:  Negative for weakness and numbness.   Hematological:  Does not bruise/bleed easily.   All other systems reviewed and are negative.   I have personally reviewed the MA's review of systems and made changes as necessary.         Objective  Resp 16   Ht 5' 1\" (1.549 m)   Wt 50.3 kg (111 lb)   BMI 20.97 kg/m²     Physical Exam  Neurological Exam          "

## 2025-01-16 NOTE — PROGRESS NOTES
Name: Belinda Dolan      : 1940      MRN: [id number]  Encounter Provider: Marcio Garcia PA-C  Encounter Date: 2025   Encounter department: [facility]UROS[facility]  :  Assessment & Plan  Closed T12 fracture (HCC)  Patient is 84 years old pleasant woman with past medical history of metabolic mentation, hypertension, Raynaud's disease, GERD, hyperparathyroidism, irritable bowel syndrome, chronic back pain, chronic kidney disease, restless leg syndrome, hypercholesterolemia, syncopal episodes and fall about 3 months ago and had T12 superior endplate compression deformity.    Patient is here today complaining of ongoing back pain. She was not wearing brace. She says her back pain increased, denies any radicular symptoms, numbness, paresthesia, weakness in the Lower extremities.    Patient denies any gait issues, Bowel or bladder dysfunction, or saddle anaesthesia.    Today patient was provided with TLSO brace, but she is not interested. She says it is too heavy and bulky.    Plan:    Offered TLSO, but patient not interested-states bulky and heavy.  Upright Thoracolumbar spine x-rays  Advised to wear  abdominal binder PRN.  Ambulatory referral to PMX & PT  F/U after images results  Questions and concerns were answered to patient's satisfaction. Patient verbalized understandings and agreed with the plan.  Orders:    X-ray thoracolumbar spine 2 views; Future    Ambulatory referral to Spine & Pain Management; Future    Ambulatory Referral to Physical Therapy; Future      History of Present Illness   HPI  Review of Systems   Musculoskeletal:  Positive for back pain (lbp across the back worse on right into right hip and leg, worse at night). Negative for gait problem and myalgias.   Neurological:  Negative for weakness and numbness.   Hematological:  Does not bruise/bleed easily.   All other systems reviewed and are negative.   I have personally reviewed the MA's review of systems and made  "changes as necessary.     Objective   Resp 16   Ht 5' 1\" (1.549 m)   Wt 50.3 kg (111 lb)   BMI 20.97 kg/m²     Physical Exam  Constitutional:       Appearance: Normal appearance.   HENT:      Head: Normocephalic and atraumatic.   Eyes:      General: Lids are normal.      Extraocular Movements: Extraocular movements intact.      Pupils: Pupils are equal, round, and reactive to light.   Pulmonary:      Effort: Pulmonary effort is normal.   Musculoskeletal:         General: Tenderness present.      Cervical back: Normal range of motion.   Neurological:      General: No focal deficit present.      Mental Status: She is oriented to person, place, and time.      Motor: Motor strength is normal.     Deep Tendon Reflexes:      Reflex Scores:       Tricep reflexes are 2+ on the right side and 2+ on the left side.       Bicep reflexes are 2+ on the right side and 2+ on the left side.       Brachioradialis reflexes are 2+ on the right side and 2+ on the left side.       Patellar reflexes are 2+ on the right side and 2+ on the left side.       Achilles reflexes are 2+ on the right side and 2+ on the left side.  Psychiatric:         Speech: Speech normal.       Neurological Exam  Mental Status  Awake, alert and oriented to person, place and time. Oriented to person, place, time and situation. Oriented to person, place, and time. Speech is normal. Language is fluent with no aphasia. Attention and concentration are normal.    Cranial Nerves  CN II: Visual acuity is normal. Visual fields full to confrontation.  CN III, IV, VI: Extraocular movements intact bilaterally. Normal lids and orbits bilaterally. Pupils equal round and reactive to light bilaterally.  CN V: Facial sensation is normal.  CN VII: Full and symmetric facial movement.  CN VIII: Hearing is normal.  CN IX, X: Palate elevates symmetrically. Normal gag reflex.  CN XI: Shoulder shrug strength is normal.  CN XII: Tongue midline without atrophy or " fasciculations.    Motor  Normal muscle bulk throughout. Normal muscle tone. No abnormal involuntary movements. Strength is 5/5 throughout all four extremities.    Sensory  Light touch is normal in upper and lower extremities.     Reflexes                                            Right                      Left  Brachioradialis                    2+                         2+  Biceps                                 2+                         2+  Triceps                                2+                         2+  Finger flex                           2+                         2+  Hamstring                            2+                         2+  Patellar                                2+                         2+  Achilles                                2+                         2+    Coordination  Right: Finger-to-nose normal.    Gait  Casual gait is normal including stance, stride, and arm swing.      No recent images    Administrative Statements   I have spent a total time of 30 minutes in caring for this patient on the day of the visit/encounter including Prognosis, Risks and benefits of tx options, Instructions for management, Patient and family education, Importance of tx compliance, Risk factor reductions, Impressions, Documenting in the medical record, Reviewing / ordering tests, medicine, procedures  , and Obtaining or reviewing history  .

## 2025-01-17 ENCOUNTER — HOSPITAL ENCOUNTER (OUTPATIENT)
Dept: RADIOLOGY | Facility: HOSPITAL | Age: 85
Discharge: HOME | End: 2025-01-17
Payer: MEDICARE

## 2025-01-17 DIAGNOSIS — S22.089A CLOSED T12 FRACTURE (HCC): ICD-10-CM

## 2025-01-17 PROCEDURE — 72080 X-RAY EXAM THORACOLMB 2/> VW: CPT

## 2025-01-31 ENCOUNTER — TELEPHONE (OUTPATIENT)
Dept: SURGICAL ONCOLOGY | Facility: CLINIC | Age: 85
End: 2025-01-31

## 2025-01-31 ENCOUNTER — OFFICE VISIT (OUTPATIENT)
Dept: NEUROSURGERY | Facility: CLINIC | Age: 85
End: 2025-01-31
Payer: MEDICARE

## 2025-01-31 VITALS
BODY MASS INDEX: 20.96 KG/M2 | WEIGHT: 111 LBS | SYSTOLIC BLOOD PRESSURE: 130 MMHG | RESPIRATION RATE: 16 BRPM | TEMPERATURE: 98.1 F | DIASTOLIC BLOOD PRESSURE: 82 MMHG | HEIGHT: 61 IN

## 2025-01-31 DIAGNOSIS — S22.080A T12 COMPRESSION FRACTURE (HCC): Primary | ICD-10-CM

## 2025-01-31 PROCEDURE — 99213 OFFICE O/P EST LOW 20 MIN: CPT | Performed by: PHYSICIAN ASSISTANT

## 2025-01-31 NOTE — PROGRESS NOTES
Name: Belinda Dolan      : 1940      MRN: [id number]  Encounter Provider: Marcio Garcia PA-C  Encounter Date: 2025   Encounter department: [facility] NEUROSURGICAL [facility]  :  Assessment & Plan  T12 compression fracture (HCC)  Patient is 84 years old pleasant woman with past medical history of metabolic mentation, hypertension, Raynaud's disease, GERD, hyperparathyroidism, irritable bowel syndrome, chronic back pain, chronic kidney disease, restless leg syndrome, hypercholesterolemia, syncopal episodes and fall about 3 months ago and had T12 superior endplate compression deformity.     Patient is here today to discuss her F/U thoracolumbar spine xrays results. She was not wearing brace. She says her back is minimal while at rest, usually increase when she is supine or go to bed, denies any radicular symptoms, numbness, paresthesia, weakness in the Lower extremities.     Her follow up Xrays shows stable mild to moderate SEP compression of T12.    Exam-A&OX3. Estela , strength 5/5 and sensation to LT intact bilaterally. DTR 2+, no clonus bilaterally. Mild tenderness noted in the lower Lumbar region.Otherwise, non tender at TL junction.        Plan:  I think patient's pain multifactorial, including arthritis, discogenic, DJD, advised to follow up with PMX.  Patient not interested in PT referral, she wants to call office if she needs one  Advised fall precaution, avoid lifting heavy objects, excessive bending or twisting.  Questions and concerns were answered to patients's satisfaction. Patient verbalized understandings and agreed with the plan.        History of Present Illness   HPI-see discussion above  Review of Systems   Musculoskeletal:  Positive for back pain (lbp across the back worse on right into bi/hip and legs, worse at night) and myalgias (cramping in left side of back). Negative for gait problem.   Neurological:  Negative for weakness and numbness.   Hematological:  Does not  "bruise/bleed easily.    I have personally reviewed the MA's review of systems and made changes as necessary     Objective   /82 (BP Location: Left arm, Patient Position: Sitting)   Temp 98.1 °F (36.7 °C) (Temporal)   Resp 16   Ht 5' 1\" (1.549 m)   Wt 50.3 kg (111 lb)   BMI 20.97 kg/m²     Physical Exam  Constitutional:       Appearance: Normal appearance.   HENT:      Head: Normocephalic and atraumatic.   Cardiovascular:      Rate and Rhythm: Normal rate.   Pulmonary:      Effort: Pulmonary effort is normal.   Musculoskeletal:         General: Tenderness present.      Cervical back: Normal range of motion.   Neurological:      General: No focal deficit present.      Mental Status: She is oriented to person, place, and time.      Motor: Motor strength is normal.     Deep Tendon Reflexes: Reflexes are normal and symmetric.   Psychiatric:         Speech: Speech normal.       Neurological Exam  Mental Status  Awake, alert and oriented to person, place and time. Oriented to person, place and time. Oriented to person, place, and time. Recent and remote memory are intact. Speech is normal. Language is fluent with no aphasia.    Motor  Normal muscle bulk throughout. No fasciculations present. Normal muscle tone. No abnormal involuntary movements. Strength is 5/5 throughout all four extremities.    Sensory  Light touch is normal in upper and lower extremities.     Reflexes  Deep tendon reflexes are 2+ and symmetric in all four extremities.    Coordination  Right: Finger-to-nose normal.Left: Finger-to-nose normal.      Radiology Results Review: I personally reviewed the following image studies in PACS and associated radiology reports: xray(s). My interpretation of the radiology images/reports is: stable T12 compression.    Administrative Statements   I have spent a total time of 30 minutes in caring for this patient on the day of the visit/encounter including Diagnostic results, Prognosis, Risks and benefits of tx " options, Instructions for management, Patient and family education, Importance of tx compliance, Risk factor reductions, Impressions, Documenting in the medical record, Reviewing / ordering tests, medicine, procedures  , and Obtaining or reviewing history  .

## 2025-01-31 NOTE — LETTER
2025     Maida Carpio MD  [address]  [address]  [place] [facility] [address]    Patient: Belinda Dolan   YOB: 1940   Date of Visit: 2025       Dear Dr. Carpio:    Thank you for referring Belinda Dolan to me for evaluation. Below are my notes for this consultation.    If you have questions, please do not hesitate to call me. I look forward to following your patient along with you.         Sincerely,        Marcio Garcia PA-C        CC: No Recipients    Marcio Garcia PA-C  2025  7:16 PM  Incomplete  Name: Belinda Dolan      : 1940      MRN: [id number]  Encounter Provider: Marcio Garcia PA-C  Encounter Date: 2025   Encounter department: [facility]  :  Assessment & Plan  T12 compression fracture (HCC)  Patient is 84 years old pleasant woman with past medical history of metabolic mentation, hypertension, Raynaud's disease, GERD, hyperparathyroidism, irritable bowel syndrome, chronic back pain, chronic kidney disease, restless leg syndrome, hypercholesterolemia, syncopal episodes and fall about 3 months ago and had T12 superior endplate compression deformity.     Patient is here today to discuss her F/U thoracolumbar spine xrays results. She was not wearing brace. She says her back is minimal while at rest, usually increase when she is supine or go to bed, denies any radicular symptoms, numbness, paresthesia, weakness in the Lower extremities.     Her follow up Xrays shows stable mild to moderate SEP compression of T12.    Exam-A&OX3. Estela , strength 5/5 and sensation to LT intact bilaterally. DTR 2+, no clonus bilaterally. Mild tenderness noted in the lower Lumbar region.Otherwise, non tender at TL junction.        Plan:  I think patient's pain multifactorial, including arthritis, discogenic, DJD, advised to follow up with PMX.  Patient not interested in PT referral, she wants to call office if she needs one  Advised fall  "precaution, avoid lifting heavy objects, excessive bending or twisting.  Questions and concerns were answered to patients's satisfaction. Patient verbalized understandings and agreed with the plan.        History of Present Illness  HPI-see discussion above  Review of Systems   Musculoskeletal:  Positive for back pain (lbp across the back worse on right into bi/hip and legs, worse at night) and myalgias (cramping in left side of back). Negative for gait problem.   Neurological:  Negative for weakness and numbness.   Hematological:  Does not bruise/bleed easily.    I have personally reviewed the MA's review of systems and made changes as necessary     Objective  /82 (BP Location: Left arm, Patient Position: Sitting)   Temp 98.1 °F (36.7 °C) (Temporal)   Resp 16   Ht 5' 1\" (1.549 m)   Wt 50.3 kg (111 lb)   BMI 20.97 kg/m²     Physical Exam  Neurological Exam    Radiology Results Review: I personally reviewed the following image studies in PACS and associated radiology reports: xray(s). My interpretation of the radiology images/reports is: stable T12 compression.    Administrative Statements  I have spent a total time of 30 minutes in caring for this patient on the day of the visit/encounter including Diagnostic results, Prognosis, Risks and benefits of tx options, Instructions for management, Patient and family education, Importance of tx compliance, Risk factor reductions, Impressions, Documenting in the medical record, Reviewing / ordering tests, medicine, procedures  , and Obtaining or reviewing history  .     Marcio Garcia PA-C  2025  7:07 PM  Sign when Signing Visit  Name: Belinda Dolan      : 1940      MRN: [id number]  Encounter Provider: Marcio Garcia PA-C  Encounter Date: 2025   Encounter department: [facility]  :  Assessment & Plan        History of Present Illness  HPI  Review of Systems   Musculoskeletal:  Positive for back pain (lbp across the " "back worse on right into bi/hip and legs, worse at night) and myalgias (cramping in left side of back). Negative for gait problem.   Neurological:  Negative for weakness and numbness.   Hematological:  Does not bruise/bleed easily.    I have personally reviewed the MA's review of systems and made changes as necessary.         Objective  /82 (BP Location: Left arm, Patient Position: Sitting)   Temp 98.1 °F (36.7 °C) (Temporal)   Resp 16   Ht 5' 1\" (1.549 m)   Wt 50.3 kg (111 lb)   BMI 20.97 kg/m²     Physical Exam  Neurological Exam          "

## 2025-01-31 NOTE — TELEPHONE ENCOUNTER
LM letting patient know that Dr. Barrios is needed in surgery on 2/26 and we will need to reschedule her appt that day. [facility] number given.

## 2025-02-07 ENCOUNTER — CONSULT (OUTPATIENT)
Dept: PAIN MEDICINE | Facility: CLINIC | Age: 85
End: 2025-02-07
Payer: MEDICARE

## 2025-02-07 VITALS — WEIGHT: 111 LBS | BODY MASS INDEX: 20.96 KG/M2 | HEIGHT: 61 IN

## 2025-02-07 DIAGNOSIS — S22.089A CLOSED T12 FRACTURE (HCC): ICD-10-CM

## 2025-02-07 DIAGNOSIS — N18.32 STAGE 3B CHRONIC KIDNEY DISEASE (HCC): ICD-10-CM

## 2025-02-07 DIAGNOSIS — M47.816 LUMBAR SPONDYLOSIS: Primary | ICD-10-CM

## 2025-02-07 PROCEDURE — 99204 OFFICE O/P NEW MOD 45 MIN: CPT | Performed by: STUDENT IN AN ORGANIZED HEALTH CARE EDUCATION/TRAINING PROGRAM

## 2025-02-07 PROCEDURE — G2211 COMPLEX E/M VISIT ADD ON: HCPCS | Performed by: STUDENT IN AN ORGANIZED HEALTH CARE EDUCATION/TRAINING PROGRAM

## 2025-02-07 RX ORDER — BROMFENAC SODIUM 0.7 MG/ML
SOLUTION/ DROPS OPHTHALMIC
COMMUNITY
Start: 2025-02-05

## 2025-02-07 NOTE — PROGRESS NOTES
Assessment:  1. Lumbar spondylosis    2. Closed T12 fracture (HCC)    3. Stage 3b chronic kidney disease (HCC)        Plan:  Orders Placed This Encounter   Procedures    Ambulatory referral to Physical Therapy     Standing Status:   Future     Expiration Date:   2/7/2026     Referral Priority:   Routine     Referral Type:   Physical Therapy     Referral Reason:   Specialty Services Required     Requested Specialty:   Physical Therapy     Number of Visits Requested:   1     Expiration Date:   2/7/2026       New Medications Ordered This Visit   Medications    bromfenac sodium 0.07 % SOLN    tiZANidine (ZANAFLEX) 4 mg tablet     Sig: Take 1 tablet (4 mg total) by mouth every 8 (eight) hours as needed for muscle spasms     Dispense:  90 tablet     Refill:  1       My impressions and treatment recommendations were discussed in detail with the patient, who verbalized understanding and had no further questions.    Belinda is a very pleasant 84-year-old female who presents today for evaluation and management of back pain.  I independently interpreted the patient's thoracolumbar radiographs showing superior endplate compression fracture at T12 with greater than 50% height loss.  There is also multilevel degenerative changes and throughout the lumbar spine.  I do think the patient's pain is mechanical and facet related as well as nociceptive pain from superior endplate fracture T12.  We discussed management including activity modification, physical therapy for core and lumbar strengthening and stabilization, multimodal analgesics, and potential injection therapy.    I provided the patient a referral to physical therapy for core and lumbar strengthening and stabilization.  The patient is limited from taking nonsteroidal anti-inflammatories due to chronic kidney disease.  I did recommend she could trial Voltaren gel as it has less systemic penetrance.  I also provided her a prescription for tizanidine to be taken up to 3 times  a day.  I discussed side effects of this medication with her.    I will have her follow-up in 6 weeks time to see how she is progressing with her therapy regimen and analgesic regiment.  If failure to improve, we can consider obtaining a thoracolumbar MRI to further evaluate for pathology that may be amenable to injection therapy.  I suspect the patient would likely be a candidate for thoracolumbar medial branch blocks above and below the level of her compression fracture.    Follow-up is planned in 6 weeks time or sooner as warranted.  Discharge instructions were provided. I personally saw and examined the patient and I agree with the above discussed plan of care.    I have spent a total time of 43 minutes in caring for this patient on the day of the visit/encounter including Diagnostic results, Prognosis, Risks and benefits of tx options, Instructions for management, Impressions, Documenting in the medical record, Reviewing / ordering tests, medicine, procedures  , and Obtaining or reviewing history  .     History of Present Illness:    Belinda Dolan is a 84 y.o. female who presents to [facility] for initial evaluation of the above stated pain complaints. The patient has a past medical and chronic pain history as outlined in the assessment section. She was referred by Marcio Garcia PA-C  [address]  [address]  [place] [id number] .    84-year-old female with past medical history of GERD, hypertension, hyperlipidemia, CKD, thyroid disease presents today for evaluation management of back pain.  She reports an injury from September 2024 where she fell resulting in the onset of her back pain.  She rates her pain as moderate to severe.  The pain is intermittent worse in the evening.  The pain is associated with shooting, sharpness, pressure, throbbing, and dull aching.  The pain is located in her low back and radiates into her buttocks.  She denies using any assistive devices.  The  pain is made worse with lying down, bending, and sitting.  She has a history of a rotator cuff repair 2017.  She is currently using Tylenol as well as using CBD.  She has taken different NSAIDs in the past including ibuprofen and meloxicam with relief.  She presents today for further evaluation.    Review of Systems:    Review of Systems   Constitutional:  Negative for fever and unexpected weight change.   HENT:  Negative for trouble swallowing.    Eyes:  Negative for visual disturbance.   Respiratory:  Negative for chest tightness, shortness of breath and wheezing.    Cardiovascular:  Negative for chest pain and palpitations.   Gastrointestinal:  Negative for constipation, diarrhea, nausea and vomiting.   Endocrine: Negative for cold intolerance, heat intolerance and polydipsia.   Genitourinary:  Negative for difficulty urinating and frequency.   Musculoskeletal:  Positive for back pain. Negative for arthralgias, gait problem, joint swelling, myalgias, neck pain and neck stiffness.   Skin:  Negative for rash.   Neurological:  Positive for dizziness, weakness, light-headedness, numbness and headaches. Negative for seizures and syncope.   Hematological:  Does not bruise/bleed easily.   Psychiatric/Behavioral:  Positive for dysphoric mood and sleep disturbance. Negative for decreased concentration. The patient is nervous/anxious.    All other systems reviewed and are negative.          Past Medical History:   Diagnosis Date    Baker's cyst of knee 10/21/2016    right- burst on its own    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HCC) 1992    hx of-fell when ice skating. Noted brain bleed w/swelling    Glaucoma     right eye    Headache(784.0)     HL (hearing loss)     Low back pain 9/10/24    Macular degeneration     Migraine     Raynaud's disease     both hands    Thoracic vertebral fracture (HCC)        Past Surgical History:   Procedure Laterality Date    AXILLARY SURGERY Left     fatty cyst removed, benign    CATARACT EXTRACTION Bilateral      CATARACT EXTRACTION W/ INTRAOCULAR LENS IMPLANT Right 11/10/2016    Procedure: EXTRACTION EXTRACAPSULAR CATARACT PHACO INTRAOCULAR LENS (IOL);  Surgeon: Citlaly Villavicencio MD;  Location: [facility] MAIN OR;  Service:     COSMETIC SURGERY      Eye lift    DILATION AND CURETTAGE OF UTERUS      MYRINGOTOMY W/ TUBES      both ears-one tube out on the left    MYRINGOTOMY W/ TUBES      IA SURGICAL ARTHROSCOPY SHOULDER W/ROTATOR CUFF RPR Right 2017    Procedure: ARTHROSCOPY SHOULDER WITH ROTATOR CUFF REPAIR, BICEPS TENODESIS, AND SUBACROMIAL DECOMPRESSION;  Surgeon: Cedrick Nielson MD;  Location: [facility] MAIN OR;  Service: Orthopedics    IA XCAPSL CTRC RMVL INSJ IO LENS PROSTH W/O ECP Left 10/20/2016    Procedure: EXTRACTION EXTRACAPSULAR CATARACT PHACO INTRAOCULAR LENS (IOL);  Surgeon: Citlaly Villavicencio MD;  Location: [facility] MAIN OR;  Service: Ophthalmology    SKIN BIOPSY      mole on chest    SKIN BIOPSY      under breast, benign    VASCULAR SURGERY  1970    vein in right leg removed       Family History   Problem Relation Age of Onset    Heart disease Mother         exp age 64 MI    Stroke Father     Macular degeneration Sister     Macular degeneration Brother     Diabetes Brother     Cancer Brother         kidney-nephrectomy    Kidney disease Brother         cancer       Social History     Occupational History    Not on file   Tobacco Use    Smoking status: Former     Current packs/day: 0.00     Average packs/day: 1.5 packs/day for 30.0 years (45.0 ttl pk-yrs)     Types: Cigarettes     Start date:      Quit date:      Years since quittin.1    Smokeless tobacco: Never   Vaping Use    Vaping status: Never Used   Substance and Sexual Activity    Alcohol use: Not Currently     Comment: socially    Drug use: No    Sexual activity: Not on file         Current Outpatient Medications:     acetaminophen (TYLENOL) 500 mg tablet, Take 1,000 mg by mouth every 6 (six) hours as needed for mild pain, Disp: , Rfl:      Alphagan P 0.1 %, Administer 1 drop to both eyes 2 (two) times a day, Disp: 15 mL, Rfl: 1    ascorbic acid (VITAMIN C) 500 mg tablet, Take 500 mg by mouth every morning, Disp: , Rfl:     atorvastatin (LIPITOR) 10 mg tablet, Take 1 tablet (10 mg total) by mouth daily, Disp: 90 tablet, Rfl: 2    Azelastine HCl 137 MCG/SPRAY SOLN, instill 1 spray into each nostril twice a day, Disp: 90 mL, Rfl: 3    Biotin 5 MG CAPS, Take by mouth every morning, Disp: , Rfl:     bromfenac sodium 0.07 % SOLN, , Disp: , Rfl:     Cyanocobalamin (VITAMIN B 12 PO), Take by mouth every morning, Disp: , Rfl:     furosemide (LASIX) 20 mg tablet, take 1 tablet by mouth twice a day, Disp: 180 tablet, Rfl: 1    ipratropium (ATROVENT) 0.06 % nasal spray, 2 sprays into each nostril 4 (four) times a day, Disp: 15 mL, Rfl: 11    levocetirizine (XYZAL) 5 MG tablet, Take 1 tablet (5 mg total) by mouth every evening, Disp: 90 tablet, Rfl: 1    Lutein 40 MG CAPS, Take by mouth every morning, Disp: , Rfl:     Magnesium 250 MG TABS, Take by mouth every morning, Disp: , Rfl:     olmesartan (BENICAR) 20 mg tablet, Take 1 tablet (20 mg total) by mouth daily, Disp: 90 tablet, Rfl: 2    omeprazole (PriLOSEC) 20 mg delayed release capsule, Take 1 capsule (20 mg total) by mouth daily, Disp: 90 capsule, Rfl: 1    Potassium Chloride ER 20 MEQ TBCR, Take 1 tablet (20 mEq total) by mouth daily, Disp: 90 tablet, Rfl: 1    SUMAtriptan (IMITREX) 50 mg tablet, Take 1 tablet (50 mg total) by mouth once as needed for migraine, Disp: 9 tablet, Rfl: 1    timolol (TIMOPTIC) 0.5 % ophthalmic solution, Administer 1 drop to the right eye 2 (two) times a day, Disp: 15 mL, Rfl: 1    tiZANidine (ZANAFLEX) 4 mg tablet, Take 1 tablet (4 mg total) by mouth every 8 (eight) hours as needed for muscle spasms, Disp: 90 tablet, Rfl: 1    triamcinolone (KENALOG) 0.1 % cream, APPLY TO ARMS, LEGS AND BACK twice a day, Disp: , Rfl:     vitamin A 7500 UNIT capsule, Take 7,500 Units by mouth  "every morning  , Disp: , Rfl:     vitamin E, tocopherol, 400 units capsule, Take 400 Units by mouth every morning Last dose 4/26/17, Disp: , Rfl:     zinc gluconate 50 mg tablet, Take 50 mg by mouth every morning, Disp: , Rfl:     Allergies   Allergen Reactions    Lactose - Food Allergy GI Intolerance    Latex Itching     Elastic,adhesive tape    Dilantin [Phenytoin Sodium Extended] Rash    Other Rash     Adhesive tape, environmental    Penicillins Rash       Physical Exam:    Ht 5' 1\" (1.549 m)   Wt 50.3 kg (111 lb)   BMI 20.97 kg/m²     Constitutional: normal, well developed, well nourished, alert, in no distress and non-toxic and no overt pain behavior.  Eyes: anicteric  HEENT: grossly intact  Neck: supple, symmetric, trachea midline and no masses   Pulmonary:even and unlabored  Cardiovascular:No edema or pitting edema present  Skin:Normal without rashes or lesions and well hydrated  Psychiatric:Mood and affect appropriate  Neurologic:Cranial Nerves II-XII grossly intact  Musculoskeletal: Range of motion of the thoracolumbar spine limited secondary to pain.  Tenderness thoracolumbar paraspinals with positive facet loading.  Strength preserved in the lower limbs.  Sensation and reflexes are normal and symmetric in the lower limbs.    Imaging       X-ray thoracolumbar spine 2 views  Status: Final result     PACS Images     Show images for X-ray thoracolumbar spine 2 views  Study Result    Narrative & Impression   XR SPINE THORACOLUMBAR 2 VW     INDICATION: S22.089A: Unspecified fracture of t11-T12 vertebra, initial encounter for closed fracture.     COMPARISON: 11/25/2024     FINDINGS:     T12 fracture demonstrates slight progressive anterosuperior loss of stature.     No new acute fracture or aggressive osseous lesion. Intact pedicles.     L1-L2, L2-L3 slight retrolisthesis. Anatomic alignment otherwise. Mild dextroscoliosis.     L4-L5, L5-S1 posterior facet mild arthrosis. T12-L1, L1-L2 marked, L2-L3 through " "L5-S1 mild/moderate disc space narrowing.     No displacement of the paraspinal line.     IMPRESSION:     T12 compression fracture slight progressive anterosuperior loss of stature     Degenerative lumbar spine.     No acute osseous abnormality otherwise.     Workstation performed: [id number]        Imaging    X-ray thoracolumbar spine 2 views (Order: [id number]) - 1/17/2025    Result History    X-ray thoracolumbar spine 2 views (Order #[id number]) on 1/20/2025 - Order Result History Report    Order Report     Order Details  Result Information    Status Priority Source   Final result (1/20/2025  4:12 PM) Routine      Reason for Exam    Dx: Closed T12 fracture (HCC) [S22.089A (ICD-10-CM)]       X-ray thoracolumbar spine 2 views: Patient Communication     Add Comments   Seen     Signed by    Signed Date/Time  Phone Pager   CAN SANTIAGO 1/20/2025 16:12 [phone]      Exam Information    Status Exam Begun  Exam Ended  Performing Tech   Final [99] 1/17/2025 17:04 1/17/2025 17:09 Arthur Arias     Questionnaire        Question Answer Comment   1. When should the test be performed?           End Exam      [facility] IM[facility] END VERIFY IMAGES IN PACS    Question Answer Comment   1. Have you verified the patient's images in PACS? Yes    2. Why have the images not been verified in PACS?           IMAGING END EXAM XRAY    Question Answer Comment   1. Script Info/History/Comments: Closed T12 fracture    2. Any additional comments the Radiologist needs to know? Pt did not want to remove necklace    3. Was the patient shielded? No    4. Was fluoro used? No    5. Fluoro time? Please type \"MINUTES\" or \"SECONDS\" following your time.     6. Were all the images in this exam within the acceptable exposure index range as posted? Yes    7. If No, provide additional information why (ie which view or position, fixed or AEC, wall/table/tabletop, etc)     8. Number of images sent to PACS: 2    9. Was jewelry removed from the " patient? No    10. Jewelry removed:           PATIENT EDUCATION    Question Answer Comment   1. Was the patient educated? Yes    2. Why was the patient not educated?          External Results Report    Open External Results Report    Encounter    View Encounter             Sedation Documentation Timeline (1/17/2025 00:00 to 1/17/2025 17:14)    An end event has not been filed for the most recent intervention. Due to this intervention’s length, all data may not appear below. To see all data, file an end event.  1/17/2025 1/17/2025 Event By   17:00:57 Orders Placed AD   Imaging  - X-ray thoracolumbar spine 2 views           Pre-op Summary    Pre-op           Recovery Summary    Recovery             Routing History    None         No orders to display       Orders Placed This Encounter   Procedures    Ambulatory referral to Physical Therapy

## 2025-02-11 ENCOUNTER — APPOINTMENT (OUTPATIENT)
Dept: LAB | Facility: CLINIC | Age: 85
End: 2025-02-11
Payer: MEDICARE

## 2025-02-11 DIAGNOSIS — R79.89 ABNORMAL LFTS: ICD-10-CM

## 2025-02-11 DIAGNOSIS — I10 ESSENTIAL HYPERTENSION: ICD-10-CM

## 2025-02-11 DIAGNOSIS — E87.6 HYPOPOTASSEMIA: ICD-10-CM

## 2025-02-11 DIAGNOSIS — E21.3 HYPERPARATHYROIDISM (HCC): ICD-10-CM

## 2025-02-11 DIAGNOSIS — R60.0 EDEMA OF EXTREMITIES: ICD-10-CM

## 2025-02-11 DIAGNOSIS — E78.00 HYPERCHOLESTEREMIA: ICD-10-CM

## 2025-02-11 LAB
ALBUMIN SERPL BCG-MCNC: 4 G/DL (ref 3.5–5)
ALP SERPL-CCNC: 83 U/L (ref 34–104)
ALT SERPL W P-5'-P-CCNC: 16 U/L (ref 7–52)
ANION GAP SERPL CALCULATED.3IONS-SCNC: 8 MMOL/L (ref 4–13)
AST SERPL W P-5'-P-CCNC: 24 U/L (ref 13–39)
BACTERIA UR QL AUTO: ABNORMAL /HPF
BILIRUB SERPL-MCNC: 1.01 MG/DL (ref 0.2–1)
BILIRUB UR QL STRIP: NEGATIVE
BUN SERPL-MCNC: 20 MG/DL (ref 5–25)
CALCIUM SERPL-MCNC: 10 MG/DL (ref 8.4–10.2)
CHLORIDE SERPL-SCNC: 105 MMOL/L (ref 96–108)
CHOLEST SERPL-MCNC: 155 MG/DL (ref ?–200)
CLARITY UR: CLEAR
CO2 SERPL-SCNC: 27 MMOL/L (ref 21–32)
COLOR UR: ABNORMAL
CREAT SERPL-MCNC: 0.94 MG/DL (ref 0.6–1.3)
GFR SERPL CREATININE-BSD FRML MDRD: 55 ML/MIN/1.73SQ M
GLUCOSE P FAST SERPL-MCNC: 100 MG/DL (ref 65–99)
GLUCOSE UR STRIP-MCNC: NEGATIVE MG/DL
HDLC SERPL-MCNC: 65 MG/DL
HGB UR QL STRIP.AUTO: NEGATIVE
KETONES UR STRIP-MCNC: NEGATIVE MG/DL
LDLC SERPL CALC-MCNC: 75 MG/DL (ref 0–100)
LEUKOCYTE ESTERASE UR QL STRIP: ABNORMAL
NITRITE UR QL STRIP: NEGATIVE
NON-SQ EPI CELLS URNS QL MICRO: ABNORMAL /HPF
PH UR STRIP.AUTO: 6.5 [PH]
POTASSIUM SERPL-SCNC: 4.2 MMOL/L (ref 3.5–5.3)
PROT SERPL-MCNC: 6.5 G/DL (ref 6.4–8.4)
PROT UR STRIP-MCNC: NEGATIVE MG/DL
RBC #/AREA URNS AUTO: ABNORMAL /HPF
SODIUM SERPL-SCNC: 140 MMOL/L (ref 135–147)
SP GR UR STRIP.AUTO: 1.01 (ref 1–1.03)
TRIGL SERPL-MCNC: 77 MG/DL (ref ?–150)
UROBILINOGEN UR STRIP-ACNC: <2 MG/DL
WBC #/AREA URNS AUTO: ABNORMAL /HPF

## 2025-02-11 PROCEDURE — 82306 VITAMIN D 25 HYDROXY: CPT

## 2025-02-11 PROCEDURE — 81001 URINALYSIS AUTO W/SCOPE: CPT

## 2025-02-11 PROCEDURE — 83970 ASSAY OF PARATHORMONE: CPT

## 2025-02-11 PROCEDURE — 80053 COMPREHEN METABOLIC PANEL: CPT

## 2025-02-11 PROCEDURE — 36415 COLL VENOUS BLD VENIPUNCTURE: CPT

## 2025-02-11 PROCEDURE — 80061 LIPID PANEL: CPT

## 2025-02-12 ENCOUNTER — RESULTS FOLLOW-UP (OUTPATIENT)
Dept: INTERNAL MEDICINE CLINIC | Facility: CLINIC | Age: 85
End: 2025-02-12

## 2025-02-12 LAB
25(OH)D3 SERPL-MCNC: 60 NG/ML (ref 30–100)
PTH-INTACT SERPL-MCNC: 99.7 PG/ML (ref 12–88)

## 2025-02-13 ENCOUNTER — EVALUATION (OUTPATIENT)
Dept: PHYSICAL THERAPY | Facility: CLINIC | Age: 85
End: 2025-02-13
Payer: MEDICARE

## 2025-02-13 DIAGNOSIS — S22.089A CLOSED T12 FRACTURE (HCC): Primary | ICD-10-CM

## 2025-02-13 DIAGNOSIS — M47.816 LUMBAR SPONDYLOSIS: ICD-10-CM

## 2025-02-13 PROCEDURE — 97161 PT EVAL LOW COMPLEX 20 MIN: CPT | Performed by: PHYSICAL THERAPIST

## 2025-02-13 NOTE — PROGRESS NOTES
Progress Note - Cardiology Office  [facility]    Belinda Dolan 84 y.o. female MRN: [id number]  : 1940  Encounter: [id number]      Assessment & Plan  Mitral valve insufficiency, unspecified etiology    Essential hypertension    Edema of extremities    Mixed hyperlipidemia    Syncope, unspecified syncope type  Possibly secondary to dehydration and hypotension     ASSESSMENT:  Syncope  Patient had a syncopal episode while she was on the toilet having diarrhea due to IBS  She had a T12 fracture and is going for physical therapy  This was possibly secondary to hypotension  Her blood pressure medications were adjusted  Her BP is normal today    Hyperparathyroidism      Mild valvular heart disease:     TTE, 2024:  EF 65%, G1 DD  Mild MR, AR and TR, RSVP 35-38 mmHg  Trivial pericardial effusion  Compared to prior echo, there is less MR, AR and less PA pressure and left atrial size     TTE, 2019  EF 65%, mild LVH, G2 DD  Mild to moderate LAE  Mild MR, mild to moderate AR and TR, mild NE, RSVP 45 mmHg     Mixed hyperlipidemia  10/27/2023: , , HDL 59, normal AST and ALT    8/15/2024: , , HDL 51  Patient had stopped taking atorvastatin  I am going to resume at 10 mg daily    2025: LDL 75, TG 77, HDL 65, normal AST and ALT  Currently on atorvastatin 10 mg     Essential hypertension  BP is well-controlled at 124/80 with heart rate of 65/min     Lower extremity edema  History of varicose veins     CKD     Raynaud's disease without gangrene     Arthritis of knees     RECOMMENDATIONS:  48-hour Holter monitor  Exercise stress test  Continue atorvastatin 10 mg, Lasix 20 mg twice daily, Benicar 20 mg  Monitor blood pressure at home and if systolic BP is less than 110, do not take the blood pressure medicine or water pill  Compression stockings to be worn regularly during the day and off at night.  Start with 15-20 mm compression and graduated to 15-20 mm  compression as tolerated  If the leg swelling is less at next OV, will consider decreasing her dose of Lasix.       Please call [phone] if any questions.    HPI :     Belinda Dolan is a 84 y.o. year old female who came for follow up.  She had a syncopal episode while she was on the toilet and having diarrhea because of IBS.  This could have been due to hypovolemia and hypotension due to the underlying diarrhea.  She sustained a T12 fracture and is going for physical therapy.  Her blood pressure medications were subsequently adjusted and her blood pressure today is 124/80 mmHg.  For sake of completion, will however schedule her for a Holter monitor and exercise stress test to rule out any underlying cardiac etiology for her syncope    REVIEW OF SYSTEMS:  Back pain  Denies chest pain, dyspnea  Had a prior syncopal episode  Denies any palpitations      Historical Information   Past Medical History:   Diagnosis Date    Baker's cyst of knee 10/21/2016    right- burst on its own    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HCC) 1992    hx of-fell when ice skating. Noted brain bleed w/swelling    Glaucoma     right eye    Headache(784.0)     HL (hearing loss)     Low back pain 9/10/24    Macular degeneration     Migraine     Raynaud's disease     both hands    Thoracic vertebral fracture (HCC)      Past Surgical History:   Procedure Laterality Date    AXILLARY SURGERY Left     fatty cyst removed, benign    CATARACT EXTRACTION Bilateral     CATARACT EXTRACTION W/ INTRAOCULAR LENS IMPLANT Right 11/10/2016    Procedure: EXTRACTION EXTRACAPSULAR CATARACT PHACO INTRAOCULAR LENS (IOL);  Surgeon: Citlaly Villavicencio MD;  Location: [facility] MAIN OR;  Service:     COSMETIC SURGERY      Eye lift    DILATION AND CURETTAGE OF UTERUS      MYRINGOTOMY W/ TUBES  2011    both ears-one tube out on the left    MYRINGOTOMY W/ TUBES      NV SURGICAL ARTHROSCOPY SHOULDER W/ROTATOR CUFF RPR Right 05/04/2017    Procedure: ARTHROSCOPY SHOULDER WITH ROTATOR CUFF REPAIR, BICEPS  TENODESIS, AND SUBACROMIAL DECOMPRESSION;  Surgeon: Cedrick Nielson MD;  Location: [facility] MAIN OR;  Service: Orthopedics    ID XCAPSL CTRC RMVL INSJ IO LENS PROSTH W/O ECP Left 10/20/2016    Procedure: EXTRACTION EXTRACAPSULAR CATARACT PHACO INTRAOCULAR LENS (IOL);  Surgeon: Citlaly Villavicencio MD;  Location: [facility] MAIN OR;  Service: Ophthalmology    SKIN BIOPSY      mole on chest    SKIN BIOPSY      under breast, benign    VASCULAR SURGERY  1970    vein in right leg removed     Social History     Substance and Sexual Activity   Alcohol Use Not Currently    Comment: socially     Social History     Substance and Sexual Activity   Drug Use No     Social History     Tobacco Use   Smoking Status Former    Current packs/day: 0.00    Average packs/day: 1.5 packs/day for 30.0 years (45.0 ttl pk-yrs)    Types: Cigarettes    Start date:     Quit date:     Years since quittin.1   Smokeless Tobacco Never     Family History:   Family History   Problem Relation Age of Onset    Heart disease Mother         exp age 64 MI    Stroke Father     Macular degeneration Sister     Macular degeneration Brother     Diabetes Brother     Cancer Brother         kidney-nephrectomy    Kidney disease Brother         cancer       Meds/Allergies     Allergies   Allergen Reactions    Lactose - Food Allergy GI Intolerance    Latex Itching     Elastic,adhesive tape    Dilantin [Phenytoin Sodium Extended] Rash    Other Rash     Adhesive tape, environmental    Penicillins Rash       Current Outpatient Medications:     acetaminophen (TYLENOL) 500 mg tablet, Take 1,000 mg by mouth every 6 (six) hours as needed for mild pain, Disp: , Rfl:     Alphagan P 0.1 %, Administer 1 drop to both eyes 2 (two) times a day, Disp: 15 mL, Rfl: 1    ascorbic acid (VITAMIN C) 500 mg tablet, Take 500 mg by mouth every morning, Disp: , Rfl:     atorvastatin (LIPITOR) 10 mg tablet, Take 1 tablet (10 mg total) by mouth daily, Disp: 90 tablet, Rfl: 2    Azelastine  "HCl 137 MCG/SPRAY SOLN, instill 1 spray into each nostril twice a day, Disp: 90 mL, Rfl: 3    Biotin 5 MG CAPS, Take by mouth every morning, Disp: , Rfl:     bromfenac sodium 0.07 % SOLN, , Disp: , Rfl:     Cyanocobalamin (VITAMIN B 12 PO), Take by mouth every morning, Disp: , Rfl:     furosemide (LASIX) 20 mg tablet, take 1 tablet by mouth twice a day, Disp: 180 tablet, Rfl: 1    ipratropium (ATROVENT) 0.06 % nasal spray, 2 sprays into each nostril 4 (four) times a day, Disp: 15 mL, Rfl: 11    levocetirizine (XYZAL) 5 MG tablet, Take 1 tablet (5 mg total) by mouth every evening, Disp: 90 tablet, Rfl: 1    Lutein 40 MG CAPS, Take by mouth every morning, Disp: , Rfl:     Magnesium 250 MG TABS, Take by mouth every morning, Disp: , Rfl:     olmesartan (BENICAR) 20 mg tablet, Take 1 tablet (20 mg total) by mouth daily, Disp: 90 tablet, Rfl: 2    omeprazole (PriLOSEC) 20 mg delayed release capsule, Take 1 capsule (20 mg total) by mouth daily, Disp: 90 capsule, Rfl: 1    Potassium Chloride ER 20 MEQ TBCR, Take 1 tablet (20 mEq total) by mouth daily, Disp: 90 tablet, Rfl: 1    SUMAtriptan (IMITREX) 50 mg tablet, Take 1 tablet (50 mg total) by mouth once as needed for migraine, Disp: 9 tablet, Rfl: 1    timolol (TIMOPTIC) 0.5 % ophthalmic solution, Administer 1 drop to the right eye 2 (two) times a day, Disp: 15 mL, Rfl: 1    tiZANidine (ZANAFLEX) 4 mg tablet, Take 1 tablet (4 mg total) by mouth every 8 (eight) hours as needed for muscle spasms, Disp: 90 tablet, Rfl: 1    triamcinolone (KENALOG) 0.1 % cream, APPLY TO ARMS, LEGS AND BACK twice a day, Disp: , Rfl:     vitamin A 7500 UNIT capsule, Take 7,500 Units by mouth every morning  , Disp: , Rfl:     vitamin E, tocopherol, 400 units capsule, Take 400 Units by mouth every morning Last dose 4/26/17, Disp: , Rfl:     zinc gluconate 50 mg tablet, Take 50 mg by mouth every morning, Disp: , Rfl:     Vitals: Blood pressure 124/80, pulse 65, height 5' 1\" (1.549 m), weight 54.4 " kg (120 lb), SpO2 99%.    Body mass index is 22.67 kg/m².  Vitals:    25 0950   Weight: 54.4 kg (120 lb)     BP Readings from Last 3 Encounters:   25 124/80   25 130/82   25 128/68       Physical Exam:  Physical Exam    Neurologic:  Alert & oriented x 3, no new focal deficits, Not in any acute distress,  Constitutional:  Well developed, well nourished, non-toxic appearance   Eyes:  Pupil equal and reacting to light, conjunctiva normal,   HENT:  Atraumatic, oropharynx moist, Neck- normal range of motion, no tenderness,  Neck supple, No JVP, No LNP   Respiratory:  Bilateral air entry, mostly clear to auscultation  Cardiovascular: S1-S2 regular with a I/VI systolic murmur   GI:  Soft, nondistended, normal bowel sounds, nontender, no hepatosplenomegaly appreciated.  Musculoskeletal:  No tenderness, no deformities.   Skin:  Well hydrated, no rash   Lymphatic:  No lymphadenopathy noted   Extremities:  No edema, patient is wearing compression stockings.      Diagnostic Studies Review Cardio:      EKG: Normal sinus rhythm, heart rate 66/min    Cardiac testing:   Results for orders placed during the hospital encounter of 19    Echo complete with contrast if indicated    Narrative  [place] [id number] [phone]    Transthoracic Echocardiogram  2D, M-mode, Doppler, and Color Doppler    Study date:  25-Mar-2019    Patient: CLAUDETTE BRADLEY  MR number: [id number]  Account number: [id number]  : 1940  Age: 78 years  Gender: Female  Status: Outpatient  Location: Echo lab  Height: 60 in  Weight: 109.8 lb  BP: 180/ 78 mmHg    Indications: SOB    Diagnoses: R06.02 - Shortness of breath    Sonographer:  FELIPE Gutierrez  Primary Physician:  Moe Elena MD  Referring Physician:  Moe Elena MD  Group:  [facility]  Interpreting Physician:  Nara Collins MD    SUMMARY    LEFT VENTRICLE:  Systolic function  was normal. Ejection fraction was estimated in the range of 60 % to 65 % to be 65 %.  There were no regional wall motion abnormalities.  Wall thickness was at the upper limits of normal.  There was mild concentric hypertrophy.  Features were consistent with a pseudonormal left ventricular filling pattern, with concomitant abnormal relaxation and increased filling pressure (grade 2 diastolic dysfunction).    LEFT ATRIUM:  The atrium was mildly to moderately dilated.    MITRAL VALVE:  There was mild regurgitation.    AORTIC VALVE:  There was at max mild to moderate regurgitation.    TRICUSPID VALVE:  There was mild to moderate regurgitation.  Estimated peak PA pressure was 45 mmHg.    PULMONIC VALVE:  There was mild regurgitation.    HISTORY: PRIOR HISTORY: Brain injury, Arthritis, shingles, HTN, Raynaud's Disease, Former smoker.    PROCEDURE: The procedure was performed in the echo lab. This was a routine study. The transthoracic approach was used. The study included complete 2D imaging, M-mode, complete spectral Doppler, and color Doppler. The heart rate was 52 bpm,  at the start of the study. Images were obtained from the parasternal, apical, subcostal, and suprasternal notch acoustic windows. Image quality was adequate.    LEFT VENTRICLE: Size was normal. Systolic function was normal. Ejection fraction was estimated in the range of 60 % to 65 % to be 65 %. There were no regional wall motion abnormalities. Wall thickness was at the upper limits of normal.  There was mild concentric hypertrophy. DOPPLER: Features were consistent with a pseudonormal left ventricular filling pattern, with concomitant abnormal relaxation and increased filling pressure (grade 2 diastolic dysfunction).    RIGHT VENTRICLE: The size was normal. Systolic function was normal.    LEFT ATRIUM: The atrium was mildly to moderately dilated.    RIGHT ATRIUM: Size was normal.    MITRAL VALVE: There was normal leaflet separation. DOPPLER: The  transmitral velocity was within the normal range. There was no evidence for stenosis. There was mild regurgitation.    AORTIC VALVE: The valve was trileaflet. Leaflets exhibited mildly increased thickness and normal cuspal separation. DOPPLER: Transaortic velocity was within the normal range. There was no evidence for stenosis. There was at max mild to  moderate regurgitation.    TRICUSPID VALVE: DOPPLER: There was mild to moderate regurgitation. Estimated peak PA pressure was 45 mmHg.    PULMONIC VALVE: DOPPLER: There was mild regurgitation.    PERICARDIUM: There was no thickening or calcification. There was no pericardial effusion.    AORTA: The root exhibited normal size.    SYSTEMIC VEINS: IVC: The inferior vena cava was normal in size. Respirophasic changes were normal.    SYSTEM MEASUREMENT TABLES    2D mode  AoR Diam 2D: 3.4 cm  LA Diam (2D): 3.4 cm  LA/Ao (2D): 1  FS (2D Teich): 32.1 %  IVSd (2D): 1.14 cm  LVDEV: 60.8 cm³  LVESV: 23.7 cm³  LVIDd(2D): 3.77 cm  LVISd (2D): 2.56 cm  LVOT Area 2D: 3.14 cm squared  LVPWd (2D): 1.13 cm  SV (Teich): 37.1 cm³    Apical four chamber  LVEF A4C: 54 %    Unspecified Scan Mode  MELISSA Cont Eq (Peak Rosalino): 2.44 cm squared  Dec. [place]; Regurgitant Flow: 2120 mm/s2  Max P mm[Hg]  V Max: 3870 mm/s  Vmax: 3660 mm/s  LVOT (VTI): 21.4 cm  LVOT Diam.: 2 cm  LVOT Vmax: 925 mm/s  LVOT Vmax; Mean: 925 mm/s  Peak Grad.; Mean: 3 mm[Hg]  SV (LVOT): 67 cm³  VTI;Mean: 2 mm[Hg]  MV Peak A Rosalino: 740 mm/s  MV Peak E Rosalino. Mean: 913 mm/s  MVA (PHT): 5 cm squared  PHT: 44 ms  Max P mm[Hg]  V Max: 3190 mm/s  Vmax: 2760 mm/s  RA Area: 16.5 cm squared  RA Volume: 37.7 cm³  TAPSE: 1.6 cm    [facility] Commission Accredited Echocardiography Laboratory    Prepared and electronically signed by    Nara Collins MD  Signed 26-Mar-2019 08:26:34        Results for orders placed during the hospital encounter of 24    Echo complete w/ contrast if indicated    Interpretation Summary     "Left Ventricle: Left ventricular cavity size is normal. Wall thickness is mildly increased. The left ventricular ejection fraction is 65% by visual estimation. Systolic function is normal. Wall motion is normal. Diastolic function is mildly abnormal, consistent with grade I (abnormal) relaxation.    Left Atrium: The atrium is upper normal in size (32 mL/m2).    Aortic Valve: There is mild regurgitation.  Patient is around 646 and 746 ms.    Mitral Valve: There is mild regurgitation.    Tricuspid Valve: There is mild regurgitation.  Pulmonary artery pressure around 35 to 38 mmHg.    Pericardium: There is a trivial pericardial effusion along the right atrial free wall.    As compared to previous study report of 2019, EF remained the same.  Less MR, less AI and lower PA pressure is noted.  Left atrial size is also now upper normal.          Imaging:  Chest X-Ray:   No Chest XR results available for this patient.    CT-scan of the chest:     No CTA results available for this patient.  Lab Review   Lab Results   Component Value Date    WBC 5.57 08/15/2024    HGB 11.9 08/15/2024    HCT 36.6 08/15/2024    MCV 96 08/15/2024    RDW 13.5 08/15/2024     08/15/2024     BMP:  Lab Results   Component Value Date    SODIUM 140 02/11/2025    K 4.2 02/11/2025     02/11/2025    CO2 27 02/11/2025    ANIONGAP 12.2 11/02/2015    BUN 20 02/11/2025    CREATININE 0.94 02/11/2025    GLUC 110 12/09/2022    GLUF 100 (H) 02/11/2025    CALCIUM 10.0 02/11/2025    CORRECTEDCA 10.3 (H) 10/12/2021    EGFR 55 02/11/2025     LFT:  Lab Results   Component Value Date    AST 24 02/11/2025    ALT 16 02/11/2025    ALKPHOS 83 02/11/2025    TP 6.5 02/11/2025    ALB 4.0 02/11/2025      No components found for: \"TSH3\"  Lab Results   Component Value Date    UTM4IGVDPAWB 0.509 10/22/2016     No results found for: \"HGBA1C\"  Lipid Profile:   Lab Results   Component Value Date    CHOLESTEROL 155 02/11/2025    HDL 65 02/11/2025    LDLCALC 75 02/11/2025 " "   TRIG 77 02/11/2025     Lab Results   Component Value Date    CHOLESTEROL 155 02/11/2025    CHOLESTEROL 208 (H) 08/15/2024     No results found for: \"CKTOTAL\", \"CKMB\", \"CKMBINDEX\", \"TROPONINI\"  No results found for: \"NTBNP\"   Recent Results (from the past 4 weeks)   PTH, intact    Collection Time: 02/11/25  9:07 AM   Result Value Ref Range    PTH 99.7 (H) 12.0 - 88.0 pg/mL   Vitamin D 25 hydroxy    Collection Time: 02/11/25  9:07 AM   Result Value Ref Range    Vit D, 25-Hydroxy 60.0 30.0 - 100.0 ng/mL   Comprehensive metabolic panel    Collection Time: 02/11/25  9:07 AM   Result Value Ref Range    Sodium 140 135 - 147 mmol/L    Potassium 4.2 3.5 - 5.3 mmol/L    Chloride 105 96 - 108 mmol/L    CO2 27 21 - 32 mmol/L    ANION GAP 8 4 - 13 mmol/L    BUN 20 5 - 25 mg/dL    Creatinine 0.94 0.60 - 1.30 mg/dL    Glucose, Fasting 100 (H) 65 - 99 mg/dL    Calcium 10.0 8.4 - 10.2 mg/dL    AST 24 13 - 39 U/L    ALT 16 7 - 52 U/L    Alkaline Phosphatase 83 34 - 104 U/L    Total Protein 6.5 6.4 - 8.4 g/dL    Albumin 4.0 3.5 - 5.0 g/dL    Total Bilirubin 1.01 (H) 0.20 - 1.00 mg/dL    eGFR 55 ml/min/1.73sq m   Lipid Panel with Direct LDL reflex    Collection Time: 02/11/25  9:07 AM   Result Value Ref Range    Cholesterol 155 See Comment mg/dL    Triglycerides 77 See Comment mg/dL    HDL, Direct 65 >=50 mg/dL    LDL Calculated 75 0 - 100 mg/dL   Urinalysis with microscopic    Collection Time: 02/11/25  9:07 AM   Result Value Ref Range    Color, UA Light Yellow     Clarity, UA Clear     Specific Gravity, UA 1.013 1.003 - 1.030    pH, UA 6.5 4.5, 5.0, 5.5, 6.0, 6.5, 7.0, 7.5, 8.0    Leukocytes, UA Small (A) Negative    Nitrite, UA Negative Negative    Protein, UA Negative Negative mg/dl    Glucose, UA Negative Negative mg/dl    Ketones, UA Negative Negative mg/dl    Urobilinogen, UA <2.0 <2.0 mg/dl mg/dl    Bilirubin, UA Negative Negative    Occult Blood, UA Negative Negative    RBC, UA None Seen None Seen, 1-2 /hpf    WBC, UA 1-2 " "None Seen, 1-2 /hpf    Epithelial Cells Occasional None Seen, Occasional /hpf    Bacteria, UA None Seen None Seen, Occasional /hpf             Dr. Andre Clay MD, [facility]      \"This note has been constructed using a voice recognition system.Therefore there may be syntax, spelling, and/or grammatical errors. Please call if you have any questions. \"  "

## 2025-02-13 NOTE — PROGRESS NOTES
"    PT Evaluation   Today's date: 2025  Patient name: Belinda Dolan  : 1940  MRN: [id number]  Referring provider: Pepito Serra DO  Dx:   Encounter Diagnosis     ICD-10-CM    1. Closed T12 fracture (HCC)  S22.089A Ambulatory referral to Physical Therapy      2. Lumbar spondylosis  M47.816 Ambulatory referral to Physical Therapy            CASE SUMMARY:   Belinda Dolan is a 84 y.o. year old female who presents to OPPT upon referral from pain management  secondary to T12 compression fracture and lumbar spondylosis.  Belinda reports onset of symptoms ~  beginning of September she passed out.  Did not seek medical treatment until 3 months ago.  Had 2 xrays and + T12 compression fracture.    Current symptom location includes pain across low back can radiate to both hips alternating, and then can radiate to ankles.  Describes as a sensation of \"restlessness\", low back symptoms are painful  and patient describes  current symptoms as: achy.  Symptoms are : intermittent.  Pain level:  Current: 3-4/10 and with ADL's 5-6/10.  Symptoms improve with: 2 tylenol and heat, and worsen with bending, twisting.  Overall symptom progression at this time is worsening.   Previous injury to this area: falling  Previous treatment includes: neg  Diagnostic testing includes: xrays.    Her restrictions at this time: no bending, no lifting, no twisting or falling given by dr. Garcia, the neurologist  PMHx includes: See chart for full details with medications, allergies, past family history, past medical history, social history, past surgical history and problem list. All topics were reviewed.      Functionally, at baseline, Belinda is independent with all basic ADL's and  advanced ADL's.  Currently, Belinda is limited in the following activities: anything requiring bending, lifting and carrying groceries, wakes her every night around 3-4 am    Belinda is lives alone in a 2 story home with no stairs to enter and flgiht " "stairs inside with + railings.   Recreational activities include: walking, running .  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 .     Patient goal(s) for physical therapy is: to not have pain and be able to sleep    Concurrent Complaints:  Red flags present: neg     Upper Motor Neuron testing (present/not present):  Marinelli (middle finger first segment flick + sign is first and second finger to flexion): neg  Inverted supinator ( reflex hammer to supinator normal: neg is wrist extension, + wrist and finger flexion) : 2+      Reflexes: NT      Posture   Sitting:good upright posture, needs lumbar support    Standing: mild forward head, reduced lumbar lordosis        Gait: wnl on level surfaces   Assistive device: neg   Trunk movement: wnl   Stride length: wnl   Trendelenburg: neg   Foot drop: neg    Functional movements:   Squat: wn + low back pain and knees   SLS: left: 10 sec + pain   Right : 17 sec    Bridging: able to lift 50% (+)pain    Transfers sit/stand: needs UE assistance to stand   bed mobility independent    Lumbar AROM:    Flexion: moderate LOM +  pain with return to stand   Extension: moderate LOM \"it hurts but feels good\"   Side bend: Right: minimal LOM (+)pain      Left: minimal LOM (+)pain      Repeated motions: NT due to patient restrictions      LE MMT  L Hip Flexion: 4-/5  R Hip Flexion: 4-/5   L Hip Extension: 4/5 R Hip Extension: 4/5   L Hip Abduction: 4+/5 R Hip Abduction: 4+/5   L Hip Adduction: 4+/5 R Hip Adduction: 4+/5   L Knee Extension: 4/5 R Knee Extension: 4/5   L Knee Flexion: 4/5 R Knee Flexion: 4/5   L Ankle DF: 4/5 R Ankle DF: 4/5   L Ankle PF: 4/5  R Ankle PF: 4/5             Dermatomes: wnl to light touch      TA facilitation: fair    Flexibility:  Hamstring: Right: good  Left: good   Hip flexors:Right: good  Left: good     Quads: Right: fair  Left: fair       FOTO score is 45% with a 55% prediction in function. "       Assessment  Assessment details: Patient is a 84 y.o. Female who presents to skilled outpatient PT for the diagnosis of   Encounter Diagnosis     ICD-10-CM    1. Closed T12 fracture (HCC)  S22.089A Ambulatory referral to Physical Therapy      2. Lumbar spondylosis  M47.816 Ambulatory referral to Physical Therapy      . Patient presents with reduced lumbar ROM, painful functional movement ie: bridging and squats and tenderness to palpation along lower tspine and lumbar spine spinous process. Also noted was increased tension in lumbar musculature. Patient  will benefit from skilled PT to address the objective findings. And incorporate core strengthening and LE strengthening due to pain in knees with functional movements ie: squatting The aforementioned impairments have limited the patient's ability to sleep, lift, bend.   Patient vocalized a good understanding of  PLOC and HEP issued. Belinda would benefit from skilled physical therapy services to address the functional limitations and progress towards prior level of function, to meet stated goals,  and independence with home exercise program.  Prognosis for successful rehab outcome is good with consistent participation in therapy and carryover of HEP and PLOC.No further referral is necessary at this time based upon examination results. Patient education performed during today's session included: Hep and PLOC.     Impairments: Abnormal muscle tone, Abnormal or restricted ROM, Activity intolerance, and Impaired physical strength, redcued function  Understanding of Dx/Px/POC: Good  Prognosis: Good    STG  + Patient will report a 30% improvement in symptoms (2-3 weeks)   + Patient will be independent in basic HEP (2-3 weeks)  + Patient will demonstrate an increase in range of motion  sidebend mae  to  within normal limits de to less pain  (2-3 weeks)  + Patient will report increased ease sleeping due to a reduction in pain (2-3 weeks)      LTG:  + Patient will  report a 60% improvement in symptoms (4-6 weeks)   + Patient will increase FOTO score by 10 points (8 sessions)   + Patient will be independent in comprehensive HEP (4-6 weeks)  + Patient will demonstrate increase  MMT of  mea knee and hip musculature to  4+/5 for maximum function. (4-6 weeks)  + Patient will tolerate 35 min core stab program without an increase in  pain  (4-6 weeks)    Plan  Plan details: HEP development, stretching as needed per objective findings, strengthening core/lower quadrant, A/AA/PROM lumbar/hip motions, joint mobilizations prn, posture education, STM/MI as needed to reduce muscle tension per objective findings, muscle reeducation per objective findings, dynamic stabilization, PLOC discussed and agreed upon with patient     Patient would benefit from: Skilled PT  Planned therapy interventions: Abdominal trunk stabilization, ADL training, Balance, Body mechanics training, Dry Needling, HEP, Joint mobilization, Manual therapy, Neuromuscular re-education, Patient education, Postural training, Strengthening, Stretching, and Therapeutic exercises  Frequency: 2x/wk  Duration in weeks: 4-6  Plan of Care beginning date: 02/13/2025  Plan of Care expiration date: 6 weeks - 3/27/2025  Treatment plan discussed with: Patient    Patient verbalized understanding of POC. Please contact me if you have any questions or recommendations. Thank you for the referral and the opportunity to share in Belinda Dolan's care.          Eval/ Re-eval Auth #/ Referral # Total visits Start date  Expiration date Total active units  Total manual units  PT only or  PT+OT?   2/13/2025 No auth, no referral no visit limit                         Past Medical History:   Diagnosis Date    Baker's cyst of knee 10/21/2016    right- burst on its own    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HCC) 1992    hx of-fell when ice skating. Noted brain bleed w/swelling    Glaucoma     right eye    Headache(784.0)     HL (hearing loss)     Low back pain 9/10/24     Macular degeneration     Migraine     Raynaud's disease     both hands    Thoracic vertebral fracture (HCC)                      Reeval:   Insurance :         Visits: 1 2 3 4 5   Manual: 2/13/2025       STM/MI: lumbar musculature                                                Ther ex/NMR:        Manual stretching: piriformis/glute        Rec bike/  nustep                        Sit/stand squats        SLR        Sidelying hip abduction                         JOHANA                                Sit/stand with hip hinge        NMR:         TA activation (stab cuff)        + marching        +hip abd iso        +hip add iso        + alternating LE extension        +Clamshells        Glut set prone        Glut set + hip extension        Glute set + bridge                Therapeutic Activity:         Reevaluation                Gait Training:                 HEP:                 Modalities                ice        Total time:

## 2025-02-17 ENCOUNTER — OFFICE VISIT (OUTPATIENT)
Dept: CARDIOLOGY CLINIC | Facility: CLINIC | Age: 85
End: 2025-02-17
Payer: MEDICARE

## 2025-02-17 VITALS
BODY MASS INDEX: 22.66 KG/M2 | SYSTOLIC BLOOD PRESSURE: 124 MMHG | WEIGHT: 120 LBS | DIASTOLIC BLOOD PRESSURE: 80 MMHG | OXYGEN SATURATION: 99 % | HEIGHT: 61 IN | HEART RATE: 65 BPM

## 2025-02-17 DIAGNOSIS — R60.0 EDEMA OF EXTREMITIES: ICD-10-CM

## 2025-02-17 DIAGNOSIS — I34.0 MITRAL VALVE INSUFFICIENCY, UNSPECIFIED ETIOLOGY: Primary | ICD-10-CM

## 2025-02-17 DIAGNOSIS — E78.2 MIXED HYPERLIPIDEMIA: ICD-10-CM

## 2025-02-17 DIAGNOSIS — I10 ESSENTIAL HYPERTENSION: ICD-10-CM

## 2025-02-17 DIAGNOSIS — R55 SYNCOPE, UNSPECIFIED SYNCOPE TYPE: ICD-10-CM

## 2025-02-17 PROCEDURE — 93000 ELECTROCARDIOGRAM COMPLETE: CPT | Performed by: INTERNAL MEDICINE

## 2025-02-17 PROCEDURE — 99214 OFFICE O/P EST MOD 30 MIN: CPT | Performed by: INTERNAL MEDICINE

## 2025-02-18 ENCOUNTER — OFFICE VISIT (OUTPATIENT)
Dept: PHYSICAL THERAPY | Facility: CLINIC | Age: 85
End: 2025-02-18
Payer: MEDICARE

## 2025-02-18 DIAGNOSIS — M47.816 LUMBAR SPONDYLOSIS: Primary | ICD-10-CM

## 2025-02-18 DIAGNOSIS — S22.089D CLOSED FRACTURE OF TWELFTH THORACIC VERTEBRA WITH ROUTINE HEALING, UNSPECIFIED FRACTURE MORPHOLOGY, SUBSEQUENT ENCOUNTER: ICD-10-CM

## 2025-02-18 PROCEDURE — 97110 THERAPEUTIC EXERCISES: CPT | Performed by: PHYSICAL THERAPIST

## 2025-02-18 PROCEDURE — 97112 NEUROMUSCULAR REEDUCATION: CPT | Performed by: PHYSICAL THERAPIST

## 2025-02-18 NOTE — PROGRESS NOTES
Daily Note         Today's date: 2025  Patient name: Belinda Dolan  : 1940  MRN: [id number]  Referring provider: Pepito Serra DO  Dx:   Encounter Diagnosis     ICD-10-CM    1. Lumbar spondylosis  M47.816       2. Closed fracture of twelfth thoracic vertebra with routine healing, unspecified fracture morphology, subsequent encounter  S22.329N           Subjective: Belinda Dolan reports she is having pain across low back, in to center of spine and into posterior left leg just below the hip.  States she still awoke over night with muscle relaxor        Objective: See treatment diary below    Assessment:   patient needed increased cuing for proper form diaphragmatic breathing. . Patient described low back pain upon lying supine but resolved upon completion of diaphragmatic breathing.  After stab, upon standing and walking had increased pressure in sacral area more intense then upon entering today.  Followed by repeated extension in standing: reduced symptom.  After increased reps: pain resolved. Patient reported sitting at her desk for extended periods of time this am 2-3 hours doing work. Reviewed with patient need to change position, incorporate walking and stretching into extension during sitting on the computer.  Issued updated HEP  in written form.  Patient had good control with transitioning from sit/stand with eccentric lowering but weakness noted with exiting chair wihtout UE assistance.  Patient advised to practice 5 reps throughout her day.  Patient reported some back pain with attempting to exit chair but no worse.  Maybe seocndary to weakness in her LE.       Plan:  progress stab and LE strengthening, flexibility as tolerated.             Eval/ Re-eval Auth #/ Referral # Total visits Start date  Expiration date Total active units  Total manual units  PT only or  PT+OT?   2025 No auth, no referral no visit limit                         Past Medical History:   Diagnosis Date     Baker's cyst of knee 10/21/2016    right- burst on its own    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HCC) 1992    hx of-fell when ice skating. Noted brain bleed w/swelling    Glaucoma     right eye    Headache(784.0)     HL (hearing loss)     Low back pain 9/10/24    Macular degeneration     Migraine     Raynaud's disease     both hands    Thoracic vertebral fracture (HCC)                      Reeval:   Insurance :         Visits: 1 2 3 4 5   Manual: 2/13/2025 2/18/25       STM/MI: lumbar musculature                                                Ther ex/NMR:        Manual stretching: piriformis/glute  --      Rec bike/  nustep                                SLR        Sidelying hip abduction         Seated hamstring stretch w/ SOS  5 sec x 10                EIS  5 x 3              Sit/stand with hip hinge  Tap to standard chair: 5 x 2      Diaphragmatic breathing   Instruct 10 reps      NMR:         TA activation (stab cuff)  W/ diaphragmatic breathing: 10  x 2      + marching  10 x each side: 2 rounds       +hip abd iso  10 x 2 w/ exhale green for iso hold      +hip add iso  10 x 2 w/ exhale      +bent knee fall out  Yellow Next visit       +Suman  --      Glut set prone        Glut set + hip extension        Glute set + bridge                Therapeutic Activity:         Reevaluation                Gait Training:                 HEP:                 Modalities        MH        ice        Total time:          Access Code: [email or web address]  URL: [email or web address]  Date: 02/18/2025  Prepared by: Marian Baig    Exercises  - Standing Lumbar Extension with Counter  - 3 x daily - 7 x weekly - 3 sets - 5 reps  - Supine Diaphragmatic Breathing  - 2 x daily - 7 x weekly - 2 sets - 10 reps  - Supine Transversus Abdominis Bracing - Hands on Stomach  - 1 x daily - 7 x weekly - 1 sets - 10 reps - 5 sec hold  - Supine March  - 1 x daily - 7 x weekly - 2 sets - 10 reps  - Supine Hip Adduction Isometric with Ball  - 1 x daily - 7 x  weekly - 1-2 sets - 10 reps  - Hooklying Isometric Hip Abduction with Belt  - 1 x daily - 7 x weekly - 1-2 sets - 10 reps

## 2025-02-20 ENCOUNTER — OFFICE VISIT (OUTPATIENT)
Dept: PHYSICAL THERAPY | Facility: CLINIC | Age: 85
End: 2025-02-20
Payer: MEDICARE

## 2025-02-20 DIAGNOSIS — M47.816 LUMBAR SPONDYLOSIS: ICD-10-CM

## 2025-02-20 DIAGNOSIS — S22.089D CLOSED FRACTURE OF TWELFTH THORACIC VERTEBRA WITH ROUTINE HEALING, UNSPECIFIED FRACTURE MORPHOLOGY, SUBSEQUENT ENCOUNTER: Primary | ICD-10-CM

## 2025-02-20 PROCEDURE — 97112 NEUROMUSCULAR REEDUCATION: CPT | Performed by: PHYSICAL THERAPIST

## 2025-02-20 PROCEDURE — 97110 THERAPEUTIC EXERCISES: CPT | Performed by: PHYSICAL THERAPIST

## 2025-02-20 NOTE — PROGRESS NOTES
Daily Note         Today's date: 2025  Patient name: Belinda Dolan  : 1940  MRN: [id number]  Referring provider: Pepito Serra DO  Dx:   Encounter Diagnosis     ICD-10-CM    1. Closed fracture of twelfth thoracic vertebra with routine healing, unspecified fracture morphology, subsequent encounter  S22.089D       2. Lumbar spondylosis  M47.816           Subjective: Belinda Dolan reports just dull soreness entering today 2/10 states she had no increase in pain after last session.         Objective: See treatment diary below    Assessment:   patient still needs  manual and verbal cuing for proper form diaphragmatic breathing and TA recruitment.  . Patient needed increased cuing manually and verbally for proper form with bridging. Patient was painful inititally in low back but with increased cuing she was able to perform painfree. Ended session with standing extension stretches which resolves minor symptoms.     Plan:  progress strength, flexibility and stabilization.           Eval/ Re-eval Auth #/ Referral # Total visits Start date  Expiration date Total active units  Total manual units  PT only or  PT+OT?   2025 No auth, no referral no visit limit                         Past Medical History:   Diagnosis Date    Baker's cyst of knee 10/21/2016    right- burst on its own    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HCC) 1992    hx of-fell when ice skating. Noted brain bleed w/swelling    Glaucoma     right eye    Headache(784.0)     HL (hearing loss)     Low back pain 9/10/24    Macular degeneration     Migraine     Raynaud's disease     both hands    Thoracic vertebral fracture (HCC)                      Reeval:   Insurance :         Visits: 1 2 3 4 5   Manual: 2025     STM/MI: lumbar musculature                                                Ther ex/NMR:        Manual stretching: piriformis/glute  --      Rec bike/  nustep   NA                             SLR        Sidelying hip  abduction         Seated hamstring stretch w/ SOS  5 sec x 10   10 sec x 5  in sitting             EIS  5 x 3 10 x              Sit/stand with hip hinge  Tap to standard chair: 5 x 2 Tap to standard chair: 10 x 2      Diaphragmatic breathing   Instruct 10 reps 10 x      NMR:         TA activation (stab cuff)  W/ diaphragmatic breathing: 10  x 2 15 x      + marching  10 x each side: 2 rounds  15 x each side      +hip abd iso  10 x 2 w/ exhale green for iso hold Green: 5 sec x 10       +hip add iso  10 x 2 w/ exhale 5 sec x 10       +bent knee fall out  Yellow Next visit  Yellow: --     Alt Hand vs knee isometric midline   5 sec x 10               +Clamshells  -- -     Glute set supine   5 sec x 10       Glute set + bridge   5 x 2     Palloff press    Green single: 10 x 2 each side      Therapeutic Activity:         Reevaluation                Gait Training:                 HEP:                 Modalities        MH        ice        Total time:          Access Code: [email or web address]  URL: [email or web address]  Date: 02/18/2025  Prepared by: Marian Baig    Exercises  - Standing Lumbar Extension with Counter  - 3 x daily - 7 x weekly - 3 sets - 5 reps  - Supine Diaphragmatic Breathing  - 2 x daily - 7 x weekly - 2 sets - 10 reps  - Supine Transversus Abdominis Bracing - Hands on Stomach  - 1 x daily - 7 x weekly - 1 sets - 10 reps - 5 sec hold  - Supine March  - 1 x daily - 7 x weekly - 2 sets - 10 reps  - Supine Hip Adduction Isometric with Ball  - 1 x daily - 7 x weekly - 1-2 sets - 10 reps  - Hooklying Isometric Hip Abduction with Belt  - 1 x daily - 7 x weekly - 1-2 sets - 10 reps

## 2025-02-24 ENCOUNTER — OFFICE VISIT (OUTPATIENT)
Dept: INTERNAL MEDICINE CLINIC | Facility: CLINIC | Age: 85
End: 2025-02-24
Payer: MEDICARE

## 2025-02-24 VITALS
DIASTOLIC BLOOD PRESSURE: 80 MMHG | HEART RATE: 70 BPM | BODY MASS INDEX: 22.66 KG/M2 | HEIGHT: 61 IN | SYSTOLIC BLOOD PRESSURE: 138 MMHG | OXYGEN SATURATION: 97 % | WEIGHT: 120 LBS

## 2025-02-24 DIAGNOSIS — E21.3 HYPERPARATHYROIDISM (HCC): Primary | ICD-10-CM

## 2025-02-24 DIAGNOSIS — E78.2 MIXED HYPERLIPIDEMIA: ICD-10-CM

## 2025-02-24 DIAGNOSIS — M15.0 PRIMARY OSTEOARTHRITIS INVOLVING MULTIPLE JOINTS: ICD-10-CM

## 2025-02-24 DIAGNOSIS — I10 ESSENTIAL HYPERTENSION: ICD-10-CM

## 2025-02-24 DIAGNOSIS — N18.32 STAGE 3B CHRONIC KIDNEY DISEASE (HCC): ICD-10-CM

## 2025-02-24 PROCEDURE — 99214 OFFICE O/P EST MOD 30 MIN: CPT | Performed by: INTERNAL MEDICINE

## 2025-02-24 PROCEDURE — G2211 COMPLEX E/M VISIT ADD ON: HCPCS | Performed by: INTERNAL MEDICINE

## 2025-02-25 ENCOUNTER — OFFICE VISIT (OUTPATIENT)
Dept: PHYSICAL THERAPY | Facility: CLINIC | Age: 85
End: 2025-02-25
Payer: MEDICARE

## 2025-02-25 DIAGNOSIS — S22.089D CLOSED FRACTURE OF TWELFTH THORACIC VERTEBRA WITH ROUTINE HEALING, UNSPECIFIED FRACTURE MORPHOLOGY, SUBSEQUENT ENCOUNTER: Primary | ICD-10-CM

## 2025-02-25 DIAGNOSIS — M47.816 LUMBAR SPONDYLOSIS: ICD-10-CM

## 2025-02-25 PROCEDURE — 97112 NEUROMUSCULAR REEDUCATION: CPT | Performed by: PHYSICAL THERAPIST

## 2025-02-25 PROCEDURE — 97110 THERAPEUTIC EXERCISES: CPT | Performed by: PHYSICAL THERAPIST

## 2025-02-25 NOTE — PROGRESS NOTES
Daily Note         Today's date: 2025  Patient name: Belinda Dolan  : 1940  MRN: [id number]  Referring provider: Pepito Serra DO  Dx:   Encounter Diagnosis     ICD-10-CM    1. Closed fracture of twelfth thoracic vertebra with routine healing, unspecified fracture morphology, subsequent encounter  S22.089D       2. Lumbar spondylosis  M47.816           Subjective: Belinda Dolan reports she had a busy weekend with a wedding including dancing with only sore legs next day. States she had no issues after last session. Reports no pain entering today.         Objective: See treatment diary below    Assessment:   no report of low back pain upon completion of session. Chloetent needed only minor cuing with all there ex for proper form. She improved foto scoe to surpass expected score.  Still expeeriences releif in tightness iwht standing lumbar extension . The goal of the current treatment is to address patient's functional limitations as well as objective findings.       Plan:  progress stabilization and strengthening, mobility, posture education          Eval/ Re-eval Auth #/ Referral # Total visits Start date  Expiration date Total active units  Total manual units  PT only or  PT+OT?   2025 No auth, no referral no visit limit                         Past Medical History:   Diagnosis Date    Baker's cyst of knee 10/21/2016    right- burst on its own    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HCC) 1992    hx of-fell when ice skating. Noted brain bleed w/swelling    Glaucoma     right eye    Headache(784.0)     HL (hearing loss)     Low back pain 9/10/24    Macular degeneration     Migraine     Raynaud's disease     both hands    Thoracic vertebral fracture (HCC)                      Reeval:   Insurance :     FOTO performed: 61/57: foto discharged    Visits: 1 2 3 4 5   Manual: 2025    STM/MI: lumbar musculature                                                Ther ex/NMR:        Manual  stretching: piriformis/glute  --      Rec bike/  nustep   NA                             SLR    + TA: --    Sidelying hip abduction         Seated hamstring stretch w/ SOS  5 sec x 10   10 sec x 5  in sitting             EIS  5 x 3 10 x  10 x 2             Sit/stand with hip hinge  Tap to standard chair: 5 x 2 Tap to standard chair: 10 x 2  10 x 3    Diaphragmatic breathing   Instruct 10 reps 10 x  10 x     NMR:         TA activation (stab cuff)  W/ diaphragmatic breathing: 10  x 2 15 x  5 sec x 15     + marching  10 x each side: 2 rounds  15 x each side  20 x    +hip abd iso  10 x 2 w/ exhale green for iso hold Green: 5 sec x 10   Green: 5 sec x15     +hip add iso  10 x 2 w/ exhale 5 sec x 10   5 sec x 15    +bent knee fall out  Yellow Next visit  Yellow: --     Alt Hand vs knee isometric midline   5 sec x 10   5 sec x 15             +Clamshells  -- -     Glute set supine   5 sec x 10   D/c     Glute set + bridge   5 x 2 7 x 2     Palloff press    Green single: 10 x 2 each side  Green: 10 x 2     Therapeutic Activity:         Reevaluation                Gait Training:                 HEP:                 Modalities        MH        ice        Total time:          Access Code: [email or web address]  URL: [email or web address]  Date: 02/18/2025  Prepared by: Marian Baig    Exercises  - Standing Lumbar Extension with Counter  - 3 x daily - 7 x weekly - 3 sets - 5 reps  - Supine Diaphragmatic Breathing  - 2 x daily - 7 x weekly - 2 sets - 10 reps  - Supine Transversus Abdominis Bracing - Hands on Stomach  - 1 x daily - 7 x weekly - 1 sets - 10 reps - 5 sec hold  - Supine March  - 1 x daily - 7 x weekly - 2 sets - 10 reps  - Supine Hip Adduction Isometric with Ball  - 1 x daily - 7 x weekly - 1-2 sets - 10 reps  - Hooklying Isometric Hip Abduction with Belt  - 1 x daily - 7 x weekly - 1-2 sets - 10 reps

## 2025-02-27 ENCOUNTER — OFFICE VISIT (OUTPATIENT)
Dept: PHYSICAL THERAPY | Facility: CLINIC | Age: 85
End: 2025-02-27
Payer: MEDICARE

## 2025-02-27 DIAGNOSIS — M47.816 LUMBAR SPONDYLOSIS: ICD-10-CM

## 2025-02-27 DIAGNOSIS — S22.089D CLOSED FRACTURE OF TWELFTH THORACIC VERTEBRA WITH ROUTINE HEALING, UNSPECIFIED FRACTURE MORPHOLOGY, SUBSEQUENT ENCOUNTER: Primary | ICD-10-CM

## 2025-02-27 PROCEDURE — 97110 THERAPEUTIC EXERCISES: CPT | Performed by: PHYSICAL THERAPIST

## 2025-02-27 PROCEDURE — 97112 NEUROMUSCULAR REEDUCATION: CPT | Performed by: PHYSICAL THERAPIST

## 2025-02-27 NOTE — PROGRESS NOTES
Daily Note         Today's date: 2025  Patient name: Belinda Dolan  : 1940  MRN: [id number]  Referring provider: Pepito Serra DO  Dx:   Encounter Diagnosis     ICD-10-CM    1. Closed fracture of twelfth thoracic vertebra with routine healing, unspecified fracture morphology, subsequent encounter  S22.089D       2. Lumbar spondylosis  M47.816           Subjective: Belinda Dolan reports she had mae hip muscle soreness after last session.       Objective: See treatment diary below    Assessment:  Pt required verbal cues in order to perform therex with proper form. . Patient denied back pain with progression of exercises.  Demonstrated appropriate form with diaphragmatic breathing. Still experiences relief with low back pain with repeated extension in standing .       Plan:  progress stab as toelrated          Eval/ Re-eval Auth #/ Referral # Total visits Start date  Expiration date Total active units  Total manual units  PT only or  PT+OT?   2025 No auth, no referral no visit limit                         Past Medical History:   Diagnosis Date    Baker's cyst of knee 10/21/2016    right- burst on its own    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HCC)     hx of-fell when ice skating. Noted brain bleed w/swelling    Glaucoma     right eye    Headache(784.0)     HL (hearing loss)     Low back pain 9/10/24    Macular degeneration     Migraine     Raynaud's disease     both hands    Thoracic vertebral fracture (HCC)                      Reeval:   Insurance :     FOTO performed: 6157: foto discharged     Visits: 1 2 3 4 5    Manual: 2025    STM/MI: lumbar musculature                                                      Ther ex/NMR:         Manual stretching: piriformis/glute  --       Rec bike/  nustep   NA NS: 5 min lv 4 NS: 5 min lv 4                                SLR    + TA: -- + TA: 10 x 2    Sidelying hip abduction          Seated hamstring stretch w/ SOS  5 sec x  10   10 sec x 5  in sitting               EIS  5 x 3 10 x  10 x 2  10 x              Sit/stand with hip hinge  Tap to standard chair: 5 x 2 Tap to standard chair: 10 x 2  10 x 3 10 x 3     Diaphragmatic breathing   Instruct 10 reps 10 x  10 x  10 x     NMR:          TA activation (stab cuff)  W/ diaphragmatic breathing: 10  x 2 15 x  5 sec x 15  --    + marching  10 x each side: 2 rounds  15 x each side  20 x With legs extended to 45 de x  Iso hold ball overhead   +hip abd iso  10 x 2 w/ exhale green for iso hold Green: 5 sec x 10   Green: 5 sec x15  Green: 5 sec x 15     +hip add iso  10 x 2 w/ exhale 5 sec x 10   5 sec x 15 5 sec x 15     +bent knee fall out  Yellow Next visit  Yellow: --      Alt Hand vs knee isometric midline   5 sec x 10   5 sec x 15  5 sec x15    TA + ball lowering overhead      10 x 2     +Clamshells  -- -      Glute set + bridge   5 x 2 7 x 2  7 x 2     Wall planks     5 sec x 10      Green band iso hold with scap retractions + march in standing     Green 10x 2             Palloff press    Green single: 10 x 2 each side  Green: 10 x 2  Green ( single) 10x 2    Therapeutic Activity:          Reevaluation                  Gait Training:                   HEP:                   Modalities         MH         ice         Total time:           Access Code: [email or web address]  URL: [email or web address]  Date: 2025  Prepared by: Marian Baig    Exercises  - Standing Lumbar Extension with Counter  - 3 x daily - 7 x weekly - 3 sets - 5 reps  - Supine Diaphragmatic Breathing  - 2 x daily - 7 x weekly - 2 sets - 10 reps  - Supine Transversus Abdominis Bracing - Hands on Stomach  - 1 x daily - 7 x weekly - 1 sets - 10 reps - 5 sec hold  - Supine March  - 1 x daily - 7 x weekly - 2 sets - 10 reps  - Supine Hip Adduction Isometric with Ball  - 1 x daily - 7 x weekly - 1-2 sets - 10 reps  - Hooklying Isometric Hip Abduction with Belt  - 1 x daily - 7 x weekly - 1-2 sets - 10 reps

## 2025-03-01 NOTE — ASSESSMENT & PLAN NOTE
Patient's dizziness improved agree continue main medication as follow    Asymptomatic    Benicar 20 mg daily  Lasix 20 mg daily    BMP reviewed as follows    Lab Results   Component Value Date    SODIUM 140 02/11/2025    K 4.2 02/11/2025     02/11/2025    CO2 27 02/11/2025    BUN 20 02/11/2025    CREATININE 0.94 02/11/2025    GLUC 110 12/09/2022    CALCIUM 10.0 02/11/2025

## 2025-03-01 NOTE — ASSESSMENT & PLAN NOTE
Lab Results   Component Value Date    EGFR 55 02/11/2025    EGFR 47 08/15/2024    EGFR 53 01/30/2024    CREATININE 0.94 02/11/2025    CREATININE 1.06 08/15/2024    CREATININE 0.97 01/30/2024   Above reviewed GFR 55 improved creatinine 0.94 improved blood pressure control    Avoid nephrotoxic drug    GFR is baseline  Creat is baseline.   Recommend periodic blood test for monitoring of BUN and Creat  Avoid Non Steroidal Antiinflammatory such as ibuprofen, aleve etc.  Potassium, HCO3 and calcium are wnl and will require periodic blood test.  Bone and Mineral disease monitoring as appropriate.  All patient with GFR less than 30( CKD 4 or 5 or 6) advised to follow with nephrologist.

## 2025-03-01 NOTE — ASSESSMENT & PLAN NOTE
Lab Results   Component Value Date    LDLCALC 75 02/11/2025      Lab Results   Component Value Date    ALT 16 02/11/2025    AST 24 02/11/2025    ALKPHOS 83 02/11/2025    BILITOT 1.3 (H) 11/02/2015       Above reviewed LDL controlled agree and continue manage medication as follow    Lipitor 10 mg daily with low-fat low-cholesterol diet no side effects

## 2025-03-01 NOTE — ASSESSMENT & PLAN NOTE
Lab Results   Component Value Date    PTH 99.7 (H) 02/11/2025    CALCIUM 10.0 02/11/2025    PHOS 3.9 08/15/2024   PTH level stable patient asymptomatic calcium normal phosphorus normal seen by endocrinology    Considering symptoms in the lab not a candidate for surgery for now needs close monitoring and patient is reluctant on for surgery

## 2025-03-01 NOTE — ASSESSMENT & PLAN NOTE
Symptoms controlled with over-the-counter Tylenol or  avoid NSAID due to chronic kidney disease as needed recommended physical therapy patient reluctant continue over-the-counter current regimen as above

## 2025-03-01 NOTE — PROGRESS NOTES
Name: Belinda Dolan      : 1940      MRN: [id number]  Encounter Provider: Maida Carpio MD  Encounter Date: 2025   Encounter department: [facility]  :  Assessment & Plan  Essential hypertension  Patient's dizziness improved agree continue main medication as follow    Asymptomatic    Benicar 20 mg daily  Lasix 20 mg daily    BMP reviewed as follows    Lab Results   Component Value Date    SODIUM 140 2025    K 4.2 2025     2025    CO2 27 2025    BUN 20 2025    CREATININE 0.94 2025    GLUC 110 2022    CALCIUM 10.0 2025           Hyperparathyroidism (HCC)  Lab Results   Component Value Date    PTH 99.7 (H) 2025    CALCIUM 10.0 2025    PHOS 3.9 08/15/2024   PTH level stable patient asymptomatic calcium normal phosphorus normal seen by endocrinology    Considering symptoms in the lab not a candidate for surgery for now needs close monitoring and patient is reluctant on for surgery       Primary osteoarthritis involving multiple joints  Symptoms controlled with over-the-counter Tylenol or  avoid NSAID due to chronic kidney disease as needed recommended physical therapy patient reluctant continue over-the-counter current regimen as above       Stage 3b chronic kidney disease (HCC)  Lab Results   Component Value Date    EGFR 55 2025    EGFR 47 08/15/2024    EGFR 53 2024    CREATININE 0.94 2025    CREATININE 1.06 08/15/2024    CREATININE 0.97 2024   Above reviewed GFR 55 improved creatinine 0.94 improved blood pressure control    Avoid nephrotoxic drug    GFR is baseline  Creat is baseline.   Recommend periodic blood test for monitoring of BUN and Creat  Avoid Non Steroidal Antiinflammatory such as ibuprofen, aleve etc.  Potassium, HCO3 and calcium are wnl and will require periodic blood test.  Bone and Mineral disease monitoring as appropriate.  All patient with GFR less than 30( CKD 4  or 5 or 6) advised to follow with nephrologist.          Mixed hyperlipidemia  Lab Results   Component Value Date    LDLCALC 75 02/11/2025      Lab Results   Component Value Date    ALT 16 02/11/2025    AST 24 02/11/2025    ALKPHOS 83 02/11/2025    BILITOT 1.3 (H) 11/02/2015       Above reviewed LDL controlled agree and continue manage medication as follow    Lipitor 10 mg daily with low-fat low-cholesterol diet no side effects              History of Present Illness   Patient denies any chest pain difficulty breathing no new complaint all the labs reviewed discussed at length came in to review all the labs for seen by endocrinology complains of pain hip knee lower back intermittent mild improved with Tylenol as needed for details refer to assessment plan visit diagnosis      Review of Systems   Constitutional:  Negative for activity change, appetite change, chills, diaphoresis, fatigue, fever and unexpected weight change.   HENT:  Negative for congestion, dental problem, drooling, ear discharge, ear pain, facial swelling, hearing loss, mouth sores, nosebleeds, postnasal drip, rhinorrhea, sinus pressure, sneezing, sore throat, tinnitus, trouble swallowing and voice change.    Eyes:  Negative for photophobia, pain, discharge, redness, itching and visual disturbance.   Respiratory:  Negative for apnea, cough, choking, chest tightness, shortness of breath, wheezing and stridor.    Cardiovascular:  Negative for chest pain, palpitations and leg swelling.   Gastrointestinal:  Negative for abdominal distention, abdominal pain, anal bleeding, blood in stool, constipation, diarrhea, nausea, rectal pain and vomiting.   Endocrine: Negative for cold intolerance, heat intolerance, polydipsia, polyphagia and polyuria.   Genitourinary:  Negative for decreased urine volume, difficulty urinating, dysuria, enuresis, flank pain, frequency, genital sores, hematuria and urgency.   Musculoskeletal:  Positive for arthralgias, back pain  "and myalgias. Negative for gait problem, joint swelling, neck pain and neck stiffness.   Skin:  Negative for color change, pallor, rash and wound.   Allergic/Immunologic: Negative.  Negative for environmental allergies, food allergies and immunocompromised state.   Neurological:  Negative for dizziness, tremors, seizures, syncope, facial asymmetry, speech difficulty, weakness, light-headedness, numbness and headaches.   Psychiatric/Behavioral:  Negative for agitation, behavioral problems, confusion, decreased concentration, dysphoric mood, hallucinations, self-injury, sleep disturbance and suicidal ideas. The patient is not nervous/anxious and is not hyperactive.        Objective   /80   Pulse 70   Ht 5' 1\" (1.549 m)   Wt 54.4 kg (120 lb)   SpO2 97%   BMI 22.67 kg/m²      Physical Exam  Vitals and nursing note reviewed.   Constitutional:       General: She is not in acute distress.     Appearance: She is well-developed. She is not ill-appearing, toxic-appearing or diaphoretic.   HENT:      Head: Normocephalic and atraumatic.      Right Ear: External ear normal.      Left Ear: External ear normal.      Nose: Nose normal.      Mouth/Throat:      Pharynx: No oropharyngeal exudate.   Eyes:      General: Lids are normal. Lids are everted, no foreign bodies appreciated. No scleral icterus.        Right eye: No discharge.         Left eye: No discharge.      Conjunctiva/sclera: Conjunctivae normal.      Pupils: Pupils are equal, round, and reactive to light.   Neck:      Thyroid: No thyromegaly.      Vascular: Normal carotid pulses. No carotid bruit, hepatojugular reflux or JVD.      Trachea: No tracheal tenderness or tracheal deviation.   Cardiovascular:      Rate and Rhythm: Normal rate and regular rhythm.      Pulses: Normal pulses.      Heart sounds: Normal heart sounds. No murmur heard.     No friction rub. No gallop.   Pulmonary:      Effort: Pulmonary effort is normal. No respiratory distress.      Breath " sounds: Normal breath sounds. No stridor. No wheezing or rales.   Chest:      Chest wall: No tenderness.   Abdominal:      General: Bowel sounds are normal. There is no distension.      Palpations: Abdomen is soft. There is no mass.      Tenderness: There is no abdominal tenderness. There is no guarding or rebound.   Musculoskeletal:         General: No tenderness or deformity. Normal range of motion.      Cervical back: Normal range of motion and neck supple. No edema, erythema or rigidity. No spinous process tenderness or muscular tenderness. Normal range of motion.   Lymphadenopathy:      Head:      Right side of head: No submental, submandibular, tonsillar, preauricular or posterior auricular adenopathy.      Left side of head: No submental, submandibular, tonsillar, preauricular, posterior auricular or occipital adenopathy.      Cervical: No cervical adenopathy.      Right cervical: No superficial, deep or posterior cervical adenopathy.     Left cervical: No superficial, deep or posterior cervical adenopathy.      Upper Body:      Right upper body: No pectoral adenopathy.      Left upper body: No pectoral adenopathy.   Skin:     General: Skin is warm and dry.      Coloration: Skin is not pale.      Findings: No erythema or rash.   Neurological:      General: No focal deficit present.      Mental Status: She is alert and oriented to person, place, and time.      Cranial Nerves: No cranial nerve deficit.      Sensory: No sensory deficit.      Motor: No tremor, abnormal muscle tone or seizure activity.      Coordination: Coordination normal.      Gait: Gait normal.      Deep Tendon Reflexes: Reflexes are normal and symmetric. Reflexes normal.   Psychiatric:         Behavior: Behavior normal.         Thought Content: Thought content normal.         Judgment: Judgment normal.

## 2025-03-04 ENCOUNTER — OFFICE VISIT (OUTPATIENT)
Dept: PHYSICAL THERAPY | Facility: CLINIC | Age: 85
End: 2025-03-04
Payer: MEDICARE

## 2025-03-04 DIAGNOSIS — S22.089D CLOSED FRACTURE OF TWELFTH THORACIC VERTEBRA WITH ROUTINE HEALING, UNSPECIFIED FRACTURE MORPHOLOGY, SUBSEQUENT ENCOUNTER: Primary | ICD-10-CM

## 2025-03-04 DIAGNOSIS — M47.816 LUMBAR SPONDYLOSIS: ICD-10-CM

## 2025-03-04 PROCEDURE — 97112 NEUROMUSCULAR REEDUCATION: CPT | Performed by: PHYSICAL THERAPIST

## 2025-03-04 PROCEDURE — 97110 THERAPEUTIC EXERCISES: CPT | Performed by: PHYSICAL THERAPIST

## 2025-03-04 NOTE — PROGRESS NOTES
Daily Note         Today's date: 3/4/2025  Patient name: Belinda Dolan  : 1940  MRN: [id number]  Referring provider: Pepito Serra DO  Dx:   Encounter Diagnosis     ICD-10-CM    1. Closed fracture of twelfth thoracic vertebra with routine healing, unspecified fracture morphology, subsequent encounter  S22.089D       2. Lumbar spondylosis  M47.816           Subjective: Belinda Dolan reports her back was stiff but feels it may be due to too much sitting       Objective: See treatment diary below    Assessment:   overall excellent tolerance to program today. Needs only minor cuing for proper form with there ex. Patient denied symptoms by end of session. Patient demonstrates good form with stabilization. . The goal of the current treatment is to address patient's functional limitations as well as objective findings.       Plan:  progressing program as tolerated. Progress program as indicated by primary PT          Eval/ Re-eval Auth #/ Referral # Total visits Start date  Expiration date Total active units  Total manual units  PT only or  PT+OT?   2025 No auth, no referral no visit limit                         Past Medical History:   Diagnosis Date    Baker's cyst of knee 10/21/2016    right- burst on its own    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HCC)     hx of-fell when ice skating. Noted brain bleed w/swelling    Glaucoma     right eye    Headache(784.0)     HL (hearing loss)     Low back pain 9/10/24    Macular degeneration     Migraine     Raynaud's disease     both hands    Thoracic vertebral fracture (HCC)                      Reeval:   Insurance :     FOTO performed: 61/57: foto discharged     Visits: 1 2 3 4 5 6   Manual: 2025 2/18/25  2/20/25 2/25/25 2/27/25 3/4/25   STM/MI: lumbar musculature                                                      Ther ex/NMR:         Manual stretching: piriformis/glute  --       Rec bike/  nustep   NA NS: 5 min lv 4 NS: 5 min lv 4  NS: 5 min lv 4             Suitcase carry      Next visit    [place] carry      Next visit    SLR    + TA: -- + TA: 10 x 2    Sidelying hip abduction          Seated hamstring stretch w/ SOS  5 sec x 10   10 sec x 5  in sitting               EIS  5 x 3 10 x  10 x 2  10 x              Sit/stand with hip hinge  Tap to standard chair: 5 x 2 Tap to standard chair: 10 x 2  10 x 3 10 x 3  TRX: Next visit    Diaphragmatic breathing   Instruct 10 reps 10 x  10 x  10 x  15 x    NMR:          TA activation (stab cuff)  W/ diaphragmatic breathing: 10  x 2 15 x  5 sec x 15  --    + marching  10 x each side: 2 rounds  15 x each side  20 x With legs extended to 45 de x  Iso hold ball overhead 20 x each   + alternating march with lowering overhead   +hip abd iso  10 x 2 w/ exhale green for iso hold Green: 5 sec x 10   Green: 5 sec x15  Green: 5 sec x 15  -   +hip add iso  10 x 2 w/ exhale 5 sec x 10   5 sec x 15 5 sec x 15  -   +bent knee fall out  Yellow Next visit  Yellow: --   Yellow: 10 x 2   Alt Hand vs knee isometric midline   5 sec x 10   5 sec x 15  5 sec x15 5 sec x15    TA + ball lowering overhead      10 x 2  10 x 2   +Clamshells  -- -      Glute set + bridge   5 x 2 7 x 2  7 x 2  Iso hold ball overhead: 10 x 2   Wall planks     5 sec x 10   7 sec x 10    Green band iso hold with scap retractions + march in standing     Green 10x 2 Green: 10 x 2            Palloff press    Green single: 10 x 2 each side  Green: 10 x 2  Green ( single) 10x 2 Green 10 x 2   Therapeutic Activity:          Reevaluation                  Gait Training:                   HEP:                   Modalities         MH         ice         Total time:           Access Code: [email or web address]  URL: [email or web address]  Date: 2025  Prepared by: Marian Baig    Exercises  - Standing Lumbar Extension with Counter  - 3 x daily - 7 x weekly - 3 sets - 5 reps  - Supine Diaphragmatic Breathing  - 2 x daily - 7 x weekly - 2 sets - 10 reps  - Supine Transversus  Abdominis Bracing - Hands on Stomach  - 1 x daily - 7 x weekly - 1 sets - 10 reps - 5 sec hold  - Supine March  - 1 x daily - 7 x weekly - 2 sets - 10 reps  - Supine Hip Adduction Isometric with Ball  - 1 x daily - 7 x weekly - 1-2 sets - 10 reps  - Hooklying Isometric Hip Abduction with Belt  - 1 x daily - 7 x weekly - 1-2 sets - 10 reps

## 2025-03-06 ENCOUNTER — APPOINTMENT (OUTPATIENT)
Dept: PHYSICAL THERAPY | Facility: CLINIC | Age: 85
End: 2025-03-06
Payer: MEDICARE

## 2025-03-07 ENCOUNTER — OFFICE VISIT (OUTPATIENT)
Dept: PHYSICAL THERAPY | Facility: CLINIC | Age: 85
End: 2025-03-07
Payer: MEDICARE

## 2025-03-07 DIAGNOSIS — M47.816 LUMBAR SPONDYLOSIS: ICD-10-CM

## 2025-03-07 DIAGNOSIS — S22.080D CLOSED WEDGE COMPRESSION FRACTURE OF T12 VERTEBRA WITH ROUTINE HEALING, SUBSEQUENT ENCOUNTER: ICD-10-CM

## 2025-03-07 DIAGNOSIS — S22.089D CLOSED FRACTURE OF TWELFTH THORACIC VERTEBRA WITH ROUTINE HEALING, UNSPECIFIED FRACTURE MORPHOLOGY, SUBSEQUENT ENCOUNTER: Primary | ICD-10-CM

## 2025-03-07 PROCEDURE — 97112 NEUROMUSCULAR REEDUCATION: CPT

## 2025-03-07 PROCEDURE — 97110 THERAPEUTIC EXERCISES: CPT

## 2025-03-07 NOTE — PROGRESS NOTES
Daily Note     Today's date: 3/7/2025  Patient name: Belinda Dolan  : 1940  MRN: [id number]  Referring provider: Pepito Serra DO  Dx:   Encounter Diagnosis     ICD-10-CM    1. Closed fracture of twelfth thoracic vertebra with routine healing, unspecified fracture morphology, subsequent encounter  S22.089D       2. Lumbar spondylosis  M47.816       3. Closed wedge compression fracture of T12 vertebra with routine healing, subsequent encounter  S22.080D                      Subjective: Reports no pain in back      Objective: See treatment diary below      Assessment: Tolerated treatment well. Patient would benefit from continued PT in order to strengthen erectors and work on thoracic mobility. Did well with farmer's carry and suitcase carry, could handle larger load next session. Was pleasantly surprised that standing open books were painless. Discussed case with primary PT.       Plan: Continue per plan of care.        Eval/ Re-eval Auth #/ Referral # Total visits Start date  Expiration date Total active units  Total manual units  PT only or  PT+OT?   2025 No auth, no referral no visit limit                         Past Medical History:   Diagnosis Date    Baker's cyst of knee 10/21/2016    right- burst on its own    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HCC) 1992    hx of-fell when ice skating. Noted brain bleed w/swelling    Glaucoma     right eye    Headache(784.0)     HL (hearing loss)     Low back pain 9/10/24    Macular degeneration     Migraine     Raynaud's disease     both hands    Thoracic vertebral fracture (HCC)                      Reeval:   Insurance :    FOTO performed: : foto discharged      Visits: 2 3 4 5 6 7   Manual: 2/18/25  2/20/25 2/25/25 2/27/25 3/4/25 3/7/25   STM/MI: lumbar musculature                                                      Ther ex/NMR:         Manual stretching: piriformis/glute --        Rec bike/  nustep  NA NS: 5 min lv 4 NS: 5 min lv 4  NS: 5 min lv 4 NS: 5 min lv 4             Suitcase carry     Next visit  9# KB 50'x 2   [place] carry     Next visit  50' x2 2*8#   SLR   + TA: -- + TA: 10 x 2     Sidelying hip abduction          Seated hamstring stretch w/ SOS 5 sec x 10   10 sec x 5  in sitting                EIS 5 x 3 10 x  10 x 2  10 x               Sit/stand with hip hinge Tap to standard chair: 5 x 2 Tap to standard chair: 10 x 2  10 x 3 10 x 3  TRX: Next visit  TRX pull up 10 x   Diaphragmatic breathing  Instruct 10 reps 10 x  10 x  10 x  15 x  With SOS for resistance 10x   NMR:          TA activation (stab cuff) W/ diaphragmatic breathing: 10  x 2 15 x  5 sec x 15  --  Standing open books 20x b/l   + marching 10 x each side: 2 rounds  15 x each side  20 x With legs extended to 45 de x  Iso hold ball overhead 20 x each   + alternating march with lowering overhead Marching with large TT 20x   +hip abd iso 10 x 2 w/ exhale green for iso hold Green: 5 sec x 10   Green: 5 sec x15  Green: 5 sec x 15  -    +hip add iso 10 x 2 w/ exhale 5 sec x 10   5 sec x 15 5 sec x 15  -    +bent knee fall out Yellow Next visit  Yellow: --   Yellow: 10 x 2 Lateral walk 10 steps x 4 GTB   Alt Hand vs knee isometric midline  5 sec x 10   5 sec x 15  5 sec x15 5 sec x15     TA + ball lowering overhead     10 x 2  10 x 2    +Clamshells -- -       Glute set + bridge  5 x 2 7 x 2  7 x 2  Iso hold ball overhead: 10 x 2 Back to back with PT- ball hand off 10x b/l   Wall planks    5 sec x 10   7 sec x 10  7 sec x 10    Green band iso hold with scap retractions + march in standing    Green 10x 2 Green: 10 x 2 Green: 10 x 2            Palloff press   Green single: 10 x 2 each side  Green: 10 x 2  Green ( single) 10x 2 Green 10 x 2 Green: 10 x 2   Therapeutic Activity:       Sit to stand with 5# arms extended  10x   Reevaluation                  Gait Training:                   HEP:                   Modalities         MH         ice         Total time:           Access Code: [email or web address]  URL:  [email or web address]  Date: 02/18/2025  Prepared by: Marian Baig    Exercises  - Standing Lumbar Extension with Counter  - 3 x daily - 7 x weekly - 3 sets - 5 reps  - Supine Diaphragmatic Breathing  - 2 x daily - 7 x weekly - 2 sets - 10 reps  - Supine Transversus Abdominis Bracing - Hands on Stomach  - 1 x daily - 7 x weekly - 1 sets - 10 reps - 5 sec hold  - Supine March  - 1 x daily - 7 x weekly - 2 sets - 10 reps  - Supine Hip Adduction Isometric with Ball  - 1 x daily - 7 x weekly - 1-2 sets - 10 reps  - Hooklying Isometric Hip Abduction with Belt  - 1 x daily - 7 x weekly - 1-2 sets - 10 reps

## 2025-03-10 ENCOUNTER — OFFICE VISIT (OUTPATIENT)
Dept: PHYSICAL THERAPY | Facility: CLINIC | Age: 85
End: 2025-03-10
Payer: MEDICARE

## 2025-03-10 DIAGNOSIS — S22.080D CLOSED WEDGE COMPRESSION FRACTURE OF T12 VERTEBRA WITH ROUTINE HEALING, SUBSEQUENT ENCOUNTER: ICD-10-CM

## 2025-03-10 DIAGNOSIS — M47.816 LUMBAR SPONDYLOSIS: ICD-10-CM

## 2025-03-10 DIAGNOSIS — S22.089D CLOSED FRACTURE OF TWELFTH THORACIC VERTEBRA WITH ROUTINE HEALING, UNSPECIFIED FRACTURE MORPHOLOGY, SUBSEQUENT ENCOUNTER: Primary | ICD-10-CM

## 2025-03-10 PROCEDURE — 97112 NEUROMUSCULAR REEDUCATION: CPT | Performed by: PHYSICAL THERAPIST

## 2025-03-10 PROCEDURE — 97110 THERAPEUTIC EXERCISES: CPT | Performed by: PHYSICAL THERAPIST

## 2025-03-10 NOTE — PROGRESS NOTES
Daily Note         Today's date: 3/10/2025  Patient name: Belinda Dolan  : 1940  MRN: [id number]  Referring provider: Pepito Serra DO  Dx:   Encounter Diagnosis     ICD-10-CM    1. Closed fracture of twelfth thoracic vertebra with routine healing, unspecified fracture morphology, subsequent encounter  S22.089D       2. Lumbar spondylosis  M47.816       3. Closed wedge compression fracture of T12 vertebra with routine healing, subsequent encounter  S22.080D           Subjective: Belinda Dolan reports she had some pain in right hip this am around 430. States she took 2 advil and returned to bed.        Objective: See treatment diary below    Assessment:   increased twisting in activities implemented last session which patient described increased her symptoms. Therefore, returned to neutral based program as already established.   . Overall good tolerance without an increase in pain during session.        Plan:  reeval next session           Eval/ Re-eval Auth #/ Referral # Total visits Start date  Expiration date Total active units  Total manual units  PT only or  PT+OT?   2025 No auth, no referral no visit limit                         Past Medical History:   Diagnosis Date    Baker's cyst of knee 10/21/2016    right- burst on its own    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HCC) 1992    hx of-fell when ice skating. Noted brain bleed w/swelling    Glaucoma     right eye    Headache(784.0)     HL (hearing loss)     Low back pain 9/10/24    Macular degeneration     Migraine     Raynaud's disease     both hands    Thoracic vertebral fracture (HCC)                      Reeval:   Insurance :   FOTO performed: 57: foto discharged       Visits: 3 4 5 6 7 8   Manual: 2/20/25 2/25/25 2/27/25 3/4/25 3/7/25 3/10/25   STM/MI: lumbar musculature                                                      Ther ex/NMR:         Manual stretching: piriformis/glute         Rec bike/  nustep NA NS: 5 min lv 4 NS: 5 min lv 4  NS: 5  min lv 4 NS: 5 min lv 4 Not performed             Suitcase carry    Next visit  9# KB 50'x 2 9 lb: length of turf: 3 rounds   [place] carry    Next visit  50' x2 2*8#    SLR  + TA: -- + TA: 10 x 2      Sidelying hip abduction          Seated hamstring stretch w/ SOS 10 sec x 5  in sitting        Sit/stand tap to chair      3 lb 10x 2   EIS 10 x  10 x 2  10 x    10 x 2             Sit/stand with hip hinge Tap to standard chair: 10 x 2  10 x 3 10 x 3  TRX: Next visit  TRX pull up 10 x TRX squats: ---   Diaphragmatic breathing  10 x  10 x  10 x  15 x  With SOS for resistance 10x 10 x no resistance   NMR:          TA activation (stab cuff) 15 x  5 sec x 15  --  Standing open books 20x b/l D/c    + marching 15 x each side  20 x With legs extended to 45 de x  Iso hold ball overhead 20 x each   + alternating march with lowering overhead Marching with large TT 20x    +hip abd iso Green: 5 sec x 10   Green: 5 sec x15  Green: 5 sec x 15  -     +hip add iso 5 sec x 10   5 sec x 15 5 sec x 15  -     +bent knee fall out Yellow: --   Yellow: 10 x 2 Lateral walk 10 steps x 4 GTB Yellow BKFO: 10 x  2   Alt Hand vs knee isometric midline 5 sec x 10   5 sec x 15  5 sec x15 5 sec x15   5 sec x 15   TA + ball lowering overhead    10 x 2  10 x 2  3 lb weight: 10 x 2 with TA facilitation   +Clamshells -        Glute set + bridge 5 x 2 7 x 2  7 x 2  Iso hold ball overhead: 10 x 2 Back to back with PT- ball hand off 10x b/l --   Wall planks   5 sec x 10   7 sec x 10  7 sec x 10  7 sec x 10    Green band iso hold with scap retractions + march in standing   Green 10x 2 Green: 10 x 2 Green: 10 x 2 Green 20 x 2             Palloff press  Green single: 10 x 2 each side  Green: 10 x 2  Green ( single) 10x 2 Green 10 x 2 Green: 10 x 2 Green: 10 x 2   Therapeutic Activity:      Sit to stand with 5# arms extended  10x 5 lb weight at chest.    Reevaluation                  Gait Training:                   HEP:                   Modalities                   ice         Total time:           Access Code: [email or web address]  URL: [email or web address]  Date: 02/18/2025  Prepared by: Marian Baig    Exercises  - Standing Lumbar Extension with Counter  - 3 x daily - 7 x weekly - 3 sets - 5 reps  - Supine Diaphragmatic Breathing  - 2 x daily - 7 x weekly - 2 sets - 10 reps  - Supine Transversus Abdominis Bracing - Hands on Stomach  - 1 x daily - 7 x weekly - 1 sets - 10 reps - 5 sec hold  - Supine March  - 1 x daily - 7 x weekly - 2 sets - 10 reps  - Supine Hip Adduction Isometric with Ball  - 1 x daily - 7 x weekly - 1-2 sets - 10 reps  - Hooklying Isometric Hip Abduction with Belt  - 1 x daily - 7 x weekly - 1-2 sets - 10 reps

## 2025-03-11 ENCOUNTER — OFFICE VISIT (OUTPATIENT)
Dept: SURGICAL ONCOLOGY | Facility: CLINIC | Age: 85
End: 2025-03-11
Payer: MEDICARE

## 2025-03-11 ENCOUNTER — APPOINTMENT (OUTPATIENT)
Dept: PHYSICAL THERAPY | Facility: CLINIC | Age: 85
End: 2025-03-11
Payer: MEDICARE

## 2025-03-11 VITALS
WEIGHT: 113 LBS | HEIGHT: 61 IN | TEMPERATURE: 98.3 F | SYSTOLIC BLOOD PRESSURE: 130 MMHG | HEART RATE: 69 BPM | DIASTOLIC BLOOD PRESSURE: 84 MMHG | OXYGEN SATURATION: 98 % | BODY MASS INDEX: 21.34 KG/M2

## 2025-03-11 DIAGNOSIS — E21.3 HYPERPARATHYROIDISM (HCC): Primary | ICD-10-CM

## 2025-03-11 PROCEDURE — 99213 OFFICE O/P EST LOW 20 MIN: CPT | Performed by: SURGERY

## 2025-03-11 NOTE — PROGRESS NOTES
Surgical Oncology follow-up       [facility] [place]  [address] [facility] [address]  [phone]    Belinda Dolan  1940  [id number]  [facility] [place]  [address] [facility] [address]  [phone]    Chief Complaint   Patient presents with    Follow-up       Assessment/Plan:    No problem-specific Assessment & Plan notes found for this encounter.       Diagnoses and all orders for this visit:    Hyperparathyroidism (HCC)  -     PTH, intact; Future  -     Calcium; Future  -     Vitamin D 1,25 dihydroxy; Future      Advance Care Planning/Advance Directives:  Discussed disease status, cancer treatment plans and/or cancer treatment goals with the patient.     Oncology History    No history exists.       History of Present Illness: 84-year-old woman with suspected primary hyperparathyroidism, here for follow-up visit.  She had a fall a few months ago and had a compression fracture of her vertebrae.  Otherwise she has no new complaints to report.    Review of Systems   Constitutional:  Positive for fatigue.   HENT: Negative.     Eyes: Negative.    Respiratory: Negative.     Cardiovascular: Negative.    Gastrointestinal: Negative.    Endocrine: Negative.    Genitourinary: Negative.    Musculoskeletal:  Positive for arthralgias and back pain.   Skin: Negative.    Allergic/Immunologic: Negative.    Neurological: Negative.    Hematological: Negative.    Psychiatric/Behavioral: Negative.     All other systems reviewed and are negative.        Patient Active Problem List   Diagnosis    Baker cyst, right    Gastroesophageal reflux disease without esophagitis    Glucose intolerance (impaired glucose tolerance)    Restless leg syndrome    Hypercholesteremia    Mitral regurgitation    Migraine    Essential hypertension    Varicose vein of leg    Edema of extremities     Allergic rhinitis due to dust    Aspirin intolerance    Persistent lymphocytosis    Hypopotassemia    Irritable bowel syndrome    Abnormal echocardiogram    Vitamin D deficiency    Diverticulosis    History of syncope    Hammer toe of left foot    Raynaud's disease    Stage 3b chronic kidney disease (HCC)    Cervical radiculopathy    Left arm pain    Rupture of left distal biceps tendon    Primary osteoarthritis involving multiple joints    HL (hearing loss)    Lumbar radiculopathy    Abnormal LFTs    Primary osteoarthritis of right knee    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    Hyperparathyroidism (HCC)    Mixed hyperlipidemia    Generalized maculopapular rash    Fall    Acute bilateral low back pain without sciatica    Seasonal allergic rhinitis due to pollen     Past Medical History:   Diagnosis Date    Baker's cyst of knee 10/21/2016    right- burst on its own    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HCC) 1992    hx of-fell when ice skating. Noted brain bleed w/swelling    Glaucoma     right eye    Headache(784.0)     HL (hearing loss)     Low back pain 9/10/24    Macular degeneration     Migraine     Raynaud's disease     both hands    Thoracic vertebral fracture (HCC)      Past Surgical History:   Procedure Laterality Date    AXILLARY SURGERY Left     fatty cyst removed, benign    CATARACT EXTRACTION Bilateral     CATARACT EXTRACTION W/ INTRAOCULAR LENS IMPLANT Right 11/10/2016    Procedure: EXTRACTION EXTRACAPSULAR CATARACT PHACO INTRAOCULAR LENS (IOL);  Surgeon: Citlaly Villavicencio MD;  Location: [facility] MAIN OR;  Service:     COSMETIC SURGERY      Eye lift    DILATION AND CURETTAGE OF UTERUS      MYRINGOTOMY W/ TUBES  2011    both ears-one tube out on the left    MYRINGOTOMY W/ TUBES      IA SURGICAL ARTHROSCOPY SHOULDER W/ROTATOR CUFF RPR Right 05/04/2017    Procedure: ARTHROSCOPY SHOULDER WITH ROTATOR CUFF REPAIR, BICEPS TENODESIS, AND SUBACROMIAL DECOMPRESSION;  Surgeon: Cedrick Nielson MD;  Location: [facility] MAIN OR;   Service: Orthopedics    TX XCAPSL CTRC RMVL INSJ IO LENS PROSTH W/O ECP Left 10/20/2016    Procedure: EXTRACTION EXTRACAPSULAR CATARACT PHACO INTRAOCULAR LENS (IOL);  Surgeon: Citlaly Villavicencio MD;  Location: [facility] MAIN OR;  Service: Ophthalmology    SKIN BIOPSY      mole on chest    SKIN BIOPSY      under breast, benign    VASCULAR SURGERY  1970    vein in right leg removed     Family History   Problem Relation Age of Onset    Heart disease Mother         exp age 64 MI    Stroke Father     Macular degeneration Sister     Macular degeneration Brother     Diabetes Brother     Cancer Brother         kidney-nephrectomy    Kidney disease Brother         cancer     Social History     Socioeconomic History    Marital status:      Spouse name: Not on file    Number of children: Not on file    Years of education: Not on file    Highest education level: Not on file   Occupational History    Not on file   Tobacco Use    Smoking status: Former     Current packs/day: 0.00     Average packs/day: 1.5 packs/day for 30.0 years (45.0 ttl pk-yrs)     Types: Cigarettes     Start date:      Quit date:      Years since quittin.2    Smokeless tobacco: Never   Vaping Use    Vaping status: Never Used   Substance and Sexual Activity    Alcohol use: Not Currently     Comment: socially    Drug use: No    Sexual activity: Not on file   Other Topics Concern    Not on file   Social History Narrative    Not on file     Social Drivers of Health     Financial Resource Strain: Low Risk  (2023)    Overall Financial Resource Strain (CARDIA)     Difficulty of Paying Living Expenses: Not hard at all   Food Insecurity: No Food Insecurity (2024)    Nursing - Inadequate Food Risk Classification     Worried About Running Out of Food in the Last Year: Never true     Ran Out of Food in the Last Year: Never true     Ran Out of Food in the Last Year: Not on file   Transportation Needs: No Transportation Needs (2024)    PRAPARE -  Transportation     Lack of Transportation (Medical): No     Lack of Transportation (Non-Medical): No   Physical Activity: Not on file   Stress: Not on file   Social Connections: Not on file   Intimate Partner Violence: Not on file   Housing Stability: Low Risk  (8/19/2024)    Housing Stability Vital Sign     Unable to Pay for Housing in the Last Year: No     Number of Times Moved in the Last Year: 0     Homeless in the Last Year: No       Current Outpatient Medications:     acetaminophen (TYLENOL) 500 mg tablet, Take 1,000 mg by mouth every 6 (six) hours as needed for mild pain, Disp: , Rfl:     Alphagan P 0.1 %, Administer 1 drop to both eyes 2 (two) times a day, Disp: 15 mL, Rfl: 1    ascorbic acid (VITAMIN C) 500 mg tablet, Take 500 mg by mouth every morning, Disp: , Rfl:     atorvastatin (LIPITOR) 10 mg tablet, Take 1 tablet (10 mg total) by mouth daily, Disp: 90 tablet, Rfl: 2    Azelastine HCl 137 MCG/SPRAY SOLN, instill 1 spray into each nostril twice a day, Disp: 90 mL, Rfl: 3    Biotin 5 MG CAPS, Take by mouth every morning, Disp: , Rfl:     bromfenac sodium 0.07 % SOLN, , Disp: , Rfl:     Cyanocobalamin (VITAMIN B 12 PO), Take by mouth every morning, Disp: , Rfl:     furosemide (LASIX) 20 mg tablet, take 1 tablet by mouth twice a day, Disp: 180 tablet, Rfl: 1    ipratropium (ATROVENT) 0.06 % nasal spray, 2 sprays into each nostril 4 (four) times a day, Disp: 15 mL, Rfl: 11    levocetirizine (XYZAL) 5 MG tablet, Take 1 tablet (5 mg total) by mouth every evening, Disp: 90 tablet, Rfl: 1    Lutein 40 MG CAPS, Take by mouth every morning, Disp: , Rfl:     Magnesium 250 MG TABS, Take by mouth every morning, Disp: , Rfl:     olmesartan (BENICAR) 20 mg tablet, Take 1 tablet (20 mg total) by mouth daily, Disp: 90 tablet, Rfl: 2    omeprazole (PriLOSEC) 20 mg delayed release capsule, Take 1 capsule (20 mg total) by mouth daily, Disp: 90 capsule, Rfl: 1    Potassium Chloride ER 20 MEQ TBCR, Take 1 tablet (20 mEq  total) by mouth daily, Disp: 90 tablet, Rfl: 1    Probiotic Product (PROBIOTIC PO), Take 100 Million Cells by mouth Daily at 2am, Disp: , Rfl:     SUMAtriptan (IMITREX) 50 mg tablet, Take 1 tablet (50 mg total) by mouth once as needed for migraine, Disp: 9 tablet, Rfl: 1    timolol (TIMOPTIC) 0.5 % ophthalmic solution, Administer 1 drop to the right eye 2 (two) times a day, Disp: 15 mL, Rfl: 1    tiZANidine (ZANAFLEX) 4 mg tablet, Take 1 tablet (4 mg total) by mouth every 8 (eight) hours as needed for muscle spasms, Disp: 90 tablet, Rfl: 1    triamcinolone (KENALOG) 0.1 % cream, APPLY TO ARMS, LEGS AND BACK twice a day, Disp: , Rfl:     vitamin A 7500 UNIT capsule, Take 7,500 Units by mouth every morning  , Disp: , Rfl:     vitamin E, tocopherol, 400 units capsule, Take 400 Units by mouth every morning Last dose 4/26/17, Disp: , Rfl:     zinc gluconate 50 mg tablet, Take 50 mg by mouth every morning, Disp: , Rfl:   Allergies   Allergen Reactions    Lactose - Food Allergy GI Intolerance    Latex Itching     Elastic,adhesive tape    Dilantin [Phenytoin Sodium Extended] Rash    Other Rash     Adhesive tape, environmental    Penicillins Rash     Vitals:    03/11/25 1351   BP: 130/84   Pulse: 69   Temp: 98.3 °F (36.8 °C)   SpO2: 98%       Physical Exam  Vitals reviewed.   Constitutional:       Appearance: Normal appearance.   HENT:      Head: Normocephalic and atraumatic.      Nose: Nose normal.   Eyes:      Pupils: Pupils are equal, round, and reactive to light.   Cardiovascular:      Rate and Rhythm: Normal rate and regular rhythm.      Heart sounds: Normal heart sounds.   Pulmonary:      Breath sounds: Normal breath sounds.   Abdominal:      Palpations: Abdomen is soft.   Musculoskeletal:         General: Normal range of motion.      Cervical back: Normal range of motion and neck supple.   Skin:     General: Skin is warm and dry.   Neurological:      General: No focal deficit present.      Mental Status: She is alert  and oriented to person, place, and time.   Psychiatric:         Mood and Affect: Mood normal.         Behavior: Behavior normal.         Pathology:  none    Labs:  Lab Results   Component Value Date    PTH 99.7 (H) 02/11/2025    CALCIUM 10.0 02/11/2025    PHOS 3.9 08/15/2024         Imaging  No results found.  I reviewed the above laboratory and imaging data.    Discussion/Summary: Suspected primary hyperparathyroidism.  No obvious target.  Calcium levels high normal.  PTH level slightly elevated.  Will follow-up in 6 months with continued blood work to assess at that time.

## 2025-03-13 ENCOUNTER — OFFICE VISIT (OUTPATIENT)
Dept: PHYSICAL THERAPY | Facility: CLINIC | Age: 85
End: 2025-03-13
Payer: MEDICARE

## 2025-03-13 DIAGNOSIS — S22.080D CLOSED WEDGE COMPRESSION FRACTURE OF T12 VERTEBRA WITH ROUTINE HEALING, SUBSEQUENT ENCOUNTER: ICD-10-CM

## 2025-03-13 DIAGNOSIS — M47.816 LUMBAR SPONDYLOSIS: ICD-10-CM

## 2025-03-13 DIAGNOSIS — S22.089D CLOSED FRACTURE OF TWELFTH THORACIC VERTEBRA WITH ROUTINE HEALING, UNSPECIFIED FRACTURE MORPHOLOGY, SUBSEQUENT ENCOUNTER: Primary | ICD-10-CM

## 2025-03-13 PROCEDURE — 97110 THERAPEUTIC EXERCISES: CPT | Performed by: PHYSICAL THERAPIST

## 2025-03-13 PROCEDURE — 97112 NEUROMUSCULAR REEDUCATION: CPT | Performed by: PHYSICAL THERAPIST

## 2025-03-13 NOTE — PROGRESS NOTES
"        Daily Note         Today's date: 3/13/2025  Patient name: Belinda Dolan  : 1940  MRN: [id number]  Referring provider: Pepito Serra DO  Dx:   Encounter Diagnosis     ICD-10-CM    1. Closed fracture of twelfth thoracic vertebra with routine healing, unspecified fracture morphology, subsequent encounter  S22.089D       2. Lumbar spondylosis  M47.816       3. Closed wedge compression fracture of T12 vertebra with routine healing, subsequent encounter  S22.080D           Subjective: Belinda Dolan reports she had some pain L side lower back. Pt has been sleeping better without increase pain at night. Pt worked this morning at desk x 1 hour and started to note increase pain along L side of LB and has been doing the extensions which is helping . States she took 2 tylenol this morning. Pt takes a mm relaxant every night before bed.        Objective: See treatment diary below    Assessment:  Emphasis of tx on core stabilization with UE and LE movements. Add some new stretches as indicated on flow sheet. Reviewed log rolling with pt to decrease \"pressure/pain\" through lumbar spine. Pt reporting no pain post PT.Pt states pleased with PT session.          Plan:  reeval next session with primary PT to discuss POC          Eval/ Re-eval Auth #/ Referral # Total visits Start date  Expiration date Total active units  Total manual units  PT only or  PT+OT?   2025 No auth, no referral no visit limit                         Past Medical History:   Diagnosis Date    Baker's cyst of knee 10/21/2016    right- burst on its own    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HCC) 1992    hx of-fell when ice skating. Noted brain bleed w/swelling    Glaucoma     right eye    Headache(784.0)     HL (hearing loss)     Low back pain 9/10/24    Macular degeneration     Migraine     Raynaud's disease     both hands    Thoracic vertebral fracture (HCC)                        Insurance :       Visits: 7 8 9   Manual: 3/7/25 3/10/25 3/13/25   STM/MI: " lumbar musculature               Log rolling x 6 trials with TrA                     Ther ex/NMR:      Manual stretching: piriformis/glute      Rec bike/  nustep NS: 5 min lv 4 Not performed  NS          Suitcase carry 9# KB 50'x 2 9 lb: length of turf: 3 rounds 9# length of turf : 4 rounds    [place] carry 50' x2 2*8#     SLR      Sidelying hip abduction    Stand marches with TrA 2x10 ea   Seated hamstring stretch w/ SOS   HS stretch   SOS x10 ea leg  hold 5   Sit/stand tap to chair  3 lb 10x 2 2x10 3# with TrA   EIS  10 x 2        L QL stretch standing using mat table x 5 hold 5 sec    Sit/stand with hip hinge TRX pull up 10 x TRX squats: ---    Diaphragmatic breathing  With SOS for resistance 10x 10 x no resistance Table slides peanut x 10 hold 5 forward flexion   NMR:       TA activation (stab cuff) Standing open books 20x b/l D/c     + marching Marching with large TT 20x     +hip abd iso   LAQ with TrA with lumbar roll 2x10   +hip add iso      +bent knee fall out Lateral walk 10 steps x 4 GTB Yellow BKFO: 10 x  2    Alt Hand vs knee isometric midline  5 sec x 15    TA + ball lowering overhead   3 lb weight: 10 x 2 with TA facilitation    +Clamshells      Glute set + bridge Back to back with PT- ball hand off 10x b/l --    Wall planks 7 sec x 10  7 sec x 10     Green band iso hold with scap retractions + march in standing Green: 10 x 2 Green 20 x 2           Palloff press  Green: 10 x 2 Green: 10 x 2    Therapeutic Activity:  Sit to stand with 5# arms extended  10x 5 lb weight at chest.     Reevaluation            Gait Training:             HEP:             Modalities      MH      ice      Total time:        Access Code: [email or web address]  URL: [email or web address]  Date: 02/18/2025  Prepared by: Marian Baig    Exercises  - Standing Lumbar Extension with Counter  - 3 x daily - 7 x weekly - 3 sets - 5 reps  - Supine Diaphragmatic Breathing  - 2 x daily - 7 x weekly - 2 sets - 10 reps  - Supine  Transversus Abdominis Bracing - Hands on Stomach  - 1 x daily - 7 x weekly - 1 sets - 10 reps - 5 sec hold  - Supine March  - 1 x daily - 7 x weekly - 2 sets - 10 reps  - Supine Hip Adduction Isometric with Ball  - 1 x daily - 7 x weekly - 1-2 sets - 10 reps  - Hooklying Isometric Hip Abduction with Belt  - 1 x daily - 7 x weekly - 1-2 sets - 10 reps

## 2025-03-18 ENCOUNTER — EVALUATION (OUTPATIENT)
Dept: PHYSICAL THERAPY | Facility: CLINIC | Age: 85
End: 2025-03-18
Payer: MEDICARE

## 2025-03-18 DIAGNOSIS — S22.080D CLOSED WEDGE COMPRESSION FRACTURE OF T12 VERTEBRA WITH ROUTINE HEALING, SUBSEQUENT ENCOUNTER: ICD-10-CM

## 2025-03-18 DIAGNOSIS — S22.089D CLOSED FRACTURE OF TWELFTH THORACIC VERTEBRA WITH ROUTINE HEALING, UNSPECIFIED FRACTURE MORPHOLOGY, SUBSEQUENT ENCOUNTER: Primary | ICD-10-CM

## 2025-03-18 DIAGNOSIS — M47.816 LUMBAR SPONDYLOSIS: ICD-10-CM

## 2025-03-18 PROCEDURE — 97530 THERAPEUTIC ACTIVITIES: CPT | Performed by: PHYSICAL THERAPIST

## 2025-03-18 PROCEDURE — 97112 NEUROMUSCULAR REEDUCATION: CPT | Performed by: PHYSICAL THERAPIST

## 2025-03-18 NOTE — PROGRESS NOTES
"Daily Note/Progress Note      Today's date: 3/18/2025  Patient name: Belinda Dolan  : 1940  MRN: [id number]  Referring provider: Pepito Serra DO  Dx:   Encounter Diagnosis     ICD-10-CM    1. Closed fracture of twelfth thoracic vertebra with routine healing, unspecified fracture morphology, subsequent encounter  S22.089D       2. Lumbar spondylosis  M47.816       3. Closed wedge compression fracture of T12 vertebra with routine healing, subsequent encounter  S22.080D           Subjective: Pt reports % improvement since SOC: 50%.  The last 50% is due to she still has increased pain particularly with increased humidity \"I ache all over\". States she still has back pain when she \"hunches over and I dont get up and stretch\" ie: sitting at the computer, and still avoids lifting heavy items, overall she describes an increase in confidence and her function has improved.     Pain level at rest:  0 /10, pain level with ADLS:  2-3 /10 mae aspect of lower ribs and closer to her spine when she twists.   At this time, the functional limitations include: as described above.   Sees pain  on Friday.     Objective: See treatment diary below (3/18/25)    Patient goal(s) for physical therapy is: to not have pain and be able to sleep ( states she is sleeping but pain is still present as described above)    Concurrent Complaints:  Red flags present: neg     Upper Motor Neuron testing (present/not present):  Marinelli (middle finger first segment flick + sign is first and second finger to flexion): neg  Inverted supinator ( reflex hammer to supinator normal: neg is wrist extension, + wrist and finger flexion) : 2+      Posture   Sitting:good upright posture, needs lumbar support (still needs cuing and reminders for posture correction to avoid symptoms)   Standing: mild forward head, reduced lumbar lordosis (status quo)         Gait: wnl on level surfaces (status quo)   Assistive device: neg   Trunk movement: wnl   Stride " "length: wnl   Trendelenburg: neg   Foot drop: neg    Functional movements:   Squat: wn + low back pain and knees  (needs cuing to maintain her back straight to avoid pain: when performed correctly is painfree)   SLS: left: 10 sec + pain   Right : 17 sec  (Right: 31:27 sec, left 3:45 sec)   Bridging: able to lift 50% (+)pain  ( able to lift 75% of motion)   Transfers sit/stand: needs UE assistance to stand ( able ot perform with out UE assistance)   bed mobility independent     Lumbar AROM:    Flexion: moderate LOM +  pain with return to stand  (wnl no pain )   Extension: moderate LOM \"it hurts but feels good\"  (min LOM no pain and \"feels good\")   Side bend: Right: minimal LOM (+)pain  (min LOM no pain )    Left: minimal LOM (+)pain  ( min LOM pain with return to stand)    Repeated motions: NT due to patient restrictions      LE MMT  L Hip Flexion: 4-/5 (4/5 no pain) R Hip Flexion: 4-/5 (  4/5 mae)   L Hip Extension: 4/5 R Hip Extension: 4/5   L Hip Abduction: 4+/5 R Hip Abduction: 4+/5   L Hip Adduction: 4+/5 R Hip Adduction: 4+/5   L Knee Extension: 4/5 R Knee Extension: 4/5   L Knee Flexion: 4/5 R Knee Flexion: 4/5   L Ankle DF: 4/5 R Ankle DF: 4/5   L Ankle PF: 4/5  R Ankle PF: 4/5       Dermatomes: wnl to light touch      TA facilitation: fair    Flexibility:  Hamstring: Right: good  Left: good   Hip flexors:Right: good  Left: good     Quads: Right: fair  Left: fair       FOTO score is 45% with a 55% prediction in function.   \  STG (3/18/25)  + Patient will report a 30% improvement in symptoms (2-3 weeks) met  + Patient will be independent in basic HEP (2-3 weeks) met  + Patient will demonstrate an increase in range of motion  sidebend mae  to  within normal limits de to less pain  (2-3 weeks) not met  + Patient will report increased ease sleeping due to a reduction in pain (2-3 weeks) met      LTG:  + Patient will report a 60% improvement in symptoms (4-6 weeks) not met  + Patient will increase FOTO score by " 10 points (8 sessions) TBA  + Patient will be independent in comprehensive HEP (4-6 weeks) not met   + Patient will demonstrate increase  MMT of  mae knee and hip musculature to  4+/5 for maximum function. (4-6 weeks) not met  + Patient will tolerate 35 min core stab program without an increase in  pain  (4-6 weeks) not met consistently      Assessment: Belinda Dolan demonstrates improvement in ROM and function and subjectively describes an improved confidence to move and improved strength. Can continue to experience pain but admittedly occurs when she has been sitting too long particularly with computer use and has not gotten up to stretch. Her symptoms consistently improve with standing lumbar extension stretch. She tolerates a stabilization program well with minimal to no increase in pain . And has a good working knowledge of proper form. patient did not want to schedule more PT until she has a follow up with physician.will comptlete 1 more scheduled session for HEP upgrade.  The goal status of Belinda Dolan is indicated above .    Plan:  transition to HEP as per patient request after next 3 scheduled sessions unless otherwise indicated at next dr appt  3/18/25--> 4/30/25   as per recent dr appointment continue 1-2 x week for strengthening and stabilization 3/24/25--> 4/24/25          Eval/ Re-eval Auth #/ Referral # Total visits Start date  Expiration date Total active units  Total manual units  PT only or  PT+OT?   2/13/2025 No auth, no referral no visit limit                         Past Medical History:   Diagnosis Date    Baker's cyst of knee 10/21/2016    right- burst on its own    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HCC) 1992    hx of-fell when ice skating. Noted brain bleed w/swelling    Glaucoma     right eye    Headache(784.0)     HL (hearing loss)     Low back pain 9/10/24    Macular degeneration     Migraine     Raynaud's disease     both hands    Thoracic vertebral fracture (HCC)                        Insurance :         Visits: 7 8 9 10   Manual: 3/7/25 3/10/25 3/13/25 3/18/25   STM/MI: lumbar musculature           Reeval performed      Log rolling x 6 trials with TrA                         Ther ex/NMR:       Manual stretching: piriformis/glute       Rec bike/  nustep NS: 5 min lv 4 Not performed  NS  Not performed           Suitcase carry 9# KB 50'x 2 9 lb: length of turf: 3 rounds 9# length of turf : 4 rounds     [place] carry 50' x2 2*8#      SLR       Sidelying hip abduction    Stand marches with TrA 2x10 ea Ball vs table : marching 20 x each side  +hip abd:    Seated hamstring stretch w/ SOS   HS stretch   SOS x10 ea leg  hold 5    Sit/stand tap to chair  3 lb 10x 2 2x10 3# with TrA 3 lb:    EIS  10 x 2   10 x 2      L QL stretch standing using mat table x 5 hold 5 sec  L QL stretch standing using pball  x 10 hold 5 sec    Sit/stand with hip hinge TRX pull up 10 x TRX squats: ---     Diaphragmatic breathing  With SOS for resistance 10x 10 x no resistance Table slides peanut x 10 hold 5 forward flexion    NMR:        TA activation (stab cuff) Standing open books 20x b/l D/c      + marching Marching with large TT 20x      +hip abd iso   LAQ with TrA with lumbar roll 2x10 10 x 2    +hip add iso       +bent knee fall out Lateral walk 10 steps x 4 GTB Yellow BKFO: 10 x  2     Alt Hand vs knee isometric midline  5 sec x 15  5 sec x 15    TA + ball lowering overhead   3 lb weight: 10 x 2 with TA facilitation   3 lb: 10 x 2  w/ TA facilitation    +Clamshells       Glute set + bridge Back to back with PT- ball hand off 10x b/l --  10 x 2    Wall planks 7 sec x 10  7 sec x 10   7 secx 10    Green band iso hold with scap retractions + march in standing Green: 10 x 2 Green 20 x 2             Palloff press  Green: 10 x 2 Green: 10 x 2     Therapeutic Activity:  Sit to stand with 5# arms extended  10x 5 lb weight at chest.      Reevaluation              Gait Training:               HEP:               Modalities              ice        Total time:         Access Code: [email or web address]  URL: [email or web address]  Date: 02/18/2025  Prepared by: Marian Baig    Exercises  - Standing Lumbar Extension with Counter  - 3 x daily - 7 x weekly - 3 sets - 5 reps  - Supine Diaphragmatic Breathing  - 2 x daily - 7 x weekly - 2 sets - 10 reps  - Supine Transversus Abdominis Bracing - Hands on Stomach  - 1 x daily - 7 x weekly - 1 sets - 10 reps - 5 sec hold  - Supine March  - 1 x daily - 7 x weekly - 2 sets - 10 reps  - Supine Hip Adduction Isometric with Ball  - 1 x daily - 7 x weekly - 1-2 sets - 10 reps  - Hooklying Isometric Hip Abduction with Belt  - 1 x daily - 7 x weekly - 1-2 sets - 10 reps

## 2025-03-20 ENCOUNTER — OFFICE VISIT (OUTPATIENT)
Dept: PHYSICAL THERAPY | Facility: CLINIC | Age: 85
End: 2025-03-20
Payer: MEDICARE

## 2025-03-20 DIAGNOSIS — S22.089D CLOSED FRACTURE OF TWELFTH THORACIC VERTEBRA WITH ROUTINE HEALING, UNSPECIFIED FRACTURE MORPHOLOGY, SUBSEQUENT ENCOUNTER: Primary | ICD-10-CM

## 2025-03-20 DIAGNOSIS — S22.080D CLOSED WEDGE COMPRESSION FRACTURE OF T12 VERTEBRA WITH ROUTINE HEALING, SUBSEQUENT ENCOUNTER: ICD-10-CM

## 2025-03-20 DIAGNOSIS — M47.816 LUMBAR SPONDYLOSIS: ICD-10-CM

## 2025-03-20 PROCEDURE — 97110 THERAPEUTIC EXERCISES: CPT | Performed by: PHYSICAL THERAPIST

## 2025-03-20 PROCEDURE — 97112 NEUROMUSCULAR REEDUCATION: CPT | Performed by: PHYSICAL THERAPIST

## 2025-03-20 NOTE — PROGRESS NOTES
"        Daily Note         Today's date: 3/20/2025  Patient name: Belinda Dolan  : 1940  MRN: [id number]  Referring provider: Pepito Serra DO  Dx:   Encounter Diagnosis     ICD-10-CM    1. Closed fracture of twelfth thoracic vertebra with routine healing, unspecified fracture morphology, subsequent encounter  S22.089D       2. Lumbar spondylosis  M47.816       3. Closed wedge compression fracture of T12 vertebra with routine healing, subsequent encounter  S22.080D           Subjective: Belinda Dolan reports mild soreness after last session. No pain entering today        Objective: See treatment diary below    Assessment:   upgraded HEP,  written information provided. Increased cuing needed for proper positioning with planking. Advised patient to tighten abdominals \"bring ribs/hips together\".  Advised her if this exercises causes low back pain, then she should transition to wall. Patient    . The goal of the current treatment is to address patient's functional limitations as well as objective findings.       Plan:  continue scheudled appointments then transition to HEP.           Eval/ Re-eval Auth #/ Referral # Total visits Start date  Expiration date Total active units  Total manual units  PT only or  PT+OT?   2025 No auth, no referral no visit limit                         Past Medical History:   Diagnosis Date    Baker's cyst of knee 10/21/2016    right- burst on its own    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HCC) 1992    hx of-fell when ice skating. Noted brain bleed w/swelling    Glaucoma     right eye    Headache(784.0)     HL (hearing loss)     Low back pain 9/10/24    Macular degeneration     Migraine     Raynaud's disease     both hands    Thoracic vertebral fracture (HCC)                        Insurance :         Visits: 7 8 9 10    Manual: 3/7/25 3/10/25 3/13/25 3/18/25    STM/MI: lumbar musculature            Reeval performed       Log rolling x 6 trials with TrA                             Ther " ex/NMR:        Manual stretching: piriformis/glute        Rec bike/  nustep NS: 5 min lv 4 Not performed  NS  Not performed  NS: 5 min lv 3           Suitcase carry 9# KB 50'x 2 9 lb: length of turf: 3 rounds 9# length of turf : 4 rounds   9 lb: 4 rounds 25 yards   [place] carry 50' x2 2*8#       SLR        Sidelying hip abduction    Stand marches with TrA 2x10 ea Ball vs table : marching 20 x each side  +hip abd:     Seated hamstring stretch w/ SOS   HS stretch   SOS x10 ea leg  hold 5     Sit/stand tap to chair  3 lb 10x 2 2x10 3# with TrA 3 lb:  5 lb 10 x 2   EIS  10 x 2   10 x 2 10 x 2       L QL stretch standing using mat table x 5 hold 5 sec  L QL stretch standing using pball  x 10 hold 5 sec  L QL stretch standing using pball  x 10 hold 5 sec    Sit/stand with hip hinge TRX pull up 10 x TRX squats: ---      Diaphragmatic breathing  With SOS for resistance 10x 10 x no resistance Table slides peanut x 10 hold 5 forward flexion     NMR:         TA activation (stab cuff) Standing open books 20x b/l D/c    TA: + 90/90 heel tap 10 x each side    + marching Marching with large TT 20x       +hip abd iso   LAQ with TrA with lumbar roll 2x10 10 x 2     +hip add iso        +bent knee fall out Lateral walk 10 steps x 4 GTB Yellow BKFO: 10 x  2      Alt Hand vs knee isometric midline  5 sec x 15  5 sec x 15  5 sec x15   TA + ball lowering overhead   3 lb weight: 10 x 2 with TA facilitation   3 lb: 10 x 2  w/ TA facilitation  3 lb 10 x 2    +Clamshells        Glute set + bridge Back to back with PT- ball hand off 10x b/l --  10 x 2  10 x 2   Wall planks 7 sec x 10  7 sec x 10   7 secx 10  On edge of table: 7 sec x 10    Green band iso hold with scap retractions + march in standing Green: 10 x 2 Green 20 x 2               Palloff press  Green: 10 x 2 Green: 10 x 2      Therapeutic Activity:  Sit to stand with 5# arms extended  10x 5 lb weight at chest.       Reevaluation                Gait Training:                 HEP:                  Modalities        MH        ice        Total time:          Access Code: [email or web address]  URL: [email or web address]  Date: 02/18/2025  Prepared by: Marian Baig    Exercises  - Standing Lumbar Extension with Counter  - 3 x daily - 7 x weekly - 3 sets - 5 reps  - Supine Diaphragmatic Breathing  - 2 x daily - 7 x weekly - 2 sets - 10 reps  - Supine Transversus Abdominis Bracing - Hands on Stomach  - 1 x daily - 7 x weekly - 1 sets - 10 reps - 5 sec hold  - Supine March  - 1 x daily - 7 x weekly - 2 sets - 10 reps  - Supine Hip Adduction Isometric with Ball  - 1 x daily - 7 x weekly - 1-2 sets - 10 reps  - Hooklying Isometric Hip Abduction with Belt  - 1 x daily - 7 x weekly - 1-2 sets - 10 reps      Access Code: [email or web address]  URL: [email or web address]  Date: 03/20/2025  Prepared by: Marian Baig    Exercises  - Supine 90/90 Alternating Toe Touch  - 1 x daily - 7 x weekly - 1 sets - 10 reps  - Hooklying Isometric Hip Flexion  - 1 x daily - 7 x weekly - 2 sets - 10 reps  - Supine Bridge  - 1 x daily - 7 x weekly - 2 sets - 10 reps  - Plank with Hands on Table  - 1 x daily - 7 x weekly - 1 sets - 10 reps - 7 sec hold  - Squat with Chair Touch  - 1 x daily - 7 x weekly - 2 sets - 10 reps  - Seated Lumbar Flexion Stretch  - 1 x daily - 7 x weekly - 1 sets - 10 reps - 5 sec hold  - Standing Lumbar Extension with Counter  - 1 x daily - 7 x weekly - 2 sets - 10 reps  - Kettlebell Suitcase Carry  - 1 x daily - 7 x weekly - 3 sets - 10 reps

## 2025-03-21 ENCOUNTER — OFFICE VISIT (OUTPATIENT)
Dept: PAIN MEDICINE | Facility: CLINIC | Age: 85
End: 2025-03-21
Payer: MEDICARE

## 2025-03-21 VITALS — WEIGHT: 113 LBS | BODY MASS INDEX: 21.34 KG/M2 | HEIGHT: 61 IN

## 2025-03-21 DIAGNOSIS — M47.816 LUMBAR SPONDYLOSIS: Primary | ICD-10-CM

## 2025-03-21 PROCEDURE — 99214 OFFICE O/P EST MOD 30 MIN: CPT | Performed by: STUDENT IN AN ORGANIZED HEALTH CARE EDUCATION/TRAINING PROGRAM

## 2025-03-21 PROCEDURE — G2211 COMPLEX E/M VISIT ADD ON: HCPCS | Performed by: STUDENT IN AN ORGANIZED HEALTH CARE EDUCATION/TRAINING PROGRAM

## 2025-03-21 NOTE — PROGRESS NOTES
Pain Medicine Follow-Up Note    Assessment:  1. Lumbar spondylosis        Plan:  No orders of the defined types were placed in this encounter.      No orders of the defined types were placed in this encounter.      My impressions and treatment recommendations were discussed in detail with the patient who verbalized understanding and had no further questions.      Belinda is a very pleasant 84-year-old female presents today for follow-up regarding her low back pain.  She has had significant improvement with conservative care including multimodal analgesics, physical therapy.  I recommend she continue with her physical therapy program and can continue taking the muscle relaxants as needed.  I will see her again in 3 months to see how she is progressing.      Follow-up is planned in 3 months time or sooner as warranted.  Discharge instructions were provided. I personally saw and examined the patient and I agree with the above discussed plan of care.    I have spent a total time of 32 minutes in caring for this patient on the day of the visit/encounter including Prognosis, Risks and benefits of tx options, Instructions for management, Impressions, Documenting in the medical record, Reviewing/placing orders in the medical record (including tests, medications, and/or procedures), and Obtaining or reviewing history  .     History of Present Illness:    Belinda Dolan is a 84 y.o. female who presents to [facility] for interval re-evaluation of the above stated pain complaints. The patient has a past medical and chronic pain history as outlined in the assessment section. She was last seen on 2/7/2025.    Belinda presents today for follow-up regarding her low back pain.  She reports since her last visit her pain has significantly improved.  She reports greater than 50% pain relief with conservative measures including use of muscle relaxant, Tylenol, and physical therapy.  Her current pain level is 3  out of 10.  Her pain is occasional associated with dull aching.  She denies that her pain interferes with her daily activities.  She is continue with conservative care.  The pain is located in the lumbar area without radiation into her lower limbs.  She presents today for further evaluation.      Other than as stated above, the patient denies any interval changes in medications, medical condition, mental condition, symptoms, or allergies since the last office visit.         Review of Systems:    Review of Systems   Respiratory:  Positive for shortness of breath. Negative for chest tightness.    Cardiovascular:  Negative for chest pain.   Gastrointestinal:  Negative for constipation, diarrhea, nausea and vomiting.   Musculoskeletal:  Positive for back pain. Negative for arthralgias, gait problem, joint swelling, myalgias, neck pain and neck stiffness.   Skin:  Negative for rash.   Neurological:  Positive for headaches. Negative for dizziness, seizures, weakness, light-headedness and numbness.   Psychiatric/Behavioral:  Negative for sleep disturbance.    All other systems reviewed and are negative.        Past Medical History:   Diagnosis Date    Baker's cyst of knee 10/21/2016    right- burst on its own    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HCC) 1992    hx of-fell when ice skating. Noted brain bleed w/swelling    Glaucoma     right eye    Headache(784.0)     HL (hearing loss)     Low back pain 9/10/24    Macular degeneration     Migraine     Raynaud's disease     both hands    Thoracic vertebral fracture (HCC)        Past Surgical History:   Procedure Laterality Date    AXILLARY SURGERY Left     fatty cyst removed, benign    CATARACT EXTRACTION Bilateral     CATARACT EXTRACTION W/ INTRAOCULAR LENS IMPLANT Right 11/10/2016    Procedure: EXTRACTION EXTRACAPSULAR CATARACT PHACO INTRAOCULAR LENS (IOL);  Surgeon: Citlaly Villavicencio MD;  Location: [facility] MAIN OR;  Service:     COSMETIC SURGERY      Eye lift    DILATION AND CURETTAGE OF UTERUS       MYRINGOTOMY W/ TUBES      both ears-one tube out on the left    MYRINGOTOMY W/ TUBES      AZ SURGICAL ARTHROSCOPY SHOULDER W/ROTATOR CUFF RPR Right 2017    Procedure: ARTHROSCOPY SHOULDER WITH ROTATOR CUFF REPAIR, BICEPS TENODESIS, AND SUBACROMIAL DECOMPRESSION;  Surgeon: Cedrick Nielson MD;  Location: [facility] MAIN OR;  Service: Orthopedics    AZ XCAPSL CTRC RMVL INSJ IO LENS PROSTH W/O ECP Left 10/20/2016    Procedure: EXTRACTION EXTRACAPSULAR CATARACT PHACO INTRAOCULAR LENS (IOL);  Surgeon: Citlaly Villavicencio MD;  Location: [facility] MAIN OR;  Service: Ophthalmology    SKIN BIOPSY      mole on chest    SKIN BIOPSY      under breast, benign    VASCULAR SURGERY  1970    vein in right leg removed       Family History   Problem Relation Age of Onset    Heart disease Mother         exp age 64 MI    Stroke Father     Macular degeneration Sister     Macular degeneration Brother     Diabetes Brother     Cancer Brother         kidney-nephrectomy    Kidney disease Brother         cancer       Social History     Occupational History    Not on file   Tobacco Use    Smoking status: Former     Current packs/day: 0.00     Average packs/day: 1.5 packs/day for 30.0 years (45.0 ttl pk-yrs)     Types: Cigarettes     Start date:      Quit date:      Years since quittin.2    Smokeless tobacco: Never   Vaping Use    Vaping status: Never Used   Substance and Sexual Activity    Alcohol use: Not Currently     Comment: socially    Drug use: No    Sexual activity: Not on file         Current Outpatient Medications:     acetaminophen (TYLENOL) 500 mg tablet, Take 1,000 mg by mouth every 6 (six) hours as needed for mild pain, Disp: , Rfl:     Alphagan P 0.1 %, Administer 1 drop to both eyes 2 (two) times a day, Disp: 15 mL, Rfl: 1    ascorbic acid (VITAMIN C) 500 mg tablet, Take 500 mg by mouth every morning, Disp: , Rfl:     atorvastatin (LIPITOR) 10 mg tablet, Take 1 tablet (10 mg total) by mouth daily, Disp: 90 tablet,  Rfl: 2    Azelastine HCl 137 MCG/SPRAY SOLN, instill 1 spray into each nostril twice a day, Disp: 90 mL, Rfl: 3    Biotin 5 MG CAPS, Take by mouth every morning, Disp: , Rfl:     bromfenac sodium 0.07 % SOLN, , Disp: , Rfl:     Cyanocobalamin (VITAMIN B 12 PO), Take by mouth every morning, Disp: , Rfl:     furosemide (LASIX) 20 mg tablet, take 1 tablet by mouth twice a day, Disp: 180 tablet, Rfl: 1    ipratropium (ATROVENT) 0.06 % nasal spray, 2 sprays into each nostril 4 (four) times a day, Disp: 15 mL, Rfl: 11    levocetirizine (XYZAL) 5 MG tablet, Take 1 tablet (5 mg total) by mouth every evening, Disp: 90 tablet, Rfl: 1    Lutein 40 MG CAPS, Take by mouth every morning, Disp: , Rfl:     Magnesium 250 MG TABS, Take by mouth every morning, Disp: , Rfl:     olmesartan (BENICAR) 20 mg tablet, Take 1 tablet (20 mg total) by mouth daily, Disp: 90 tablet, Rfl: 2    omeprazole (PriLOSEC) 20 mg delayed release capsule, Take 1 capsule (20 mg total) by mouth daily, Disp: 90 capsule, Rfl: 1    Potassium Chloride ER 20 MEQ TBCR, Take 1 tablet (20 mEq total) by mouth daily, Disp: 90 tablet, Rfl: 1    Probiotic Product (PROBIOTIC PO), Take 100 Million Cells by mouth Daily at 2am, Disp: , Rfl:     SUMAtriptan (IMITREX) 50 mg tablet, Take 1 tablet (50 mg total) by mouth once as needed for migraine, Disp: 9 tablet, Rfl: 1    timolol (TIMOPTIC) 0.5 % ophthalmic solution, Administer 1 drop to the right eye 2 (two) times a day, Disp: 15 mL, Rfl: 1    tiZANidine (ZANAFLEX) 4 mg tablet, Take 1 tablet (4 mg total) by mouth every 8 (eight) hours as needed for muscle spasms, Disp: 90 tablet, Rfl: 1    triamcinolone (KENALOG) 0.1 % cream, APPLY TO ARMS, LEGS AND BACK twice a day, Disp: , Rfl:     vitamin A 7500 UNIT capsule, Take 7,500 Units by mouth every morning  , Disp: , Rfl:     vitamin E, tocopherol, 400 units capsule, Take 400 Units by mouth every morning Last dose 4/26/17, Disp: , Rfl:     zinc gluconate 50 mg tablet, Take 50 mg  "by mouth every morning, Disp: , Rfl:     Allergies   Allergen Reactions    Lactose - Food Allergy GI Intolerance    Latex Itching     Elastic,adhesive tape    Dilantin [Phenytoin Sodium Extended] Rash    Other Rash     Adhesive tape, environmental    Penicillins Rash       Physical Exam:    Ht 5' 1\" (1.549 m)   Wt 51.3 kg (113 lb)   BMI 21.35 kg/m²     Constitutional:normal, well developed, well nourished, alert, in no distress and non-toxic and no overt pain behavior.  Eyes:anicteric  HEENT:grossly intact  Neck:supple, symmetric, trachea midline and no masses   Pulmonary:even and unlabored  Cardiovascular:No edema or pitting edema present  Skin:Normal without rashes or lesions and well hydrated  Psychiatric:Mood and affect appropriate  Neurologic:Cranial Nerves II-XII grossly intact  Musculoskeletal:normal      Imaging  No orders to display         No orders of the defined types were placed in this encounter.           "

## 2025-03-24 ENCOUNTER — OFFICE VISIT (OUTPATIENT)
Dept: PHYSICAL THERAPY | Facility: CLINIC | Age: 85
End: 2025-03-24
Payer: MEDICARE

## 2025-03-24 DIAGNOSIS — M47.816 LUMBAR SPONDYLOSIS: ICD-10-CM

## 2025-03-24 DIAGNOSIS — S22.080D CLOSED WEDGE COMPRESSION FRACTURE OF T12 VERTEBRA WITH ROUTINE HEALING, SUBSEQUENT ENCOUNTER: ICD-10-CM

## 2025-03-24 DIAGNOSIS — S22.089D CLOSED FRACTURE OF TWELFTH THORACIC VERTEBRA WITH ROUTINE HEALING, UNSPECIFIED FRACTURE MORPHOLOGY, SUBSEQUENT ENCOUNTER: Primary | ICD-10-CM

## 2025-03-24 PROCEDURE — 97112 NEUROMUSCULAR REEDUCATION: CPT | Performed by: PHYSICAL THERAPIST

## 2025-03-24 NOTE — PROGRESS NOTES
Daily Note         Today's date: 3/24/2025  Patient name: Belinda Dolan  : 1940  MRN: [id number]  Referring provider: Pepito Serra DO  Dx:   Encounter Diagnosis     ICD-10-CM    1. Closed fracture of twelfth thoracic vertebra with routine healing, unspecified fracture morphology, subsequent encounter  S22.089D       2. Lumbar spondylosis  M47.816       3. Closed wedge compression fracture of T12 vertebra with routine healing, subsequent encounter  S22.080D           Subjective: Belinda Dolan reports she met with pain management and he advised she may continue to PT for strengthening       Objective: See treatment diary below    Assessment:   patient without complaint of pain during session. She needs only minimal cuing for proper form.  Discussed PLOC and agreed to continue for another few weeks.  ( 3-4 weeks) as doctor issued a new RX and she would like to strengthening in preparation for yardwork.  . The goal of the current treatment is to address patient's functional limitations as well as objective findings.       Plan:  continue another 3-4 weeks           Eval/ Re-eval Auth #/ Referral # Total visits Start date  Expiration date Total active units  Total manual units  PT only or  PT+OT?   2025 No auth, no referral no visit limit                         Past Medical History:   Diagnosis Date    Baker's cyst of knee 10/21/2016    right- burst on its own    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HCC) 1992    hx of-fell when ice skating. Noted brain bleed w/swelling    Glaucoma     right eye    Headache(784.0)     HL (hearing loss)     Low back pain 9/10/24    Macular degeneration     Migraine     Raynaud's disease     both hands    Thoracic vertebral fracture (HCC)                        Insurance :          Visits: 7 8 9 10 11 12   Manual: 3/7/25 3/10/25 3/13/25 3/18/25 3/20/25 3/24/25   STM/MI: lumbar musculature             Reeval performed        Log rolling x 6 trials with TrA                                  Ther ex/NMR:         Manual stretching: piriformis/glute         Rec bike/  nustep NS: 5 min lv 4 Not performed  NS  Not performed  NS: 5 min lv 3 NS: 5 min lv 3            Suitcase carry 9# KB 50'x 2 9 lb: length of turf: 3 rounds 9# length of turf : 4 rounds   9 lb: 4 rounds 25 yards    [place] carry 50' x2 2*8#        SLR      SLR + hip abd: 5 x 2 mae  + TA   Sidelying hip abduction    Stand marches with TrA 2x10 ea Ball vs table : marching 20 x each side  +hip abd:   SL hip abd + hip ext: 5 x 2 + TA   Seated hamstring stretch w/ SOS   HS stretch   SOS x10 ea leg  hold 5      Sit/stand tap to chair  3 lb 10x 2 2x10 3# with TrA 3 lb:  5 lb 10 x 2 3 lb 10 x 2    EIS  10 x 2   10 x 2 10 x 2        L QL stretch standing using mat table x 5 hold 5 sec  L QL stretch standing using pball  x 10 hold 5 sec  L QL stretch standing using pball  x 10 hold 5 sec     Sit/stand with hip hinge TRX pull up 10 x TRX squats: ---       Diaphragmatic breathing  With SOS for resistance 10x 10 x no resistance Table slides peanut x 10 hold 5 forward flexion      NMR:          Split squat      Rows: 12.5 lb 10 x 2            TA activation (stab cuff) Standing open books 20x b/l D/c    TA: + 90/90 heel tap 10 x each side  TA: + 90/90 heel tap 10 x each side    + marching Marching with large TT 20x        +hip abd iso   LAQ with TrA with lumbar roll 2x10 10 x 2      +hip add iso         +bent knee fall out Lateral walk 10 steps x 4 GTB Yellow BKFO: 10 x  2                Alt Hand vs knee isometric midline  5 sec x 15  5 sec x 15  5 sec x15    TA + ball lowering overhead   3 lb weight: 10 x 2 with TA facilitation   3 lb: 10 x 2  w/ TA facilitation  3 lb 10 x 2     +Clamshells      + yellow ball squeeze between ankles: 10 x 2   Glute set + bridge Back to back with PT- ball hand off 10x b/l --  10 x 2  10 x 2 Hip add iso: 3 secx 10  Hip abd + 3 sec x10    Wall planks 7 sec x 10  7 sec x 10   7 secx 10  On edge of table: 7 sec x 10      Green band iso hold with scap retractions + march in standing Green: 10 x 2 Green 20 x 2                 Palloff press  Green: 10 x 2 Green: 10 x 2       Therapeutic Activity:  Sit to stand with 5# arms extended  10x 5 lb weight at chest.        Reevaluation                  Gait Training:                   HEP:                   Modalities         MH         ice         Total time:           Access Code: [email or web address]  URL: [email or web address]  Date: 02/18/2025  Prepared by: Marian Baig    Exercises  - Standing Lumbar Extension with Counter  - 3 x daily - 7 x weekly - 3 sets - 5 reps  - Supine Diaphragmatic Breathing  - 2 x daily - 7 x weekly - 2 sets - 10 reps  - Supine Transversus Abdominis Bracing - Hands on Stomach  - 1 x daily - 7 x weekly - 1 sets - 10 reps - 5 sec hold  - Supine March  - 1 x daily - 7 x weekly - 2 sets - 10 reps  - Supine Hip Adduction Isometric with Ball  - 1 x daily - 7 x weekly - 1-2 sets - 10 reps  - Hooklying Isometric Hip Abduction with Belt  - 1 x daily - 7 x weekly - 1-2 sets - 10 reps      Access Code: [email or web address]  URL: [email or web address]  Date: 03/20/2025  Prepared by: Marian Baig    Exercises  - Supine 90/90 Alternating Toe Touch  - 1 x daily - 7 x weekly - 1 sets - 10 reps  - Hooklying Isometric Hip Flexion  - 1 x daily - 7 x weekly - 2 sets - 10 reps  - Supine Bridge  - 1 x daily - 7 x weekly - 2 sets - 10 reps  - Plank with Hands on Table  - 1 x daily - 7 x weekly - 1 sets - 10 reps - 7 sec hold  - Squat with Chair Touch  - 1 x daily - 7 x weekly - 2 sets - 10 reps  - Seated Lumbar Flexion Stretch  - 1 x daily - 7 x weekly - 1 sets - 10 reps - 5 sec hold  - Standing Lumbar Extension with Counter  - 1 x daily - 7 x weekly - 2 sets - 10 reps  - Kettlebell Suitcase Carry  - 1 x daily - 7 x weekly - 3 sets - 10 reps

## 2025-03-26 ENCOUNTER — OFFICE VISIT (OUTPATIENT)
Dept: PHYSICAL THERAPY | Facility: CLINIC | Age: 85
End: 2025-03-26
Payer: MEDICARE

## 2025-03-26 DIAGNOSIS — S22.080D CLOSED WEDGE COMPRESSION FRACTURE OF T12 VERTEBRA WITH ROUTINE HEALING, SUBSEQUENT ENCOUNTER: ICD-10-CM

## 2025-03-26 DIAGNOSIS — S22.089D CLOSED FRACTURE OF TWELFTH THORACIC VERTEBRA WITH ROUTINE HEALING, UNSPECIFIED FRACTURE MORPHOLOGY, SUBSEQUENT ENCOUNTER: Primary | ICD-10-CM

## 2025-03-26 DIAGNOSIS — M47.816 LUMBAR SPONDYLOSIS: ICD-10-CM

## 2025-03-26 PROCEDURE — 97112 NEUROMUSCULAR REEDUCATION: CPT | Performed by: PHYSICAL THERAPIST

## 2025-03-26 PROCEDURE — 97110 THERAPEUTIC EXERCISES: CPT | Performed by: PHYSICAL THERAPIST

## 2025-03-26 NOTE — PROGRESS NOTES
Daily Note         Today's date: 3/26/2025  Patient name: Belinda Dolan  : 1940  MRN: [id number]  Referring provider: Pepito Serra DO  Dx:   Encounter Diagnosis     ICD-10-CM    1. Closed fracture of twelfth thoracic vertebra with routine healing, unspecified fracture morphology, subsequent encounter  S22.089D       2. Lumbar spondylosis  M47.816       3. Closed wedge compression fracture of T12 vertebra with routine healing, subsequent encounter  S22.080D           Subjective: Belinda Dolan reports no low back pain upon entering clinic or this am        Objective: See treatment diary below    Assessment:  Pt introduced to resisted ambulation  without c/o exacerbation of symptoms. , Pt would benefit from continued PT. , Pt required verbal cues in order to perform therex with proper form. , and Pt was educated on proper posture with emphasis on frequent change of position during worki hours while she is working on [employer]. . The goal of the current treatment is to address patient's functional limitations as well as objective findings.       Plan:  continue strength and stabilization          Eval/ Re-eval Auth #/ Referral # Total visits Start date  Expiration date Total active units  Total manual units  PT only or  PT+OT?   2025 No auth, no referral no visit limit                         Past Medical History:   Diagnosis Date    Baker's cyst of knee 10/21/2016    right- burst on its own    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HCC) 1992    hx of-fell when ice skating. Noted brain bleed w/swelling    Glaucoma     right eye    Headache(784.0)     HL (hearing loss)     Low back pain 9/10/24    Macular degeneration     Migraine     Raynaud's disease     both hands    Thoracic vertebral fracture (HCC)                        Insurance :           Visits: 7 8 9 10 11 12 13   Manual: 3/7/25 3/10/25 3/13/25 3/18/25 3/20/25 3/24/25 3/26/25   STM/MI: lumbar musculature              Reeval performed         Log rolling  x 6 trials with TrA                                     Ther ex/NMR:          Manual stretching: piriformis/glute          Rec bike/  nustep NS: 5 min lv 4 Not performed  NS  Not performed  NS: 5 min lv 3 NS: 5 min lv 3 NS: 5 min lv 3             Suitcase carry 9# KB 50'x 2 9 lb: length of turf: 3 rounds 9# length of turf : 4 rounds   9 lb: 4 rounds 25 yards     [place] carry 50' x2 2*8#         SLR      SLR + hip abd: 5 x 2 mae  + TA    Sidelying hip abduction    Stand marches with TrA 2x10 ea Ball vs table : marching 20 x each side  +hip abd:   SL hip abd + hip ext: 5 x 2 + TA    Seated hamstring stretch w/ SOS   HS stretch   SOS x10 ea leg  hold 5       Sit/stand tap to chair  3 lb 10x 2 2x10 3# with TrA 3 lb:  5 lb 10 x 2 3 lb 10 x 2  TRX squats: 10 x 2   EIS  10 x 2   10 x 2 10 x 2         L QL stretch standing using mat table x 5 hold 5 sec  L QL stretch standing using pball  x 10 hold 5 sec  L QL stretch standing using pball  x 10 hold 5 sec      Sit/stand with hip hinge TRX pull up 10 x TRX squats: ---        Diaphragmatic breathing  With SOS for resistance 10x 10 x no resistance Table slides peanut x 10 hold 5 forward flexion       NMR:           Split squat      Rows: 12.5 lb 10 x 2              TA activation (stab cuff) Standing open books 20x b/l D/c    TA: + 90/90 heel tap 10 x each side  TA: + 90/90 heel tap 10 x each side  90/90: 5 sec x 10    + marching Marching with large TT 20x         +hip abd iso   LAQ with TrA with lumbar roll 2x10 10 x 2       +hip add iso          +bent knee fall out Lateral walk 10 steps x 4 GTB Yellow BKFO: 10 x  2                  Alt Hand vs knee isometric midline  5 sec x 15  5 sec x 15  5 sec x15  With hips and knees at 90 deg 5 secx 10 x 2    TA + ball lowering overhead   3 lb weight: 10 x 2 with TA facilitation   3 lb: 10 x 2  w/ TA facilitation  3 lb 10 x 2      +Clamshells      + yellow ball squeeze between ankles: 10 x 2 + yellow ball squeeze between ankles: 10 x 2    Reverse clamshells       10 x 2    Glute set + bridge Back to back with PT- ball hand off 10x b/l --  10 x 2  10 x 2 Hip add iso: 3 secx 10  Hip abd + 3 sec x10  Calves on peanut: 5 sec x 20    Wall planks 7 sec x 10  7 sec x 10   7 secx 10  On edge of table: 7 sec x 10   Edge of table 10 sec x 5    Green band iso hold with scap retractions + march in standing Green: 10 x 2 Green 20 x 2         Resisted ambulation at cable column       Sidesteppin.0 lb 5 rounds mae, bkwd: 5.5 lb 5 rounds;  Forward/bkwd     Palloff press  Green: 10 x 2 Green: 10 x 2        Therapeutic Activity:  Sit to stand with 5# arms extended  10x 5 lb weight at chest.      TRX: 10 x 2    Reevaluation                    Gait Training:                     HEP:                     Modalities          MH          ice          Total time:            Access Code: [email or web address]  URL: [email or web address]/  Date: 2025  Prepared by: Marian Baig    Exercises  - Standing Lumbar Extension with Counter  - 3 x daily - 7 x weekly - 3 sets - 5 reps  - Supine Diaphragmatic Breathing  - 2 x daily - 7 x weekly - 2 sets - 10 reps  - Supine Transversus Abdominis Bracing - Hands on Stomach  - 1 x daily - 7 x weekly - 1 sets - 10 reps - 5 sec hold  - Supine March  - 1 x daily - 7 x weekly - 2 sets - 10 reps  - Supine Hip Adduction Isometric with Ball  - 1 x daily - 7 x weekly - 1-2 sets - 10 reps  - Hooklying Isometric Hip Abduction with Belt  - 1 x daily - 7 x weekly - 1-2 sets - 10 reps      Access Code: [email or web address]  URL: [email or web address]/  Date: 2025  Prepared by: Marian Baig    Exercises  - Supine 90/90 Alternating Toe Touch  - 1 x daily - 7 x weekly - 1 sets - 10 reps  - Hooklying Isometric Hip Flexion  - 1 x daily - 7 x weekly - 2 sets - 10 reps  - Supine Bridge  - 1 x daily - 7 x weekly - 2 sets - 10 reps  - Plank with Hands on Table  - 1 x daily - 7 x weekly - 1 sets - 10 reps - 7 sec hold  - Squat with Chair  Touch  - 1 x daily - 7 x weekly - 2 sets - 10 reps  - Seated Lumbar Flexion Stretch  - 1 x daily - 7 x weekly - 1 sets - 10 reps - 5 sec hold  - Standing Lumbar Extension with Counter  - 1 x daily - 7 x weekly - 2 sets - 10 reps  - Kettlebell Suitcase Carry  - 1 x daily - 7 x weekly - 3 sets - 10 reps

## 2025-04-01 ENCOUNTER — APPOINTMENT (OUTPATIENT)
Dept: PHYSICAL THERAPY | Facility: CLINIC | Age: 85
End: 2025-04-01
Payer: MEDICARE

## 2025-04-03 ENCOUNTER — APPOINTMENT (OUTPATIENT)
Dept: PHYSICAL THERAPY | Facility: CLINIC | Age: 85
End: 2025-04-03
Payer: MEDICARE

## 2025-04-07 ENCOUNTER — OFFICE VISIT (OUTPATIENT)
Dept: PHYSICAL THERAPY | Facility: CLINIC | Age: 85
End: 2025-04-07
Payer: MEDICARE

## 2025-04-07 DIAGNOSIS — S22.089D CLOSED FRACTURE OF TWELFTH THORACIC VERTEBRA WITH ROUTINE HEALING, UNSPECIFIED FRACTURE MORPHOLOGY, SUBSEQUENT ENCOUNTER: Primary | ICD-10-CM

## 2025-04-07 DIAGNOSIS — M47.816 LUMBAR SPONDYLOSIS: ICD-10-CM

## 2025-04-07 DIAGNOSIS — S22.080D CLOSED WEDGE COMPRESSION FRACTURE OF T12 VERTEBRA WITH ROUTINE HEALING, SUBSEQUENT ENCOUNTER: ICD-10-CM

## 2025-04-07 PROCEDURE — 97112 NEUROMUSCULAR REEDUCATION: CPT | Performed by: PHYSICAL THERAPIST

## 2025-04-07 PROCEDURE — 97110 THERAPEUTIC EXERCISES: CPT | Performed by: PHYSICAL THERAPIST

## 2025-04-07 NOTE — PROGRESS NOTES
Daily Note         Today's date: 2025  Patient name: Belinda Dolan  : 1940  MRN: [id number]  Referring provider: Pepito Serra DO  Dx:   Encounter Diagnosis     ICD-10-CM    1. Closed fracture of twelfth thoracic vertebra with routine healing, unspecified fracture morphology, subsequent encounter  S22.089D       2. Lumbar spondylosis  M47.816       3. Closed wedge compression fracture of T12 vertebra with routine healing, subsequent encounter  S22.080D           Subjective: Belinda Dolan reports she had an eye procedure last week which is why she missed PT       Objective: See treatment diary below    Assessment:   still needs cuing for proper form with planking. Postures with hips into flexion , but is able to self correct with cuing. Patient with pain in low back with cuing but resolved when cued to facilitate glutes prior to lift . The goal of the current treatment is to address patient's functional limitations as well as objective findings.       Plan:  progress stabilization          Eval/ Re-eval Auth #/ Referral # Total visits Start date  Expiration date Total active units  Total manual units  PT only or  PT+OT?   2025 No auth, no referral no visit limit                         Past Medical History:   Diagnosis Date    Baker's cyst of knee 10/21/2016    right- burst on its own    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HCC) 1992    hx of-fell when ice skating. Noted brain bleed w/swelling    Glaucoma     right eye    Headache(784.0)     HL (hearing loss)     Low back pain 9/10/24    Macular degeneration     Migraine     Raynaud's disease     both hands    Thoracic vertebral fracture (HCC)                        Insurance :          Visits: 9 10 11 12 13 14   Manual: 3/13/25 3/18/25 3/20/25 3/24/25 3/26/25 4/7/25   STM/MI: lumbar musculature           Reeval performed        Log rolling x 6 trials with TrA                                   Ther ex/NMR:         Manual stretching: piriformis/glute          Rec bike/  nustep NS  Not performed  NS: 5 min lv 3 NS: 5 min lv 3 NS: 5 min lv 3 NS: 7 min lv 3            Suitcase carry 9# length of turf : 4 rounds   9 lb: 4 rounds 25 yards      [place] carry         SLR    SLR + hip abd: 5 x 2 mae  + TA     Sidelying hip abduction  Stand marches with TrA 2x10 ea Ball vs table : marching 20 x each side  +hip abd:   SL hip abd + hip ext: 5 x 2 + TA     Seated hamstring stretch w/ SOS HS stretch   SOS x10 ea leg  hold 5        Sit/stand tap to chair 2x10 3# with TrA 3 lb:  5 lb 10 x 2 3 lb 10 x 2  TRX squats: 10 x 2    EIS  10 x 2 10 x 2                 L QL stretch standing using mat table x 5 hold 5 sec  L QL stretch standing using pball  x 10 hold 5 sec  L QL stretch standing using pball  x 10 hold 5 sec       Sit/stand with hip hinge         Diaphragmatic breathing  Table slides peanut x 10 hold 5 forward flexion        NMR:          Split squat    Rows: 12.5 lb 10 x 2              TA activation (stab cuff)   TA: + 90/90 heel tap 10 x each side  TA: + 90/90 heel tap 10 x each side  90/90: 5 sec x 10  Performed with iso hold for 4 lb overhead 10 x 2   + marching         +hip abd iso LAQ with TrA with lumbar roll 2x10 10 x 2        +hip add iso         +bent knee fall out         Rib trick      4 lb 10 x 2   Alt Hand vs knee isometric midline  5 sec x 15  5 sec x15  With hips and knees at 90 deg 5 secx 10 x 2     TA + ball lowering overhead    3 lb: 10 x 2  w/ TA facilitation  3 lb 10 x 2       +Clamshells    + yellow ball squeeze between ankles: 10 x 2 + yellow ball squeeze between ankles: 10 x 2 + yellow ball squeeze between ankles: 10 x 2   Reverse clamshells     10 x 2  10 x 2    Glute set + bridge  10 x 2  10 x 2 Hip add iso: 3 secx 10  Hip abd + 3 sec x10  Calves on peanut: 5 sec x 20  knees on peanut: 5 sec x 20    Wall planks  7 secx 10  On edge of table: 7 sec x 10   Edge of table 10 sec x 5  10 sec x10    Green band iso hold with scap retractions + march in standing          Resisted ambulation at cable column     Sidesteppin.0 lb 5 rounds mae, bkwd: 5.5 lb 5 rounds;  Forward/bkwd      Palloff press          Therapeutic Activity:      TRX: 10 x 2  TRX squats 10 x 3   Reevaluation                  Gait Training:                   HEP:                   Modalities         MH         ice         Total time:           Access Code: [email or web address]  URL: [email or web address]://[employer]/  Date: 2025  Prepared by: Marian Baig    Exercises  - Standing Lumbar Extension with Counter  - 3 x daily - 7 x weekly - 3 sets - 5 reps  - Supine Diaphragmatic Breathing  - 2 x daily - 7 x weekly - 2 sets - 10 reps  - Supine Transversus Abdominis Bracing - Hands on Stomach  - 1 x daily - 7 x weekly - 1 sets - 10 reps - 5 sec hold  - Supine March  - 1 x daily - 7 x weekly - 2 sets - 10 reps  - Supine Hip Adduction Isometric with Ball  - 1 x daily - 7 x weekly - 1-2 sets - 10 reps  - Hooklying Isometric Hip Abduction with Belt  - 1 x daily - 7 x weekly - 1-2 sets - 10 reps      Access Code: [email or web address]  URL: [email or web address]://[employer]/  Date: 2025  Prepared by: Marian Baig    Exercises  - Supine 90/90 Alternating Toe Touch  - 1 x daily - 7 x weekly - 1 sets - 10 reps  - Hooklying Isometric Hip Flexion  - 1 x daily - 7 x weekly - 2 sets - 10 reps  - Supine Bridge  - 1 x daily - 7 x weekly - 2 sets - 10 reps  - Plank with Hands on Table  - 1 x daily - 7 x weekly - 1 sets - 10 reps - 7 sec hold  - Squat with Chair Touch  - 1 x daily - 7 x weekly - 2 sets - 10 reps  - Seated Lumbar Flexion Stretch  - 1 x daily - 7 x weekly - 1 sets - 10 reps - 5 sec hold  - Standing Lumbar Extension with Counter  - 1 x daily - 7 x weekly - 2 sets - 10 reps  - Kettlebell Suitcase Carry  - 1 x daily - 7 x weekly - 3 sets - 10 reps

## 2025-04-09 ENCOUNTER — OFFICE VISIT (OUTPATIENT)
Dept: PHYSICAL THERAPY | Facility: CLINIC | Age: 85
End: 2025-04-09
Payer: MEDICARE

## 2025-04-09 DIAGNOSIS — S22.080D CLOSED WEDGE COMPRESSION FRACTURE OF T12 VERTEBRA WITH ROUTINE HEALING, SUBSEQUENT ENCOUNTER: ICD-10-CM

## 2025-04-09 DIAGNOSIS — M47.816 LUMBAR SPONDYLOSIS: ICD-10-CM

## 2025-04-09 DIAGNOSIS — S22.089D CLOSED FRACTURE OF TWELFTH THORACIC VERTEBRA WITH ROUTINE HEALING, UNSPECIFIED FRACTURE MORPHOLOGY, SUBSEQUENT ENCOUNTER: Primary | ICD-10-CM

## 2025-04-09 PROCEDURE — 97110 THERAPEUTIC EXERCISES: CPT | Performed by: PHYSICAL THERAPIST

## 2025-04-09 PROCEDURE — 97112 NEUROMUSCULAR REEDUCATION: CPT | Performed by: PHYSICAL THERAPIST

## 2025-04-09 NOTE — PROGRESS NOTES
Daily Note         Today's date: 2025  Patient name: Belinda Dolan  : 1940  MRN: [id number]  Referring provider: Pepito Serra DO  Dx:   Encounter Diagnosis     ICD-10-CM    1. Closed fracture of twelfth thoracic vertebra with routine healing, unspecified fracture morphology, subsequent encounter  S22.089D       2. Lumbar spondylosis  M47.816       3. Closed wedge compression fracture of T12 vertebra with routine healing, subsequent encounter  S22.080D           Subjective: Belinda Dolan reports no pain entering today, states on Tuesday she had hip soreness which is not a new symptom       Objective: See treatment diary below    Assessment:   patient had excellent tolerance to program and needed only minimal cuing for proper form with exercises, specifically planking.   Patient is progressing towards d/c at end of scheduled appointments.       Plan:  d/c ~ 25          Eval/ Re-eval Auth #/ Referral # Total visits Start date  Expiration date Total active units  Total manual units  PT only or  PT+OT?   2025 No auth, no referral no visit limit                         Past Medical History:   Diagnosis Date    Baker's cyst of knee 10/21/2016    right- burst on its own    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HCC) 1992    hx of-fell when ice skating. Noted brain bleed w/swelling    Glaucoma     right eye    Headache(784.0)     HL (hearing loss)     Low back pain 9/10/24    Macular degeneration     Migraine     Raynaud's disease     both hands    Thoracic vertebral fracture (HCC)                        Insurance :           Visits: 9 10 11 12 13 14 15   Manual: 3/13/25 3/18/25 3/20/25 3/24/25 3/26/25 4/7/25 4/9/25   STM/MI: lumbar musculature            Reeval performed         Log rolling x 6 trials with TrA                                       Ther ex/NMR:          Manual stretching: piriformis/glute          Rec bike/  nustep NS  Not performed  NS: 5 min lv 3 NS: 5 min lv 3 NS: 5 min lv 3 NS: 7 min lv 3  NS: 7 min lv 6             Suitcase carry 9# length of turf : 4 rounds   9 lb: 4 rounds 25 yards       [place] carry          SLR    SLR + hip abd: 5 x 2 mae  + TA   SLR + hip abd: 5 x 2 mae  + TA   Sidelying hip abduction  Stand marches with TrA 2x10 ea Ball vs table : marching 20 x each side  +hip abd:   SL hip abd + hip ext: 5 x 2 + TA      Seated hamstring stretch w/ SOS HS stretch   SOS x10 ea leg  hold 5         Sit/stand tap to chair 2x10 3# with TrA 3 lb:  5 lb 10 x 2 3 lb 10 x 2  TRX squats: 10 x 2  10 x3 + TRX   EIS  10 x 2 10 x 2                   L QL stretch standing using mat table x 5 hold 5 sec  L QL stretch standing using pball  x 10 hold 5 sec  L QL stretch standing using pball  x 10 hold 5 sec        Sit/stand with hip hinge          Diaphragmatic breathing  Table slides peanut x 10 hold 5 forward flexion         NMR:           Split squat    Rows: 12.5 lb 10 x 2                TA activation (stab cuff)   TA: + 90/90 heel tap 10 x each side  TA: + 90/90 heel tap 10 x each side  90/90: 5 sec x 10  Performed with iso hold for 4 lb overhead 10 x 2 Performed with iso hold for 4 lb overhead 10 x 2   + marching          +hip abd iso LAQ with TrA with lumbar roll 2x10 10 x 2         +hip add iso          +bent knee fall out          Rib trick      4 lb 10 x 2 4 lb: 10 x 2   Alt Hand vs knee isometric midline  5 sec x 15  5 sec x15  With hips and knees at 90 deg 5 secx 10 x 2      TA + ball lowering overhead    3 lb: 10 x 2  w/ TA facilitation  3 lb 10 x 2        +Clamshells    + yellow ball squeeze between ankles: 10 x 2 + yellow ball squeeze between ankles: 10 x 2 + yellow ball squeeze between ankles: 10 x 2 + yellow ball squeeze between ankles: 10 x 2   Reverse clamshells     10 x 2  10 x 2  10 x 2   Glute set + bridge  10 x 2  10 x 2 Hip add iso: 3 secx 10  Hip abd + 3 sec x10  Calves on peanut: 5 sec x 20  knees on peanut: 5 sec x 20  knees on peanut: 5 sec x 20    Wall planks  7 secx 10  On edge of  table: 7 sec x 10   Edge of table 10 sec x 5  10 sec x10  10 secx 10    Green band iso hold with scap retractions + march in standing          Resisted ambulation at cable column     Sidesteppin.0 lb 5 rounds mae, bkwd: 5.5 lb 5 rounds;  Forward/bkwd    Sidesteppin.0 lb 5 rounds mae, bkwd: 5.5 lb 5 rounds;  Forward/bkwd   Palloff press           Therapeutic Activity:      TRX: 10 x 2  TRX squats 10 x 3 See above    Reevaluation                    Gait Training:                     HEP:                     Modalities          MH          ice          Total time:            Access Code: [email or web address]  URL: [email or web address]/  Date: 2025  Prepared by: Marian Baig    Exercises  - Standing Lumbar Extension with Counter  - 3 x daily - 7 x weekly - 3 sets - 5 reps  - Supine Diaphragmatic Breathing  - 2 x daily - 7 x weekly - 2 sets - 10 reps  - Supine Transversus Abdominis Bracing - Hands on Stomach  - 1 x daily - 7 x weekly - 1 sets - 10 reps - 5 sec hold  - Supine March  - 1 x daily - 7 x weekly - 2 sets - 10 reps  - Supine Hip Adduction Isometric with Ball  - 1 x daily - 7 x weekly - 1-2 sets - 10 reps  - Hooklying Isometric Hip Abduction with Belt  - 1 x daily - 7 x weekly - 1-2 sets - 10 reps      Access Code: [email or web address]  URL: [email or web address]/  Date: 2025  Prepared by: Marian Baig    Exercises  - Supine 90/90 Alternating Toe Touch  - 1 x daily - 7 x weekly - 1 sets - 10 reps  - Hooklying Isometric Hip Flexion  - 1 x daily - 7 x weekly - 2 sets - 10 reps  - Supine Bridge  - 1 x daily - 7 x weekly - 2 sets - 10 reps  - Plank with Hands on Table  - 1 x daily - 7 x weekly - 1 sets - 10 reps - 7 sec hold  - Squat with Chair Touch  - 1 x daily - 7 x weekly - 2 sets - 10 reps  - Seated Lumbar Flexion Stretch  - 1 x daily - 7 x weekly - 1 sets - 10 reps - 5 sec hold  - Standing Lumbar Extension with Counter  - 1 x daily - 7 x weekly - 2 sets - 10 reps  - Kettlebell  Suitcase Carry  - 1 x daily - 7 x weekly - 3 sets - 10 reps

## 2025-04-14 ENCOUNTER — OFFICE VISIT (OUTPATIENT)
Dept: PHYSICAL THERAPY | Facility: CLINIC | Age: 85
End: 2025-04-14
Payer: MEDICARE

## 2025-04-14 DIAGNOSIS — M47.816 LUMBAR SPONDYLOSIS: ICD-10-CM

## 2025-04-14 DIAGNOSIS — S22.080D CLOSED WEDGE COMPRESSION FRACTURE OF T12 VERTEBRA WITH ROUTINE HEALING, SUBSEQUENT ENCOUNTER: ICD-10-CM

## 2025-04-14 DIAGNOSIS — S22.089D CLOSED FRACTURE OF TWELFTH THORACIC VERTEBRA WITH ROUTINE HEALING, UNSPECIFIED FRACTURE MORPHOLOGY, SUBSEQUENT ENCOUNTER: Primary | ICD-10-CM

## 2025-04-14 PROCEDURE — 97112 NEUROMUSCULAR REEDUCATION: CPT | Performed by: PHYSICAL THERAPIST

## 2025-04-14 PROCEDURE — 97110 THERAPEUTIC EXERCISES: CPT | Performed by: PHYSICAL THERAPIST

## 2025-04-14 NOTE — PROGRESS NOTES
Daily Note         Today's date: 2025  Patient name: Belinda Dolan  : 1940  MRN: [id number]  Referring provider: Pepito Serra DO  Dx:   Encounter Diagnosis     ICD-10-CM    1. Closed fracture of twelfth thoracic vertebra with routine healing, unspecified fracture morphology, subsequent encounter  S22.089D       2. Lumbar spondylosis  M47.816       3. Closed wedge compression fracture of T12 vertebra with routine healing, subsequent encounter  S22.080D           Subjective: Belinda Dolan reports over the weekend with the cold and rainy weather all of her arthritic joints were painful particularly the left hip.         Objective: See treatment diary below    Assessment:   patient feels her BP was elevated. BP taken in clinic and was 160 / 88 . Patient vocalized she will contact physician after appointment to discuss BP. States left side leg lifts is highly challenging compared to right. Did experience pain left hip into left thigh with right sidelying hip abduction. But resolved upon completion of this activity.     The goal of the current treatment is to address patient's functional limitations as well as objective findings.   Patient needs only minimal cuing for proper form with there ex.      Plan: Progress note during next visit.           Eval/ Re-eval Auth #/ Referral # Total visits Start date  Expiration date Total active units  Total manual units  PT only or  PT+OT?   2025 No auth, no referral no visit limit                         Past Medical History:   Diagnosis Date    Baker's cyst of knee 10/21/2016    right- burst on its own    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HCC) 1992    hx of-fell when ice skating. Noted brain bleed w/swelling    Glaucoma     right eye    Headache(784.0)     HL (hearing loss)     Low back pain 9/10/24    Macular degeneration     Migraine     Raynaud's disease     both hands    Thoracic vertebral fracture (HCC)                        Insurance :          Visits:   13 14 15 16   Manual: 3/20/25 3/24/25 3/26/25 4/7/25 4/9/25 4/14/25   STM/MI: lumbar musculature      /88 sitting                      Ther ex/NMR:         Manual stretching: piriformis/glute         Rec bike/  nustep NS: 5 min lv 3 NS: 5 min lv 3 NS: 5 min lv 3 NS: 7 min lv 3 NS: 7 min lv 6 NS: 7 min lv 6   Heel slides w/ peanut emphasis on TA      20 x    Suitcase carry 9 lb: 4 rounds 25 yards        [place] carry         SLR  SLR + hip abd: 5 x 2 mae  + TA   SLR + hip abd: 5 x 2 mae  + TA 10 x 2  + TA each side   Sidelying hip abduction   SL hip abd + hip ext: 5 x 2 + TA    10 x 2   Seated hamstring stretch w/ SOS         Sit/stand tap to chair 5 lb 10 x 2 3 lb 10 x 2  TRX squats: 10 x 2  10 x3 + TRX TRX squats: 10 x  3   EIS 10 x 2          L QL stretch standing using pball  x 10 hold 5 sec         Sit/stand with hip hinge         Diaphragmatic breathing          NMR:          Split squat  Rows: 12.5 lb 10 x 2    ++   TA activation (stab cuff) TA: + 90/90 heel tap 10 x each side  TA: + 90/90 heel tap 10 x each side  90/90: 5 sec x 10  Performed with iso hold for 4 lb overhead 10 x 2 Performed with iso hold for 4 lb overhead 10 x 2    + marching         +hip abd iso         +hip add iso         +bent knee fall out      Red: 5 sec x 10    Rib trick    4 lb 10 x 2 4 lb: 10 x 2 4 lb 10 x 2   Alt Hand vs knee isometric midline 5 sec x15  With hips and knees at 90 deg 5 secx 10 x 2       TA + ball lowering overhead  3 lb 10 x 2         +Clamshells  + yellow ball squeeze between ankles: 10 x 2 + yellow ball squeeze between ankles: 10 x 2 + yellow ball squeeze between ankles: 10 x 2 + yellow ball squeeze between ankles: 10 x 2    Reverse clamshells   10 x 2  10 x 2  10 x 2    Glute set + bridge 10 x 2 Hip add iso: 3 secx 10  Hip abd + 3 sec x10  Calves on peanut: 5 sec x 20  knees on peanut: 5 sec x 20  knees on peanut: 5 sec x 20  knees on peanut: 5 sec x 20    Wall planks On edge of table: 7 sec x 10   Edge of  table 10 sec x 5  10 sec x10  10 secx 10     Green band iso hold with scap retractions + march in standing      Green: 10 x2    Resisted ambulation at cable column   Sidesteppin.0 lb 5 rounds mae, bkwd: 5.5 lb 5 rounds;  Forward/bkwd    Sidesteppin.0 lb 5 rounds mae, bkwd: 5.5 lb 5 rounds;  Forward/bkwd    Palloff press          Therapeutic Activity:    TRX: 10 x 2  TRX squats 10 x 3 See above  TRX: 10 x 3    Reevaluation                  Gait Training:                   HEP:                   Modalities         MH         ice         Total time:           Access Code: [email or web address]  URL: [email or web address]  Date: 2025  Prepared by: Marian Baig    Exercises  - Standing Lumbar Extension with Counter  - 3 x daily - 7 x weekly - 3 sets - 5 reps  - Supine Diaphragmatic Breathing  - 2 x daily - 7 x weekly - 2 sets - 10 reps  - Supine Transversus Abdominis Bracing - Hands on Stomach  - 1 x daily - 7 x weekly - 1 sets - 10 reps - 5 sec hold  - Supine March  - 1 x daily - 7 x weekly - 2 sets - 10 reps  - Supine Hip Adduction Isometric with Ball  - 1 x daily - 7 x weekly - 1-2 sets - 10 reps  - Hooklying Isometric Hip Abduction with Belt  - 1 x daily - 7 x weekly - 1-2 sets - 10 reps      Access Code: [email or web address]  URL: [email or web address]  Date: 2025  Prepared by: Marian Baig    Exercises  - Supine 90/90 Alternating Toe Touch  - 1 x daily - 7 x weekly - 1 sets - 10 reps  - Hooklying Isometric Hip Flexion  - 1 x daily - 7 x weekly - 2 sets - 10 reps  - Supine Bridge  - 1 x daily - 7 x weekly - 2 sets - 10 reps  - Plank with Hands on Table  - 1 x daily - 7 x weekly - 1 sets - 10 reps - 7 sec hold  - Squat with Chair Touch  - 1 x daily - 7 x weekly - 2 sets - 10 reps  - Seated Lumbar Flexion Stretch  - 1 x daily - 7 x weekly - 1 sets - 10 reps - 5 sec hold  - Standing Lumbar Extension with Counter  - 1 x daily - 7 x weekly - 2 sets - 10 reps  - Kettlebell Suitcase Carry  - 1 x  daily - 7 x weekly - 3 sets - 10 reps

## 2025-04-17 ENCOUNTER — EVALUATION (OUTPATIENT)
Dept: PHYSICAL THERAPY | Facility: CLINIC | Age: 85
End: 2025-04-17
Attending: STUDENT IN AN ORGANIZED HEALTH CARE EDUCATION/TRAINING PROGRAM
Payer: MEDICARE

## 2025-04-17 DIAGNOSIS — S22.080D CLOSED WEDGE COMPRESSION FRACTURE OF T12 VERTEBRA WITH ROUTINE HEALING, SUBSEQUENT ENCOUNTER: ICD-10-CM

## 2025-04-17 DIAGNOSIS — S22.089D CLOSED FRACTURE OF TWELFTH THORACIC VERTEBRA WITH ROUTINE HEALING, UNSPECIFIED FRACTURE MORPHOLOGY, SUBSEQUENT ENCOUNTER: Primary | ICD-10-CM

## 2025-04-17 DIAGNOSIS — M47.816 LUMBAR SPONDYLOSIS: ICD-10-CM

## 2025-04-17 PROCEDURE — 97110 THERAPEUTIC EXERCISES: CPT | Performed by: PHYSICAL THERAPIST

## 2025-04-17 PROCEDURE — 97530 THERAPEUTIC ACTIVITIES: CPT | Performed by: PHYSICAL THERAPIST

## 2025-04-17 NOTE — PROGRESS NOTES
Daily Note/Progress Note      Today's date: 2025  Patient name: Belinda Dolan  : 1940  MRN: [id number]  Referring provider: Pepito Serra DO  Dx:   Encounter Diagnosis     ICD-10-CM    1. Closed fracture of twelfth thoracic vertebra with routine healing, unspecified fracture morphology, subsequent encounter  S22.089D       2. Lumbar spondylosis  M47.816       3. Closed wedge compression fracture of T12 vertebra with routine healing, subsequent encounter  S22.080D           Subjective: Pt reports % improvement since SOC: 75%.  The last 25% is due to she still has hip pain in damp weather. No longer experiences back pain .    Pain level at rest:  0 /10, pain level with ADLS:  1-2 /10 main complaint is stiffness upon rising from static sitting  .  At this time, the functional limitations include: none      Objective: See treatment diary below (25)    Patient goal(s) for physical therapy is: to not have pain and be able to sleep ( states she is sleeping but pain is still present as described above)( met)    Concurrent Complaints:  Red flags present: neg     Upper Motor Neuron testing (present/not present):  Marinelli (middle finger first segment flick + sign is first and second finger to flexion): neg  Inverted supinator ( reflex hammer to supinator normal: neg is wrist extension, + wrist and finger flexion) : 2+      Posture   Sitting:good upright posture, needs lumbar support (still needs cuing and reminders for posture correction to avoid symptoms)   Standing: mild forward head, reduced lumbar lordosis (status quo)         Gait: wnl on level surfaces (status quo)   Assistive device: neg   Trunk movement: wnl   Stride length: wnl   Trendelenburg: neg   Foot drop: neg    Functional movements:   Squat: wn + low back pain and knees  (needs cuing to maintain her back straight to avoid pain: when performed correctly is painfree)   SLS: left: 10 sec + pain   Right : 17 sec  (Right: 31:27 sec, left  "3:45 sec) (Right: 29:91 sec   left: 9:73 sec)   Bridging: able to lift 50% (+)pain  ( able to lift 75% of motion)   Transfers sit/stand: needs UE assistance to stand ( able ot perform with out UE assistance)    bed mobility independent     Lumbar AROM:    Flexion: moderate LOM +  pain with return to stand  (wnl no pain )   Extension: moderate LOM \"it hurts but feels good\"  (min LOM no pain and \"feels good\")(status quo)    Side bend: Right: minimal LOM (+)pain  (min LOM no pain )status quo    Left: minimal LOM (+)pain  ( min LOM pain with return to stand) status quo    Repeated motions: NT due to patient restrictions      LE MMT    4/17/25 : wnl t/o mae LE   L Hip Flexion: 4-/5 (4/5 no pain) R Hip Flexion: 4-/5 (  4/5 mae)    L Hip Extension: 4/5 R Hip Extension: 4/5    L Hip Abduction: 4+/5 R Hip Abduction: 4+/5    L Hip Adduction: 4+/5 R Hip Adduction: 4+/5    L Knee Extension: 4/5 R Knee Extension: 4/5    L Knee Flexion: 4/5 R Knee Flexion: 4/5    L Ankle DF: 4/5 R Ankle DF: 4/5    L Ankle PF: 4/5  R Ankle PF: 4/5        Dermatomes: wnl to light touch      TA facilitation: fair    Flexibility: (status quo)   Hamstring: Right: good  Left: good   Hip flexors:Right: good  Left: good     Quads: Right: fair  Left: fair           STG (3/18/25) (4/17/25)  + Patient will report a 30% improvement in symptoms (2-3 weeks) met  + Patient will be independent in basic HEP (2-3 weeks) met  + Patient will demonstrate an increase in range of motion  sidebend mae  to  within normal limits de to less pain  (2-3 weeks) not met but not pain ful  + Patient will report increased ease sleeping due to a reduction in pain (2-3 weeks) met      LTG:  + Patient will report a 60% improvement in symptoms (4-6 weeks) not met (met)  + Patient will increase FOTO score by 10 points (8 sessions) TBA (met)  + Patient will be independent in comprehensive HEP (4-6 weeks) not met (met)  + Patient will demonstrate increase  MMT of  mae knee and hip " musculature to  4+/5 for maximum function. (4-6 weeks) not met (met)  + Patient will tolerate 35 min core stab program without an increase in  pain  (4-6 weeks) not met consistently ( met)         Assessment: Belinda Dolan demonstrates signficant improvement since SOC in resolving pain and improving objective limitations. Patient tolerates a comprehensive strengthening/stab program wihtout production of symptoms. She has a good working knowledge of proper form with there ex and has resolved any functional limitations. Paitent no longer requires skilled PT.  The goal status of Belinda Dolan is indicated above .    Plan:  d/c to HEp          Eval/ Re-eval Auth #/ Referral # Total visits Start date  Expiration date Total active units  Total manual units  PT only or  PT+OT?   2/13/2025 No auth, no referral no visit limit                         Past Medical History:   Diagnosis Date    Baker's cyst of knee 10/21/2016    right- burst on its own    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HCC) 1992    hx of-fell when ice skating. Noted brain bleed w/swelling    Glaucoma     right eye    Headache(784.0)     HL (hearing loss)     Low back pain 9/10/24    Macular degeneration     Migraine     Raynaud's disease     both hands    Thoracic vertebral fracture (HCC)                        Insurance :           Visits: 11 12 13 14 15 16 17   Manual: 3/20/25 3/24/25 3/26/25 4/7/25 4/9/25 4/14/25 4/17/25   STM/MI: lumbar musculature      /88 sitting            Reeval performed              Ther ex/NMR:          Manual stretching: piriformis/glute          Rec bike/  nustep NS: 5 min lv 3 NS: 5 min lv 3 NS: 5 min lv 3 NS: 7 min lv 3 NS: 7 min lv 6 NS: 7 min lv 6 Lv 5 7 min NS   Heel slides w/ peanut emphasis on TA      20 x     Suitcase carry 9 lb: 4 rounds 25 yards         [place] carry          SLR  SLR + hip abd: 5 x 2 mae  + TA   SLR + hip abd: 5 x 2 mae  + TA 10 x 2  + TA each side    Sidelying hip abduction   SL hip abd + hip ext: 5 x 2 + TA     10 x 2    Seated hamstring stretch w/ SOS          Sit/stand tap to chair 5 lb 10 x 2 3 lb 10 x 2  TRX squats: 10 x 2  10 x3 + TRX TRX squats: 10 x  3 TRX squats: 10 x  3   EIS 10 x 2           L QL stretch standing using pball  x 10 hold 5 sec          Sit/stand with hip hinge          Diaphragmatic breathing           NMR:           Split squat  Rows: 12.5 lb 10 x 2    ++    TA activation (stab cuff) TA: + 90/90 heel tap 10 x each side  TA: + 90/90 heel tap 10 x each side  90/90: 5 sec x 10  Performed with iso hold for 4 lb overhead 10 x 2 Performed with iso hold for 4 lb overhead 10 x 2  Performed with iso hold for 4 lb overhead 10 x 2   + marching          +hip abd iso          +hip add iso          +bent knee fall out      Red: 5 sec x 10  Red: 10 x 2 5 sec    Rib trick    4 lb 10 x 2 4 lb: 10 x 2 4 lb 10 x 2 4 lb 10 x 2   Alt Hand vs knee isometric midline 5 sec x15  With hips and knees at 90 deg 5 secx 10 x 2        TA + ball lowering overhead  3 lb 10 x 2          +Clamshells  + yellow ball squeeze between ankles: 10 x 2 + yellow ball squeeze between ankles: 10 x 2 + yellow ball squeeze between ankles: 10 x 2 + yellow ball squeeze between ankles: 10 x 2  Isometric hold top of motion:  5 sec x 20   Reverse clamshells   10 x 2  10 x 2  10 x 2  Isometric hold top of motion:    Glute set + bridge 10 x 2 Hip add iso: 3 secx 10  Hip abd + 3 sec x10  Calves on peanut: 5 sec x 20  knees on peanut: 5 sec x 20  knees on peanut: 5 sec x 20  knees on peanut: 5 sec x 20     Wall planks On edge of table: 7 sec x 10   Edge of table 10 sec x 5  10 sec x10  10 secx 10   10 sec x 10    Green band iso hold with scap retractions + march in standing      Green: 10 x2  Green:    Resisted ambulation at cable column   Sidesteppin.0 lb 5 rounds mae, bkwd: 5.5 lb 5 rounds;  Forward/bkwd    Sidesteppin.0 lb 5 rounds mae, bkwd: 5.5 lb 5 rounds;  Forward/bkwd   5.5 lb 5 rounds;  Forward/bkwd 7 x  Sidesteppin.0 lb 5 rounds  mae   [facility] press           Therapeutic Activity:    TRX: 10 x 2  TRX squats 10 x 3 See above  TRX: 10 x 3  TRX: 10 x 3    Reevaluation                    Gait Training:                     HEP:                     Modalities          MH          ice          Total time:            Access Code: [email or web address]  URL: [email or web address]  Date: 02/18/2025  Prepared by: Marianquoc Baig    Exercises  - Standing Lumbar Extension with Counter  - 3 x daily - 7 x weekly - 3 sets - 5 reps  - Supine Diaphragmatic Breathing  - 2 x daily - 7 x weekly - 2 sets - 10 reps  - Supine Transversus Abdominis Bracing - Hands on Stomach  - 1 x daily - 7 x weekly - 1 sets - 10 reps - 5 sec hold  - Supine March  - 1 x daily - 7 x weekly - 2 sets - 10 reps  - Supine Hip Adduction Isometric with Ball  - 1 x daily - 7 x weekly - 1-2 sets - 10 reps  - Hooklying Isometric Hip Abduction with Belt  - 1 x daily - 7 x weekly - 1-2 sets - 10 reps      Access Code: [email or web address]  URL: [email or web address]  Date: 03/20/2025  Prepared by: Marian Baig    Exercises  - Supine 90/90 Alternating Toe Touch  - 1 x daily - 7 x weekly - 1 sets - 10 reps  - Hooklying Isometric Hip Flexion  - 1 x daily - 7 x weekly - 2 sets - 10 reps  - Supine Bridge  - 1 x daily - 7 x weekly - 2 sets - 10 reps  - Plank with Hands on Table  - 1 x daily - 7 x weekly - 1 sets - 10 reps - 7 sec hold  - Squat with Chair Touch  - 1 x daily - 7 x weekly - 2 sets - 10 reps  - Seated Lumbar Flexion Stretch  - 1 x daily - 7 x weekly - 1 sets - 10 reps - 5 sec hold  - Standing Lumbar Extension with Counter  - 1 x daily - 7 x weekly - 2 sets - 10 reps  - Kettlebell Suitcase Carry  - 1 x daily - 7 x weekly - 3 sets - 10 reps

## 2025-04-22 ENCOUNTER — APPOINTMENT (OUTPATIENT)
Dept: PHYSICAL THERAPY | Facility: CLINIC | Age: 85
End: 2025-04-22
Payer: MEDICARE

## 2025-04-24 ENCOUNTER — APPOINTMENT (OUTPATIENT)
Dept: PHYSICAL THERAPY | Facility: CLINIC | Age: 85
End: 2025-04-24
Payer: MEDICARE

## 2025-04-26 DIAGNOSIS — H40.9 GLAUCOMA OF RIGHT EYE, UNSPECIFIED GLAUCOMA TYPE: ICD-10-CM

## 2025-04-26 DIAGNOSIS — H35.30 MACULAR DEGENERATION OF BOTH EYES, UNSPECIFIED TYPE: ICD-10-CM

## 2025-04-26 DIAGNOSIS — K21.9 GASTROESOPHAGEAL REFLUX DISEASE WITHOUT ESOPHAGITIS: ICD-10-CM

## 2025-04-28 RX ORDER — BRIMONIDINE TARTRATE 1 MG/ML
SOLUTION/ DROPS OPHTHALMIC
Qty: 15 ML | Refills: 1 | Status: SHIPPED | OUTPATIENT
Start: 2025-04-28

## 2025-04-28 RX ORDER — OMEPRAZOLE 20 MG/1
20 CAPSULE, DELAYED RELEASE ORAL DAILY
Qty: 90 CAPSULE | Refills: 1 | Status: SHIPPED | OUTPATIENT
Start: 2025-04-28

## 2025-05-12 DIAGNOSIS — I10 ESSENTIAL HYPERTENSION: ICD-10-CM

## 2025-05-12 DIAGNOSIS — E78.2 MIXED HYPERLIPIDEMIA: ICD-10-CM

## 2025-05-12 RX ORDER — OLMESARTAN MEDOXOMIL 20 MG/1
20 TABLET ORAL DAILY
Qty: 90 TABLET | Refills: 1 | Status: SHIPPED | OUTPATIENT
Start: 2025-05-12

## 2025-05-12 RX ORDER — ATORVASTATIN CALCIUM 10 MG/1
10 TABLET, FILM COATED ORAL DAILY
Qty: 90 TABLET | Refills: 1 | Status: SHIPPED | OUTPATIENT
Start: 2025-05-12

## 2025-05-14 DIAGNOSIS — J30.1 SEASONAL ALLERGIC RHINITIS DUE TO POLLEN: ICD-10-CM

## 2025-05-15 RX ORDER — LEVOCETIRIZINE DIHYDROCHLORIDE 5 MG/1
5 TABLET, FILM COATED ORAL EVERY EVENING
Qty: 90 TABLET | Refills: 1 | Status: SHIPPED | OUTPATIENT
Start: 2025-05-15

## 2025-07-06 DIAGNOSIS — H35.30 MACULAR DEGENERATION OF BOTH EYES, UNSPECIFIED TYPE: ICD-10-CM

## 2025-07-06 DIAGNOSIS — H40.9 GLAUCOMA OF RIGHT EYE, UNSPECIFIED GLAUCOMA TYPE: ICD-10-CM

## 2025-07-08 RX ORDER — TIMOLOL MALEATE 5 MG/ML
SOLUTION/ DROPS OPHTHALMIC
Qty: 15 ML | Refills: 1 | Status: SHIPPED | OUTPATIENT
Start: 2025-07-08

## 2025-08-16 DIAGNOSIS — E87.6 HYPOPOTASSEMIA: ICD-10-CM

## 2025-08-18 RX ORDER — POTASSIUM CHLORIDE 1500 MG/1
1 TABLET, EXTENDED RELEASE ORAL DAILY
Qty: 90 TABLET | Refills: 1 | Status: SHIPPED | OUTPATIENT
Start: 2025-08-18

## 2025-08-19 DIAGNOSIS — I10 ESSENTIAL HYPERTENSION: ICD-10-CM

## 2025-08-19 DIAGNOSIS — E78.2 MIXED HYPERLIPIDEMIA: ICD-10-CM

## 2025-08-19 DIAGNOSIS — Z13.0 SCREENING FOR DEFICIENCY ANEMIA: ICD-10-CM

## 2025-08-19 DIAGNOSIS — D72.820 PERSISTENT LYMPHOCYTOSIS: ICD-10-CM

## 2025-08-19 DIAGNOSIS — E78.00 HYPERCHOLESTEREMIA: ICD-10-CM

## 2025-08-19 DIAGNOSIS — E21.3 HYPERPARATHYROIDISM (HCC): ICD-10-CM

## (undated) DEVICE — POSITIONER TRIMANO LIMB BEACH CHAIR

## (undated) DEVICE — PACK ARTHROSCOPY

## (undated) DEVICE — BLADE SHAVER EXCALIBUR 5.5MM 130CM COOLCUT

## (undated) DEVICE — ALL PURPOSE SPONGES,NON-WOVEN, 4 PLY: Brand: CURITY

## (undated) DEVICE — SUT FIBERSTICK #2 50IN BLUE

## (undated) DEVICE — GLOVE PI ULTRA TOUCH SZ.7.5

## (undated) DEVICE — TIBURON BEACH CHAIR SHOULDER DRAPE: Brand: CONVERTORS

## (undated) DEVICE — LABEL MEDICATION STERILE 2 YELLOW LIDOCAINE 2 BLUE MARCAINE 2 ORANGE HEPARIN

## (undated) DEVICE — 3M™ TEGADERM™ TRANSPARENT FILM DRESSING FRAME STYLE, 1626W, 4 IN X 4-3/4 IN (10 CM X 12 CM), 50/CT 4CT/CASE: Brand: 3M™ TEGADERM™

## (undated) DEVICE — CANNULA 5.75 X 70MM BARREL SHAPED BOWL

## (undated) DEVICE — BLADE SHAVER TORPEDO 4MM 13CM  COOLCUT

## (undated) DEVICE — FIBERTAPE 2MM X 7IN AR-7237-7

## (undated) DEVICE — NEEDLE SUT SCORPION MULTIFIRE

## (undated) DEVICE — 3M™ IOBAN™ 2 ANTIMICROBIAL INCISE DRAPE 6650EZ: Brand: IOBAN™ 2

## (undated) DEVICE — INTENDED FOR TISSUE SEPARATION, AND OTHER PROCEDURES THAT REQUIRE A SHARP SURGICAL BLADE TO PUNCTURE OR CUT.: Brand: BARD-PARKER ® CARBON RIB-BACK BLADES

## (undated) DEVICE — BLADE SHAVER EXCALIBUR 4MM 13CM COOLCUT

## (undated) DEVICE — VAPR COOLPULSE 90 ELECTRODE WITH HAND CONTROLS 90 DEGREES SUCTION WITH INTEGRATED HANDPIECE: Brand: VAPR COOLPULSE

## (undated) DEVICE — GLOVE PI ULTRA TOUCH SZ.6.5

## (undated) DEVICE — BURR  OVAL 4MM 13CM 8 FLUTE COOLCUT

## (undated) DEVICE — GLOVE INDICATOR PI UNDERGLOVE SZ 6.5 BLUE

## (undated) DEVICE — ASTOUND IMPERVIOUS SURGICAL GOWN: Brand: CONVERTORS

## (undated) DEVICE — TUBING ARTHROSCOPY REDUCE PATIENT

## (undated) DEVICE — GLOVE INDICATOR PI UNDERGLOVE SZ 7.5 BLUE

## (undated) DEVICE — CANNULA BUTTON 8 X 30MM PASSPORT

## (undated) DEVICE — TUBING ARTHROSCOPIC WAVE  MAIN PUMP